# Patient Record
Sex: FEMALE | Race: WHITE | Employment: OTHER | URBAN - METROPOLITAN AREA
[De-identification: names, ages, dates, MRNs, and addresses within clinical notes are randomized per-mention and may not be internally consistent; named-entity substitution may affect disease eponyms.]

---

## 2017-02-14 ENCOUNTER — APPOINTMENT (OUTPATIENT)
Dept: GENERAL RADIOLOGY | Age: 72
End: 2017-02-14
Payer: MEDICARE

## 2017-02-14 ENCOUNTER — HOSPITAL ENCOUNTER (EMERGENCY)
Age: 72
Discharge: HOME | End: 2017-02-14
Attending: EMERGENCY MEDICINE
Payer: MEDICARE

## 2017-02-14 VITALS
OXYGEN SATURATION: 95 % | TEMPERATURE: 98.6 F | RESPIRATION RATE: 20 BRPM | BODY MASS INDEX: 38.27 KG/M2 | WEIGHT: 230 LBS | DIASTOLIC BLOOD PRESSURE: 58 MMHG | SYSTOLIC BLOOD PRESSURE: 122 MMHG | HEART RATE: 97 BPM

## 2017-02-14 DIAGNOSIS — J40 BRONCHITIS: Primary | ICD-10-CM

## 2017-02-14 PROCEDURE — 6370000000 HC RX 637 (ALT 250 FOR IP): Performed by: EMERGENCY MEDICINE

## 2017-02-14 PROCEDURE — 99283 EMERGENCY DEPT VISIT LOW MDM: CPT

## 2017-02-14 PROCEDURE — 71020 XR CHEST STANDARD TWO VW: CPT

## 2017-02-14 RX ORDER — BENZONATATE 100 MG/1
100 CAPSULE ORAL 3 TIMES DAILY PRN
Qty: 20 CAPSULE | Refills: 0 | Status: ON HOLD | OUTPATIENT
Start: 2017-02-14 | End: 2019-10-28

## 2017-02-14 RX ORDER — AZITHROMYCIN 250 MG/1
TABLET, FILM COATED ORAL
Qty: 4 TABLET | Refills: 0 | Status: SHIPPED | OUTPATIENT
Start: 2017-02-14 | End: 2017-02-24

## 2017-02-14 RX ORDER — AZITHROMYCIN 500 MG/1
1000 TABLET, FILM COATED ORAL DAILY
Status: DISCONTINUED | OUTPATIENT
Start: 2017-02-14 | End: 2017-02-14

## 2017-02-14 RX ORDER — AZITHROMYCIN 500 MG/1
500 TABLET, FILM COATED ORAL ONCE
Status: COMPLETED | OUTPATIENT
Start: 2017-02-14 | End: 2017-02-14

## 2017-02-14 RX ORDER — BENZONATATE 100 MG/1
200 CAPSULE ORAL ONCE
Status: COMPLETED | OUTPATIENT
Start: 2017-02-14 | End: 2017-02-14

## 2017-02-14 RX ORDER — PREDNISONE 10 MG/1
50 TABLET ORAL DAILY
Qty: 25 TABLET | Refills: 0 | Status: SHIPPED | OUTPATIENT
Start: 2017-02-14 | End: 2017-02-19

## 2017-02-14 RX ADMIN — AZITHROMYCIN 500 MG: 500 TABLET, FILM COATED ORAL at 21:54

## 2017-02-14 RX ADMIN — BENZONATATE 200 MG: 100 CAPSULE ORAL at 21:50

## 2017-02-14 ASSESSMENT — PAIN SCALES - GENERAL: PAINLEVEL_OUTOF10: 5

## 2017-02-14 ASSESSMENT — ENCOUNTER SYMPTOMS
EYE DISCHARGE: 0
ABDOMINAL PAIN: 0
CHEST TIGHTNESS: 0
WHEEZING: 0
ABDOMINAL DISTENTION: 0
VOMITING: 0
PHOTOPHOBIA: 0
SHORTNESS OF BREATH: 0
SORE THROAT: 1
COUGH: 1

## 2017-02-14 ASSESSMENT — PAIN DESCRIPTION - PAIN TYPE: TYPE: ACUTE PAIN

## 2017-02-14 ASSESSMENT — PAIN DESCRIPTION - LOCATION: LOCATION: THROAT

## 2017-02-14 ASSESSMENT — PAIN DESCRIPTION - DESCRIPTORS: DESCRIPTORS: SORE

## 2017-02-14 ASSESSMENT — PAIN DESCRIPTION - FREQUENCY: FREQUENCY: CONTINUOUS

## 2017-04-23 ENCOUNTER — APPOINTMENT (OUTPATIENT)
Dept: GENERAL RADIOLOGY | Age: 72
End: 2017-04-23
Payer: MEDICARE

## 2017-04-23 ENCOUNTER — APPOINTMENT (OUTPATIENT)
Dept: ULTRASOUND IMAGING | Age: 72
End: 2017-04-23
Payer: MEDICARE

## 2017-04-23 ENCOUNTER — HOSPITAL ENCOUNTER (EMERGENCY)
Age: 72
Discharge: HOME | End: 2017-04-23
Payer: MEDICARE

## 2017-04-23 VITALS
RESPIRATION RATE: 18 BRPM | HEART RATE: 91 BPM | TEMPERATURE: 98.7 F | OXYGEN SATURATION: 99 % | BODY MASS INDEX: 38.32 KG/M2 | SYSTOLIC BLOOD PRESSURE: 152 MMHG | DIASTOLIC BLOOD PRESSURE: 83 MMHG | WEIGHT: 230 LBS | HEIGHT: 65 IN

## 2017-04-23 DIAGNOSIS — R60.9 PERIPHERAL EDEMA: Primary | ICD-10-CM

## 2017-04-23 LAB
ALBUMIN SERPL-MCNC: 4.1 G/DL (ref 3.9–4.9)
ALP BLD-CCNC: 74 U/L (ref 40–130)
ALT SERPL-CCNC: 16 U/L (ref 0–33)
ANION GAP SERPL CALCULATED.3IONS-SCNC: 11 MEQ/L (ref 7–13)
AST SERPL-CCNC: 20 U/L (ref 0–35)
BASOPHILS ABSOLUTE: 0 K/UL (ref 0–0.2)
BASOPHILS RELATIVE PERCENT: 0.3 %
BILIRUB SERPL-MCNC: 0.3 MG/DL (ref 0–1.2)
BILIRUBIN URINE: NEGATIVE
BLOOD, URINE: NEGATIVE
BUN BLDV-MCNC: 14 MG/DL (ref 8–23)
CALCIUM SERPL-MCNC: 10.1 MG/DL (ref 8.6–10.2)
CHLORIDE BLD-SCNC: 98 MEQ/L (ref 98–107)
CLARITY: CLEAR
CO2: 29 MEQ/L (ref 22–29)
COLOR: YELLOW
CREAT SERPL-MCNC: 0.67 MG/DL (ref 0.5–0.9)
EOSINOPHILS ABSOLUTE: 0.1 K/UL (ref 0–0.7)
EOSINOPHILS RELATIVE PERCENT: 1.7 %
GFR AFRICAN AMERICAN: >60
GFR NON-AFRICAN AMERICAN: >60
GLOBULIN: 2.3 G/DL (ref 2.3–3.5)
GLUCOSE BLD-MCNC: 90 MG/DL (ref 74–109)
GLUCOSE URINE: NEGATIVE MG/DL
HCT VFR BLD CALC: 36.9 % (ref 37–47)
HEMOGLOBIN: 12.3 G/DL (ref 12–16)
KETONES, URINE: NEGATIVE MG/DL
LEUKOCYTE ESTERASE, URINE: NEGATIVE
LYMPHOCYTES ABSOLUTE: 2.6 K/UL (ref 1–4.8)
LYMPHOCYTES RELATIVE PERCENT: 30.7 %
MCH RBC QN AUTO: 28.7 PG (ref 27–31.3)
MCHC RBC AUTO-ENTMCNC: 33.4 % (ref 33–37)
MCV RBC AUTO: 86 FL (ref 82–100)
MONOCYTES ABSOLUTE: 0.7 K/UL (ref 0.2–0.8)
MONOCYTES RELATIVE PERCENT: 7.8 %
NEUTROPHILS ABSOLUTE: 5 K/UL (ref 1.4–6.5)
NEUTROPHILS RELATIVE PERCENT: 59.5 %
NITRITE, URINE: NEGATIVE
PDW BLD-RTO: 14.2 % (ref 11.5–14.5)
PH UA: 5.5 (ref 5–9)
PLATELET # BLD: 346 K/UL (ref 130–400)
POTASSIUM SERPL-SCNC: 4.1 MEQ/L (ref 3.5–5.1)
PRO-BNP: 74 PG/ML
PROTEIN UA: NEGATIVE MG/DL
RBC # BLD: 4.29 M/UL (ref 4.2–5.4)
SODIUM BLD-SCNC: 138 MEQ/L (ref 132–144)
SPECIFIC GRAVITY UA: 1.01 (ref 1–1.03)
TOTAL CK: 155 U/L (ref 0–170)
TOTAL PROTEIN: 6.4 G/DL (ref 6.4–8.1)
URINE REFLEX TO CULTURE: NORMAL
UROBILINOGEN, URINE: 0.2 E.U./DL
WBC # BLD: 8.4 K/UL (ref 4.8–10.8)

## 2017-04-23 PROCEDURE — 80053 COMPREHEN METABOLIC PANEL: CPT

## 2017-04-23 PROCEDURE — 82550 ASSAY OF CK (CPK): CPT

## 2017-04-23 PROCEDURE — 81003 URINALYSIS AUTO W/O SCOPE: CPT

## 2017-04-23 PROCEDURE — 71010 XR CHEST PORTABLE: CPT

## 2017-04-23 PROCEDURE — 96374 THER/PROPH/DIAG INJ IV PUSH: CPT

## 2017-04-23 PROCEDURE — 6360000002 HC RX W HCPCS: Performed by: PHYSICIAN ASSISTANT

## 2017-04-23 PROCEDURE — 99284 EMERGENCY DEPT VISIT MOD MDM: CPT

## 2017-04-23 PROCEDURE — 85025 COMPLETE CBC W/AUTO DIFF WBC: CPT

## 2017-04-23 PROCEDURE — 93971 EXTREMITY STUDY: CPT

## 2017-04-23 PROCEDURE — 83880 ASSAY OF NATRIURETIC PEPTIDE: CPT

## 2017-04-23 PROCEDURE — 36415 COLL VENOUS BLD VENIPUNCTURE: CPT

## 2017-04-23 RX ORDER — HYDROCODONE BITARTRATE AND ACETAMINOPHEN 5; 325 MG/1; MG/1
1 TABLET ORAL EVERY 8 HOURS PRN
Qty: 10 TABLET | Refills: 0 | Status: SHIPPED | OUTPATIENT
Start: 2017-04-23 | End: 2017-04-30

## 2017-04-23 RX ORDER — LORAZEPAM 0.5 MG/1
0.25 TABLET ORAL
COMMUNITY

## 2017-04-23 RX ORDER — FUROSEMIDE 10 MG/ML
40 INJECTION INTRAMUSCULAR; INTRAVENOUS ONCE
Status: COMPLETED | OUTPATIENT
Start: 2017-04-23 | End: 2017-04-23

## 2017-04-23 RX ORDER — IBUPROFEN 600 MG/1
600 TABLET ORAL EVERY 6 HOURS PRN
COMMUNITY
End: 2018-08-11

## 2017-04-23 RX ORDER — CYCLOBENZAPRINE HCL 5 MG
5 TABLET ORAL 2 TIMES DAILY PRN
COMMUNITY
End: 2018-08-11 | Stop reason: ALTCHOICE

## 2017-04-23 RX ORDER — VITAMIN B COMPLEX
1 CAPSULE ORAL DAILY
Status: ON HOLD | COMMUNITY
End: 2019-10-29

## 2017-04-23 RX ADMIN — FUROSEMIDE 40 MG: 10 INJECTION, SOLUTION INTRAMUSCULAR; INTRAVENOUS at 16:27

## 2017-04-23 ASSESSMENT — ENCOUNTER SYMPTOMS
BACK PAIN: 0
SORE THROAT: 0
COLOR CHANGE: 0
EYE PAIN: 0
DIARRHEA: 0
ABDOMINAL PAIN: 0
VOMITING: 0
NAUSEA: 0
RHINORRHEA: 0
CHEST TIGHTNESS: 0
CONSTIPATION: 1
COUGH: 0
ABDOMINAL DISTENTION: 0
EYE DISCHARGE: 0
SHORTNESS OF BREATH: 0

## 2017-12-18 ENCOUNTER — HOSPITAL ENCOUNTER (EMERGENCY)
Age: 72
Discharge: HOME | End: 2017-12-18
Attending: EMERGENCY MEDICINE
Payer: MEDICARE

## 2017-12-18 VITALS
DIASTOLIC BLOOD PRESSURE: 72 MMHG | RESPIRATION RATE: 18 BRPM | OXYGEN SATURATION: 99 % | BODY MASS INDEX: 38.32 KG/M2 | SYSTOLIC BLOOD PRESSURE: 118 MMHG | WEIGHT: 230 LBS | TEMPERATURE: 98.4 F | HEIGHT: 65 IN | HEART RATE: 88 BPM

## 2017-12-18 DIAGNOSIS — S16.1XXA STRAIN OF NECK MUSCLE, INITIAL ENCOUNTER: Primary | ICD-10-CM

## 2017-12-18 PROCEDURE — 96372 THER/PROPH/DIAG INJ SC/IM: CPT

## 2017-12-18 PROCEDURE — 99283 EMERGENCY DEPT VISIT LOW MDM: CPT

## 2017-12-18 PROCEDURE — 6360000002 HC RX W HCPCS: Performed by: EMERGENCY MEDICINE

## 2017-12-18 RX ORDER — KETOROLAC TROMETHAMINE 30 MG/ML
60 INJECTION, SOLUTION INTRAMUSCULAR; INTRAVENOUS ONCE
Status: COMPLETED | OUTPATIENT
Start: 2017-12-18 | End: 2017-12-18

## 2017-12-18 RX ADMIN — KETOROLAC TROMETHAMINE 60 MG: 30 INJECTION, SOLUTION INTRAMUSCULAR at 18:10

## 2017-12-18 ASSESSMENT — ENCOUNTER SYMPTOMS
SHORTNESS OF BREATH: 0
ABDOMINAL PAIN: 0
FACIAL SWELLING: 0
ABDOMINAL DISTENTION: 0
VOMITING: 0
COLOR CHANGE: 0
WHEEZING: 0
PHOTOPHOBIA: 0
RHINORRHEA: 0
EYE DISCHARGE: 0

## 2017-12-18 ASSESSMENT — PAIN DESCRIPTION - ORIENTATION
ORIENTATION: LEFT

## 2017-12-18 ASSESSMENT — PAIN SCALES - GENERAL
PAINLEVEL_OUTOF10: 4
PAINLEVEL_OUTOF10: 7
PAINLEVEL_OUTOF10: 2
PAINLEVEL_OUTOF10: 4

## 2017-12-18 ASSESSMENT — PAIN DESCRIPTION - LOCATION
LOCATION: NECK

## 2017-12-18 ASSESSMENT — PAIN DESCRIPTION - DESCRIPTORS
DESCRIPTORS: ACHING
DESCRIPTORS: SHARP;SPASM

## 2017-12-18 ASSESSMENT — PAIN DESCRIPTION - PAIN TYPE
TYPE: ACUTE PAIN

## 2017-12-18 ASSESSMENT — PAIN DESCRIPTION - FREQUENCY
FREQUENCY: INTERMITTENT
FREQUENCY: INTERMITTENT

## 2017-12-18 ASSESSMENT — PAIN DESCRIPTION - PROGRESSION: CLINICAL_PROGRESSION: GRADUALLY IMPROVING

## 2017-12-18 ASSESSMENT — PAIN DESCRIPTION - ONSET: ONSET: ON-GOING

## 2017-12-19 NOTE — ED PROVIDER NOTES
[address] [facility] ED  eMERGENCY dEPARTMENT eNCOUnter      Pt Name: Samson Tomlin  MRN: [id number]  Shruthigfсветлана 1945  Date of evaluation: 12/18/2017  Provider: Tyrel Medina, [address] [place] [address]       Chief Complaint   Patient presents with    Neck Pain     left sided neck pain with certain positions since last night         HISTORY OF PRESENT ILLNESS   (Location/Symptom, Timing/Onset, Context/Setting, Quality, Duration, Modifying Factors, Severity)  Note limiting factors. Samson Tomlin is a 71 y.o. female who presents to the emergency department With a chief complaint of a pain in her neck. It started yesterday evening when she fell asleep on the couch and woke up with a start. She noticed it off and on throughout the day today especially with turning her head in a certain direction or with certain position changes. She denies any pain or numbness down her arm. She denies any lightheadedness or dizziness or chest pain, nausea, headache, vomiting, or other complaints. Patient states \"it's almost like it spasms off and on and the pain gets really intense but it does not last for very long\". HPI    Nursing Notes were reviewed. REVIEW OF SYSTEMS    (2-9 systems for level 4, 10 or more for level 5)     Review of Systems   Constitutional: Negative for activity change and appetite change. HENT: Negative for congestion, facial swelling and rhinorrhea. Eyes: Negative for photophobia and discharge. Respiratory: Negative for shortness of breath and wheezing. Cardiovascular: Negative for chest pain. Gastrointestinal: Negative for abdominal distention, abdominal pain and vomiting. Endocrine: Negative for polydipsia and polyphagia. Genitourinary: Negative for difficulty urinating, frequency, vaginal bleeding and vaginal discharge. Musculoskeletal: Positive for neck pain. Negative for gait problem. Skin: Negative for color change.    Allergic/Immunologic: Negative for immunocompromised state. Neurological: Negative for dizziness, weakness and light-headedness. Hematological: Negative for adenopathy. Psychiatric/Behavioral: Negative for behavioral problems. Except as noted above the remainder of the review of systems was reviewed and negative. PAST MEDICAL HISTORY     Past Medical History:   Diagnosis Date    Hypertension     MRSA infection     UTI (lower urinary tract infection)          SURGICAL HISTORY       Past Surgical History:   Procedure Laterality Date    APPENDECTOMY      SHOULDER SURGERY      TONSILLECTOMY      WRIST SURGERY           CURRENT MEDICATIONS       Previous Medications    AMITRIPTYLINE (ELAVIL) 25 MG TABLET    Take 25 mg by mouth nightly. ASPIRIN 81 MG TABLET    Take 81 mg by mouth daily. ATORVASTATIN (LIPITOR) 20 MG TABLET    Take 20 mg by mouth daily. B COMPLEX VITAMINS CAPSULE    Take 1 capsule by mouth daily    BENZONATATE (TESSALON PERLES) 100 MG CAPSULE    Take 1 capsule by mouth 3 times daily as needed for Cough    CEPHALEXIN (KEFLEX) 500 MG CAPSULE    Take 1 capsule by mouth 3 times daily. CHLORTHALIDONE (HYGROTON) 25 MG TABLET    Take 25 mg by mouth daily. CIPROFLOXACIN (CIPRO) 500 MG TABLET    Take 500 mg by mouth 2 times daily. CYCLOBENZAPRINE (FLEXERIL) 5 MG TABLET    Take 5 mg by mouth 3 times daily as needed for Muscle spasms.     CYCLOBENZAPRINE (FLEXERIL) 5 MG TABLET    Take 5 mg by mouth 2 times daily as needed for Muscle spasms    DICYCLOMINE (BENTYL) 10 MG CAPSULE    Take 1 capsule by mouth 4 times daily (before meals and nightly)    FUROSEMIDE (LASIX) 40 MG TABLET    Take 20 mg by mouth daily     GLUCOS-CHONDROIT-HYALURON-MSM (GLUCOSAMINE CHONDROITIN JOINT PO)    Take by mouth    IBUPROFEN (ADVIL;MOTRIN) 600 MG TABLET    Take 600 mg by mouth every 6 hours as needed for Pain    LISINOPRIL (PRINIVIL;ZESTRIL) 20 MG TABLET    Take 10 mg by mouth daily     LORAZEPAM (ATIVAN) 0.5 MG TABLET    Take 0.5 mg by mouth 2 times daily    METH-HYO-M BL-NA PHOS-PH SAL (URIBEL) 118 MG CAPS    Take 20 capsules by mouth 3 times daily. MULTIPLE VITAMINS-MINERALS (THERAPEUTIC MULTIVITAMIN-MINERALS) TABLET    Take 1 tablet by mouth daily. NABUMETONE (RELAFEN) 750 MG TABLET    Take 750 mg by mouth 2 times daily. PAROXETINE (PAXIL-CR) 25 MG CR TABLET    Take 25 mg by mouth every morning. POTASSIUM CHLORIDE SA (K-DUR;KLOR-CON M) 10 MEQ TABLET    Take 10 mEq by mouth 2 times daily. ALLERGIES     Metronidazole and Tetracycline    FAMILY HISTORY     History reviewed. No pertinent family history. SOCIAL HISTORY       Social History     Social History    Marital status:      Spouse name: N/A    Number of children: N/A    Years of education: N/A     Social History Main Topics    Smoking status: Never Smoker    Smokeless tobacco: Never Used    Alcohol use Yes      Comment: Rarely    Drug use: No    Sexual activity: No     Other Topics Concern    None     Social History Narrative    None       SCREENINGS             PHYSICAL EXAM    (up to 7 for level 4, 8 or more for level 5)     ED Triage Vitals [12/18/17 1723]   BP Temp Temp Source Pulse Resp SpO2 Height Weight   119/74 99.3 °F (37.4 °C) Oral 110 18 99 % 5' 5\" (1.651 m) 230 lb (104.3 kg)       Physical Exam   Constitutional: She is oriented to person, place, and time. She appears well-developed and well-nourished. HENT:   Head: Normocephalic and atraumatic. Eyes: Conjunctivae and EOM are normal. Pupils are equal, round, and reactive to light. Neck: Normal range of motion. Neck supple. Cardiovascular: Normal rate. Pulmonary/Chest: Effort normal.   Abdominal: Soft. Bowel sounds are normal.   Musculoskeletal: Normal range of motion. Patient has pain at the lateral aspect of her left neck at the insertion of her muscle where increased tissue tension abnormality is palpable as a muscle spasm.     Neurological: She is alert and oriented to

## 2018-03-15 ENCOUNTER — HOSPITAL ENCOUNTER (EMERGENCY)
Age: 73
Discharge: HOME | End: 2018-03-15
Attending: STUDENT IN AN ORGANIZED HEALTH CARE EDUCATION/TRAINING PROGRAM
Payer: MEDICARE

## 2018-03-15 VITALS
OXYGEN SATURATION: 99 % | HEART RATE: 83 BPM | HEIGHT: 65 IN | SYSTOLIC BLOOD PRESSURE: 128 MMHG | BODY MASS INDEX: 38.32 KG/M2 | TEMPERATURE: 98.9 F | RESPIRATION RATE: 14 BRPM | WEIGHT: 230 LBS | DIASTOLIC BLOOD PRESSURE: 74 MMHG

## 2018-03-15 DIAGNOSIS — R35.0 URINARY FREQUENCY: Primary | ICD-10-CM

## 2018-03-15 DIAGNOSIS — E87.6 HYPOKALEMIA: ICD-10-CM

## 2018-03-15 LAB
ALBUMIN SERPL-MCNC: 3.9 G/DL (ref 3.9–4.9)
ALP BLD-CCNC: 76 U/L (ref 40–130)
ALT SERPL-CCNC: 18 U/L (ref 0–33)
ANION GAP SERPL CALCULATED.3IONS-SCNC: 12 MEQ/L (ref 7–13)
AST SERPL-CCNC: 20 U/L (ref 0–35)
BASOPHILS ABSOLUTE: 0.1 K/UL (ref 0–0.2)
BASOPHILS RELATIVE PERCENT: 1 %
BILIRUB SERPL-MCNC: 0.4 MG/DL (ref 0–1.2)
BILIRUBIN URINE: NEGATIVE
BLOOD, URINE: NEGATIVE
BUN BLDV-MCNC: 19 MG/DL (ref 8–23)
CALCIUM SERPL-MCNC: 9.1 MG/DL (ref 8.6–10.2)
CHLORIDE BLD-SCNC: 95 MEQ/L (ref 98–107)
CLARITY: CLEAR
CO2: 33 MEQ/L (ref 22–29)
COLOR: YELLOW
CREAT SERPL-MCNC: 0.76 MG/DL (ref 0.5–0.9)
EOSINOPHILS ABSOLUTE: 0.2 K/UL (ref 0–0.7)
EOSINOPHILS RELATIVE PERCENT: 2.9 %
GFR AFRICAN AMERICAN: >60
GFR NON-AFRICAN AMERICAN: >60
GLOBULIN: 2.4 G/DL (ref 2.3–3.5)
GLUCOSE BLD-MCNC: 125 MG/DL (ref 74–109)
GLUCOSE URINE: NEGATIVE MG/DL
HCT VFR BLD CALC: 38.3 % (ref 37–47)
HEMOGLOBIN: 12.9 G/DL (ref 12–16)
KETONES, URINE: NEGATIVE MG/DL
LACTIC ACID: 1.7 MMOL/L (ref 0.5–2.2)
LEUKOCYTE ESTERASE, URINE: NEGATIVE
LYMPHOCYTES ABSOLUTE: 2.6 K/UL (ref 1–4.8)
LYMPHOCYTES RELATIVE PERCENT: 36.2 %
MCH RBC QN AUTO: 29.6 PG (ref 27–31.3)
MCHC RBC AUTO-ENTMCNC: 33.8 % (ref 33–37)
MCV RBC AUTO: 87.6 FL (ref 82–100)
MONOCYTES ABSOLUTE: 0.5 K/UL (ref 0.2–0.8)
MONOCYTES RELATIVE PERCENT: 6.6 %
NEUTROPHILS ABSOLUTE: 3.8 K/UL (ref 1.4–6.5)
NEUTROPHILS RELATIVE PERCENT: 53.3 %
NITRITE, URINE: NEGATIVE
PDW BLD-RTO: 14.1 % (ref 11.5–14.5)
PH UA: 6 (ref 5–9)
PLATELET # BLD: 297 K/UL (ref 130–400)
POTASSIUM SERPL-SCNC: 3.1 MEQ/L (ref 3.5–5.1)
PROTEIN UA: NEGATIVE MG/DL
RBC # BLD: 4.37 M/UL (ref 4.2–5.4)
SODIUM BLD-SCNC: 140 MEQ/L (ref 132–144)
SPECIFIC GRAVITY UA: 1.01 (ref 1–1.03)
TOTAL PROTEIN: 6.3 G/DL (ref 6.4–8.1)
URINE REFLEX TO CULTURE: NORMAL
UROBILINOGEN, URINE: 0.2 E.U./DL
WBC # BLD: 7.1 K/UL (ref 4.8–10.8)

## 2018-03-15 PROCEDURE — 99284 EMERGENCY DEPT VISIT MOD MDM: CPT

## 2018-03-15 PROCEDURE — 83605 ASSAY OF LACTIC ACID: CPT

## 2018-03-15 PROCEDURE — 85025 COMPLETE CBC W/AUTO DIFF WBC: CPT

## 2018-03-15 PROCEDURE — 80053 COMPREHEN METABOLIC PANEL: CPT

## 2018-03-15 PROCEDURE — 81003 URINALYSIS AUTO W/O SCOPE: CPT

## 2018-03-15 PROCEDURE — 36415 COLL VENOUS BLD VENIPUNCTURE: CPT

## 2018-03-15 PROCEDURE — 6370000000 HC RX 637 (ALT 250 FOR IP): Performed by: NURSE PRACTITIONER

## 2018-03-15 RX ORDER — POTASSIUM BICARBONATE 25 MEQ/1
50 TABLET, EFFERVESCENT ORAL ONCE
Status: COMPLETED | OUTPATIENT
Start: 2018-03-15 | End: 2018-03-15

## 2018-03-15 RX ADMIN — POTASSIUM BICARBONATE 50 MEQ: 25 TABLET, EFFERVESCENT ORAL at 16:59

## 2018-03-15 ASSESSMENT — ENCOUNTER SYMPTOMS
DIARRHEA: 0
VOMITING: 0
COUGH: 0
ABDOMINAL PAIN: 0
SHORTNESS OF BREATH: 0
NAUSEA: 0

## 2018-03-15 ASSESSMENT — PAIN DESCRIPTION - DESCRIPTORS: DESCRIPTORS: SHARP

## 2018-03-15 ASSESSMENT — PAIN DESCRIPTION - FREQUENCY: FREQUENCY: INTERMITTENT

## 2018-03-15 ASSESSMENT — PAIN DESCRIPTION - PAIN TYPE: TYPE: ACUTE PAIN

## 2018-03-15 ASSESSMENT — PAIN SCALES - GENERAL: PAINLEVEL_OUTOF10: 4

## 2018-03-15 ASSESSMENT — PAIN DESCRIPTION - ORIENTATION: ORIENTATION: LEFT

## 2018-03-15 ASSESSMENT — PAIN DESCRIPTION - LOCATION: LOCATION: FLANK

## 2018-03-15 NOTE — ED TRIAGE NOTES
A &o x4 female, skin pk/w/d, resp unlabored, bilateral 2-3+ edema bilateral edema, urine pale yellow, speech clear, [place] x4 = & spont.

## 2018-03-15 NOTE — ED PROVIDER NOTES
vomiting. Endocrine: Positive for polyuria. Genitourinary: Negative for dysuria and flank pain. Skin: Negative for rash. Neurological: Negative for dizziness, light-headedness and headaches. Except as noted above the remainder of the review of systems was reviewed and negative. PAST HISTORY     Past Medical History:   Diagnosis Date    Asthma     Degenerative disc disease, lumbar     Hypertension     MRSA infection     UTI (lower urinary tract infection)      Past Surgical History:   Procedure Laterality Date    APPENDECTOMY      SHOULDER SURGERY      TONSILLECTOMY      WRIST SURGERY       Social History     Social History    Marital status:      Spouse name: N/A    Number of children: N/A    Years of education: N/A     Social History Main Topics    Smoking status: Never Smoker    Smokeless tobacco: Never Used    Alcohol use Yes      Comment: Rarely    Drug use: No    Sexual activity: No     Other Topics Concern    None     Social History Narrative    None         PHYSICAL EXAM    (up to 7 for level 4, 8 or more for level 5)     ED Triage Vitals [03/15/18 1449]   BP Temp Temp Source Pulse Resp SpO2 Height Weight   131/71 98.9 °F (37.2 °C) Oral 103 19 98 % 5' 5\" (1.651 m) 230 lb (104.3 kg)       Physical Exam   Constitutional: She is oriented to person, place, and time. She appears well-developed and well-nourished. She is active. No distress. HENT:   Head: Normocephalic and atraumatic. Mouth/Throat: Mucous membranes are normal.   Neck: Normal range of motion. Neck supple. Cardiovascular: Normal rate, regular rhythm, normal heart sounds, intact distal pulses and normal pulses. Pulmonary/Chest: Effort normal and breath sounds normal.   Abdominal: Soft. Normal appearance and bowel sounds are normal. There is no tenderness. Musculoskeletal: She exhibits edema (chronic). Neurological: She is alert and oriented to person, place, and time. She has normal strength. concerning symptoms. Patient demonstrates understanding and all questions were answered. PROCEDURES:    Procedures      FINAL IMPRESSION      1. Urinary frequency    2.  Hypokalemia          DISPOSITION/PLAN   DISPOSITION Decision To Discharge 03/15/2018 04:28:01 PM      PATIENT REFERRED TO:  Becky Emanuel MD  [facility] [phone]    In 1 day  For continued evaluation and management      DISCHARGE MEDICATIONS:  New Prescriptions    No medications on file       (Please note that portions of this note were completed with a voice recognition program.  Efforts were made to edit the dictations but occasionally words are mis-transcribed.)    Ted Matta, [address] [facility], CNP  03/15/18 [facility], [address] [place] [address]  03/15/18 306 [address]

## 2018-04-13 ENCOUNTER — APPOINTMENT (OUTPATIENT)
Dept: GENERAL RADIOLOGY | Age: 73
End: 2018-04-13
Payer: MEDICARE

## 2018-04-13 ENCOUNTER — APPOINTMENT (OUTPATIENT)
Dept: ULTRASOUND IMAGING | Age: 73
End: 2018-04-13
Payer: MEDICARE

## 2018-04-13 ENCOUNTER — HOSPITAL ENCOUNTER (EMERGENCY)
Age: 73
Discharge: HOME | End: 2018-04-13
Payer: MEDICARE

## 2018-04-13 VITALS
SYSTOLIC BLOOD PRESSURE: 138 MMHG | TEMPERATURE: 99.2 F | RESPIRATION RATE: 18 BRPM | HEIGHT: 65 IN | DIASTOLIC BLOOD PRESSURE: 56 MMHG | HEART RATE: 91 BPM | BODY MASS INDEX: 38.32 KG/M2 | OXYGEN SATURATION: 94 % | WEIGHT: 230 LBS

## 2018-04-13 DIAGNOSIS — G89.29 CHRONIC PAIN OF BOTH KNEES: Primary | ICD-10-CM

## 2018-04-13 DIAGNOSIS — E87.6 HYPOKALEMIA: ICD-10-CM

## 2018-04-13 DIAGNOSIS — M25.561 CHRONIC PAIN OF BOTH KNEES: Primary | ICD-10-CM

## 2018-04-13 DIAGNOSIS — M25.562 CHRONIC PAIN OF BOTH KNEES: Primary | ICD-10-CM

## 2018-04-13 DIAGNOSIS — R60.0 BILATERAL LOWER EXTREMITY EDEMA: ICD-10-CM

## 2018-04-13 LAB
ALBUMIN SERPL-MCNC: 4.1 G/DL (ref 3.9–4.9)
ALP BLD-CCNC: 80 U/L (ref 40–130)
ALT SERPL-CCNC: 20 U/L (ref 0–33)
ANION GAP SERPL CALCULATED.3IONS-SCNC: 14 MEQ/L (ref 7–13)
APTT: 25.4 SEC (ref 21.6–35.4)
AST SERPL-CCNC: 24 U/L (ref 0–35)
BASOPHILS ABSOLUTE: 0 K/UL (ref 0–0.2)
BASOPHILS RELATIVE PERCENT: 0.5 %
BILIRUB SERPL-MCNC: 0.4 MG/DL (ref 0–1.2)
BUN BLDV-MCNC: 12 MG/DL (ref 8–23)
CALCIUM SERPL-MCNC: 9.5 MG/DL (ref 8.6–10.2)
CHLORIDE BLD-SCNC: 94 MEQ/L (ref 98–107)
CO2: 30 MEQ/L (ref 22–29)
CREAT SERPL-MCNC: 0.71 MG/DL (ref 0.5–0.9)
EOSINOPHILS ABSOLUTE: 0.1 K/UL (ref 0–0.7)
EOSINOPHILS RELATIVE PERCENT: 1.6 %
GFR AFRICAN AMERICAN: >60
GFR NON-AFRICAN AMERICAN: >60
GLOBULIN: 2 G/DL (ref 2.3–3.5)
GLUCOSE BLD-MCNC: 149 MG/DL (ref 74–109)
HCT VFR BLD CALC: 38.3 % (ref 37–47)
HEMOGLOBIN: 12.9 G/DL (ref 12–16)
INR BLD: 1
LYMPHOCYTES ABSOLUTE: 2.8 K/UL (ref 1–4.8)
LYMPHOCYTES RELATIVE PERCENT: 30.9 %
MCH RBC QN AUTO: 29.7 PG (ref 27–31.3)
MCHC RBC AUTO-ENTMCNC: 33.8 % (ref 33–37)
MCV RBC AUTO: 87.8 FL (ref 82–100)
MONOCYTES ABSOLUTE: 0.5 K/UL (ref 0.2–0.8)
MONOCYTES RELATIVE PERCENT: 6.1 %
NEUTROPHILS ABSOLUTE: 5.5 K/UL (ref 1.4–6.5)
NEUTROPHILS RELATIVE PERCENT: 60.9 %
PDW BLD-RTO: 14.5 % (ref 11.5–14.5)
PLATELET # BLD: 325 K/UL (ref 130–400)
POTASSIUM SERPL-SCNC: 3.1 MEQ/L (ref 3.5–5.1)
PRO-BNP: 126 PG/ML
PROTHROMBIN TIME: 10.8 SEC (ref 9.6–12.3)
RBC # BLD: 4.36 M/UL (ref 4.2–5.4)
SODIUM BLD-SCNC: 138 MEQ/L (ref 132–144)
TOTAL PROTEIN: 6.1 G/DL (ref 6.4–8.1)
WBC # BLD: 9 K/UL (ref 4.8–10.8)

## 2018-04-13 PROCEDURE — 80053 COMPREHEN METABOLIC PANEL: CPT

## 2018-04-13 PROCEDURE — 6370000000 HC RX 637 (ALT 250 FOR IP): Performed by: PHYSICIAN ASSISTANT

## 2018-04-13 PROCEDURE — 99284 EMERGENCY DEPT VISIT MOD MDM: CPT

## 2018-04-13 PROCEDURE — 73562 X-RAY EXAM OF KNEE 3: CPT

## 2018-04-13 PROCEDURE — 85730 THROMBOPLASTIN TIME PARTIAL: CPT

## 2018-04-13 PROCEDURE — 36415 COLL VENOUS BLD VENIPUNCTURE: CPT

## 2018-04-13 PROCEDURE — 93970 EXTREMITY STUDY: CPT

## 2018-04-13 PROCEDURE — 85610 PROTHROMBIN TIME: CPT

## 2018-04-13 PROCEDURE — 83880 ASSAY OF NATRIURETIC PEPTIDE: CPT

## 2018-04-13 PROCEDURE — 85025 COMPLETE CBC W/AUTO DIFF WBC: CPT

## 2018-04-13 PROCEDURE — 71046 X-RAY EXAM CHEST 2 VIEWS: CPT

## 2018-04-13 RX ORDER — POTASSIUM CHLORIDE 20 MEQ/1
40 TABLET, EXTENDED RELEASE ORAL ONCE
Status: COMPLETED | OUTPATIENT
Start: 2018-04-13 | End: 2018-04-13

## 2018-04-13 RX ADMIN — POTASSIUM CHLORIDE 40 MEQ: 20 TABLET, EXTENDED RELEASE ORAL at 19:20

## 2018-04-13 ASSESSMENT — ENCOUNTER SYMPTOMS
GASTROINTESTINAL NEGATIVE: 1
EYES NEGATIVE: 1

## 2018-04-13 ASSESSMENT — PAIN DESCRIPTION - LOCATION: LOCATION: KNEE

## 2018-04-13 ASSESSMENT — PAIN SCALES - GENERAL: PAINLEVEL_OUTOF10: 2

## 2018-04-13 ASSESSMENT — PAIN DESCRIPTION - PAIN TYPE: TYPE: ACUTE PAIN

## 2018-04-13 ASSESSMENT — PAIN DESCRIPTION - ORIENTATION: ORIENTATION: LEFT

## 2018-05-07 ENCOUNTER — HOSPITAL ENCOUNTER (EMERGENCY)
Age: 73
Discharge: HOME | End: 2018-05-07
Attending: EMERGENCY MEDICINE
Payer: MEDICARE

## 2018-05-07 VITALS
SYSTOLIC BLOOD PRESSURE: 146 MMHG | DIASTOLIC BLOOD PRESSURE: 83 MMHG | HEIGHT: 63 IN | RESPIRATION RATE: 16 BRPM | TEMPERATURE: 99.3 F | HEART RATE: 68 BPM | WEIGHT: 230 LBS | BODY MASS INDEX: 40.75 KG/M2 | OXYGEN SATURATION: 100 %

## 2018-05-07 DIAGNOSIS — S09.8XXA BLUNT HEAD TRAUMA, INITIAL ENCOUNTER: Primary | ICD-10-CM

## 2018-05-07 PROCEDURE — 99283 EMERGENCY DEPT VISIT LOW MDM: CPT

## 2018-05-07 PROCEDURE — 6370000000 HC RX 637 (ALT 250 FOR IP): Performed by: EMERGENCY MEDICINE

## 2018-05-07 RX ORDER — ACETAMINOPHEN 500 MG
1000 TABLET ORAL ONCE
Status: COMPLETED | OUTPATIENT
Start: 2018-05-07 | End: 2018-05-07

## 2018-05-07 RX ORDER — NAPROXEN 500 MG/1
500 TABLET ORAL ONCE
Status: COMPLETED | OUTPATIENT
Start: 2018-05-07 | End: 2018-05-07

## 2018-05-07 RX ADMIN — NAPROXEN 500 MG: 500 TABLET ORAL at 17:10

## 2018-05-07 RX ADMIN — ACETAMINOPHEN 1000 MG: 500 TABLET ORAL at 17:10

## 2018-05-07 ASSESSMENT — PAIN DESCRIPTION - FREQUENCY: FREQUENCY: CONTINUOUS

## 2018-05-07 ASSESSMENT — ENCOUNTER SYMPTOMS
SHORTNESS OF BREATH: 0
PHOTOPHOBIA: 0
ABDOMINAL PAIN: 0
WHEEZING: 0
FACIAL SWELLING: 0
RHINORRHEA: 0
COLOR CHANGE: 0
VOMITING: 0
EYE DISCHARGE: 0
ABDOMINAL DISTENTION: 0

## 2018-05-07 ASSESSMENT — PAIN DESCRIPTION - LOCATION
LOCATION: HEAD
LOCATION: SHOULDER

## 2018-05-07 ASSESSMENT — PAIN DESCRIPTION - ORIENTATION: ORIENTATION: LEFT

## 2018-05-07 ASSESSMENT — PAIN SCALES - GENERAL
PAINLEVEL_OUTOF10: 2

## 2018-05-07 ASSESSMENT — PAIN DESCRIPTION - DESCRIPTORS
DESCRIPTORS: ACHING;SORE
DESCRIPTORS: SORE

## 2018-05-10 ENCOUNTER — APPOINTMENT (OUTPATIENT)
Dept: CT IMAGING | Age: 73
End: 2018-05-10
Payer: MEDICARE

## 2018-05-10 ENCOUNTER — HOSPITAL ENCOUNTER (EMERGENCY)
Age: 73
Discharge: HOME | End: 2018-05-10
Payer: MEDICARE

## 2018-05-10 VITALS
HEIGHT: 65 IN | HEART RATE: 78 BPM | TEMPERATURE: 98.4 F | WEIGHT: 230 LBS | SYSTOLIC BLOOD PRESSURE: 124 MMHG | OXYGEN SATURATION: 100 % | BODY MASS INDEX: 38.32 KG/M2 | RESPIRATION RATE: 20 BRPM | DIASTOLIC BLOOD PRESSURE: 68 MMHG

## 2018-05-10 DIAGNOSIS — F07.81 POST CONCUSSION SYNDROME: ICD-10-CM

## 2018-05-10 DIAGNOSIS — S09.90XA CLOSED HEAD INJURY, INITIAL ENCOUNTER: Primary | ICD-10-CM

## 2018-05-10 DIAGNOSIS — N39.0 URINARY TRACT INFECTION WITHOUT HEMATURIA, SITE UNSPECIFIED: ICD-10-CM

## 2018-05-10 LAB
BACTERIA: ABNORMAL /HPF
BILIRUBIN URINE: NEGATIVE
BLOOD, URINE: NEGATIVE
CLARITY: ABNORMAL
COLOR: YELLOW
EPITHELIAL CELLS, UA: ABNORMAL /HPF
GLUCOSE URINE: NEGATIVE MG/DL
KETONES, URINE: NEGATIVE MG/DL
LEUKOCYTE ESTERASE, URINE: ABNORMAL
NITRITE, URINE: NEGATIVE
PH UA: 6.5 (ref 5–9)
PROTEIN UA: NEGATIVE MG/DL
RBC UA: ABNORMAL /HPF (ref 0–2)
SPECIFIC GRAVITY UA: 1.01 (ref 1–1.03)
URINE REFLEX TO CULTURE: YES
UROBILINOGEN, URINE: 0.2 E.U./DL
WBC UA: ABNORMAL /HPF (ref 0–5)

## 2018-05-10 PROCEDURE — 70450 CT HEAD/BRAIN W/O DYE: CPT

## 2018-05-10 PROCEDURE — 99284 EMERGENCY DEPT VISIT MOD MDM: CPT

## 2018-05-10 PROCEDURE — 6360000002 HC RX W HCPCS: Performed by: PHYSICIAN ASSISTANT

## 2018-05-10 PROCEDURE — 87086 URINE CULTURE/COLONY COUNT: CPT

## 2018-05-10 PROCEDURE — 81001 URINALYSIS AUTO W/SCOPE: CPT

## 2018-05-10 PROCEDURE — 72125 CT NECK SPINE W/O DYE: CPT

## 2018-05-10 RX ORDER — ONDANSETRON 4 MG/1
4 TABLET, ORALLY DISINTEGRATING ORAL ONCE
Status: COMPLETED | OUTPATIENT
Start: 2018-05-10 | End: 2018-05-10

## 2018-05-10 RX ORDER — ONDANSETRON 4 MG/1
4 TABLET, FILM COATED ORAL EVERY 8 HOURS PRN
Qty: 12 TABLET | Refills: 0 | Status: SHIPPED | OUTPATIENT
Start: 2018-05-10 | End: 2018-08-11 | Stop reason: ALTCHOICE

## 2018-05-10 RX ORDER — NITROFURANTOIN 25; 75 MG/1; MG/1
100 CAPSULE ORAL 2 TIMES DAILY
Qty: 10 CAPSULE | Refills: 0 | Status: SHIPPED | OUTPATIENT
Start: 2018-05-10 | End: 2018-05-15

## 2018-05-10 RX ADMIN — ONDANSETRON 4 MG: 4 TABLET, ORALLY DISINTEGRATING ORAL at 16:10

## 2018-05-10 ASSESSMENT — PAIN DESCRIPTION - PAIN TYPE: TYPE: ACUTE PAIN

## 2018-05-10 ASSESSMENT — ENCOUNTER SYMPTOMS
NAUSEA: 1
ABDOMINAL PAIN: 0
DIARRHEA: 0
CONSTIPATION: 0
SHORTNESS OF BREATH: 0
COLOR CHANGE: 0
EYE DISCHARGE: 0
ABDOMINAL DISTENTION: 0
VOMITING: 0
SORE THROAT: 0
RHINORRHEA: 0

## 2018-05-10 ASSESSMENT — PAIN SCALES - GENERAL: PAINLEVEL_OUTOF10: 3

## 2018-05-10 ASSESSMENT — PAIN DESCRIPTION - LOCATION: LOCATION: HEAD

## 2018-05-12 LAB — URINE CULTURE, ROUTINE: NORMAL

## 2018-06-17 ENCOUNTER — HOSPITAL ENCOUNTER (EMERGENCY)
Age: 73
Discharge: HOME | End: 2018-06-17
Payer: MEDICARE

## 2018-06-17 ENCOUNTER — APPOINTMENT (OUTPATIENT)
Dept: CT IMAGING | Age: 73
End: 2018-06-17
Payer: MEDICARE

## 2018-06-17 VITALS
HEART RATE: 97 BPM | WEIGHT: 230 LBS | OXYGEN SATURATION: 100 % | SYSTOLIC BLOOD PRESSURE: 130 MMHG | BODY MASS INDEX: 38.32 KG/M2 | DIASTOLIC BLOOD PRESSURE: 80 MMHG | HEIGHT: 65 IN | TEMPERATURE: 98.4 F | RESPIRATION RATE: 20 BRPM

## 2018-06-17 DIAGNOSIS — N30.01 ACUTE CYSTITIS WITH HEMATURIA: Primary | ICD-10-CM

## 2018-06-17 LAB
ALBUMIN SERPL-MCNC: 3.9 G/DL (ref 3.9–4.9)
ALP BLD-CCNC: 85 U/L (ref 40–130)
ALT SERPL-CCNC: 16 U/L (ref 0–33)
ANION GAP SERPL CALCULATED.3IONS-SCNC: 12 MEQ/L (ref 7–13)
APTT: 24.2 SEC (ref 21.6–35.4)
AST SERPL-CCNC: 22 U/L (ref 0–35)
BACTERIA: ABNORMAL /HPF
BASOPHILS ABSOLUTE: 0 K/UL (ref 0–0.2)
BASOPHILS RELATIVE PERCENT: 0.4 %
BILIRUB SERPL-MCNC: 0.6 MG/DL (ref 0–1.2)
BILIRUBIN URINE: NEGATIVE
BLOOD, URINE: ABNORMAL
BUN BLDV-MCNC: 14 MG/DL (ref 8–23)
CALCIUM SERPL-MCNC: 9 MG/DL (ref 8.6–10.2)
CHLORIDE BLD-SCNC: 98 MEQ/L (ref 98–107)
CLARITY: ABNORMAL
CO2: 33 MEQ/L (ref 22–29)
COLOR: YELLOW
CREAT SERPL-MCNC: 0.63 MG/DL (ref 0.5–0.9)
EOSINOPHILS ABSOLUTE: 0.2 K/UL (ref 0–0.7)
EOSINOPHILS RELATIVE PERCENT: 2.4 %
EPITHELIAL CELLS, UA: ABNORMAL /HPF
GFR AFRICAN AMERICAN: >60
GFR NON-AFRICAN AMERICAN: >60
GLOBULIN: 2.8 G/DL (ref 2.3–3.5)
GLUCOSE BLD-MCNC: 111 MG/DL (ref 74–109)
GLUCOSE URINE: NEGATIVE MG/DL
HCT VFR BLD CALC: 38.2 % (ref 37–47)
HEMOGLOBIN: 13 G/DL (ref 12–16)
INR BLD: 1
KETONES, URINE: NEGATIVE MG/DL
LEUKOCYTE ESTERASE, URINE: ABNORMAL
LYMPHOCYTES ABSOLUTE: 2.2 K/UL (ref 1–4.8)
LYMPHOCYTES RELATIVE PERCENT: 25.3 %
MCH RBC QN AUTO: 29.8 PG (ref 27–31.3)
MCHC RBC AUTO-ENTMCNC: 34 % (ref 33–37)
MCV RBC AUTO: 87.7 FL (ref 82–100)
MONOCYTES ABSOLUTE: 0.6 K/UL (ref 0.2–0.8)
MONOCYTES RELATIVE PERCENT: 6.4 %
NEUTROPHILS ABSOLUTE: 5.7 K/UL (ref 1.4–6.5)
NEUTROPHILS RELATIVE PERCENT: 65.5 %
NITRITE, URINE: NEGATIVE
PDW BLD-RTO: 14 % (ref 11.5–14.5)
PH UA: 6 (ref 5–9)
PLATELET # BLD: 334 K/UL (ref 130–400)
POTASSIUM SERPL-SCNC: 3.3 MEQ/L (ref 3.5–5.1)
PROTEIN UA: NEGATIVE MG/DL
PROTHROMBIN TIME: 10.4 SEC (ref 9.6–12.3)
RBC # BLD: 4.35 M/UL (ref 4.2–5.4)
RBC UA: ABNORMAL /HPF (ref 0–2)
SODIUM BLD-SCNC: 143 MEQ/L (ref 132–144)
SPECIFIC GRAVITY UA: 1.01 (ref 1–1.03)
TOTAL PROTEIN: 6.7 G/DL (ref 6.4–8.1)
URINE REFLEX TO CULTURE: YES
UROBILINOGEN, URINE: 0.2 E.U./DL
WBC # BLD: 8.8 K/UL (ref 4.8–10.8)
WBC UA: ABNORMAL /HPF (ref 0–5)

## 2018-06-17 PROCEDURE — 85610 PROTHROMBIN TIME: CPT

## 2018-06-17 PROCEDURE — 6360000002 HC RX W HCPCS: Performed by: PHYSICIAN ASSISTANT

## 2018-06-17 PROCEDURE — 81001 URINALYSIS AUTO W/SCOPE: CPT

## 2018-06-17 PROCEDURE — 74177 CT ABD & PELVIS W/CONTRAST: CPT

## 2018-06-17 PROCEDURE — 87086 URINE CULTURE/COLONY COUNT: CPT

## 2018-06-17 PROCEDURE — 80053 COMPREHEN METABOLIC PANEL: CPT

## 2018-06-17 PROCEDURE — 99284 EMERGENCY DEPT VISIT MOD MDM: CPT

## 2018-06-17 PROCEDURE — 36415 COLL VENOUS BLD VENIPUNCTURE: CPT

## 2018-06-17 PROCEDURE — 6360000004 HC RX CONTRAST MEDICATION: Performed by: PHYSICIAN ASSISTANT

## 2018-06-17 PROCEDURE — 85025 COMPLETE CBC W/AUTO DIFF WBC: CPT

## 2018-06-17 PROCEDURE — 85730 THROMBOPLASTIN TIME PARTIAL: CPT

## 2018-06-17 PROCEDURE — 96374 THER/PROPH/DIAG INJ IV PUSH: CPT

## 2018-06-17 RX ORDER — KETOROLAC TROMETHAMINE 30 MG/ML
30 INJECTION, SOLUTION INTRAMUSCULAR; INTRAVENOUS ONCE
Status: COMPLETED | OUTPATIENT
Start: 2018-06-17 | End: 2018-06-17

## 2018-06-17 RX ORDER — CEPHALEXIN 500 MG/1
500 CAPSULE ORAL 4 TIMES DAILY
Qty: 40 CAPSULE | Refills: 0 | Status: SHIPPED | OUTPATIENT
Start: 2018-06-17 | End: 2018-08-11 | Stop reason: ALTCHOICE

## 2018-06-17 RX ADMIN — KETOROLAC TROMETHAMINE 30 MG: 30 INJECTION, SOLUTION INTRAMUSCULAR at 16:02

## 2018-06-17 RX ADMIN — IOPAMIDOL 100 ML: 755 INJECTION, SOLUTION INTRAVENOUS at 16:21

## 2018-06-17 ASSESSMENT — ENCOUNTER SYMPTOMS
ABDOMINAL PAIN: 1
VOMITING: 0
SHORTNESS OF BREATH: 0
BLOOD IN STOOL: 0
CONSTIPATION: 1
DIARRHEA: 0
COUGH: 0
NAUSEA: 1
WHEEZING: 0
BACK PAIN: 1
SORE THROAT: 0
RHINORRHEA: 0

## 2018-06-17 ASSESSMENT — PAIN SCALES - GENERAL: PAINLEVEL_OUTOF10: 3

## 2018-06-19 LAB — URINE CULTURE, ROUTINE: NORMAL

## 2018-08-11 ENCOUNTER — HOSPITAL ENCOUNTER (EMERGENCY)
Age: 73
Discharge: HOME | End: 2018-08-11
Attending: STUDENT IN AN ORGANIZED HEALTH CARE EDUCATION/TRAINING PROGRAM
Payer: MEDICARE

## 2018-08-11 ENCOUNTER — APPOINTMENT (OUTPATIENT)
Dept: GENERAL RADIOLOGY | Age: 73
End: 2018-08-11
Payer: MEDICARE

## 2018-08-11 VITALS
DIASTOLIC BLOOD PRESSURE: 71 MMHG | WEIGHT: 230 LBS | RESPIRATION RATE: 18 BRPM | BODY MASS INDEX: 38.32 KG/M2 | HEART RATE: 94 BPM | TEMPERATURE: 98.6 F | OXYGEN SATURATION: 97 % | SYSTOLIC BLOOD PRESSURE: 122 MMHG | HEIGHT: 65 IN

## 2018-08-11 DIAGNOSIS — S40.011A CONTUSION OF RIGHT SHOULDER, INITIAL ENCOUNTER: ICD-10-CM

## 2018-08-11 DIAGNOSIS — M25.511 ACUTE PAIN OF RIGHT SHOULDER: ICD-10-CM

## 2018-08-11 DIAGNOSIS — N30.01 ACUTE CYSTITIS WITH HEMATURIA: Primary | ICD-10-CM
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
## 2018-08-11 LAB
BACTERIA: ABNORMAL /HPF
BILIRUBIN URINE: NEGATIVE
BLOOD, URINE: ABNORMAL
CLARITY: ABNORMAL
COLOR: YELLOW
EPITHELIAL CELLS, UA: ABNORMAL /HPF
GLUCOSE URINE: NEGATIVE MG/DL
KETONES, URINE: NEGATIVE MG/DL
LEUKOCYTE ESTERASE, URINE: ABNORMAL
NITRITE, URINE: POSITIVE
PH UA: 5.5 (ref 5–9)
PROTEIN UA: NEGATIVE MG/DL
RBC UA: ABNORMAL /HPF (ref 0–2)
SPECIFIC GRAVITY UA: 1.01 (ref 1–1.03)
URINE REFLEX TO CULTURE: YES
UROBILINOGEN, URINE: 0.2 E.U./DL
WBC UA: >100 /HPF (ref 0–5)

## 2018-08-11 PROCEDURE — 6370000000 HC RX 637 (ALT 250 FOR IP): Performed by: STUDENT IN AN ORGANIZED HEALTH CARE EDUCATION/TRAINING PROGRAM

## 2018-08-11 PROCEDURE — 81001 URINALYSIS AUTO W/SCOPE: CPT

## 2018-08-11 PROCEDURE — 87086 URINE CULTURE/COLONY COUNT: CPT

## 2018-08-11 PROCEDURE — 99284 EMERGENCY DEPT VISIT MOD MDM: CPT

## 2018-08-11 PROCEDURE — 73030 X-RAY EXAM OF SHOULDER: CPT

## 2018-08-11 PROCEDURE — 87077 CULTURE AEROBIC IDENTIFY: CPT

## 2018-08-11 PROCEDURE — 87186 SC STD MICRODIL/AGAR DIL: CPT

## 2018-08-11 RX ORDER — SULFAMETHOXAZOLE AND TRIMETHOPRIM 800; 160 MG/1; MG/1
1 TABLET ORAL 2 TIMES DAILY
Qty: 14 TABLET | Refills: 0 | Status: SHIPPED | OUTPATIENT
Start: 2018-08-11 | End: 2018-08-18

## 2018-08-11 RX ORDER — SULFAMETHOXAZOLE AND TRIMETHOPRIM 800; 160 MG/1; MG/1
1 TABLET ORAL ONCE
Status: COMPLETED | OUTPATIENT
Start: 2018-08-11 | End: 2018-08-11

## 2018-08-11 RX ADMIN — SULFAMETHOXAZOLE AND TRIMETHOPRIM 1 TABLET: 800; 160 TABLET ORAL at 18:51

## 2018-08-11 ASSESSMENT — ENCOUNTER SYMPTOMS
SINUS PRESSURE: 0
DIARRHEA: 0
BACK PAIN: 0
COUGH: 0
NAUSEA: 0
VOMITING: 0
TROUBLE SWALLOWING: 0
CHEST TIGHTNESS: 0
SHORTNESS OF BREATH: 0
ABDOMINAL PAIN: 0

## 2018-08-11 ASSESSMENT — PAIN SCALES - GENERAL: PAINLEVEL_OUTOF10: 2

## 2018-08-11 ASSESSMENT — PAIN DESCRIPTION - DESCRIPTORS: DESCRIPTORS: DISCOMFORT

## 2018-08-11 ASSESSMENT — PAIN DESCRIPTION - FREQUENCY: FREQUENCY: INTERMITTENT

## 2018-08-11 ASSESSMENT — PAIN DESCRIPTION - ORIENTATION: ORIENTATION: RIGHT

## 2018-08-11 ASSESSMENT — PAIN DESCRIPTION - LOCATION: LOCATION: SHOULDER

## 2018-08-11 NOTE — ED PROVIDER NOTES
Neurological: Negative for syncope, weakness and headaches. Hematological: Does not bruise/bleed easily. All other systems reviewed and are negative. Except as noted above the remainder of the review of systems was reviewed and negative. PAST MEDICAL HISTORY     Past Medical History:   Diagnosis Date    Asthma     Degenerative disc disease, lumbar     Hypertension     Lymphedema     MRSA infection     UTI (lower urinary tract infection)          SURGICAL HISTORY       Past Surgical History:   Procedure Laterality Date    APPENDECTOMY      SHOULDER SURGERY      TONSILLECTOMY      WRIST SURGERY           CURRENT MEDICATIONS       Previous Medications    ASPIRIN 81 MG TABLET    Take 81 mg by mouth daily. ATORVASTATIN (LIPITOR) 20 MG TABLET    Take 20 mg by mouth daily. B COMPLEX VITAMINS CAPSULE    Take 1 capsule by mouth daily    BENZONATATE (TESSALON PERLES) 100 MG CAPSULE    Take 1 capsule by mouth 3 times daily as needed for Cough    CHLORTHALIDONE (HYGROTON) 25 MG TABLET    Take 25 mg by mouth daily. DICYCLOMINE (BENTYL) 10 MG CAPSULE    Take 1 capsule by mouth 4 times daily (before meals and nightly)    FUROSEMIDE (LASIX) 40 MG TABLET    Take 40 mg by mouth daily     GLUCOS-CHONDROIT-HYALURON-MSM (GLUCOSAMINE CHONDROITIN JOINT PO)    Take by mouth    LISINOPRIL (PRINIVIL;ZESTRIL) 20 MG TABLET    Take 10 mg by mouth daily     LORAZEPAM (ATIVAN) 0.5 MG TABLET    Take 0.5 mg by mouth 2 times daily    METH-HYO-M BL-NA PHOS-PH SAL (URIBEL) 118 MG CAPS    Take 20 capsules by mouth 3 times daily. MULTIPLE VITAMINS-MINERALS (THERAPEUTIC MULTIVITAMIN-MINERALS) TABLET    Take 1 tablet by mouth daily. NABUMETONE (RELAFEN) 750 MG TABLET    Take 750 mg by mouth 2 times daily. PAROXETINE (PAXIL-CR) 25 MG CR TABLET    Take 25 mg by mouth every morning. POTASSIUM CHLORIDE SA (K-DUR;KLOR-CON M) 10 MEQ TABLET    Take 10 mEq by mouth 2 times daily.        ALLERGIES     Metronidazole There is no tenderness. There is no rebound and no guarding. Obese. No CVA tenderness. Musculoskeletal: Normal range of motion. She exhibits no edema or tenderness. Lymphadenopathy:        Head (right side): No submental adenopathy present. Head (left side): No submental adenopathy present. She has no cervical adenopathy. Neurological: She is alert and oriented to person, place, and time. She has normal reflexes. No cranial nerve deficit. She exhibits normal muscle tone. Coordination normal.   Skin: Skin is warm and dry. No rash noted. She is not diaphoretic. No erythema. No pallor. Psychiatric: She has a normal mood and affect. Her behavior is normal. Judgment and thought content normal.   Nursing note and vitals reviewed. DIAGNOSTIC RESULTS     EKG: All EKG's are interpreted by the Emergency Department Physician who either signs or Co-signs this chart in the absence of a cardiologist.        RADIOLOGY:   Non-plain film images such as CT, Ultrasound and MRI are read by the radiologist. Plain radiographic images are visualized and preliminarily interpreted by the emergency physician with the below findings:        Interpretation per the Radiologist below, if available at the time of this note:    XR SHOULDER RIGHT (MIN 2 VIEWS)   Final Result   NO ACUTE FRACTURE. REMOTE INTERNAL FIXATION RIGHT HUMERAL HEAD AND NECK. ED BEDSIDE ULTRASOUND:   Performed by ED Physician - none    LABS:  Labs Reviewed   URINE RT REFLEX TO CULTURE - Abnormal; Notable for the following:        Result Value    Clarity, UA TURBID (*)     Blood, Urine SMALL (*)     Nitrite, Urine POSITIVE (*)     Leukocyte Esterase, Urine LARGE (*)     All other components within normal limits   URINE CULTURE   MICROSCOPIC URINALYSIS       All other labs were within normal range or not returned as of this dictation.     EMERGENCY DEPARTMENT COURSE and DIFFERENTIAL DIAGNOSIS/MDM:   Vitals:    Vitals:    08/11/18 1635   BP: 122/71   Pulse: 94   Resp: 18   Temp: 98.6 °F (37 °C)   TempSrc: Oral   SpO2: 97%   Weight: 230 lb (104.3 kg)   Height: 5' 5\" (1.651 m)           MDM  The ER physician reexamined the patient. She has a urinary tract infection and started on Bactrim in the emergency room. The ER physician discussed the findings to patient, she verbalizes understanding as no further questions. CONSULTS:  None    PROCEDURES:  Unless otherwise noted below, none     Procedures    FINAL IMPRESSION      1. Acute cystitis with hematuria    2. Contusion of right shoulder, initial encounter    3. Fall at home, initial encounter    4.  Acute pain of right shoulder          DISPOSITION/PLAN   DISPOSITION        PATIENT REFERRED TO:  Alie Krishnan MD  [address] [place], [place] [address] [phone]    Schedule an appointment as soon as possible for a visit in 2 days        DISCHARGE MEDICATIONS:  New Prescriptions    SULFAMETHOXAZOLE-TRIMETHOPRIM (BACTRIM DS) 800-160 MG PER TABLET    Take 1 tablet by mouth 2 times daily for 7 days          (Please note that portions of this note were completed with a voice recognition program.  Efforts were made to edit the dictations but occasionally words are mis-transcribed.)    Glenna Chery DO (electronically signed)  Attending Emergency Physician          Glenna Chery DO  08/11/18 [phone]

## 2018-08-13 LAB
ORGANISM: ABNORMAL
URINE CULTURE, ROUTINE: ABNORMAL
URINE CULTURE, ROUTINE: ABNORMAL

## 2019-01-02 ENCOUNTER — APPOINTMENT (OUTPATIENT)
Dept: GENERAL RADIOLOGY | Age: 74
End: 2019-01-02
Payer: MEDICARE

## 2019-01-02 ENCOUNTER — HOSPITAL ENCOUNTER (EMERGENCY)
Age: 74
Discharge: HOME | End: 2019-01-02
Payer: MEDICARE

## 2019-01-02 VITALS
SYSTOLIC BLOOD PRESSURE: 123 MMHG | HEIGHT: 65 IN | WEIGHT: 225 LBS | OXYGEN SATURATION: 96 % | RESPIRATION RATE: 19 BRPM | HEART RATE: 94 BPM | DIASTOLIC BLOOD PRESSURE: 62 MMHG | TEMPERATURE: 98.8 F | BODY MASS INDEX: 37.49 KG/M2

## 2019-01-02 DIAGNOSIS — N30.00 ACUTE CYSTITIS WITHOUT HEMATURIA: Primary | ICD-10-CM

## 2019-01-02 LAB
ALBUMIN SERPL-MCNC: 3.6 G/DL (ref 3.9–4.9)
ALP BLD-CCNC: 73 U/L (ref 40–130)
ALT SERPL-CCNC: 17 U/L (ref 0–33)
ANION GAP SERPL CALCULATED.3IONS-SCNC: 14 MEQ/L (ref 7–13)
AST SERPL-CCNC: 21 U/L (ref 0–35)
BACTERIA: NEGATIVE /HPF
BASOPHILS ABSOLUTE: 0.1 K/UL (ref 0–0.2)
BASOPHILS RELATIVE PERCENT: 1.5 %
BILIRUB SERPL-MCNC: 0.3 MG/DL (ref 0–1.2)
BILIRUBIN URINE: NEGATIVE
BLOOD, URINE: NEGATIVE
BUN BLDV-MCNC: 13 MG/DL (ref 8–23)
CALCIUM SERPL-MCNC: 9.1 MG/DL (ref 8.6–10.2)
CHLORIDE BLD-SCNC: 96 MEQ/L (ref 98–107)
CK MB: 5.2 NG/ML (ref 0–3.8)
CLARITY: CLEAR
CO2: 28 MEQ/L (ref 22–29)
COLOR: YELLOW
CREAT SERPL-MCNC: 0.58 MG/DL (ref 0.5–0.9)
CREATINE KINASE-MB INDEX: 3 % (ref 0–3.5)
EKG ATRIAL RATE: 76 BPM
EKG P AXIS: 53 DEGREES
EKG P-R INTERVAL: 162 MS
EKG Q-T INTERVAL: 396 MS
EKG QRS DURATION: 108 MS
EKG QTC CALCULATION (BAZETT): 445 MS
EKG R AXIS: 6 DEGREES
EKG T AXIS: 1 DEGREES
EKG VENTRICULAR RATE: 76 BPM
EOSINOPHILS ABSOLUTE: 0.3 K/UL (ref 0–0.7)
EOSINOPHILS RELATIVE PERCENT: 2.8 %
EPITHELIAL CELLS, UA: ABNORMAL /HPF (ref 0–5)
GFR AFRICAN AMERICAN: >60
GFR NON-AFRICAN AMERICAN: >60
GLOBULIN: 2.9 G/DL (ref 2.3–3.5)
GLUCOSE BLD-MCNC: 72 MG/DL (ref 74–109)
GLUCOSE URINE: NEGATIVE MG/DL
HCT VFR BLD CALC: 36.8 % (ref 37–47)
HEMOGLOBIN: 12.6 G/DL (ref 12–16)
HYALINE CASTS: ABNORMAL /HPF (ref 0–5)
KETONES, URINE: NEGATIVE MG/DL
LACTIC ACID: 0.8 MMOL/L (ref 0.5–2.2)
LEUKOCYTE ESTERASE, URINE: ABNORMAL
LYMPHOCYTES ABSOLUTE: 3.5 K/UL (ref 1–4.8)
LYMPHOCYTES RELATIVE PERCENT: 36.4 %
MCH RBC QN AUTO: 30.4 PG (ref 27–31.3)
MCHC RBC AUTO-ENTMCNC: 34.2 % (ref 33–37)
MCV RBC AUTO: 88.9 FL (ref 82–100)
MONOCYTES ABSOLUTE: 0.7 K/UL (ref 0.2–0.8)
MONOCYTES RELATIVE PERCENT: 6.7 %
NEUTROPHILS ABSOLUTE: 5.1 K/UL (ref 1.4–6.5)
NEUTROPHILS RELATIVE PERCENT: 52.6 %
NITRITE, URINE: NEGATIVE
PDW BLD-RTO: 13.8 % (ref 11.5–14.5)
PH UA: 6.5 (ref 5–9)
PLATELET # BLD: 344 K/UL (ref 130–400)
POTASSIUM SERPL-SCNC: 3.8 MEQ/L (ref 3.5–5.1)
PROTEIN UA: NEGATIVE MG/DL
RBC # BLD: 4.14 M/UL (ref 4.2–5.4)
RBC UA: ABNORMAL /HPF (ref 0–5)
SODIUM BLD-SCNC: 138 MEQ/L (ref 132–144)
SPECIFIC GRAVITY UA: 1.01 (ref 1–1.03)
TOTAL CK: 172 U/L (ref 0–170)
TOTAL PROTEIN: 6.5 G/DL (ref 6.4–8.1)
TROPONIN: <0.01 NG/ML (ref 0–0.01)
URINE REFLEX TO CULTURE: YES
UROBILINOGEN, URINE: 0.2 E.U./DL
WBC # BLD: 9.7 K/UL (ref 4.8–10.8)
WBC UA: ABNORMAL /HPF (ref 0–5)

## 2019-01-02 PROCEDURE — 82553 CREATINE MB FRACTION: CPT

## 2019-01-02 PROCEDURE — 2580000003 HC RX 258: Performed by: PHYSICIAN ASSISTANT

## 2019-01-02 PROCEDURE — 71045 X-RAY EXAM CHEST 1 VIEW: CPT

## 2019-01-02 PROCEDURE — 36415 COLL VENOUS BLD VENIPUNCTURE: CPT

## 2019-01-02 PROCEDURE — 87086 URINE CULTURE/COLONY COUNT: CPT

## 2019-01-02 PROCEDURE — 84484 ASSAY OF TROPONIN QUANT: CPT

## 2019-01-02 PROCEDURE — 80053 COMPREHEN METABOLIC PANEL: CPT

## 2019-01-02 PROCEDURE — 96374 THER/PROPH/DIAG INJ IV PUSH: CPT

## 2019-01-02 PROCEDURE — 6370000000 HC RX 637 (ALT 250 FOR IP): Performed by: PHYSICIAN ASSISTANT

## 2019-01-02 PROCEDURE — 85025 COMPLETE CBC W/AUTO DIFF WBC: CPT

## 2019-01-02 PROCEDURE — 82550 ASSAY OF CK (CPK): CPT

## 2019-01-02 PROCEDURE — 81001 URINALYSIS AUTO W/SCOPE: CPT

## 2019-01-02 PROCEDURE — 93005 ELECTROCARDIOGRAM TRACING: CPT

## 2019-01-02 PROCEDURE — 83605 ASSAY OF LACTIC ACID: CPT

## 2019-01-02 PROCEDURE — 6360000002 HC RX W HCPCS: Performed by: PHYSICIAN ASSISTANT

## 2019-01-02 PROCEDURE — 99285 EMERGENCY DEPT VISIT HI MDM: CPT

## 2019-01-02 RX ORDER — SULFAMETHOXAZOLE AND TRIMETHOPRIM 800; 160 MG/1; MG/1
1 TABLET ORAL 2 TIMES DAILY
Qty: 20 TABLET | Refills: 0 | Status: SHIPPED | OUTPATIENT
Start: 2019-01-02 | End: 2019-01-12

## 2019-01-02 RX ORDER — PHENAZOPYRIDINE HYDROCHLORIDE 100 MG/1
100 TABLET, FILM COATED ORAL ONCE
Status: COMPLETED | OUTPATIENT
Start: 2019-01-02 | End: 2019-01-02

## 2019-01-02 RX ORDER — PHENAZOPYRIDINE HYDROCHLORIDE 100 MG/1
100 TABLET, FILM COATED ORAL 3 TIMES DAILY PRN
Qty: 6 TABLET | Refills: 0 | Status: SHIPPED | OUTPATIENT
Start: 2019-01-02 | End: 2019-01-05

## 2019-01-02 RX ORDER — 0.9 % SODIUM CHLORIDE 0.9 %
1000 INTRAVENOUS SOLUTION INTRAVENOUS ONCE
Status: COMPLETED | OUTPATIENT
Start: 2019-01-02 | End: 2019-01-02

## 2019-01-02 RX ORDER — SULFAMETHOXAZOLE AND TRIMETHOPRIM 800; 160 MG/1; MG/1
1 TABLET ORAL ONCE
Status: COMPLETED | OUTPATIENT
Start: 2019-01-02 | End: 2019-01-02

## 2019-01-02 RX ORDER — ONDANSETRON 2 MG/ML
4 INJECTION INTRAMUSCULAR; INTRAVENOUS ONCE
Status: COMPLETED | OUTPATIENT
Start: 2019-01-02 | End: 2019-01-02

## 2019-01-02 RX ADMIN — PHENAZOPYRIDINE HYDROCHLORIDE 100 MG: 100 TABLET ORAL at 21:39

## 2019-01-02 RX ADMIN — SODIUM CHLORIDE 1000 ML: 9 INJECTION, SOLUTION INTRAVENOUS at 19:45

## 2019-01-02 RX ADMIN — SULFAMETHOXAZOLE AND TRIMETHOPRIM 1 TABLET: 800; 160 TABLET ORAL at 21:39

## 2019-01-02 RX ADMIN — ONDANSETRON 4 MG: 2 INJECTION INTRAMUSCULAR; INTRAVENOUS at 19:45

## 2019-01-02 ASSESSMENT — PAIN DESCRIPTION - DESCRIPTORS: DESCRIPTORS: CRAMPING

## 2019-01-02 ASSESSMENT — PAIN DESCRIPTION - ORIENTATION: ORIENTATION: LOWER;MID

## 2019-01-02 ASSESSMENT — ENCOUNTER SYMPTOMS
EYE PAIN: 0
ABDOMINAL PAIN: 0
ALLERGIC/IMMUNOLOGIC NEGATIVE: 1
COLOR CHANGE: 0
VOMITING: 0
SHORTNESS OF BREATH: 0
APNEA: 0
DIARRHEA: 0
TROUBLE SWALLOWING: 0

## 2019-01-02 ASSESSMENT — PAIN DESCRIPTION - PAIN TYPE: TYPE: ACUTE PAIN

## 2019-01-02 ASSESSMENT — PAIN DESCRIPTION - FREQUENCY: FREQUENCY: INTERMITTENT

## 2019-01-02 ASSESSMENT — PAIN DESCRIPTION - LOCATION: LOCATION: ABDOMEN

## 2019-01-02 ASSESSMENT — PAIN SCALES - GENERAL: PAINLEVEL_OUTOF10: 4

## 2019-01-04 LAB — URINE CULTURE, ROUTINE: NORMAL

## 2019-01-04 PROCEDURE — 93010 ELECTROCARDIOGRAM REPORT: CPT | Performed by: INTERNAL MEDICINE

## 2019-06-08 ENCOUNTER — HOSPITAL ENCOUNTER (EMERGENCY)
Age: 74
Discharge: HOME | End: 2019-06-08
Attending: EMERGENCY MEDICINE
Payer: MEDICARE

## 2019-06-08 ENCOUNTER — APPOINTMENT (OUTPATIENT)
Dept: GENERAL RADIOLOGY | Age: 74
End: 2019-06-08
Payer: MEDICARE

## 2019-06-08 VITALS
DIASTOLIC BLOOD PRESSURE: 87 MMHG | BODY MASS INDEX: 38.32 KG/M2 | SYSTOLIC BLOOD PRESSURE: 170 MMHG | WEIGHT: 230 LBS | HEIGHT: 65 IN | OXYGEN SATURATION: 97 % | TEMPERATURE: 99.1 F | HEART RATE: 94 BPM | RESPIRATION RATE: 18 BRPM

## 2019-06-08 DIAGNOSIS — N30.00 ACUTE CYSTITIS WITHOUT HEMATURIA: ICD-10-CM

## 2019-06-08 DIAGNOSIS — M25.512 ACUTE PAIN OF LEFT SHOULDER: ICD-10-CM

## 2019-06-08 DIAGNOSIS — M54.12 CERVICAL RADICULOPATHY: Primary | ICD-10-CM

## 2019-06-08 DIAGNOSIS — M54.2 NECK PAIN: ICD-10-CM

## 2019-06-08 LAB
ALBUMIN SERPL-MCNC: 3.5 G/DL (ref 3.5–4.6)
ALP BLD-CCNC: 83 U/L (ref 40–130)
ALT SERPL-CCNC: 27 U/L (ref 0–33)
ANION GAP SERPL CALCULATED.3IONS-SCNC: 11 MEQ/L (ref 9–15)
AST SERPL-CCNC: 20 U/L (ref 0–35)
BACTERIA: NEGATIVE /HPF
BASOPHILS ABSOLUTE: 0.1 K/UL (ref 0–0.2)
BASOPHILS RELATIVE PERCENT: 1.2 %
BILIRUB SERPL-MCNC: 0.3 MG/DL (ref 0.2–0.7)
BILIRUBIN URINE: NEGATIVE
BLOOD, URINE: NEGATIVE
BUN BLDV-MCNC: 9 MG/DL (ref 8–23)
CALCIUM SERPL-MCNC: 9.2 MG/DL (ref 8.5–9.9)
CHLORIDE BLD-SCNC: 94 MEQ/L (ref 95–107)
CLARITY: CLEAR
CO2: 31 MEQ/L (ref 20–31)
COLOR: YELLOW
CREAT SERPL-MCNC: 0.53 MG/DL (ref 0.5–0.9)
EKG ATRIAL RATE: 86 BPM
EKG P AXIS: 45 DEGREES
EKG P-R INTERVAL: 148 MS
EKG Q-T INTERVAL: 392 MS
EKG QRS DURATION: 132 MS
EKG QTC CALCULATION (BAZETT): 469 MS
EKG R AXIS: 3 DEGREES
EKG T AXIS: -13 DEGREES
EKG VENTRICULAR RATE: 86 BPM
EOSINOPHILS ABSOLUTE: 0.2 K/UL (ref 0–0.7)
EOSINOPHILS RELATIVE PERCENT: 1.5 %
EPITHELIAL CELLS, UA: NORMAL /HPF (ref 0–5)
GFR AFRICAN AMERICAN: >60
GFR NON-AFRICAN AMERICAN: >60
GLOBULIN: 3.3 G/DL (ref 2.3–3.5)
GLUCOSE BLD-MCNC: 83 MG/DL (ref 70–99)
GLUCOSE URINE: NEGATIVE MG/DL
HCT VFR BLD CALC: 34.3 % (ref 37–47)
HEMOGLOBIN: 11.5 G/DL (ref 12–16)
HYALINE CASTS: NORMAL /HPF (ref 0–5)
KETONES, URINE: NEGATIVE MG/DL
LEUKOCYTE ESTERASE, URINE: ABNORMAL
LYMPHOCYTES ABSOLUTE: 2.3 K/UL (ref 1–4.8)
LYMPHOCYTES RELATIVE PERCENT: 22.1 %
MCH RBC QN AUTO: 29.4 PG (ref 27–31.3)
MCHC RBC AUTO-ENTMCNC: 33.5 % (ref 33–37)
MCV RBC AUTO: 87.7 FL (ref 82–100)
MONOCYTES ABSOLUTE: 1 K/UL (ref 0.2–0.8)
MONOCYTES RELATIVE PERCENT: 9.8 %
NEUTROPHILS ABSOLUTE: 6.8 K/UL (ref 1.4–6.5)
NEUTROPHILS RELATIVE PERCENT: 65.4 %
NITRITE, URINE: NEGATIVE
PDW BLD-RTO: 13.8 % (ref 11.5–14.5)
PH UA: 5 (ref 5–9)
PLATELET # BLD: 472 K/UL (ref 130–400)
POTASSIUM SERPL-SCNC: 4.2 MEQ/L (ref 3.4–4.9)
PRO-BNP: 193 PG/ML
PROTEIN UA: NEGATIVE MG/DL
RBC # BLD: 3.91 M/UL (ref 4.2–5.4)
RBC UA: NORMAL /HPF (ref 0–5)
SODIUM BLD-SCNC: 136 MEQ/L (ref 135–144)
SPECIFIC GRAVITY UA: 1.01 (ref 1–1.03)
TOTAL PROTEIN: 6.8 G/DL (ref 6.3–8)
TROPONIN: <0.01 NG/ML (ref 0–0.01)
URINE REFLEX TO CULTURE: YES
UROBILINOGEN, URINE: 0.2 E.U./DL
WBC # BLD: 10.3 K/UL (ref 4.8–10.8)
WBC UA: NORMAL /HPF (ref 0–5)

## 2019-06-08 PROCEDURE — 85025 COMPLETE CBC W/AUTO DIFF WBC: CPT

## 2019-06-08 PROCEDURE — 81001 URINALYSIS AUTO W/SCOPE: CPT

## 2019-06-08 PROCEDURE — 93005 ELECTROCARDIOGRAM TRACING: CPT | Performed by: EMERGENCY MEDICINE

## 2019-06-08 PROCEDURE — 71045 X-RAY EXAM CHEST 1 VIEW: CPT

## 2019-06-08 PROCEDURE — 99284 EMERGENCY DEPT VISIT MOD MDM: CPT

## 2019-06-08 PROCEDURE — 83880 ASSAY OF NATRIURETIC PEPTIDE: CPT

## 2019-06-08 PROCEDURE — 87086 URINE CULTURE/COLONY COUNT: CPT

## 2019-06-08 PROCEDURE — 80053 COMPREHEN METABOLIC PANEL: CPT

## 2019-06-08 PROCEDURE — 36415 COLL VENOUS BLD VENIPUNCTURE: CPT

## 2019-06-08 PROCEDURE — 84484 ASSAY OF TROPONIN QUANT: CPT

## 2019-06-08 RX ORDER — SULFAMETHOXAZOLE AND TRIMETHOPRIM 800; 160 MG/1; MG/1
1 TABLET ORAL 2 TIMES DAILY
Qty: 14 TABLET | Refills: 0 | Status: SHIPPED | OUTPATIENT
Start: 2019-06-08 | End: 2019-06-15

## 2019-06-08 RX ORDER — ETODOLAC 200 MG/1
200 CAPSULE ORAL EVERY 8 HOURS
Qty: 25 CAPSULE | Refills: 0 | Status: ON HOLD | OUTPATIENT
Start: 2019-06-08 | End: 2019-10-29

## 2019-06-08 ASSESSMENT — ENCOUNTER SYMPTOMS
CHEST TIGHTNESS: 0
FACIAL SWELLING: 0
WHEEZING: 0
SHORTNESS OF BREATH: 0
SORE THROAT: 0
SINUS PRESSURE: 0
EYE DISCHARGE: 0
COLOR CHANGE: 0
NAUSEA: 0
RHINORRHEA: 0
ABDOMINAL PAIN: 0
BACK PAIN: 0
CONSTIPATION: 0
COUGH: 0
STRIDOR: 0
DIARRHEA: 0
CHOKING: 0
EYE ITCHING: 0
VOMITING: 0
EYE REDNESS: 0
VOICE CHANGE: 0
PHOTOPHOBIA: 0
ANAL BLEEDING: 0
EYE PAIN: 0
BLOOD IN STOOL: 0
ABDOMINAL DISTENTION: 0
TROUBLE SWALLOWING: 0

## 2019-06-08 ASSESSMENT — PAIN SCALES - GENERAL: PAINLEVEL_OUTOF10: 7

## 2019-06-08 ASSESSMENT — PAIN DESCRIPTION - PAIN TYPE: TYPE: ACUTE PAIN

## 2019-06-08 ASSESSMENT — PAIN DESCRIPTION - LOCATION: LOCATION: NECK;SHOULDER

## 2019-06-08 NOTE — ED NOTES
Pt resting in bed. Given warm blankets. Denies any needs at this time. States neck and shoulder pain worse on movement, especially when trying to lift her arms above her head.       Lady Rosales RN  06/08/19 [phone]

## 2019-06-08 NOTE — ED PROVIDER NOTES
PO) Take by mouthHistorical Med      b complex vitamins capsule Take 1 capsule by mouth dailyHistorical Med      benzonatate (TESSALON PERLES) 100 MG capsule Take 1 capsule by mouth 3 times daily as needed for Cough, Disp-20 capsule, R-0      dicyclomine (BENTYL) 10 MG capsule Take 1 capsule by mouth 4 times daily (before meals and nightly), Disp-20 capsule, R-0      Meth-Hyo-M Bl-Na Phos-Ph Sal (URIBEL) 118 MG CAPS Take 20 capsules by mouth 3 times daily. , Disp-120 capsule, R-0      PARoxetine (PAXIL-CR) 25 MG CR tablet Take 25 mg by mouth every morning. furosemide (LASIX) 40 MG tablet Take 40 mg by mouth daily Historical Med      chlorthalidone (HYGROTON) 25 MG tablet Take 25 mg by mouth daily. potassium chloride SA (K-DUR;KLOR-CON M) 10 MEQ tablet Take 10 mEq by mouth 2 times daily. lisinopril (PRINIVIL;ZESTRIL) 20 MG tablet Take 10 mg by mouth daily Historical Med      atorvastatin (LIPITOR) 20 MG tablet Take 20 mg by mouth daily. aspirin 81 MG tablet Take 81 mg by mouth daily. Multiple Vitamins-Minerals (THERAPEUTIC MULTIVITAMIN-MINERALS) tablet Take 1 tablet by mouth daily. ALLERGIES     Metronidazole and Tetracycline    FAMILY HISTORY     History reviewed. No pertinent family history. SOCIAL HISTORY       Social History     Socioeconomic History    Marital status:       Spouse name: None    Number of children: None    Years of education: None    Highest education level: None   Occupational History    None   Social Needs    Financial resource strain: None    Food insecurity:     Worry: None     Inability: None    Transportation needs:     Medical: None     Non-medical: None   Tobacco Use    Smoking status: Never Smoker    Smokeless tobacco: Never Used   Substance and Sexual Activity    Alcohol use: Yes     Comment: Rarely    Drug use: No    Sexual activity: Never   Lifestyle    Physical activity:     Days per week: None     Minutes per session: None    Stress: None   Relationships    Social connections:     Talks on phone: None     Gets together: None     Attends Mu-ism service: None     Active member of club or organization: None     Attends meetings of clubs or organizations: None     Relationship status: None    Intimate partner violence:     Fear of current or ex partner: None     Emotionally abused: None     Physically abused: None     Forced sexual activity: None   Other Topics Concern    None   Social History Narrative    None       SCREENINGS             PHYSICAL EXAM    (up to 7 for level 4, 8 or more for level 5)     ED Triage Vitals [06/08/19 1545]   BP Temp Temp Source Pulse Resp SpO2 Height Weight   (!) 161/104 99.1 °F (37.3 °C) Oral 90 18 100 % 5' 5\" (1.651 m) 230 lb (104.3 kg)       Physical Exam   Constitutional: She is oriented to person, place, and time. She appears well-developed and well-nourished. No distress. HENT:   Head: Normocephalic and atraumatic. Right Ear: External ear normal.   Left Ear: External ear normal.   Nose: Nose normal.   Mouth/Throat: Oropharynx is clear and moist. No oropharyngeal exudate. Eyes: Pupils are equal, round, and reactive to light. Conjunctivae and EOM are normal. Right eye exhibits no discharge. Left eye exhibits no discharge. No scleral icterus. Neck: Normal range of motion. Neck supple. No JVD present. No tracheal deviation present. No thyromegaly present. Cardiovascular: Normal rate, regular rhythm, normal heart sounds and intact distal pulses. Exam reveals no gallop and no friction rub. No murmur heard. Pulmonary/Chest: Effort normal and breath sounds normal. No stridor. No respiratory distress. She has no wheezes. She has no rales. She exhibits no tenderness. Abdominal: Soft. Bowel sounds are normal. She exhibits no distension and no mass. There is no tenderness. There is no rebound and no guarding. Musculoskeletal: Normal range of motion.  She exhibits no edema or tenderness. Lymphadenopathy:     She has no cervical adenopathy. Neurological: She is alert and oriented to person, place, and time. She has normal reflexes. She displays normal reflexes. No cranial nerve deficit. She exhibits normal muscle tone. Coordination normal.   Skin: Skin is warm and dry. No rash noted. She is not diaphoretic. No erythema. No pallor. Psychiatric: She has a normal mood and affect. Her behavior is normal. Judgment and thought content normal.   Nursing note and vitals reviewed. DIAGNOSTIC RESULTS     EKG: All EKG's are interpreted by the Emergency Department Physician who either signs or Co-signs this chart in the absence of a cardiologist.    12-lead EKG was performed and shows normal sinus rhythm with a rate of 86 bpm.    Rhythm: normal sinus   Rate: normal  Axis: normal  Ectopy: none  Conduction: right bundle branch block (complete)  ST Segments: depression in  v1 and v2  T Waves: inversion in  v1 and v2  Q Waves: nonspecific    Clinical Impression: no acute changes and right bundle branch block. Is no ectopy. There is no ST segment elevation or depression in any limb later precordial lead. Summary abnormal EKG without acute findings. RADIOLOGY:   Non-plain film images such as CT, Ultrasound and MRI are read by the radiologist. Plain radiographic images are visualized and preliminarily interpreted by the emergency physician with the below findings:    One view chest x-ray was performed which shows a right shoulder prosthesis no other abnormalities.   The patient was given a copy is her x-ray summary normal 1 view chest x-ray    Interpretation per the Radiologist below, if available at the time of this note:    XR CHEST PORTABLE    (Results Pending)         ED BEDSIDE ULTRASOUND:   Performed by ED Physician - none    LABS:  Labs Reviewed   URINE RT REFLEX TO CULTURE - Abnormal; Notable for the following components:       Result Value    Leukocyte Esterase, Urine TRACE (*) All other components within normal limits   COMPREHENSIVE METABOLIC PANEL - Abnormal; Notable for the following components:    Chloride 94 (*)     All other components within normal limits   CBC WITH AUTO DIFFERENTIAL - Abnormal; Notable for the following components:    RBC 3.91 (*)     Hemoglobin 11.5 (*)     Hematocrit 34.3 (*)     Platelets 776 (*)     Neutrophils # 6.8 (*)     Monocytes # 1.0 (*)     All other components within normal limits   URINE CULTURE   BRAIN NATRIURETIC PEPTIDE   TROPONIN   MICROSCOPIC URINALYSIS       All other labs were within normal range or not returned as of this dictation. EMERGENCY DEPARTMENT COURSE and DIFFERENTIAL DIAGNOSIS/MDM:   Vitals:    Vitals:    06/08/19 1545 06/08/19 1600   BP: (!) 161/104 (!) 170/87   Pulse: 90 94   Resp: 18    Temp: 99.1 °F (37.3 °C)    TempSrc: Oral    SpO2: 100% 97%   Weight: 230 lb (104.3 kg)    Height: 5' 5\" (1.651 m)        I printed copies of all lab tests and explained everyone to the patient. I showed her the EKG and told her that there were no findings of an acute STEMI MI. I reviewed the patient's chest x-ray with her and showed her that there was no cause for concern and it was normal.  After reassuring the patient that all of this was most likely musculoskeletal pain since movement of her left arm reproduces everything I told her that she should not be sleeping on the couch which she's been doing for a number of years she should be either sleeping on the bed or on the floor because the couch will not give her support and that's samantha causing the neck and shoulder pain area and she nodded her head in understanding this    MDM      CRITICAL CARE TIME     CONSULTS:  None    PROCEDURES:  Unless otherwise noted below, none     Procedures    FINAL IMPRESSION      1. Cervical radiculopathy    2. Neck pain    3. Acute pain of left shoulder    4.  Acute cystitis without hematuria          DISPOSITION/PLAN   DISPOSITION Decision To Discharge 06/08/2019 06:41:22 PM      PATIENT REFERRED TO:  Sylvia Virgen MD  [address] [facility]  Jacob Has [phone]    Go in 3 days  For follow up. Return if worse in any way.       DISCHARGE MEDICATIONS:  Discharge Medication List as of 6/8/2019  6:45 PM      START taking these medications    Details   etodolac (LODINE) 200 MG capsule Take 1 capsule by mouth every 8 hours As needed for pain, Disp-25 capsule, R-0Print      sulfamethoxazole-trimethoprim (BACTRIM DS) 800-160 MG per tablet Take 1 tablet by mouth 2 times daily for 7 days, Disp-14 tablet, R-0Print                (Please note that portions of this note were completed with a voice recognition program.  Efforts were made to edit the dictations but occasionally words are mis-transcribed.)    Diann Morrison MD (electronically signed)  Attending Emergency Physician          Diann Morrison MD  06/08/19 0277

## 2019-06-08 NOTE — ED TRIAGE NOTES
Pt to ER with c/o neck and bilat shoulder pain on and off x 1 week. Also c/o nausea x 1 month. Pt alert and oriented. Denies CP or SOB. Skin warm and dry.

## 2019-06-08 NOTE — ED NOTES
Bed: 19  Expected date: 6/8/19  Expected time: 3:35 PM  Means of arrival: [facility]  Comments:  68year old female neck/shoulder pain x 1 day.       Raleigh Hurtado RN  06/08/19 8741

## 2019-06-10 PROCEDURE — 93010 ELECTROCARDIOGRAM REPORT: CPT | Performed by: INTERNAL MEDICINE

## 2019-06-11 LAB — URINE CULTURE, ROUTINE: NORMAL

## 2019-08-02 ENCOUNTER — HOSPITAL ENCOUNTER (OUTPATIENT)
Dept: GENERAL RADIOLOGY | Age: 74
Discharge: HOME | End: 2019-08-04
Payer: MEDICARE

## 2019-08-02 DIAGNOSIS — R07.89 OTHER CHEST PAIN: ICD-10-CM

## 2019-08-02 PROCEDURE — 71046 X-RAY EXAM CHEST 2 VIEWS: CPT

## 2019-08-08 ENCOUNTER — HOSPITAL ENCOUNTER (EMERGENCY)
Age: 74
Discharge: HOME | End: 2019-08-08
Attending: EMERGENCY MEDICINE
Payer: MEDICARE

## 2019-08-08 VITALS
BODY MASS INDEX: 38.65 KG/M2 | HEIGHT: 65 IN | TEMPERATURE: 98.5 F | WEIGHT: 232 LBS | OXYGEN SATURATION: 94 % | HEART RATE: 99 BPM | SYSTOLIC BLOOD PRESSURE: 141 MMHG | RESPIRATION RATE: 16 BRPM | DIASTOLIC BLOOD PRESSURE: 94 MMHG

## 2019-08-08 DIAGNOSIS — Z87.39 HISTORY OF SPINAL STENOSIS: ICD-10-CM

## 2019-08-08 DIAGNOSIS — R26.9 GAIT DISTURBANCE: Primary | ICD-10-CM

## 2019-08-08 DIAGNOSIS — R20.0 NUMBNESS OF LEFT FOOT: ICD-10-CM

## 2019-08-08 LAB
BACTERIA: NEGATIVE /HPF
BILIRUBIN URINE: NEGATIVE
BLOOD, URINE: NEGATIVE
CLARITY: CLEAR
COLOR: YELLOW
EPITHELIAL CELLS, UA: ABNORMAL /HPF (ref 0–5)
GLUCOSE URINE: NEGATIVE MG/DL
HYALINE CASTS: ABNORMAL /HPF (ref 0–5)
KETONES, URINE: NEGATIVE MG/DL
LEUKOCYTE ESTERASE, URINE: ABNORMAL
NITRITE, URINE: NEGATIVE
PH UA: 5 (ref 5–9)
PROTEIN UA: NEGATIVE MG/DL
RBC UA: ABNORMAL /HPF (ref 0–5)
SPECIFIC GRAVITY UA: 1.01 (ref 1–1.03)
URINE REFLEX TO CULTURE: YES
UROBILINOGEN, URINE: 0.2 E.U./DL
WBC UA: ABNORMAL /HPF (ref 0–5)

## 2019-08-08 PROCEDURE — 99283 EMERGENCY DEPT VISIT LOW MDM: CPT

## 2019-08-08 PROCEDURE — 81001 URINALYSIS AUTO W/SCOPE: CPT

## 2019-08-08 PROCEDURE — 87086 URINE CULTURE/COLONY COUNT: CPT

## 2019-08-08 ASSESSMENT — ENCOUNTER SYMPTOMS
ABDOMINAL PAIN: 0
EYE PAIN: 0
SORE THROAT: 0
NAUSEA: 0
BACK PAIN: 0
VOMITING: 0
CHEST TIGHTNESS: 0
SHORTNESS OF BREATH: 0

## 2019-08-08 NOTE — CARE COORDINATION
Dr. Mere Phillips considering Physical Therapy consult. Spoke to pt regarding Physical Therapy options. Discussed HHC and homebound status and Out Patient Physical Therapy. After discussion pt does not consider herself homebound. Would rather have Out Patient Physical Therapy, she has had it in past about 3 years ago. Dr. Mere Phillips spoke to PMD who agreed to write the referral order for Physical Therapy Out Patient.

## 2019-08-08 NOTE — ED TRIAGE NOTES
Pt arrived to ER with c/o having chronic numbness to her left foot. Pt states it is moving to her right foot and she may loose her ability to drive. Pt suffers from underlying depression due to multiple deaths in her life and her mobility declining. Pt A&Ox4, skin p/w/d. resp even and unlabored.

## 2019-08-10 LAB — URINE CULTURE, ROUTINE: NORMAL

## 2019-10-26 ENCOUNTER — HOSPITAL ENCOUNTER (EMERGENCY)
Age: 74
Discharge: HOME | DRG: 312 | End: 2019-10-26
Attending: FAMILY MEDICINE
Payer: MEDICARE

## 2019-10-26 VITALS
HEIGHT: 65 IN | DIASTOLIC BLOOD PRESSURE: 80 MMHG | SYSTOLIC BLOOD PRESSURE: 127 MMHG | OXYGEN SATURATION: 98 % | TEMPERATURE: 98.4 F | BODY MASS INDEX: 38.32 KG/M2 | RESPIRATION RATE: 18 BRPM | HEART RATE: 72 BPM | WEIGHT: 230 LBS

## 2019-10-26 DIAGNOSIS — N39.0 ACUTE UTI (URINARY TRACT INFECTION): Primary | ICD-10-CM

## 2019-10-26 LAB
ALBUMIN SERPL-MCNC: 3.6 G/DL (ref 3.5–4.6)
ALP BLD-CCNC: 71 U/L (ref 40–130)
ALT SERPL-CCNC: 11 U/L (ref 0–33)
ANION GAP SERPL CALCULATED.3IONS-SCNC: 10 MEQ/L (ref 9–15)
AST SERPL-CCNC: 17 U/L (ref 0–35)
BACTERIA: ABNORMAL /HPF
BASOPHILS ABSOLUTE: 0 K/UL (ref 0–0.2)
BASOPHILS RELATIVE PERCENT: 0.3 %
BILIRUB SERPL-MCNC: 0.3 MG/DL (ref 0.2–0.7)
BILIRUBIN URINE: NEGATIVE
BLOOD, URINE: NEGATIVE
BUN BLDV-MCNC: 10 MG/DL (ref 8–23)
CALCIUM SERPL-MCNC: 8.5 MG/DL (ref 8.5–9.9)
CHLORIDE BLD-SCNC: 93 MEQ/L (ref 95–107)
CLARITY: CLEAR
CO2: 32 MEQ/L (ref 20–31)
COLOR: YELLOW
CREAT SERPL-MCNC: 0.59 MG/DL (ref 0.5–0.9)
EOSINOPHILS ABSOLUTE: 0.1 K/UL (ref 0–0.7)
EOSINOPHILS RELATIVE PERCENT: 0.8 %
EPITHELIAL CELLS, UA: ABNORMAL /HPF (ref 0–5)
GFR AFRICAN AMERICAN: >60
GFR NON-AFRICAN AMERICAN: >60
GLOBULIN: 2.6 G/DL (ref 2.3–3.5)
GLUCOSE BLD-MCNC: 106 MG/DL (ref 70–99)
GLUCOSE URINE: NEGATIVE MG/DL
HCT VFR BLD CALC: 35.4 % (ref 37–47)
HEMOGLOBIN: 11.9 G/DL (ref 12–16)
HYALINE CASTS: ABNORMAL /HPF (ref 0–5)
KETONES, URINE: NEGATIVE MG/DL
LACTIC ACID: 1.6 MMOL/L (ref 0.5–2.2)
LEUKOCYTE ESTERASE, URINE: ABNORMAL
LYMPHOCYTES ABSOLUTE: 2.1 K/UL (ref 1–4.8)
LYMPHOCYTES RELATIVE PERCENT: 27.5 %
MCH RBC QN AUTO: 28.9 PG (ref 27–31.3)
MCHC RBC AUTO-ENTMCNC: 33.6 % (ref 33–37)
MCV RBC AUTO: 85.9 FL (ref 82–100)
MONOCYTES ABSOLUTE: 0.6 K/UL (ref 0.2–0.8)
MONOCYTES RELATIVE PERCENT: 7.4 %
NEUTROPHILS ABSOLUTE: 4.8 K/UL (ref 1.4–6.5)
NEUTROPHILS RELATIVE PERCENT: 64 %
NITRITE, URINE: NEGATIVE
PDW BLD-RTO: 14.5 % (ref 11.5–14.5)
PH UA: 6 (ref 5–9)
PLATELET # BLD: 337 K/UL (ref 130–400)
POTASSIUM SERPL-SCNC: 3.7 MEQ/L (ref 3.4–4.9)
PRO-BNP: 124 PG/ML
PROTEIN UA: NEGATIVE MG/DL
RBC # BLD: 4.12 M/UL (ref 4.2–5.4)
RBC UA: ABNORMAL /HPF (ref 0–5)
SODIUM BLD-SCNC: 135 MEQ/L (ref 135–144)
SPECIFIC GRAVITY UA: 1.01 (ref 1–1.03)
TOTAL PROTEIN: 6.2 G/DL (ref 6.3–8)
URINE REFLEX TO CULTURE: YES
UROBILINOGEN, URINE: 0.2 E.U./DL
WBC # BLD: 7.6 K/UL (ref 4.8–10.8)
WBC UA: ABNORMAL /HPF (ref 0–5)

## 2019-10-26 PROCEDURE — 80053 COMPREHEN METABOLIC PANEL: CPT

## 2019-10-26 PROCEDURE — 87086 URINE CULTURE/COLONY COUNT: CPT

## 2019-10-26 PROCEDURE — 36415 COLL VENOUS BLD VENIPUNCTURE: CPT

## 2019-10-26 PROCEDURE — 87040 BLOOD CULTURE FOR BACTERIA: CPT

## 2019-10-26 PROCEDURE — 6360000002 HC RX W HCPCS: Performed by: FAMILY MEDICINE

## 2019-10-26 PROCEDURE — 99283 EMERGENCY DEPT VISIT LOW MDM: CPT

## 2019-10-26 PROCEDURE — 81001 URINALYSIS AUTO W/SCOPE: CPT

## 2019-10-26 PROCEDURE — 83880 ASSAY OF NATRIURETIC PEPTIDE: CPT

## 2019-10-26 PROCEDURE — 96365 THER/PROPH/DIAG IV INF INIT: CPT

## 2019-10-26 PROCEDURE — 85025 COMPLETE CBC W/AUTO DIFF WBC: CPT

## 2019-10-26 PROCEDURE — 83605 ASSAY OF LACTIC ACID: CPT

## 2019-10-26 RX ORDER — CIPROFLOXACIN 500 MG/1
500 TABLET, FILM COATED ORAL 2 TIMES DAILY
Qty: 14 TABLET | Refills: 0 | Status: SHIPPED | OUTPATIENT
Start: 2019-10-26 | End: 2019-11-02

## 2019-10-26 RX ORDER — CIPROFLOXACIN 2 MG/ML
400 INJECTION, SOLUTION INTRAVENOUS ONCE
Status: COMPLETED | OUTPATIENT
Start: 2019-10-26 | End: 2019-10-26

## 2019-10-26 RX ADMIN — CIPROFLOXACIN 400 MG: 2 INJECTION, SOLUTION INTRAVENOUS at 13:26

## 2019-10-26 ASSESSMENT — PAIN SCALES - GENERAL: PAINLEVEL_OUTOF10: 4

## 2019-10-26 ASSESSMENT — PAIN DESCRIPTION - DESCRIPTORS: DESCRIPTORS: HEADACHE

## 2019-10-26 ASSESSMENT — PAIN DESCRIPTION - LOCATION: LOCATION: HEAD

## 2019-10-26 ASSESSMENT — ENCOUNTER SYMPTOMS: RESPIRATORY NEGATIVE: 1

## 2019-10-26 ASSESSMENT — PAIN DESCRIPTION - PAIN TYPE: TYPE: ACUTE PAIN

## 2019-10-28 ENCOUNTER — HOSPITAL ENCOUNTER (INPATIENT)
Age: 74
LOS: 1 days | Discharge: HOME | DRG: 312 | End: 2019-10-29
Attending: EMERGENCY MEDICINE | Admitting: INTERNAL MEDICINE
Payer: MEDICARE

## 2019-10-28 DIAGNOSIS — I95.1 ORTHOSTATIC HYPOTENSION: Primary | ICD-10-CM

## 2019-10-28 DIAGNOSIS — R42 DIZZINESS: ICD-10-CM

## 2019-10-28 DIAGNOSIS — R42 LIGHTHEADEDNESS: ICD-10-CM

## 2019-10-28 DIAGNOSIS — E87.6 HYPOKALEMIA: ICD-10-CM

## 2019-10-28 LAB
ALBUMIN SERPL-MCNC: 4.1 G/DL (ref 3.5–4.6)
ALP BLD-CCNC: 84 U/L (ref 40–130)
ALT SERPL-CCNC: 13 U/L (ref 0–33)
ANION GAP SERPL CALCULATED.3IONS-SCNC: 16 MEQ/L (ref 9–15)
AST SERPL-CCNC: 24 U/L (ref 0–35)
BACTERIA: ABNORMAL /HPF
BILIRUB SERPL-MCNC: 0.3 MG/DL (ref 0.2–0.7)
BILIRUBIN URINE: NEGATIVE
BLOOD, URINE: NEGATIVE
BUN BLDV-MCNC: 12 MG/DL (ref 8–23)
CALCIUM SERPL-MCNC: 8.9 MG/DL (ref 8.5–9.9)
CASTS UA: ABNORMAL /LPF
CHLORIDE BLD-SCNC: 88 MEQ/L (ref 95–107)
CLARITY: CLEAR
CO2: 29 MEQ/L (ref 20–31)
COLOR: YELLOW
CREAT SERPL-MCNC: 0.8 MG/DL (ref 0.5–0.9)
EKG ATRIAL RATE: 75 BPM
EKG P AXIS: 16 DEGREES
EKG P-R INTERVAL: 168 MS
EKG Q-T INTERVAL: 436 MS
EKG QRS DURATION: 152 MS
EKG QTC CALCULATION (BAZETT): 486 MS
EKG R AXIS: 29 DEGREES
EKG T AXIS: 0 DEGREES
EKG VENTRICULAR RATE: 75 BPM
EPITHELIAL CELLS, UA: ABNORMAL /HPF (ref 0–5)
GFR AFRICAN AMERICAN: >60
GFR NON-AFRICAN AMERICAN: >60
GLOBULIN: 2.9 G/DL (ref 2.3–3.5)
GLUCOSE BLD-MCNC: 88 MG/DL (ref 70–99)
GLUCOSE URINE: NEGATIVE MG/DL
HCT VFR BLD CALC: 36.8 % (ref 37–47)
HEMOGLOBIN: 12.4 G/DL (ref 12–16)
HYALINE CASTS: ABNORMAL /HPF (ref 0–5)
KETONES, URINE: NEGATIVE MG/DL
LEUKOCYTE ESTERASE, URINE: ABNORMAL
MCH RBC QN AUTO: 29.1 PG (ref 27–31.3)
MCHC RBC AUTO-ENTMCNC: 33.7 % (ref 33–37)
MCV RBC AUTO: 86.5 FL (ref 82–100)
NITRITE, URINE: NEGATIVE
PDW BLD-RTO: 14.4 % (ref 11.5–14.5)
PH UA: 6.5 (ref 5–9)
PLATELET # BLD: 359 K/UL (ref 130–400)
POTASSIUM SERPL-SCNC: 3 MEQ/L (ref 3.4–4.9)
PROTEIN UA: NEGATIVE MG/DL
RBC # BLD: 4.25 M/UL (ref 4.2–5.4)
RBC UA: ABNORMAL /HPF (ref 0–2)
SODIUM BLD-SCNC: 133 MEQ/L (ref 135–144)
SPECIFIC GRAVITY UA: 1.01 (ref 1–1.03)
TOTAL PROTEIN: 7 G/DL (ref 6.3–8)
URINE CULTURE, ROUTINE: NORMAL
URINE REFLEX TO CULTURE: YES
UROBILINOGEN, URINE: 0.2 E.U./DL
WBC # BLD: 8.7 K/UL (ref 4.8–10.8)
WBC UA: ABNORMAL /HPF (ref 0–5)

## 2019-10-28 PROCEDURE — 87086 URINE CULTURE/COLONY COUNT: CPT

## 2019-10-28 PROCEDURE — 6360000002 HC RX W HCPCS: Performed by: INTERNAL MEDICINE

## 2019-10-28 PROCEDURE — 6370000000 HC RX 637 (ALT 250 FOR IP): Performed by: EMERGENCY MEDICINE

## 2019-10-28 PROCEDURE — 2580000003 HC RX 258: Performed by: EMERGENCY MEDICINE

## 2019-10-28 PROCEDURE — 99285 EMERGENCY DEPT VISIT HI MDM: CPT

## 2019-10-28 PROCEDURE — 36415 COLL VENOUS BLD VENIPUNCTURE: CPT

## 2019-10-28 PROCEDURE — 96361 HYDRATE IV INFUSION ADD-ON: CPT

## 2019-10-28 PROCEDURE — 6370000000 HC RX 637 (ALT 250 FOR IP): Performed by: INTERNAL MEDICINE

## 2019-10-28 PROCEDURE — 81001 URINALYSIS AUTO W/SCOPE: CPT

## 2019-10-28 PROCEDURE — 93005 ELECTROCARDIOGRAM TRACING: CPT | Performed by: EMERGENCY MEDICINE

## 2019-10-28 PROCEDURE — 96360 HYDRATION IV INFUSION INIT: CPT

## 2019-10-28 PROCEDURE — 96372 THER/PROPH/DIAG INJ SC/IM: CPT

## 2019-10-28 PROCEDURE — 85027 COMPLETE CBC AUTOMATED: CPT

## 2019-10-28 PROCEDURE — 1210000000 HC MED SURG R&B

## 2019-10-28 PROCEDURE — G0378 HOSPITAL OBSERVATION PER HR: HCPCS

## 2019-10-28 PROCEDURE — 2580000003 HC RX 258: Performed by: INTERNAL MEDICINE

## 2019-10-28 PROCEDURE — 80053 COMPREHEN METABOLIC PANEL: CPT

## 2019-10-28 RX ORDER — AMITRIPTYLINE HYDROCHLORIDE 50 MG/1
25 TABLET, FILM COATED ORAL NIGHTLY
Status: DISCONTINUED | OUTPATIENT
Start: 2019-10-28 | End: 2019-10-29 | Stop reason: HOSPADM

## 2019-10-28 RX ORDER — LORAZEPAM 0.5 MG/1
0.5 TABLET ORAL 2 TIMES DAILY
Status: DISCONTINUED | OUTPATIENT
Start: 2019-10-28 | End: 2019-10-29 | Stop reason: HOSPADM

## 2019-10-28 RX ORDER — BENZONATATE 100 MG/1
100 CAPSULE ORAL 3 TIMES DAILY PRN
Status: DISCONTINUED | OUTPATIENT
Start: 2019-10-28 | End: 2019-10-29 | Stop reason: HOSPADM

## 2019-10-28 RX ORDER — PAROXETINE HYDROCHLORIDE 20 MG/1
20 TABLET, FILM COATED ORAL DAILY
Status: DISCONTINUED | OUTPATIENT
Start: 2019-10-29 | End: 2019-10-29 | Stop reason: HOSPADM

## 2019-10-28 RX ORDER — LORAZEPAM 1 MG/1
0.5 TABLET ORAL ONCE
Status: COMPLETED | OUTPATIENT
Start: 2019-10-28 | End: 2019-10-28

## 2019-10-28 RX ORDER — SODIUM CHLORIDE 9 MG/ML
INJECTION, SOLUTION INTRAVENOUS CONTINUOUS
Status: DISPENSED | OUTPATIENT
Start: 2019-10-28 | End: 2019-10-29

## 2019-10-28 RX ORDER — IBUPROFEN 400 MG/1
600 TABLET ORAL 2 TIMES DAILY
Status: ON HOLD | COMMUNITY
End: 2020-02-06 | Stop reason: HOSPADM

## 2019-10-28 RX ORDER — ANALGESIC BALM 1.74; 4.06 G/29G; G/29G
OINTMENT TOPICAL ONCE
COMMUNITY

## 2019-10-28 RX ORDER — IBUPROFEN 400 MG/1
400 TABLET ORAL EVERY 8 HOURS PRN
Status: DISCONTINUED | OUTPATIENT
Start: 2019-10-28 | End: 2019-10-29 | Stop reason: HOSPADM

## 2019-10-28 RX ORDER — POTASSIUM CHLORIDE 20 MEQ/1
40 TABLET, EXTENDED RELEASE ORAL ONCE
Status: COMPLETED | OUTPATIENT
Start: 2019-10-28 | End: 2019-10-28

## 2019-10-28 RX ORDER — ATORVASTATIN CALCIUM 20 MG/1
20 TABLET, FILM COATED ORAL DAILY
Status: DISCONTINUED | OUTPATIENT
Start: 2019-10-28 | End: 2019-10-29 | Stop reason: HOSPADM

## 2019-10-28 RX ORDER — ONDANSETRON 2 MG/ML
4 INJECTION INTRAMUSCULAR; INTRAVENOUS EVERY 6 HOURS PRN
Status: DISCONTINUED | OUTPATIENT
Start: 2019-10-28 | End: 2019-10-29 | Stop reason: HOSPADM

## 2019-10-28 RX ORDER — 0.9 % SODIUM CHLORIDE 0.9 %
1000 INTRAVENOUS SOLUTION INTRAVENOUS ONCE
Status: COMPLETED | OUTPATIENT
Start: 2019-10-28 | End: 2019-10-28

## 2019-10-28 RX ORDER — CIPROFLOXACIN 250 MG/1
500 TABLET, FILM COATED ORAL 2 TIMES DAILY
Status: DISCONTINUED | OUTPATIENT
Start: 2019-10-28 | End: 2019-10-29 | Stop reason: HOSPADM

## 2019-10-28 RX ORDER — AMITRIPTYLINE HYDROCHLORIDE 25 MG/1
25 TABLET, FILM COATED ORAL NIGHTLY
COMMUNITY

## 2019-10-28 RX ORDER — ASPIRIN 81 MG/1
81 TABLET, CHEWABLE ORAL DAILY
Status: DISCONTINUED | OUTPATIENT
Start: 2019-10-28 | End: 2019-10-29 | Stop reason: HOSPADM

## 2019-10-28 RX ORDER — LORAZEPAM 0.5 MG/1
0.5 TABLET ORAL NIGHTLY
Status: ON HOLD | COMMUNITY
End: 2020-02-04

## 2019-10-28 RX ORDER — SODIUM CHLORIDE 0.9 % (FLUSH) 0.9 %
10 SYRINGE (ML) INJECTION PRN
Status: DISCONTINUED | OUTPATIENT
Start: 2019-10-28 | End: 2019-10-29 | Stop reason: HOSPADM

## 2019-10-28 RX ORDER — SODIUM CHLORIDE 0.9 % (FLUSH) 0.9 %
10 SYRINGE (ML) INJECTION EVERY 12 HOURS SCHEDULED
Status: DISCONTINUED | OUTPATIENT
Start: 2019-10-28 | End: 2019-10-29 | Stop reason: HOSPADM

## 2019-10-28 RX ORDER — LIDOCAINE 4 G/G
2 PATCH TOPICAL DAILY
Status: DISCONTINUED | OUTPATIENT
Start: 2019-10-29 | End: 2019-10-29 | Stop reason: HOSPADM

## 2019-10-28 RX ADMIN — Medication 10 ML: at 21:21

## 2019-10-28 RX ADMIN — ATORVASTATIN CALCIUM 20 MG: 20 TABLET, FILM COATED ORAL at 21:35

## 2019-10-28 RX ADMIN — LORAZEPAM 0.5 MG: 1 TABLET ORAL at 18:08

## 2019-10-28 RX ADMIN — POTASSIUM CHLORIDE 40 MEQ: 20 TABLET, EXTENDED RELEASE ORAL at 15:12

## 2019-10-28 RX ADMIN — SODIUM CHLORIDE 1000 ML: 9 INJECTION, SOLUTION INTRAVENOUS at 13:35

## 2019-10-28 RX ADMIN — IBUPROFEN 400 MG: 400 TABLET, FILM COATED ORAL at 23:39

## 2019-10-28 RX ADMIN — AMITRIPTYLINE HYDROCHLORIDE 25 MG: 50 TABLET, FILM COATED ORAL at 21:37

## 2019-10-28 RX ADMIN — LORAZEPAM 0.25 MG: 0.5 TABLET ORAL at 21:35

## 2019-10-28 RX ADMIN — SODIUM CHLORIDE: 9 INJECTION, SOLUTION INTRAVENOUS at 21:23

## 2019-10-28 RX ADMIN — ENOXAPARIN SODIUM 40 MG: 40 INJECTION SUBCUTANEOUS at 21:25

## 2019-10-28 ASSESSMENT — PAIN DESCRIPTION - PAIN TYPE: TYPE: CHRONIC PAIN

## 2019-10-28 ASSESSMENT — ENCOUNTER SYMPTOMS
VOMITING: 0
ABDOMINAL PAIN: 0
FACIAL SWELLING: 0
WHEEZING: 0
RHINORRHEA: 0
SHORTNESS OF BREATH: 0
PHOTOPHOBIA: 0
EYE DISCHARGE: 0
COLOR CHANGE: 0
ABDOMINAL DISTENTION: 0

## 2019-10-28 ASSESSMENT — PAIN SCALES - GENERAL
PAINLEVEL_OUTOF10: 4
PAINLEVEL_OUTOF10: 3
PAINLEVEL_OUTOF10: 3

## 2019-10-28 ASSESSMENT — PAIN DESCRIPTION - LOCATION
LOCATION: KNEE
LOCATION: HEAD

## 2019-10-29 VITALS
WEIGHT: 238 LBS | DIASTOLIC BLOOD PRESSURE: 69 MMHG | HEART RATE: 79 BPM | OXYGEN SATURATION: 99 % | SYSTOLIC BLOOD PRESSURE: 135 MMHG | BODY MASS INDEX: 39.65 KG/M2 | RESPIRATION RATE: 18 BRPM | TEMPERATURE: 97.7 F | HEIGHT: 65 IN

## 2019-10-29 LAB
ANION GAP SERPL CALCULATED.3IONS-SCNC: 10 MEQ/L (ref 9–15)
BUN BLDV-MCNC: 11 MG/DL (ref 8–23)
CALCIUM SERPL-MCNC: 8.3 MG/DL (ref 8.5–9.9)
CHLORIDE BLD-SCNC: 98 MEQ/L (ref 95–107)
CO2: 31 MEQ/L (ref 20–31)
CREAT SERPL-MCNC: 0.69 MG/DL (ref 0.5–0.9)
GFR AFRICAN AMERICAN: >60
GFR NON-AFRICAN AMERICAN: >60
GLUCOSE BLD-MCNC: 103 MG/DL (ref 70–99)
HCT VFR BLD CALC: 34.5 % (ref 37–47)
HEMOGLOBIN: 11.6 G/DL (ref 12–16)
MCH RBC QN AUTO: 29.2 PG (ref 27–31.3)
MCHC RBC AUTO-ENTMCNC: 33.6 % (ref 33–37)
MCV RBC AUTO: 87 FL (ref 82–100)
PDW BLD-RTO: 14.7 % (ref 11.5–14.5)
PLATELET # BLD: 327 K/UL (ref 130–400)
POTASSIUM REFLEX MAGNESIUM: 3.6 MEQ/L (ref 3.4–4.9)
RBC # BLD: 3.97 M/UL (ref 4.2–5.4)
SODIUM BLD-SCNC: 139 MEQ/L (ref 135–144)
WBC # BLD: 8.5 K/UL (ref 4.8–10.8)

## 2019-10-29 PROCEDURE — 80048 BASIC METABOLIC PNL TOTAL CA: CPT

## 2019-10-29 PROCEDURE — 6370000000 HC RX 637 (ALT 250 FOR IP): Performed by: INTERNAL MEDICINE

## 2019-10-29 PROCEDURE — G0378 HOSPITAL OBSERVATION PER HR: HCPCS

## 2019-10-29 PROCEDURE — 85027 COMPLETE CBC AUTOMATED: CPT

## 2019-10-29 PROCEDURE — 93010 ELECTROCARDIOGRAM REPORT: CPT | Performed by: INTERNAL MEDICINE

## 2019-10-29 PROCEDURE — 36415 COLL VENOUS BLD VENIPUNCTURE: CPT

## 2019-10-29 RX ORDER — LORAZEPAM 1 MG/1
1 TABLET ORAL
Status: DISCONTINUED | OUTPATIENT
Start: 2019-10-29 | End: 2019-10-29 | Stop reason: HOSPADM

## 2019-10-29 RX ADMIN — ASPIRIN 81 MG 81 MG: 81 TABLET ORAL at 10:01

## 2019-10-29 RX ADMIN — CIPROFLOXACIN HYDROCHLORIDE 500 MG: 250 TABLET, FILM COATED ORAL at 10:01

## 2019-10-29 RX ADMIN — PAROXETINE HYDROCHLORIDE 20 MG: 20 TABLET, FILM COATED ORAL at 10:01

## 2019-10-29 RX ADMIN — ATORVASTATIN CALCIUM 20 MG: 20 TABLET, FILM COATED ORAL at 10:01

## 2019-10-29 RX ADMIN — LORAZEPAM 0.5 MG: 0.5 TABLET ORAL at 10:01

## 2019-10-29 ASSESSMENT — PAIN SCALES - GENERAL
PAINLEVEL_OUTOF10: 2
PAINLEVEL_OUTOF10: 2

## 2019-10-30 LAB — URINE CULTURE, ROUTINE: NORMAL

## 2019-10-31 LAB
BLOOD CULTURE, ROUTINE: NORMAL
CULTURE, BLOOD 2: NORMAL

## 2019-11-03 ENCOUNTER — HOSPITAL ENCOUNTER (EMERGENCY)
Age: 74
Discharge: HOME | End: 2019-11-03
Payer: MEDICARE

## 2019-11-03 ENCOUNTER — APPOINTMENT (OUTPATIENT)
Dept: GENERAL RADIOLOGY | Age: 74
End: 2019-11-03
Payer: MEDICARE

## 2019-11-03 VITALS
RESPIRATION RATE: 18 BRPM | TEMPERATURE: 98.8 F | HEART RATE: 85 BPM | WEIGHT: 230 LBS | HEIGHT: 65 IN | SYSTOLIC BLOOD PRESSURE: 169 MMHG | DIASTOLIC BLOOD PRESSURE: 95 MMHG | BODY MASS INDEX: 38.32 KG/M2 | OXYGEN SATURATION: 98 %

## 2019-11-03 DIAGNOSIS — R60.9 PERIPHERAL EDEMA: Primary | ICD-10-CM

## 2019-11-03 LAB
ALBUMIN SERPL-MCNC: 3.9 G/DL (ref 3.5–4.6)
ALP BLD-CCNC: 81 U/L (ref 40–130)
ALT SERPL-CCNC: 8 U/L (ref 0–33)
ANION GAP SERPL CALCULATED.3IONS-SCNC: 11 MEQ/L (ref 9–15)
AST SERPL-CCNC: 18 U/L (ref 0–35)
BACTERIA: NEGATIVE /HPF
BASOPHILS ABSOLUTE: 0 K/UL (ref 0–0.2)
BASOPHILS RELATIVE PERCENT: 0.4 %
BILIRUB SERPL-MCNC: 0.4 MG/DL (ref 0.2–0.7)
BILIRUBIN URINE: NEGATIVE
BLOOD, URINE: NEGATIVE
BUN BLDV-MCNC: 8 MG/DL (ref 8–23)
CALCIUM SERPL-MCNC: 9.3 MG/DL (ref 8.5–9.9)
CHLORIDE BLD-SCNC: 92 MEQ/L (ref 95–107)
CLARITY: CLEAR
CO2: 31 MEQ/L (ref 20–31)
COLOR: YELLOW
CREAT SERPL-MCNC: 0.72 MG/DL (ref 0.5–0.9)
EOSINOPHILS ABSOLUTE: 0.1 K/UL (ref 0–0.7)
EOSINOPHILS RELATIVE PERCENT: 1.3 %
EPITHELIAL CELLS, UA: NORMAL /HPF (ref 0–5)
GFR AFRICAN AMERICAN: >60
GFR NON-AFRICAN AMERICAN: >60
GLOBULIN: 3.1 G/DL (ref 2.3–3.5)
GLUCOSE BLD-MCNC: 91 MG/DL (ref 70–99)
GLUCOSE URINE: NEGATIVE MG/DL
HCT VFR BLD CALC: 37.3 % (ref 37–47)
HEMOGLOBIN: 12.7 G/DL (ref 12–16)
HYALINE CASTS: NORMAL /HPF (ref 0–5)
KETONES, URINE: NEGATIVE MG/DL
LEUKOCYTE ESTERASE, URINE: ABNORMAL
LYMPHOCYTES ABSOLUTE: 2.4 K/UL (ref 1–4.8)
LYMPHOCYTES RELATIVE PERCENT: 23.1 %
MCH RBC QN AUTO: 29.2 PG (ref 27–31.3)
MCHC RBC AUTO-ENTMCNC: 34.1 % (ref 33–37)
MCV RBC AUTO: 85.5 FL (ref 82–100)
MONOCYTES ABSOLUTE: 0.8 K/UL (ref 0.2–0.8)
MONOCYTES RELATIVE PERCENT: 7.5 %
NEUTROPHILS ABSOLUTE: 7 K/UL (ref 1.4–6.5)
NEUTROPHILS RELATIVE PERCENT: 67.7 %
NITRITE, URINE: NEGATIVE
PDW BLD-RTO: 14.5 % (ref 11.5–14.5)
PH UA: 6.5 (ref 5–9)
PLATELET # BLD: 355 K/UL (ref 130–400)
POTASSIUM SERPL-SCNC: 4.1 MEQ/L (ref 3.4–4.9)
PRO-BNP: 186 PG/ML
PROTEIN UA: NEGATIVE MG/DL
RBC # BLD: 4.36 M/UL (ref 4.2–5.4)
RBC UA: NORMAL /HPF (ref 0–5)
SODIUM BLD-SCNC: 134 MEQ/L (ref 135–144)
SPECIFIC GRAVITY UA: 1 (ref 1–1.03)
TOTAL PROTEIN: 7 G/DL (ref 6.3–8)
URINE REFLEX TO CULTURE: YES
UROBILINOGEN, URINE: 0.2 E.U./DL
WBC # BLD: 10.3 K/UL (ref 4.8–10.8)
WBC UA: NORMAL /HPF (ref 0–5)

## 2019-11-03 PROCEDURE — 80053 COMPREHEN METABOLIC PANEL: CPT

## 2019-11-03 PROCEDURE — 87086 URINE CULTURE/COLONY COUNT: CPT

## 2019-11-03 PROCEDURE — 83880 ASSAY OF NATRIURETIC PEPTIDE: CPT

## 2019-11-03 PROCEDURE — 71045 X-RAY EXAM CHEST 1 VIEW: CPT

## 2019-11-03 PROCEDURE — 99283 EMERGENCY DEPT VISIT LOW MDM: CPT

## 2019-11-03 PROCEDURE — 85025 COMPLETE CBC W/AUTO DIFF WBC: CPT

## 2019-11-03 PROCEDURE — 36415 COLL VENOUS BLD VENIPUNCTURE: CPT

## 2019-11-03 PROCEDURE — 81001 URINALYSIS AUTO W/SCOPE: CPT

## 2019-11-03 PROCEDURE — 87077 CULTURE AEROBIC IDENTIFY: CPT

## 2019-11-03 PROCEDURE — 87186 SC STD MICRODIL/AGAR DIL: CPT

## 2019-11-03 ASSESSMENT — ENCOUNTER SYMPTOMS
CHOKING: 0
ABDOMINAL DISTENTION: 0
RHINORRHEA: 0
COLOR CHANGE: 0
SORE THROAT: 0
COUGH: 0
SHORTNESS OF BREATH: 0
BACK PAIN: 0
EYE DISCHARGE: 0
DIARRHEA: 0
VOMITING: 0
ABDOMINAL PAIN: 0
CONSTIPATION: 0
NAUSEA: 0

## 2019-11-03 ASSESSMENT — PAIN DESCRIPTION - ORIENTATION: ORIENTATION: LOWER

## 2019-11-03 ASSESSMENT — PAIN DESCRIPTION - LOCATION: LOCATION: ABDOMEN

## 2019-11-03 ASSESSMENT — PAIN SCALES - GENERAL: PAINLEVEL_OUTOF10: 3

## 2019-11-03 ASSESSMENT — PAIN DESCRIPTION - DESCRIPTORS: DESCRIPTORS: CRAMPING

## 2019-11-06 LAB
ORGANISM: ABNORMAL
URINE CULTURE, ROUTINE: ABNORMAL

## 2019-12-05 ENCOUNTER — HOSPITAL ENCOUNTER (EMERGENCY)
Age: 74
Discharge: HOME | End: 2019-12-05
Payer: MEDICARE

## 2019-12-05 VITALS
TEMPERATURE: 98.7 F | HEIGHT: 65 IN | WEIGHT: 230 LBS | SYSTOLIC BLOOD PRESSURE: 142 MMHG | DIASTOLIC BLOOD PRESSURE: 74 MMHG | OXYGEN SATURATION: 98 % | BODY MASS INDEX: 38.32 KG/M2 | HEART RATE: 96 BPM | RESPIRATION RATE: 18 BRPM

## 2019-12-05 DIAGNOSIS — N39.0 URINARY TRACT INFECTION WITHOUT HEMATURIA, SITE UNSPECIFIED: Primary | ICD-10-CM

## 2019-12-05 LAB
ALBUMIN SERPL-MCNC: 3.6 G/DL (ref 3.5–4.6)
ALP BLD-CCNC: 74 U/L (ref 40–130)
ALT SERPL-CCNC: 11 U/L (ref 0–33)
ANION GAP SERPL CALCULATED.3IONS-SCNC: 8 MEQ/L (ref 9–15)
AST SERPL-CCNC: 16 U/L (ref 0–35)
BACTERIA: ABNORMAL /HPF
BASOPHILS ABSOLUTE: 0.1 K/UL (ref 0–0.2)
BASOPHILS RELATIVE PERCENT: 0.8 %
BILIRUB SERPL-MCNC: 0.3 MG/DL (ref 0.2–0.7)
BILIRUBIN URINE: NEGATIVE
BLOOD, URINE: ABNORMAL
BUN BLDV-MCNC: 14 MG/DL (ref 8–23)
CALCIUM SERPL-MCNC: 8.7 MG/DL (ref 8.5–9.9)
CHLORIDE BLD-SCNC: 99 MEQ/L (ref 95–107)
CLARITY: ABNORMAL
CO2: 31 MEQ/L (ref 20–31)
COLOR: YELLOW
CREAT SERPL-MCNC: 0.65 MG/DL (ref 0.5–0.9)
EOSINOPHILS ABSOLUTE: 0.2 K/UL (ref 0–0.7)
EOSINOPHILS RELATIVE PERCENT: 2.9 %
EPITHELIAL CELLS, UA: ABNORMAL /HPF (ref 0–5)
GFR AFRICAN AMERICAN: >60
GFR NON-AFRICAN AMERICAN: >60
GLOBULIN: 2.9 G/DL (ref 2.3–3.5)
GLUCOSE BLD-MCNC: 97 MG/DL (ref 70–99)
GLUCOSE URINE: NEGATIVE MG/DL
HCT VFR BLD CALC: 35.4 % (ref 37–47)
HEMOGLOBIN: 11.7 G/DL (ref 12–16)
HYALINE CASTS: ABNORMAL /HPF (ref 0–5)
KETONES, URINE: NEGATIVE MG/DL
LEUKOCYTE ESTERASE, URINE: ABNORMAL
LYMPHOCYTES ABSOLUTE: 1.8 K/UL (ref 1–4.8)
LYMPHOCYTES RELATIVE PERCENT: 22.8 %
MCH RBC QN AUTO: 28.6 PG (ref 27–31.3)
MCHC RBC AUTO-ENTMCNC: 33.1 % (ref 33–37)
MCV RBC AUTO: 86.5 FL (ref 82–100)
MONOCYTES ABSOLUTE: 0.6 K/UL (ref 0.2–0.8)
MONOCYTES RELATIVE PERCENT: 7.8 %
NEUTROPHILS ABSOLUTE: 5.2 K/UL (ref 1.4–6.5)
NEUTROPHILS RELATIVE PERCENT: 65.7 %
NITRITE, URINE: POSITIVE
PDW BLD-RTO: 14.3 % (ref 11.5–14.5)
PH UA: 7.5 (ref 5–9)
PLATELET # BLD: 411 K/UL (ref 130–400)
POTASSIUM SERPL-SCNC: 3.8 MEQ/L (ref 3.4–4.9)
PROTEIN UA: ABNORMAL MG/DL
RBC # BLD: 4.09 M/UL (ref 4.2–5.4)
RBC UA: ABNORMAL /HPF (ref 0–5)
SODIUM BLD-SCNC: 138 MEQ/L (ref 135–144)
SPECIFIC GRAVITY UA: 1.01 (ref 1–1.03)
TOTAL PROTEIN: 6.5 G/DL (ref 6.3–8)
URINE REFLEX TO CULTURE: YES
UROBILINOGEN, URINE: 0.2 E.U./DL
WBC # BLD: 8 K/UL (ref 4.8–10.8)
WBC UA: >100 /HPF (ref 0–5)

## 2019-12-05 PROCEDURE — 36415 COLL VENOUS BLD VENIPUNCTURE: CPT

## 2019-12-05 PROCEDURE — 87086 URINE CULTURE/COLONY COUNT: CPT

## 2019-12-05 PROCEDURE — 2580000003 HC RX 258: Performed by: PHYSICIAN ASSISTANT

## 2019-12-05 PROCEDURE — 81001 URINALYSIS AUTO W/SCOPE: CPT

## 2019-12-05 PROCEDURE — 99283 EMERGENCY DEPT VISIT LOW MDM: CPT

## 2019-12-05 PROCEDURE — 80053 COMPREHEN METABOLIC PANEL: CPT

## 2019-12-05 PROCEDURE — 96365 THER/PROPH/DIAG IV INF INIT: CPT

## 2019-12-05 PROCEDURE — 6360000002 HC RX W HCPCS: Performed by: PHYSICIAN ASSISTANT

## 2019-12-05 PROCEDURE — 87077 CULTURE AEROBIC IDENTIFY: CPT

## 2019-12-05 PROCEDURE — 87186 SC STD MICRODIL/AGAR DIL: CPT

## 2019-12-05 PROCEDURE — 85025 COMPLETE CBC W/AUTO DIFF WBC: CPT

## 2019-12-05 RX ORDER — PHENAZOPYRIDINE HYDROCHLORIDE 100 MG/1
100 TABLET, FILM COATED ORAL 3 TIMES DAILY PRN
Qty: 6 TABLET | Refills: 0 | Status: SHIPPED | OUTPATIENT
Start: 2019-12-05 | End: 2019-12-08

## 2019-12-05 RX ORDER — SODIUM CHLORIDE 9 MG/ML
INJECTION, SOLUTION INTRAVENOUS CONTINUOUS
Status: DISCONTINUED | OUTPATIENT
Start: 2019-12-05 | End: 2019-12-05 | Stop reason: HOSPADM

## 2019-12-05 RX ORDER — SULFAMETHOXAZOLE AND TRIMETHOPRIM 800; 160 MG/1; MG/1
1 TABLET ORAL 2 TIMES DAILY
Qty: 14 TABLET | Refills: 0 | Status: SHIPPED | OUTPATIENT
Start: 2019-12-05 | End: 2019-12-12

## 2019-12-05 RX ORDER — TIZANIDINE 4 MG/1
4 TABLET ORAL 2 TIMES DAILY
COMMUNITY

## 2019-12-05 RX ADMIN — CEFTRIAXONE SODIUM 1 G: 1 INJECTION, POWDER, FOR SOLUTION INTRAMUSCULAR; INTRAVENOUS at 15:30

## 2019-12-05 RX ADMIN — SODIUM CHLORIDE: 9 INJECTION, SOLUTION INTRAVENOUS at 11:52

## 2019-12-05 ASSESSMENT — ENCOUNTER SYMPTOMS
EYE DISCHARGE: 0
CONSTIPATION: 0
RHINORRHEA: 0
SORE THROAT: 0
ABDOMINAL PAIN: 0
COLOR CHANGE: 0
ABDOMINAL DISTENTION: 0
SHORTNESS OF BREATH: 0
BACK PAIN: 0

## 2019-12-05 ASSESSMENT — PAIN DESCRIPTION - LOCATION: LOCATION: ABDOMEN

## 2019-12-05 ASSESSMENT — PAIN DESCRIPTION - DESCRIPTORS: DESCRIPTORS: CRAMPING

## 2019-12-05 ASSESSMENT — PAIN DESCRIPTION - FREQUENCY: FREQUENCY: CONTINUOUS

## 2019-12-05 ASSESSMENT — PAIN DESCRIPTION - PAIN TYPE: TYPE: ACUTE PAIN

## 2019-12-05 ASSESSMENT — PAIN SCALES - GENERAL: PAINLEVEL_OUTOF10: 2

## 2019-12-07 LAB
ORGANISM: ABNORMAL
URINE CULTURE, ROUTINE: ABNORMAL

## 2019-12-27 ENCOUNTER — APPOINTMENT (OUTPATIENT)
Dept: CT IMAGING | Age: 74
End: 2019-12-27
Payer: MEDICARE

## 2019-12-27 ENCOUNTER — APPOINTMENT (OUTPATIENT)
Dept: GENERAL RADIOLOGY | Age: 74
End: 2019-12-27
Payer: MEDICARE

## 2019-12-27 ENCOUNTER — HOSPITAL ENCOUNTER (OUTPATIENT)
Age: 74
Setting detail: OBSERVATION
Discharge: HOME | End: 2019-12-28
Attending: ORTHOPAEDIC SURGERY | Admitting: ORTHOPAEDIC SURGERY
Payer: MEDICARE

## 2019-12-27 PROBLEM — S43.015A ANTERIOR DISLOCATION OF LEFT SHOULDER: Status: ACTIVE | Noted: 2019-12-27

## 2019-12-27 LAB
ALBUMIN SERPL-MCNC: 3.4 G/DL (ref 3.5–4.6)
ALP BLD-CCNC: 81 U/L (ref 40–130)
ALT SERPL-CCNC: 13 U/L (ref 0–33)
ANION GAP SERPL CALCULATED.3IONS-SCNC: 13 MEQ/L (ref 9–15)
ANION GAP SERPL CALCULATED.3IONS-SCNC: 13 MEQ/L (ref 9–15)
APTT: 28.9 SEC (ref 24.4–36.8)
AST SERPL-CCNC: 18 U/L (ref 0–35)
BACTERIA: NEGATIVE /HPF
BASOPHILS ABSOLUTE: 0.1 K/UL (ref 0–0.2)
BASOPHILS RELATIVE PERCENT: 0.5 %
BILIRUB SERPL-MCNC: 0.5 MG/DL (ref 0.2–0.7)
BILIRUBIN URINE: NEGATIVE
BLOOD, URINE: ABNORMAL
BUN BLDV-MCNC: 12 MG/DL (ref 8–23)
BUN BLDV-MCNC: 12 MG/DL (ref 8–23)
CALCIUM SERPL-MCNC: 8.9 MG/DL (ref 8.5–9.9)
CALCIUM SERPL-MCNC: 8.9 MG/DL (ref 8.5–9.9)
CHLORIDE BLD-SCNC: 93 MEQ/L (ref 95–107)
CHLORIDE BLD-SCNC: 94 MEQ/L (ref 95–107)
CLARITY: CLEAR
CO2: 26 MEQ/L (ref 20–31)
CO2: 29 MEQ/L (ref 20–31)
COLOR: YELLOW
CREAT SERPL-MCNC: 0.5 MG/DL (ref 0.5–0.9)
CREAT SERPL-MCNC: 0.57 MG/DL (ref 0.5–0.9)
EKG ATRIAL RATE: 95 BPM
EKG P AXIS: 53 DEGREES
EKG P-R INTERVAL: 160 MS
EKG Q-T INTERVAL: 390 MS
EKG QRS DURATION: 148 MS
EKG QTC CALCULATION (BAZETT): 490 MS
EKG R AXIS: 10 DEGREES
EKG T AXIS: -20 DEGREES
EKG VENTRICULAR RATE: 95 BPM
EOSINOPHILS ABSOLUTE: 0 K/UL (ref 0–0.7)
EOSINOPHILS RELATIVE PERCENT: 0.4 %
EPITHELIAL CELLS, UA: ABNORMAL /HPF (ref 0–5)
GFR AFRICAN AMERICAN: >60
GFR AFRICAN AMERICAN: >60
GFR NON-AFRICAN AMERICAN: >60
GFR NON-AFRICAN AMERICAN: >60
GLOBULIN: 3 G/DL (ref 2.3–3.5)
GLUCOSE BLD-MCNC: 102 MG/DL (ref 70–99)
GLUCOSE BLD-MCNC: 95 MG/DL (ref 70–99)
GLUCOSE URINE: NEGATIVE MG/DL
HCT VFR BLD CALC: 35.8 % (ref 37–47)
HEMOGLOBIN: 11.8 G/DL (ref 12–16)
HYALINE CASTS: ABNORMAL /HPF (ref 0–5)
INR BLD: 1
KETONES, URINE: NEGATIVE MG/DL
LEUKOCYTE ESTERASE, URINE: ABNORMAL
LYMPHOCYTES ABSOLUTE: 2.3 K/UL (ref 1–4.8)
LYMPHOCYTES RELATIVE PERCENT: 18 %
MAGNESIUM: 1.9 MG/DL (ref 1.7–2.4)
MCH RBC QN AUTO: 28.7 PG (ref 27–31.3)
MCHC RBC AUTO-ENTMCNC: 33 % (ref 33–37)
MCV RBC AUTO: 87 FL (ref 82–100)
MONOCYTES ABSOLUTE: 1.5 K/UL (ref 0.2–0.8)
MONOCYTES RELATIVE PERCENT: 11.6 %
NEUTROPHILS ABSOLUTE: 8.9 K/UL (ref 1.4–6.5)
NEUTROPHILS RELATIVE PERCENT: 69.5 %
NITRITE, URINE: NEGATIVE
PDW BLD-RTO: 14.5 % (ref 11.5–14.5)
PH UA: 7 (ref 5–9)
PLATELET # BLD: 332 K/UL (ref 130–400)
POTASSIUM REFLEX MAGNESIUM: 3.1 MEQ/L (ref 3.4–4.9)
POTASSIUM SERPL-SCNC: 3.1 MEQ/L (ref 3.4–4.9)
PROTEIN UA: NEGATIVE MG/DL
PROTHROMBIN TIME: 14 SEC (ref 12.3–14.9)
RBC # BLD: 4.11 M/UL (ref 4.2–5.4)
RBC UA: ABNORMAL /HPF (ref 0–5)
SODIUM BLD-SCNC: 132 MEQ/L (ref 135–144)
SODIUM BLD-SCNC: 136 MEQ/L (ref 135–144)
SPECIFIC GRAVITY UA: 1.01 (ref 1–1.03)
TOTAL PROTEIN: 6.4 G/DL (ref 6.3–8)
URINE REFLEX TO CULTURE: YES
UROBILINOGEN, URINE: 0.2 E.U./DL
WBC # BLD: 12.8 K/UL (ref 4.8–10.8)
WBC UA: ABNORMAL /HPF (ref 0–5)

## 2019-12-27 PROCEDURE — 6370000000 HC RX 637 (ALT 250 FOR IP): Performed by: ORTHOPAEDIC SURGERY

## 2019-12-27 PROCEDURE — 86901 BLOOD TYPING SEROLOGIC RH(D): CPT

## 2019-12-27 PROCEDURE — G0378 HOSPITAL OBSERVATION PER HR: HCPCS

## 2019-12-27 PROCEDURE — 73030 X-RAY EXAM OF SHOULDER: CPT

## 2019-12-27 PROCEDURE — 83735 ASSAY OF MAGNESIUM: CPT

## 2019-12-27 PROCEDURE — 93005 ELECTROCARDIOGRAM TRACING: CPT

## 2019-12-27 PROCEDURE — 6360000002 HC RX W HCPCS: Performed by: PHYSICIAN ASSISTANT

## 2019-12-27 PROCEDURE — 6360000002 HC RX W HCPCS

## 2019-12-27 PROCEDURE — 96374 THER/PROPH/DIAG INJ IV PUSH: CPT

## 2019-12-27 PROCEDURE — 81001 URINALYSIS AUTO W/SCOPE: CPT

## 2019-12-27 PROCEDURE — 86850 RBC ANTIBODY SCREEN: CPT

## 2019-12-27 PROCEDURE — 2580000003 HC RX 258: Performed by: PHYSICIAN ASSISTANT

## 2019-12-27 PROCEDURE — 6370000000 HC RX 637 (ALT 250 FOR IP): Performed by: INTERNAL MEDICINE

## 2019-12-27 PROCEDURE — 85730 THROMBOPLASTIN TIME PARTIAL: CPT

## 2019-12-27 PROCEDURE — 87086 URINE CULTURE/COLONY COUNT: CPT

## 2019-12-27 PROCEDURE — 80053 COMPREHEN METABOLIC PANEL: CPT

## 2019-12-27 PROCEDURE — 23650 CLTX SHO DSLC W/MNPJ WO ANES: CPT

## 2019-12-27 PROCEDURE — 85610 PROTHROMBIN TIME: CPT

## 2019-12-27 PROCEDURE — 85025 COMPLETE CBC W/AUTO DIFF WBC: CPT

## 2019-12-27 PROCEDURE — 99285 EMERGENCY DEPT VISIT HI MDM: CPT

## 2019-12-27 PROCEDURE — 2500000003 HC RX 250 WO HCPCS: Performed by: PHYSICIAN ASSISTANT

## 2019-12-27 PROCEDURE — 96375 TX/PRO/DX INJ NEW DRUG ADDON: CPT

## 2019-12-27 PROCEDURE — 73200 CT UPPER EXTREMITY W/O DYE: CPT

## 2019-12-27 PROCEDURE — 36415 COLL VENOUS BLD VENIPUNCTURE: CPT

## 2019-12-27 PROCEDURE — 86900 BLOOD TYPING SEROLOGIC ABO: CPT

## 2019-12-27 PROCEDURE — 99152 MOD SED SAME PHYS/QHP 5/>YRS: CPT

## 2019-12-27 RX ORDER — SODIUM CHLORIDE 9 MG/ML
INJECTION, SOLUTION INTRAVENOUS CONTINUOUS
Status: DISCONTINUED | OUTPATIENT
Start: 2019-12-27 | End: 2019-12-28 | Stop reason: HOSPADM

## 2019-12-27 RX ORDER — MIDAZOLAM HYDROCHLORIDE 1 MG/ML
2 INJECTION INTRAMUSCULAR; INTRAVENOUS ONCE
Status: COMPLETED | OUTPATIENT
Start: 2019-12-27 | End: 2019-12-27

## 2019-12-27 RX ORDER — ASPIRIN 81 MG/1
81 TABLET, CHEWABLE ORAL DAILY
Status: DISCONTINUED | OUTPATIENT
Start: 2019-12-28 | End: 2019-12-28 | Stop reason: HOSPADM

## 2019-12-27 RX ORDER — ETOMIDATE 2 MG/ML
0.3 INJECTION INTRAVENOUS ONCE
Status: COMPLETED | OUTPATIENT
Start: 2019-12-27 | End: 2019-12-27

## 2019-12-27 RX ORDER — FENTANYL CITRATE 50 UG/ML
50 INJECTION, SOLUTION INTRAMUSCULAR; INTRAVENOUS ONCE
Status: DISCONTINUED | OUTPATIENT
Start: 2019-12-27 | End: 2019-12-27 | Stop reason: HOSPADM

## 2019-12-27 RX ORDER — ONDANSETRON 2 MG/ML
4 INJECTION INTRAMUSCULAR; INTRAVENOUS EVERY 6 HOURS PRN
Status: DISCONTINUED | OUTPATIENT
Start: 2019-12-27 | End: 2019-12-28 | Stop reason: HOSPADM

## 2019-12-27 RX ORDER — MORPHINE SULFATE 2 MG/ML
2 INJECTION, SOLUTION INTRAMUSCULAR; INTRAVENOUS
Status: DISCONTINUED | OUTPATIENT
Start: 2019-12-27 | End: 2019-12-28 | Stop reason: HOSPADM

## 2019-12-27 RX ORDER — LORAZEPAM 0.5 MG/1
0.5 TABLET ORAL NIGHTLY
Status: DISCONTINUED | OUTPATIENT
Start: 2019-12-27 | End: 2019-12-28 | Stop reason: HOSPADM

## 2019-12-27 RX ORDER — IBUPROFEN 600 MG/1
600 TABLET ORAL 2 TIMES DAILY
Status: DISCONTINUED | OUTPATIENT
Start: 2019-12-27 | End: 2019-12-28 | Stop reason: HOSPADM

## 2019-12-27 RX ORDER — LORAZEPAM 0.5 MG/1
0.25 TABLET ORAL
Status: DISCONTINUED | OUTPATIENT
Start: 2019-12-28 | End: 2019-12-28 | Stop reason: HOSPADM

## 2019-12-27 RX ORDER — ACETAMINOPHEN 80 MG
TABLET,CHEWABLE ORAL ONCE
Status: DISCONTINUED | OUTPATIENT
Start: 2019-12-27 | End: 2019-12-28 | Stop reason: HOSPADM

## 2019-12-27 RX ORDER — ONDANSETRON 2 MG/ML
4 INJECTION INTRAMUSCULAR; INTRAVENOUS ONCE
Status: COMPLETED | OUTPATIENT
Start: 2019-12-27 | End: 2019-12-27

## 2019-12-27 RX ORDER — PAROXETINE HYDROCHLORIDE 20 MG/1
20 TABLET, FILM COATED ORAL DAILY
Status: DISCONTINUED | OUTPATIENT
Start: 2019-12-28 | End: 2019-12-28 | Stop reason: HOSPADM

## 2019-12-27 RX ORDER — OXYCODONE HYDROCHLORIDE AND ACETAMINOPHEN 5; 325 MG/1; MG/1
2 TABLET ORAL EVERY 6 HOURS PRN
Status: DISCONTINUED | OUTPATIENT
Start: 2019-12-27 | End: 2019-12-28 | Stop reason: HOSPADM

## 2019-12-27 RX ORDER — OXYCODONE HYDROCHLORIDE AND ACETAMINOPHEN 5; 325 MG/1; MG/1
1 TABLET ORAL EVERY 6 HOURS PRN
Status: DISCONTINUED | OUTPATIENT
Start: 2019-12-27 | End: 2019-12-28 | Stop reason: HOSPADM

## 2019-12-27 RX ORDER — ONDANSETRON 2 MG/ML
4 INJECTION INTRAMUSCULAR; INTRAVENOUS ONCE
Status: DISCONTINUED | OUTPATIENT
Start: 2019-12-27 | End: 2019-12-27 | Stop reason: HOSPADM

## 2019-12-27 RX ORDER — ATORVASTATIN CALCIUM 20 MG/1
20 TABLET, FILM COATED ORAL DAILY
Status: DISCONTINUED | OUTPATIENT
Start: 2019-12-28 | End: 2019-12-28 | Stop reason: HOSPADM

## 2019-12-27 RX ORDER — ACETAMINOPHEN 500 MG
1000 TABLET ORAL ONCE
Status: DISCONTINUED | OUTPATIENT
Start: 2019-12-27 | End: 2019-12-28 | Stop reason: HOSPADM

## 2019-12-27 RX ORDER — CHLORTHALIDONE 25 MG/1
12.5 TABLET ORAL DAILY
Status: DISCONTINUED | OUTPATIENT
Start: 2019-12-28 | End: 2019-12-28 | Stop reason: HOSPADM

## 2019-12-27 RX ORDER — PROPOFOL 10 MG/ML
INJECTION, EMULSION INTRAVENOUS
Status: COMPLETED
Start: 2019-12-27 | End: 2019-12-27

## 2019-12-27 RX ORDER — CEFAZOLIN SODIUM 2 G/50ML
2 SOLUTION INTRAVENOUS
Status: ACTIVE | OUTPATIENT
Start: 2019-12-27 | End: 2019-12-28

## 2019-12-27 RX ORDER — FUROSEMIDE 40 MG/1
40 TABLET ORAL DAILY
COMMUNITY

## 2019-12-27 RX ORDER — TIZANIDINE 4 MG/1
4 TABLET ORAL 2 TIMES DAILY
Status: DISCONTINUED | OUTPATIENT
Start: 2019-12-27 | End: 2019-12-28 | Stop reason: HOSPADM

## 2019-12-27 RX ORDER — AMITRIPTYLINE HYDROCHLORIDE 25 MG/1
25 TABLET, FILM COATED ORAL NIGHTLY
Status: DISCONTINUED | OUTPATIENT
Start: 2019-12-27 | End: 2019-12-28 | Stop reason: HOSPADM

## 2019-12-27 RX ORDER — MORPHINE SULFATE 2 MG/ML
2 INJECTION, SOLUTION INTRAMUSCULAR; INTRAVENOUS ONCE
Status: COMPLETED | OUTPATIENT
Start: 2019-12-27 | End: 2019-12-27

## 2019-12-27 RX ORDER — SODIUM CHLORIDE 0.9 % (FLUSH) 0.9 %
10 SYRINGE (ML) INJECTION EVERY 12 HOURS SCHEDULED
Status: DISCONTINUED | OUTPATIENT
Start: 2019-12-27 | End: 2019-12-28 | Stop reason: HOSPADM

## 2019-12-27 RX ORDER — SODIUM CHLORIDE 0.9 % (FLUSH) 0.9 %
10 SYRINGE (ML) INJECTION PRN
Status: DISCONTINUED | OUTPATIENT
Start: 2019-12-27 | End: 2019-12-28 | Stop reason: HOSPADM

## 2019-12-27 RX ORDER — MORPHINE SULFATE 4 MG/ML
4 INJECTION, SOLUTION INTRAMUSCULAR; INTRAVENOUS
Status: DISCONTINUED | OUTPATIENT
Start: 2019-12-27 | End: 2019-12-28 | Stop reason: HOSPADM

## 2019-12-27 RX ORDER — FUROSEMIDE 40 MG/1
40 TABLET ORAL DAILY
Status: DISCONTINUED | OUTPATIENT
Start: 2019-12-28 | End: 2019-12-28 | Stop reason: HOSPADM

## 2019-12-27 RX ADMIN — LORAZEPAM 0.5 MG: 0.5 TABLET ORAL at 22:59

## 2019-12-27 RX ADMIN — PROPOFOL 40 MG: 10 INJECTION, EMULSION INTRAVENOUS at 17:27

## 2019-12-27 RX ADMIN — MIDAZOLAM HYDROCHLORIDE 2 MG: 1 INJECTION, SOLUTION INTRAMUSCULAR; INTRAVENOUS at 17:24

## 2019-12-27 RX ADMIN — MORPHINE SULFATE 2 MG: 2 INJECTION, SOLUTION INTRAMUSCULAR; INTRAVENOUS at 16:41

## 2019-12-27 RX ADMIN — AMITRIPTYLINE HYDROCHLORIDE 25 MG: 25 TABLET, FILM COATED ORAL at 22:59

## 2019-12-27 RX ADMIN — OXYCODONE HYDROCHLORIDE AND ACETAMINOPHEN 1 TABLET: 5; 325 TABLET ORAL at 22:07

## 2019-12-27 RX ADMIN — ETOMIDATE 31.2 MG: 2 INJECTION, SOLUTION INTRAVENOUS at 17:21

## 2019-12-27 RX ADMIN — TIZANIDINE 4 MG: 4 TABLET ORAL at 22:59

## 2019-12-27 RX ADMIN — SODIUM CHLORIDE: 9 INJECTION, SOLUTION INTRAVENOUS at 16:41

## 2019-12-27 RX ADMIN — ONDANSETRON 4 MG: 2 INJECTION INTRAMUSCULAR; INTRAVENOUS at 16:41

## 2019-12-27 RX ADMIN — IBUPROFEN 600 MG: 600 TABLET, FILM COATED ORAL at 22:59

## 2019-12-27 ASSESSMENT — PAIN DESCRIPTION - ORIENTATION
ORIENTATION: LEFT
ORIENTATION: LEFT

## 2019-12-27 ASSESSMENT — PAIN DESCRIPTION - PAIN TYPE
TYPE: ACUTE PAIN
TYPE: ACUTE PAIN

## 2019-12-27 ASSESSMENT — PAIN DESCRIPTION - LOCATION
LOCATION: SHOULDER
LOCATION: SHOULDER

## 2019-12-27 ASSESSMENT — ENCOUNTER SYMPTOMS
ABDOMINAL PAIN: 0
COLOR CHANGE: 0
APNEA: 0
ALLERGIC/IMMUNOLOGIC NEGATIVE: 1
EYE PAIN: 0
TROUBLE SWALLOWING: 0
SHORTNESS OF BREATH: 0

## 2019-12-27 ASSESSMENT — PAIN SCALES - GENERAL
PAINLEVEL_OUTOF10: 5
PAINLEVEL_OUTOF10: 6
PAINLEVEL_OUTOF10: 5
PAINLEVEL_OUTOF10: 5
PAINLEVEL_OUTOF10: 3
PAINLEVEL_OUTOF10: 5

## 2019-12-27 NOTE — ED PROVIDER NOTES
[facility] ED  eMERGENCYdEPARTMENT eNCOUnter      Pt Name: Hayley Lundberg  MRN: [id number]  [place] 1945  Date of evaluation: 12/27/2019  Provider:Jered Stephenson PA-C    CHIEF COMPLAINT       Chief Complaint   Patient presents with    Shoulder Pain         HISTORY OF PRESENT ILLNESS  (Location/Symptom, Timing/Onset, Context/Setting, Quality, Duration, Modifying Factors, Severity.)   Hayley Lundberg is a 69 y.o. female who presents to the emergency department with a 2-day history of left shoulder pain. Patient denies any falls or blunt trauma, but does state that she was carrying heavy trash bags prior to symptom onset. Patient describes the pain is \"in the joint\" that intermittently radiates to the left elbow, across her left collarbone and into her chest.  Pain increases with any movement or range of motion of the shoulder or when leaning forward. Patient denies any chest pain or shortness of breath. Patient denies any headaches or dizziness. Patient has been taking ibuprofen for the pain without improvement. Patient rates the pain at a 5 out of 10    HPI    Nursing Notes were reviewed and I agree. REVIEW OF SYSTEMS    (2-9 systems for level 4, 10 or more for level 5)     Review of Systems   Constitutional: Negative for diaphoresis and fever. HENT: Negative for hearing loss and trouble swallowing. Eyes: Negative for pain. Respiratory: Negative for apnea and shortness of breath. Cardiovascular: Negative for chest pain. Gastrointestinal: Negative for abdominal pain. Endocrine: Negative. Genitourinary: Negative for hematuria. Musculoskeletal: Negative for neck pain and neck stiffness. Skin: Negative for color change. Allergic/Immunologic: Negative. Neurological: Negative for dizziness and numbness. Hematological: Negative. Psychiatric/Behavioral: Negative. All other systems reviewed and are negative.        Except as noted above the remainder of the review of systems was reviewed and negative. PAST MEDICAL HISTORY     Past Medical History:   Diagnosis Date    Asthma     Degenerative disc disease, lumbar     Hypertension     Lymphedema     MRSA infection     UTI (lower urinary tract infection)          SURGICAL HISTORY       Past Surgical History:   Procedure Laterality Date    APPENDECTOMY      SALPINGO-OOPHORECTOMY      left side     SHOULDER SURGERY      TONSILLECTOMY      WRIST SURGERY           CURRENT MEDICATIONS       Previous Medications    AMITRIPTYLINE (ELAVIL) 25 MG TABLET    Take 25 mg by mouth nightly    ASPIRIN 81 MG TABLET    Take 81 mg by mouth daily. ATORVASTATIN (LIPITOR) 20 MG TABLET    Take 20 mg by mouth daily. CHLORTHALIDONE (HYGROTON) 25 MG TABLET    Take 12.5 mg by mouth daily Patient takes half a tablet    GLUCOS-CHONDROIT-HYALURON-MSM (GLUCOSAMINE CHONDROITIN JOINT PO)    Take by mouth    IBUPROFEN (ADVIL;MOTRIN) 400 MG TABLET    Take 400 mg by mouth 2 times daily    LORAZEPAM (ATIVAN) 0.5 MG TABLET    Take 0.5 mg by mouth Daily with lunch. LORAZEPAM (ATIVAN) 0.5 MG TABLET    Take 0.25 mg by mouth nightly. Reduced dose at night    MENTHOL-METHYL SALICYLATE (ANALGESIC OINTMENT) OINT OINTMENT    Apply topically once    MULTIPLE VITAMINS-MINERALS (THERAPEUTIC MULTIVITAMIN-MINERALS) TABLET    Take 1 tablet by mouth daily. PAROXETINE (PAXIL-CR) 25 MG CR TABLET    Take 25 mg by mouth every morning. POTASSIUM CHLORIDE SA (K-DUR;KLOR-CON M) 10 MEQ TABLET    Take 10 mEq by mouth 2 times daily. TIZANIDINE (ZANAFLEX) 4 MG TABLET    Take 4 mg by mouth every 8 hours       ALLERGIES     Ciprofloxacin; Metronidazole; and Tetracycline    FAMILY HISTORY     History reviewed. No pertinent family history. SOCIAL HISTORY       Social History     Socioeconomic History    Marital status:       Spouse name: None    Number of children: None    Years of education: None    Highest education level: None Occupational History    None   Social Needs    Financial resource strain: None    Food insecurity:     Worry: None     Inability: None    Transportation needs:     Medical: None     Non-medical: None   Tobacco Use    Smoking status: Never Smoker    Smokeless tobacco: Never Used   Substance and Sexual Activity    Alcohol use: Yes     Comment: Rarely    Drug use: No    Sexual activity: Never   Lifestyle    Physical activity:     Days per week: None     Minutes per session: None    Stress: None   Relationships    Social connections:     Talks on phone: None     Gets together: None     Attends Hoahaoism service: None     Active member of club or organization: None     Attends meetings of clubs or organizations: None     Relationship status: None    Intimate partner violence:     Fear of current or ex partner: None     Emotionally abused: None     Physically abused: None     Forced sexual activity: None   Other Topics Concern    None   Social History Narrative    None       SCREENINGS           PHYSICAL EXAM    (up to 7 forlevel 4, 8 or more for level 5)     ED Triage Vitals   BP Temp Temp src Pulse Resp SpO2 Height Weight   -- -- -- -- -- -- -- --       Physical Exam  Vitals signs and nursing note reviewed. Constitutional:       General: She is not in acute distress. Appearance: She is well-developed. She is not diaphoretic. HENT:      Head: Normocephalic and atraumatic. Mouth/Throat:      Pharynx: No oropharyngeal exudate. Eyes:      General: No scleral icterus. Conjunctiva/sclera: Conjunctivae normal.      Pupils: Pupils are equal, round, and reactive to light. Neck:      Musculoskeletal: Normal range of motion and neck supple. Trachea: No tracheal deviation. Cardiovascular:      Rate and Rhythm: Normal rate. Heart sounds: Normal heart sounds. Pulmonary:      Effort: Pulmonary effort is normal. No respiratory distress. Breath sounds: Normal breath sounds.

## 2019-12-27 NOTE — ED TRIAGE NOTES
Pt arrived to ER with c/o left shoulder pain x2 days. Pt thinks she may have hurt it lifting a heavy garbage bag. Pt A&OX4, skin p/w/d, resp even and unlabored.

## 2019-12-27 NOTE — ED NOTES
Report called to [facility] RN 2wt, transport requested     Dedra Bruner RN  12/27/19 2725

## 2019-12-28 VITALS
RESPIRATION RATE: 19 BRPM | TEMPERATURE: 97.5 F | OXYGEN SATURATION: 88 % | SYSTOLIC BLOOD PRESSURE: 113 MMHG | DIASTOLIC BLOOD PRESSURE: 64 MMHG | BODY MASS INDEX: 38.32 KG/M2 | WEIGHT: 230 LBS | HEIGHT: 65 IN | HEART RATE: 78 BPM

## 2019-12-28 PROBLEM — S42.152A GLENOID FRACTURE OF SHOULDER, LEFT, CLOSED, INITIAL ENCOUNTER: Status: ACTIVE | Noted: 2019-12-28

## 2019-12-28 PROBLEM — S42.142A GLENOID FRACTURE OF SHOULDER, LEFT, CLOSED, INITIAL ENCOUNTER: Status: ACTIVE | Noted: 2019-12-28

## 2019-12-28 LAB
ABO/RH: NORMAL
ANTIBODY SCREEN: NORMAL

## 2019-12-28 PROCEDURE — 6370000000 HC RX 637 (ALT 250 FOR IP): Performed by: INTERNAL MEDICINE

## 2019-12-28 PROCEDURE — G0378 HOSPITAL OBSERVATION PER HR: HCPCS

## 2019-12-28 RX ORDER — POTASSIUM CHLORIDE 20 MEQ/1
40 TABLET, EXTENDED RELEASE ORAL ONCE
Status: COMPLETED | OUTPATIENT
Start: 2019-12-28 | End: 2019-12-28

## 2019-12-28 RX ADMIN — LORAZEPAM 0.25 MG: 0.5 TABLET ORAL at 14:19

## 2019-12-28 RX ADMIN — CHLORTHALIDONE 12.5 MG: 25 TABLET ORAL at 09:59

## 2019-12-28 RX ADMIN — PAROXETINE HYDROCHLORIDE 20 MG: 20 TABLET, FILM COATED ORAL at 09:59

## 2019-12-28 RX ADMIN — POTASSIUM CHLORIDE 40 MEQ: 20 TABLET, EXTENDED RELEASE ORAL at 14:22

## 2019-12-28 RX ADMIN — IBUPROFEN 600 MG: 600 TABLET, FILM COATED ORAL at 09:59

## 2019-12-28 RX ADMIN — TIZANIDINE 4 MG: 4 TABLET ORAL at 10:00

## 2019-12-28 RX ADMIN — ASPIRIN 81 MG 81 MG: 81 TABLET ORAL at 09:59

## 2019-12-28 RX ADMIN — POTASSIUM CHLORIDE 40 MEQ: 20 TABLET, EXTENDED RELEASE ORAL at 11:03

## 2019-12-28 RX ADMIN — ATORVASTATIN CALCIUM 20 MG: 20 TABLET, FILM COATED ORAL at 10:00

## 2019-12-28 RX ADMIN — FUROSEMIDE 40 MG: 40 TABLET ORAL at 10:00

## 2019-12-28 ASSESSMENT — PAIN SCALES - GENERAL
PAINLEVEL_OUTOF10: 0
PAINLEVEL_OUTOF10: 0
PAINLEVEL_OUTOF10: 4

## 2019-12-28 ASSESSMENT — ENCOUNTER SYMPTOMS
NAUSEA: 0
COUGH: 0
DIARRHEA: 0
SHORTNESS OF BREATH: 0
VOMITING: 0

## 2019-12-28 NOTE — CARE COORDINATION
Spoke to patient. Patient from home alone, uses cane, no working transportation and has been relying on friends for transportation. No anticipated needs @ discharge. Patient is right handed and injury to L arm. Patient will follow-up with physician in office in 2 weeks and determine if follow-up therapy needed.

## 2019-12-28 NOTE — CONSULTS
Hospital Medicine  History and Physical    Patient:  Nancy Lomeli  MRN: [id number]    CHIEF COMPLAINT:    Chief Complaint   Patient presents with    Shoulder Pain       History Obtained From:  Patient, EMR  Primary Care Physician: Brennan Noyola MD    HISTORY OF PRESENT ILLNESS:   The patient is a 69 y.o. female with PMHx of HTN and asthma who presents with left shoulder pain initially concerning for possible dislocation. Admitted for surgical correction but after CT reviewed by surgeon no plans for OR. We were consulted for medical comanagement. Other than shoulder pain the patient denies any other complaints. No fevers, chills, sweats, CP, SOB; does admit to frequent urination. Past Medical History:      Diagnosis Date    Asthma     Degenerative disc disease, lumbar     Hypertension     Lymphedema     MRSA infection     UTI (lower urinary tract infection)      Past Surgical History:      Procedure Laterality Date    APPENDECTOMY      SALPINGO-OOPHORECTOMY      left side     SHOULDER SURGERY      TONSILLECTOMY      WRIST SURGERY       Medications Prior to Admission:    Prior to Admission medications    Medication Sig Start Date End Date Taking? Authorizing Provider   furosemide (LASIX) 40 MG tablet Take 40 mg by mouth daily   Yes Historical Provider, MD   tiZANidine (ZANAFLEX) 4 MG tablet Take 4 mg by mouth 2 times daily    Yes Historical Provider, MD   LORazepam (ATIVAN) 0.5 MG tablet Take 0.5 mg by mouth nightly. Reduced dose at night    Yes Historical Provider, MD   amitriptyline (ELAVIL) 25 MG tablet Take 25 mg by mouth nightly   Yes Historical Provider, MD   Menthol-Methyl Salicylate (ANALGESIC OINTMENT) OINT ointment Apply topically once   Yes Historical Provider, MD   ibuprofen (ADVIL;MOTRIN) 400 MG tablet Take 600 mg by mouth 2 times daily    Yes Historical Provider, MD   LORazepam (ATIVAN) 0.5 MG tablet Take 0.25 mg by mouth Daily with lunch.     Yes Historical Provider, MD Glucos-Chondroit-Hyaluron-MSM (GLUCOSAMINE CHONDROITIN JOINT PO) Take by mouth   Yes Historical Provider, MD   PARoxetine (PAXIL-CR) 25 MG CR tablet Take 25 mg by mouth every morning. Yes Historical Provider, MD   chlorthalidone (HYGROTON) 25 MG tablet Take 12.5 mg by mouth daily Patient takes half a tablet   Yes Historical Provider, MD   potassium chloride SA (K-DUR;KLOR-CON M) 10 MEQ tablet Take 10 mEq by mouth 2 times daily. Yes Historical Provider, MD   atorvastatin (LIPITOR) 20 MG tablet Take 20 mg by mouth daily. Yes Historical Provider, MD   aspirin 81 MG tablet Take 81 mg by mouth daily. Yes Historical Provider, MD   Multiple Vitamins-Minerals (THERAPEUTIC MULTIVITAMIN-MINERALS) tablet Take 1 tablet by mouth daily. Yes Historical Provider, MD     Allergies:  Ciprofloxacin; Metronidazole; and Tetracycline    Social History:   TOBACCO:   reports that she has never smoked. She has never used smokeless tobacco.  ETOH:   reports current alcohol use. Family History:   History reviewed. No pertinent family history. REVIEW OF SYSTEMS:  Ten systems reviewed and negative except for stated in HPI    Physical Exam:    Vitals: BP (!) 121/99   Pulse 104   Temp 98.8 °F (37.1 °C) (Oral)   Resp 19   Ht 5' 5\" (1.651 m)   Wt 230 lb (104.3 kg)   SpO2 96%   BMI 38.27 kg/m²   General appearance: alert, appears stated age and cooperative  Skin:  No rashes or lesions  HEENT: Head: Normal, normocephalic, atraumatic. Aneita Lorenzo    Neck: supple, symmetrical, trachea midline  Lungs: clear to auscultation bilaterally  Heart: regular rate and rhythm  Abdomen: soft, non-tender; bowel sounds normal; no masses,  no organomegaly  Extremities: extremities normal, atraumatic, no cyanosis or edema  Neurologic: Non focal    Recent Labs     12/27/19 2007   WBC 12.8*   HGB 11.8*        Recent Labs     12/27/19  1817 12/27/19 2008   * 136   K 3.1* 3.1*   CL 93* 94*   CO2 26 29   BUN 12 12   CREATININE 0.50 0.57 GLUCOSE 95 102*   AST 18  --    ALT 13  --    BILITOT 0.5  --    ALKPHOS 81  --      Troponin T: No results for input(s): TROPONINI in the last 72 hours. ABGs: No results found for: PHART, PO2ART, TCZ6XGQ  INR:   Recent Labs     12/27/19 2007   INR 1.0     URINALYSIS:  Recent Labs     12/27/19  1800   COLORU Yellow   PHUR 7.0   WBCUA 6-10*   RBCUA 6-10*   BACTERIA Negative   CLARITYU Clear   SPECGRAV 1.014   LEUKOCYTESUR SMALL*   UROBILINOGEN 0.2   BILIRUBINUR Negative   BLOODU TRACE*   GLUCOSEU Negative     -----------------------------------------------------------------   Ct Shoulder Left Wo Contrast    Result Date: 12/27/2019  CT SHOULDER LEFT WO CONTRAST : 12/27/2019 CLINICAL HISTORY: dislocation . COMPARISON: Chest CT 11/1/2015. TECHNIQUE: Spiral unenhanced imaging was obtained of the left shoulder, with routine multiplanar reconstructions performed. All CT scans at this facility use dose modulation, iterative reconstruction, and/or weight based dosing when appropriate to reduce radiation dose to as low as reasonably achievable. FINDINGS: A mildly displaced avulsion fracture of the anterior inferior glenoid rim measuring just over 1 cm appears old, but is a new finding from 11/1/2015. There is mild anterior subluxation of the humeral head with respect to the glenoid, without dislocation. Marked osteoarthritic changes of the glenohumeral joint have also significantly worsened compared to the prior study. A large joint effusion extends into the subdeltoid bursa, likely related to chronic rotator cuff disease. There is no acute fracture, worrisome bone destruction or acromioclavicular joint separation identified. Moderate degenerative changes of the acromioclavicular joint is present with prominent inferior marginal osteophytosis of a type II shaped lateral acromion. NO ACUTE FRACTURE OR GLENOHUMERAL DISLOCATION.  JUST OVER 1 CM MILDLY DISPLACED PROBABLY OLD AVULSION FRACTURE OF THE ANTERIOR INFERIOR GLENOID RIM. ADVANCED GLENOHUMERAL OSTEOARTHROSIS. LARGE JOINT EFFUSION EXTENDING INTO THE SUBDELTOID BURSA, LIKELY SECONDARY TO ROTATOR CUFF DISEASE. Xr Shoulder Left (min 2 Views)    Result Date: 12/27/2019  XR SHOULDER LEFT (MIN 2 VIEWS) : 12/27/2019 CLINICAL HISTORY:  post reduction . COMPARISON: Left shoulder from earlier 12/27/2019. TECHNIQUE: A portable transscapular radiographs of the left shoulder was obtained. FINDINGS: The glenohumeral joint appears in anatomic position on the single view obtained. There is no fracture or other significant changes identified. APPARENTLY REDUCED LEFT SHOULDER DISLOCATION. Xr Shoulder Left (min 2 Views)    Result Date: 12/27/2019  EXAM: Left shoulder, 3 view HISTORY: Shoulder pain COMPARISON: Left shoulder radiographs from May 11, 2010 TECHNIQUE: Frontal, Alina Champ and a Y view of the shoulder obtained. FINDINGS: Anterior inferior shoulder dislocation. No acute fracture identified. Mild glenohumeral and moderate acromioclavicular osteoarthritis. Degenerative changes of the thoracic spine noted. Soft tissues are within normal limits. Anterior inferior shoulder dislocation. Assessment and Plan   1. Left shoulder pain:  Per primary team  2. HTN:  Continue home meds  3. ? Anxiety/Depression:  Per med list likely has anxiety although no diagnosis found; continue home meds    Patient Active Problem List   Diagnosis Code    Abscess or cellulitis of wrist [id number]    MRSA (methicillin resistant Staphylococcus aureus) infection A49.02    Orthostasis I95.1    Anterior dislocation of left shoulder S43.015A       Amarilis So MD  Consultant Hospitalist    Emergency Contact:

## 2019-12-29 LAB — URINE CULTURE, ROUTINE: NORMAL

## 2019-12-30 NOTE — H&P
[address]                      [facility], [address] [facility]                              HISTORY AND PHYSICAL    PATIENT NAME: Amber Hernandez                 :        1945  MED REC NO:   [id number]                            ROOM:       R168  ACCOUNT NO:   [de-identified]                           ADMIT DATE: 2019  PROVIDER:     Rashida Whitehead MD    HISTORY OF PRESENT ILLNESS:  The patient was admitted with a dislocated  shoulder. It was reduced. Had abnormal x-rays. Concern was for still  chronic dislocation due to considerable pain. She was stabilized and a  CT scan was obtained. Review of the CT scan shows a complex shoulder situation. 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 This creates an unstable, but yet painful  situation, but manageable. She is comfortable in the sling this morning  after she kind of awoken from all her sedation and the reduction. PAST MEDICAL HISTORY:  Noted to include asthma, arthritis, hypertension,  history of MRSA infections, previous open reduction and fixation  surgical wise on the right shoulder. ALLERGIES:  To CIPRO, METRONIDAZOLE, and TETRACYCLINE. SOCIAL HISTORY:  Negative for tobacco.  Alcohol is minimal.    FAMILY HISTORY:  Negative. REVIEW OF SYSTEMS:  Noncontributory for orthopedics. PHYSICAL EXAMINATION:  HEENT:  Normocephalic, atraumatic head. HEART:  Regular rate and rhythm. LUNGS:  Clear. ABDOMEN:  Nondistended. EXTREMITIES:  Left elbow, hand, and wrist are moving well. Shoulder is  less painful, less discomfort, less swelling. No gross deformity. Tender obviously to touch. No bruising. Neurovascularly intact.     DIAGNOSTIC DATA:  Review of study shows a stable reduced shoulder with  severe arthritic change and old glenoid rim fracture at this time. TREATMENT PLAN:  Sling for comfort. Motrin anti-inflammatory for pain. We talked about using some light narcotics, which she declined. She  will see us in the office as an outpatient in two to three weeks and we  will discuss then possible shoulder replacement. Sling should be used  at this time for comfort, but may be removed for general range of motion  and ADLs. She is not to weightbear or put cane or crutches on that  side. We will look forward to seeing her back in the office in two to three  weeks as an outpatient.         Kamala John MD    D: 12/28/2019 10:34:53       T: 12/28/2019 11:43:50     [email or web address]  Job#: [id number]     Doc#: [id number]    CC:

## 2020-02-02 PROCEDURE — 96374 THER/PROPH/DIAG INJ IV PUSH: CPT

## 2020-02-02 PROCEDURE — 96376 TX/PRO/DX INJ SAME DRUG ADON: CPT

## 2020-02-03 ENCOUNTER — HOSPITAL ENCOUNTER (INPATIENT)
Age: 75
LOS: 1 days | Discharge: HOME | DRG: 244 | End: 2020-02-06
Attending: INTERNAL MEDICINE | Admitting: INTERNAL MEDICINE
Payer: MEDICARE

## 2020-02-03 PROCEDURE — 99285 EMERGENCY DEPT VISIT HI MDM: CPT

## 2020-02-03 PROCEDURE — 93005 ELECTROCARDIOGRAM TRACING: CPT | Performed by: PHYSICIAN ASSISTANT

## 2020-02-03 PROCEDURE — 96376 TX/PRO/DX INJ SAME DRUG ADON: CPT

## 2020-02-03 ASSESSMENT — PAIN DESCRIPTION - DESCRIPTORS: DESCRIPTORS: ACHING

## 2020-02-03 ASSESSMENT — PAIN DESCRIPTION - LOCATION: LOCATION: HEAD

## 2020-02-03 ASSESSMENT — PAIN SCALES - GENERAL: PAINLEVEL_OUTOF10: 4

## 2020-02-04 ENCOUNTER — APPOINTMENT (OUTPATIENT)
Dept: GENERAL RADIOLOGY | Age: 75
DRG: 244 | End: 2020-02-04
Payer: MEDICARE

## 2020-02-04 PROBLEM — R00.1 SYMPTOMATIC BRADYCARDIA: Status: ACTIVE | Noted: 2020-02-04

## 2020-02-04 LAB
ALBUMIN SERPL-MCNC: 4.2 G/DL (ref 3.5–4.6)
ALP BLD-CCNC: 105 U/L (ref 40–130)
ALT SERPL-CCNC: 16 U/L (ref 0–33)
ANION GAP SERPL CALCULATED.3IONS-SCNC: 12 MEQ/L (ref 9–15)
AST SERPL-CCNC: 27 U/L (ref 0–35)
BACTERIA: ABNORMAL /HPF
BASOPHILS ABSOLUTE: 0.2 K/UL (ref 0–0.2)
BASOPHILS RELATIVE PERCENT: 1.4 %
BILIRUB SERPL-MCNC: <0.2 MG/DL (ref 0.2–0.7)
BILIRUBIN URINE: NEGATIVE
BLOOD, URINE: NEGATIVE
BUN BLDV-MCNC: 18 MG/DL (ref 8–23)
CALCIUM SERPL-MCNC: 9.5 MG/DL (ref 8.5–9.9)
CHLORIDE BLD-SCNC: 97 MEQ/L (ref 95–107)
CLARITY: CLEAR
CO2: 30 MEQ/L (ref 20–31)
COLOR: YELLOW
CREAT SERPL-MCNC: 0.66 MG/DL (ref 0.5–0.9)
EOSINOPHILS ABSOLUTE: 0.4 K/UL (ref 0–0.7)
EOSINOPHILS RELATIVE PERCENT: 2.8 %
EPITHELIAL CELLS, UA: ABNORMAL /HPF (ref 0–5)
GFR AFRICAN AMERICAN: >60
GFR NON-AFRICAN AMERICAN: >60
GLOBULIN: 3.4 G/DL (ref 2.3–3.5)
GLUCOSE BLD-MCNC: 88 MG/DL (ref 70–99)
GLUCOSE URINE: NEGATIVE MG/DL
HCT VFR BLD CALC: 38.9 % (ref 37–47)
HEMOGLOBIN: 12.8 G/DL (ref 12–16)
HYALINE CASTS: ABNORMAL /HPF (ref 0–5)
KETONES, URINE: NEGATIVE MG/DL
LEUKOCYTE ESTERASE, URINE: ABNORMAL
LV EF: 60 %
LVEF MODALITY: NORMAL
LYMPHOCYTES ABSOLUTE: 4.4 K/UL (ref 1–4.8)
LYMPHOCYTES RELATIVE PERCENT: 32.8 %
MAGNESIUM: 2.1 MG/DL (ref 1.7–2.4)
MCH RBC QN AUTO: 28.8 PG (ref 27–31.3)
MCHC RBC AUTO-ENTMCNC: 33 % (ref 33–37)
MCV RBC AUTO: 87.2 FL (ref 82–100)
MONOCYTES ABSOLUTE: 1.1 K/UL (ref 0.2–0.8)
MONOCYTES RELATIVE PERCENT: 8.4 %
NEUTROPHILS ABSOLUTE: 7.4 K/UL (ref 1.4–6.5)
NEUTROPHILS RELATIVE PERCENT: 54.6 %
NITRITE, URINE: NEGATIVE
PDW BLD-RTO: 14.8 % (ref 11.5–14.5)
PH UA: 6.5 (ref 5–9)
PLATELET # BLD: 447 K/UL (ref 130–400)
POTASSIUM SERPL-SCNC: 4.3 MEQ/L (ref 3.4–4.9)
PRO-BNP: 227 PG/ML
PROTEIN UA: NEGATIVE MG/DL
RBC # BLD: 4.46 M/UL (ref 4.2–5.4)
RBC UA: ABNORMAL /HPF (ref 0–5)
SODIUM BLD-SCNC: 139 MEQ/L (ref 135–144)
SPECIFIC GRAVITY UA: 1.01 (ref 1–1.03)
TOTAL CK: 114 U/L (ref 0–170)
TOTAL PROTEIN: 7.6 G/DL (ref 6.3–8)
TROPONIN: <0.01 NG/ML (ref 0–0.01)
TROPONIN: <0.01 NG/ML (ref 0–0.01)
URINE REFLEX TO CULTURE: YES
UROBILINOGEN, URINE: 0.2 E.U./DL
WBC # BLD: 13.5 K/UL (ref 4.8–10.8)
WBC UA: ABNORMAL /HPF (ref 0–5)

## 2020-02-04 PROCEDURE — G0378 HOSPITAL OBSERVATION PER HR: HCPCS

## 2020-02-04 PROCEDURE — 96372 THER/PROPH/DIAG INJ SC/IM: CPT

## 2020-02-04 PROCEDURE — 99219 PR INITIAL OBSERVATION CARE/DAY 50 MINUTES: CPT | Performed by: INTERNAL MEDICINE

## 2020-02-04 PROCEDURE — 6360000002 HC RX W HCPCS: Performed by: PHYSICIAN ASSISTANT

## 2020-02-04 PROCEDURE — 6370000000 HC RX 637 (ALT 250 FOR IP): Performed by: PHYSICIAN ASSISTANT

## 2020-02-04 PROCEDURE — 85025 COMPLETE CBC W/AUTO DIFF WBC: CPT

## 2020-02-04 PROCEDURE — 83735 ASSAY OF MAGNESIUM: CPT

## 2020-02-04 PROCEDURE — 71045 X-RAY EXAM CHEST 1 VIEW: CPT

## 2020-02-04 PROCEDURE — 82550 ASSAY OF CK (CPK): CPT

## 2020-02-04 PROCEDURE — 80053 COMPREHEN METABOLIC PANEL: CPT

## 2020-02-04 PROCEDURE — 6360000002 HC RX W HCPCS: Performed by: INTERNAL MEDICINE

## 2020-02-04 PROCEDURE — 36415 COLL VENOUS BLD VENIPUNCTURE: CPT

## 2020-02-04 PROCEDURE — 87086 URINE CULTURE/COLONY COUNT: CPT

## 2020-02-04 PROCEDURE — 81001 URINALYSIS AUTO W/SCOPE: CPT

## 2020-02-04 PROCEDURE — 2580000003 HC RX 258: Performed by: INTERNAL MEDICINE

## 2020-02-04 PROCEDURE — 6370000000 HC RX 637 (ALT 250 FOR IP): Performed by: INTERNAL MEDICINE

## 2020-02-04 PROCEDURE — 96376 TX/PRO/DX INJ SAME DRUG ADON: CPT

## 2020-02-04 PROCEDURE — 93306 TTE W/DOPPLER COMPLETE: CPT

## 2020-02-04 PROCEDURE — 96374 THER/PROPH/DIAG INJ IV PUSH: CPT

## 2020-02-04 PROCEDURE — 84484 ASSAY OF TROPONIN QUANT: CPT

## 2020-02-04 PROCEDURE — 83880 ASSAY OF NATRIURETIC PEPTIDE: CPT

## 2020-02-04 RX ORDER — IBUPROFEN 600 MG/1
600 TABLET ORAL 2 TIMES DAILY
Status: DISCONTINUED | OUTPATIENT
Start: 2020-02-04 | End: 2020-02-06 | Stop reason: HOSPADM

## 2020-02-04 RX ORDER — CHLORTHALIDONE 25 MG/1
12.5 TABLET ORAL DAILY
Status: CANCELLED | OUTPATIENT
Start: 2020-02-04

## 2020-02-04 RX ORDER — TIZANIDINE 4 MG/1
4 TABLET ORAL 2 TIMES DAILY
Status: DISCONTINUED | OUTPATIENT
Start: 2020-02-04 | End: 2020-02-06 | Stop reason: HOSPADM

## 2020-02-04 RX ORDER — ATORVASTATIN CALCIUM 40 MG/1
20 TABLET, FILM COATED ORAL NIGHTLY
Status: DISCONTINUED | OUTPATIENT
Start: 2020-02-04 | End: 2020-02-06 | Stop reason: HOSPADM

## 2020-02-04 RX ORDER — LORAZEPAM 0.5 MG/1
0.25 TABLET ORAL
Status: DISCONTINUED | OUTPATIENT
Start: 2020-02-04 | End: 2020-02-06 | Stop reason: HOSPADM

## 2020-02-04 RX ORDER — IBUPROFEN 400 MG/1
400 TABLET ORAL ONCE
Status: COMPLETED | OUTPATIENT
Start: 2020-02-04 | End: 2020-02-04

## 2020-02-04 RX ORDER — POTASSIUM CHLORIDE 20 MEQ/1
10 TABLET, EXTENDED RELEASE ORAL 2 TIMES DAILY
Status: DISCONTINUED | OUTPATIENT
Start: 2020-02-04 | End: 2020-02-06

## 2020-02-04 RX ORDER — SULFAMETHOXAZOLE AND TRIMETHOPRIM 800; 160 MG/1; MG/1
1 TABLET ORAL ONCE
Status: COMPLETED | OUTPATIENT
Start: 2020-02-04 | End: 2020-02-04

## 2020-02-04 RX ORDER — SODIUM CHLORIDE 0.9 % (FLUSH) 0.9 %
10 SYRINGE (ML) INJECTION EVERY 12 HOURS SCHEDULED
Status: DISCONTINUED | OUTPATIENT
Start: 2020-02-04 | End: 2020-02-06 | Stop reason: HOSPADM

## 2020-02-04 RX ORDER — ONDANSETRON 2 MG/ML
4 INJECTION INTRAMUSCULAR; INTRAVENOUS EVERY 6 HOURS PRN
Status: DISCONTINUED | OUTPATIENT
Start: 2020-02-04 | End: 2020-02-06 | Stop reason: HOSPADM

## 2020-02-04 RX ORDER — ASPIRIN 81 MG/1
324 TABLET, CHEWABLE ORAL ONCE
Status: COMPLETED | OUTPATIENT
Start: 2020-02-04 | End: 2020-02-04

## 2020-02-04 RX ORDER — PAROXETINE HYDROCHLORIDE 25 MG/1
25 TABLET, FILM COATED, EXTENDED RELEASE ORAL EVERY MORNING
Status: DISCONTINUED | OUTPATIENT
Start: 2020-02-04 | End: 2020-02-05 | Stop reason: CLARIF

## 2020-02-04 RX ORDER — NITROGLYCERIN 0.4 MG/1
0.4 TABLET SUBLINGUAL EVERY 5 MIN PRN
Status: DISCONTINUED | OUTPATIENT
Start: 2020-02-04 | End: 2020-02-06 | Stop reason: HOSPADM

## 2020-02-04 RX ORDER — AMITRIPTYLINE HYDROCHLORIDE 25 MG/1
25 TABLET, FILM COATED ORAL NIGHTLY
Status: DISCONTINUED | OUTPATIENT
Start: 2020-02-04 | End: 2020-02-06 | Stop reason: HOSPADM

## 2020-02-04 RX ORDER — ANALGESIC BALM 1.74; 4.06 G/29G; G/29G
OINTMENT TOPICAL ONCE
Status: DISCONTINUED | OUTPATIENT
Start: 2020-02-04 | End: 2020-02-06 | Stop reason: HOSPADM

## 2020-02-04 RX ORDER — ASPIRIN 81 MG/1
81 TABLET, CHEWABLE ORAL DAILY
Status: DISCONTINUED | OUTPATIENT
Start: 2020-02-05 | End: 2020-02-06 | Stop reason: HOSPADM

## 2020-02-04 RX ORDER — M-VIT,TX,IRON,MINS/CALC/FOLIC 27MG-0.4MG
1 TABLET ORAL DAILY
Status: DISCONTINUED | OUTPATIENT
Start: 2020-02-04 | End: 2020-02-06 | Stop reason: HOSPADM

## 2020-02-04 RX ORDER — LORAZEPAM 0.5 MG/1
0.5 TABLET ORAL ONCE
Status: COMPLETED | OUTPATIENT
Start: 2020-02-05 | End: 2020-02-04

## 2020-02-04 RX ORDER — FUROSEMIDE 40 MG/1
40 TABLET ORAL DAILY
Status: CANCELLED | OUTPATIENT
Start: 2020-02-04

## 2020-02-04 RX ORDER — FUROSEMIDE 10 MG/ML
40 INJECTION INTRAMUSCULAR; INTRAVENOUS 2 TIMES DAILY
Status: DISCONTINUED | OUTPATIENT
Start: 2020-02-04 | End: 2020-02-06

## 2020-02-04 RX ORDER — ATORVASTATIN CALCIUM 20 MG/1
20 TABLET, FILM COATED ORAL DAILY
Status: CANCELLED | OUTPATIENT
Start: 2020-02-04

## 2020-02-04 RX ORDER — SODIUM CHLORIDE 0.9 % (FLUSH) 0.9 %
10 SYRINGE (ML) INJECTION PRN
Status: DISCONTINUED | OUTPATIENT
Start: 2020-02-04 | End: 2020-02-06 | Stop reason: HOSPADM

## 2020-02-04 RX ADMIN — Medication 10 ML: at 10:50

## 2020-02-04 RX ADMIN — IBUPROFEN 600 MG: 600 TABLET ORAL at 10:51

## 2020-02-04 RX ADMIN — LORAZEPAM 0.25 MG: 0.5 TABLET ORAL at 10:51

## 2020-02-04 RX ADMIN — MULTIPLE VITAMINS W/ MINERALS TAB 1 TABLET: TAB at 10:51

## 2020-02-04 RX ADMIN — POTASSIUM CHLORIDE 10 MEQ: 20 TABLET, EXTENDED RELEASE ORAL at 10:51

## 2020-02-04 RX ADMIN — ASPIRIN 81 MG 324 MG: 81 TABLET ORAL at 03:09

## 2020-02-04 RX ADMIN — FUROSEMIDE 40 MG: 10 INJECTION, SOLUTION INTRAMUSCULAR; INTRAVENOUS at 10:50

## 2020-02-04 RX ADMIN — Medication 10 ML: at 21:33

## 2020-02-04 RX ADMIN — SULFAMETHOXAZOLE AND TRIMETHOPRIM 1 TABLET: 800; 160 TABLET ORAL at 03:09

## 2020-02-04 RX ADMIN — IBUPROFEN 400 MG: 400 TABLET, FILM COATED ORAL at 01:58

## 2020-02-04 RX ADMIN — LORAZEPAM 0.5 MG: 0.5 TABLET ORAL at 23:52

## 2020-02-04 RX ADMIN — AMITRIPTYLINE HYDROCHLORIDE 25 MG: 25 TABLET, FILM COATED ORAL at 21:34

## 2020-02-04 RX ADMIN — ATORVASTATIN CALCIUM 20 MG: 40 TABLET, FILM COATED ORAL at 21:33

## 2020-02-04 RX ADMIN — FUROSEMIDE 40 MG: 10 INJECTION, SOLUTION INTRAMUSCULAR; INTRAVENOUS at 16:10

## 2020-02-04 RX ADMIN — ENOXAPARIN SODIUM 100 MG: 100 INJECTION SUBCUTANEOUS at 03:10

## 2020-02-04 RX ADMIN — POTASSIUM CHLORIDE 10 MEQ: 20 TABLET, EXTENDED RELEASE ORAL at 21:34

## 2020-02-04 RX ADMIN — TIZANIDINE 4 MG: 4 TABLET ORAL at 10:51

## 2020-02-04 RX ADMIN — IBUPROFEN 600 MG: 600 TABLET ORAL at 21:33

## 2020-02-04 RX ADMIN — TIZANIDINE 4 MG: 4 TABLET ORAL at 21:33

## 2020-02-04 ASSESSMENT — ENCOUNTER SYMPTOMS
ABDOMINAL PAIN: 1
NAUSEA: 0
WHEEZING: 0
PHOTOPHOBIA: 0
BACK PAIN: 0
APNEA: 0
EYE DISCHARGE: 0
EYES NEGATIVE: 1
BLOOD IN STOOL: 0
COUGH: 0
ABDOMINAL DISTENTION: 0
SHORTNESS OF BREATH: 0
RESPIRATORY NEGATIVE: 1
CHEST TIGHTNESS: 0
VOICE CHANGE: 0
ANAL BLEEDING: 0
GASTROINTESTINAL NEGATIVE: 1
STRIDOR: 0

## 2020-02-04 ASSESSMENT — PAIN SCALES - GENERAL
PAINLEVEL_OUTOF10: 0
PAINLEVEL_OUTOF10: 4
PAINLEVEL_OUTOF10: 5
PAINLEVEL_OUTOF10: 0

## 2020-02-04 ASSESSMENT — PAIN DESCRIPTION - LOCATION: LOCATION: BACK;SHOULDER

## 2020-02-04 ASSESSMENT — PAIN DESCRIPTION - PAIN TYPE: TYPE: CHRONIC PAIN

## 2020-02-04 NOTE — ED NOTES
Pt asking for and receiving water. Pt is asymptomatic but continually asks about her heart rate. Pt labs sent via tube system at this time. She is aware that urine is needed.       Hollie Philippe RN  02/04/20 5464

## 2020-02-04 NOTE — ED NOTES
Pt up to [facility] with 1x assist, veronique. Well, obt. Urine to lab, pt a&ox4, skin w/d/pink, 0 c/o, 0 sob, 0 problems, will monitor.      Kaaren Kawasaki, RN  02/04/20 3985

## 2020-02-04 NOTE — H&P
History and Physical  [facility]  [facility] S[facility]/I047-40  YOB: 1945  MRN: [id number]  Acct: [de-identified]   Admitting Diagnosis: Symptomatic bradycardia [R00.1]  Admit Date:  2/3/2020  Hospital Day: 0      Chief Complaint: HTN and Bradycardia      History of Present Illness: past 2 days prior to ER she felt LH and little 'off\" she noted her BP to be elevated and HR to be low. NO prior CAD PAD CVA    SHe has had Lymphedema since age 13. ECG showed Type 2 2nd degree AVB     She is minimally active but gets around with a rosario. No falls. She lives independently      EKG: Type ll AVB 2nd degree  PMHx:  Past Medical History:   Diagnosis Date    Asthma     Degenerative disc disease, lumbar     Hypertension     Lymphedema     MRSA infection     UTI (lower urinary tract infection)        PSHx:  Past Surgical History:   Procedure Laterality Date    APPENDECTOMY      SALPINGO-OOPHORECTOMY      left side     SHOULDER SURGERY      TONSILLECTOMY      WRIST SURGERY         Social Hx:  Social History     Socioeconomic History    Marital status:       Spouse name: None    Number of children: None    Years of education: None    Highest education level: None   Occupational History    None   Social Needs    Financial resource strain: None    Food insecurity:     Worry: None     Inability: None    Transportation needs:     Medical: None     Non-medical: None   Tobacco Use    Smoking status: Never Smoker    Smokeless tobacco: Never Used   Substance and Sexual Activity    Alcohol use: Yes     Comment: Rarely    Drug use: No    Sexual activity: Never   Lifestyle    Physical activity:     Days per week: None     Minutes per session: None    Stress: None   Relationships    Social connections:     Talks on phone: None     Gets together: None     Attends Restoration service: None     Active member of club or organization: None     Attends meetings of clubs or organizations: None Relationship status: None    Intimate partner violence:     Fear of current or ex partner: None     Emotionally abused: None     Physically abused: None     Forced sexual activity: None   Other Topics Concern    None   Social History Narrative    None       Family Hx:  No family history on file. Allergies   Allergen Reactions    Ciprofloxacin      Per patient request due to bad side effects     Metronidazole     Tetracycline        Current Facility-Administered Medications   Medication Dose Route Frequency Provider Last Rate Last Dose    sodium chloride flush 0.9 % injection 10 mL  10 mL Intravenous 2 times per day Savannah Can MD        sodium chloride flush 0.9 % injection 10 mL  10 mL Intravenous PRN Savannah Can MD        magnesium hydroxide (MILK OF MAGNESIA) 400 MG/5ML suspension 30 mL  30 mL Oral Daily PRN Savannah aCn MD        ondansetron [facility]) injection 4 mg  4 mg Intravenous Q6H PRN Savannah Can MD        atorvastatin (LIPITOR) tablet 20 mg  20 mg Oral Nightly MD August Schmid ON 2/5/2020] aspirin chewable tablet 81 mg  81 mg Oral Daily Savannah Can MD        nitroGLYCERIN (NITROSTAT) SL tablet 0.4 mg  0.4 mg Sublingual Q5 Min PRN Savannah Can MD        furosemide (LASIX) injection 40 mg  40 mg Intravenous BID Savannah Can MD           Review of Systems:   Review of Systems   Constitutional: Negative. Negative for diaphoresis and fatigue. HENT: Negative. Eyes: Negative. Respiratory: Negative. Negative for cough, chest tightness, shortness of breath, wheezing and stridor. Cardiovascular: Negative. Negative for chest pain, palpitations and leg swelling. Gastrointestinal: Negative. Negative for blood in stool and nausea. Genitourinary: Negative. Musculoskeletal: Negative. Skin: Negative. Neurological: Positive for weakness and light-headedness. Negative for dizziness and syncope. Hematological: Negative.     Psychiatric/Behavioral: Negative. Physical Examination:    BP (!) 122/59   Pulse 59   Temp 98.3 °F (36.8 °C) (Oral)   Resp 18   Ht 5' 5\" (1.651 m)   Wt 237 lb 4.8 oz (107.6 kg)   SpO2 97%   BMI 39.49 kg/m²    Physical Exam   Constitutional: She appears healthy. No distress. HENT:   Normal cephalic and Atraumatic   Eyes: Pupils are equal, round, and reactive to light. Neck: Normal range of motion and thyroid normal. Neck supple. No JVD present. No neck adenopathy. No thyromegaly present. Cardiovascular: Normal rate, regular rhythm, intact distal pulses and normal pulses. Murmur heard. Pulmonary/Chest: Effort normal and breath sounds normal. She has no wheezes. She has no rales. She exhibits no tenderness. Abdominal: Soft. Bowel sounds are normal. There is no abdominal tenderness. Musculoskeletal: Normal range of motion. General: Edema (moderate) present. No tenderness. Neurological: She is alert and oriented to person, place, and time. Skin: Skin is warm. No cyanosis. Nails show no clubbing.          LABS:  CBC:   Lab Results   Component Value Date    WBC 13.5 02/04/2020    RBC 4.46 02/04/2020    HGB 12.8 02/04/2020    HCT 38.9 02/04/2020    MCV 87.2 02/04/2020    MCH 28.8 02/04/2020    MCHC 33.0 02/04/2020    RDW 14.8 02/04/2020     02/04/2020    MPV 8.5 05/26/2015     CBC with Differential:    Lab Results   Component Value Date    WBC 13.5 02/04/2020    RBC 4.46 02/04/2020    HGB 12.8 02/04/2020    HCT 38.9 02/04/2020     02/04/2020    MCV 87.2 02/04/2020    MCH 28.8 02/04/2020    MCHC 33.0 02/04/2020    RDW 14.8 02/04/2020    LYMPHOPCT 32.8 02/04/2020    MONOPCT 8.4 02/04/2020    BASOPCT 1.4 02/04/2020    MONOSABS 1.1 02/04/2020    LYMPHSABS 4.4 02/04/2020    EOSABS 0.4 02/04/2020    BASOSABS 0.2 02/04/2020     CMP:    Lab Results   Component Value Date     02/04/2020    K 4.3 02/04/2020    K 3.1 12/27/2019    CL 97 02/04/2020    CO2 30 02/04/2020    BUN 18 02/04/2020

## 2020-02-04 NOTE — ACP (ADVANCE CARE PLANNING)
Advanced Care Planning:  Reviewed with the patient the [facility] of /Living Will Declaration forms. Reviewed the process of designating a competent adult as an Agent (or -in-fact) that could take make health care decisions for the patient if incompetent.  Patient was asked to complete the declaration forms, either acknowledge the forms by a public notary or an eligible witness and provide a signed copy to the hospital.    Time spent (minutes): [facility]

## 2020-02-04 NOTE — PROGRESS NOTES
Advanced Care Planning:  Reviewed with the patient the [facility] of /Living Will Declaration forms. Reviewed the process of designating a competent adult as an Agent (or -in-fact) that could take make health care decisions for the patient if incompetent.  Patient was asked to complete the declaration forms, either acknowledge the forms by a public notary or an eligible witness and provide a signed copy to the hospital.    Time spent (minutes): [address] [facility]

## 2020-02-04 NOTE — CARE COORDINATION
[facility] [place] Case Management Initial Discharge Assessment    Met with Patient to discuss discharge plan. PCP: Hussain Jackson MD                                  Date of Last Visit: COUPLE MONTHS, HAS UPCOMING APPT    Discharge Planning    Living Arrangements: independently at home    Who do you live with? SELF    Who helps you with your care:  self    If lives at home:     Do you have any barriers navigating in your home? no    Patient can perform ADL? Yes    Current Services (outpatient and in home) :  [facility] ([address] 2X/MONTH)    Dialysis: No    Is transportation available to get to your appointments? Yes, PT CURRENTLY DOES NOT HAVE A CAR, FAMILY AND FRIENDS TAKE HER PLACES OR SHE WILL CALL A TAXI. TRANSPORTATION LIST GIVEN UP ON REQUEST    DME Equipment:  yes - CANE    Respiratory equipment: None    Respiratory provider:  no     Pharmacy:  [facility] - [facility] [place]    Consult with Medication Assistance Program?  No      Patient agreeable to [facility]? Yes, [employer]--ONLY IF NEEDED AFTER PACEMAKER    Patient agreeable to SNF/Rehab? Declined    Other discharge needs identified? N/A    Freedom of choice list provided with basic dialogue that supports the patient's individualized plan of care/goals and shares the quality data associated with the providers. Yes    Does Patient Have a High-Risk for Readmission Diagnosis (CHF, PN, MI, COPD)? No    The plan for Transition of Care is related to the following treatment goals: [facility] [facility]    Initial Discharge Plan? (Note: please see concurrent daily documentation for any updates after initial note). MET WITH PATIENT, FROM HOME ALONE, USES A CANE. PT HAS BEEN SLEEPING ON HER COUCH FOR THE PAST FEW YEARS D/T FEAR OF GOING UP HER STAIRS BY HERSELF. PT HAS HANDICAPPED BATHROOM DOWNSTAIRS. PT DOES HAVE LIFE ALERT TYPE SYSTEM.   PT STATES SHE WOULD LIKE TO GO HOME SINCE SHE HAS AN APPT

## 2020-02-04 NOTE — ED PROVIDER NOTES
[address] [facility] ED  eMERGENCY dEPARTMENT eNCOUnter      Pt Name: Cristian Burns  MRN: [id number]  [place] 1945  Date of evaluation: 2/3/2020  Provider: Linda Corea PA-C    CHIEF COMPLAINT       Chief Complaint   Patient presents with    Hypertension         HISTORY OF PRESENT ILLNESS   (Location/Symptom, Timing/Onset,Context/Setting, Quality, Duration, Modifying Factors, Severity)  Note limiting factors. Cristian Burns is a 69 y.o. female who presents to the emergency department presents with hypertension chest discomfort and low heart rate she states normally her heart rate is elevated 80-90 today is been 40-50. She is also been tired does have some lower abdominal pain with history of UTIs. Symptoms are moderate severity nothing improves or worsen symptoms she denies shortness of breath nausea vomiting fever chills. HPI    NursingNotes were reviewed. REVIEW OF SYSTEMS    (2-9 systems for level 4, 10 or more for level 5)     Review of Systems   Constitutional: Positive for fatigue. Negative for activity change, appetite change, fever and unexpected weight change. HENT: Negative for ear discharge, nosebleeds and voice change. Eyes: Negative for photophobia and discharge. Respiratory: Negative for apnea and cough. Cardiovascular: Positive for chest pain and leg swelling. Gastrointestinal: Positive for abdominal pain. Negative for abdominal distention and anal bleeding. Endocrine: Negative for cold intolerance, heat intolerance and polyphagia. Genitourinary: Negative for dysuria and hematuria. Musculoskeletal: Negative for back pain and joint swelling. Skin: Negative for pallor. Allergic/Immunologic: Negative for immunocompromised state. Neurological: Negative for seizures and facial asymmetry. Hematological: Does not bruise/bleed easily. Psychiatric/Behavioral: Negative for behavioral problems, self-injury and sleep disturbance.    All other systems Spouse name: Not on file    Number of children: Not on file    Years of education: Not on file    Highest education level: Not on file   Occupational History    Not on file   Social Needs    Financial resource strain: Not on file    Food insecurity:     Worry: Not on file     Inability: Not on file    Transportation needs:     Medical: Not on file     Non-medical: Not on file   Tobacco Use    Smoking status: Never Smoker    Smokeless tobacco: Never Used   Substance and Sexual Activity    Alcohol use: Yes     Comment: Rarely    Drug use: No    Sexual activity: Never   Lifestyle    Physical activity:     Days per week: Not on file     Minutes per session: Not on file    Stress: Not on file   Relationships    Social connections:     Talks on phone: Not on file     Gets together: Not on file     Attends Anabaptist service: Not on file     Active member of club or organization: Not on file     Attends meetings of clubs or organizations: Not on file     Relationship status: Not on file    Intimate partner violence:     Fear of current or ex partner: Not on file     Emotionally abused: Not on file     Physically abused: Not on file     Forced sexual activity: Not on file   Other Topics Concern    Not on file   Social History Narrative    Not on file       SCREENINGS    [place] Coma Scale  Eye Opening: Spontaneous  Best Verbal Response: Oriented  Best Motor Response: Obeys commands  [place]anda Coma Scale Score: 15 @FLOW([id number])@      PHYSICAL EXAM    (up to 7 for level 4, 8 or more for level 5)     ED Triage Vitals [02/03/20 2315]   BP Temp Temp Source Pulse Resp SpO2 Height Weight   (!) 135/56 98.3 °F (36.8 °C) Oral 104 20 95 % 5' 5\" (1.651 m) 230 lb (104.3 kg)       Physical Exam  Vitals signs and nursing note reviewed. Constitutional:       General: She is not in acute distress. Appearance: She is well-developed. She is obese. HENT:      Head: Normocephalic and atraumatic.       Nose: Nose normal.      Mouth/Throat:      Mouth: Mucous membranes are moist.   Eyes:      General:         Right eye: No discharge. Left eye: No discharge. Pupils: Pupils are equal, round, and reactive to light. Neck:      Musculoskeletal: Normal range of motion and neck supple. Cardiovascular:      Rate and Rhythm: Normal rate and regular rhythm. Heart sounds: Normal heart sounds. Pulmonary:      Effort: Pulmonary effort is normal. No respiratory distress. Breath sounds: Normal breath sounds. No stridor. No wheezing or rhonchi. Abdominal:      General: Bowel sounds are normal. There is no distension. Palpations: Abdomen is soft. Tenderness: There is no abdominal tenderness. Musculoskeletal: Normal range of motion. Right lower leg: Edema present. Left lower leg: Edema present. Comments: +2/+3 pitting edema bilaterally with history of lymphedema   Skin:     General: Skin is warm. Findings: No erythema. Neurological:      General: No focal deficit present. Mental Status: She is alert and oriented to person, place, and time. Psychiatric:         Mood and Affect: Mood normal.         DIAGNOSTIC RESULTS     EKG: All EKG's are interpreted by the Emergency Department Physician who either signs or Co-signsthis chart in the absence of a cardiologist.    EKG sinus rhythm ventricular rate 49 bradycardia FL interval 154 ms  ms.     RADIOLOGY:   Non-plain filmimages such as CT, Ultrasound and MRI are read by the radiologist. Plain radiographic images are visualized and preliminarily interpreted by the emergency physician with the below findings:    See rad report    Interpretation per the Radiologist below, if available at the time ofthis note:    XR CHEST PORTABLE    (Results Pending)         ED BEDSIDE ULTRASOUND:   Performed by ED Physician - none    LABS:  Labs Reviewed   CBC WITH AUTO DIFFERENTIAL - Abnormal; Notable for the following components:

## 2020-02-05 ENCOUNTER — APPOINTMENT (OUTPATIENT)
Dept: CARDIAC CATH/INVASIVE PROCEDURES | Age: 75
DRG: 244 | End: 2020-02-05
Payer: MEDICARE

## 2020-02-05 ENCOUNTER — APPOINTMENT (OUTPATIENT)
Dept: GENERAL RADIOLOGY | Age: 75
DRG: 244 | End: 2020-02-05
Payer: MEDICARE

## 2020-02-05 PROBLEM — I45.9 HEART BLOCK: Status: ACTIVE | Noted: 2020-02-05

## 2020-02-05 LAB
ANION GAP SERPL CALCULATED.3IONS-SCNC: 13 MEQ/L (ref 9–15)
BACTERIA: NEGATIVE /HPF
BILIRUBIN URINE: NEGATIVE
BLOOD, URINE: NEGATIVE
BUN BLDV-MCNC: 17 MG/DL (ref 8–23)
CALCIUM SERPL-MCNC: 9 MG/DL (ref 8.5–9.9)
CHLORIDE BLD-SCNC: 101 MEQ/L (ref 95–107)
CHOLESTEROL, TOTAL: 101 MG/DL (ref 0–199)
CLARITY: CLEAR
CO2: 31 MEQ/L (ref 20–31)
COLOR: YELLOW
CREAT SERPL-MCNC: 0.8 MG/DL (ref 0.5–0.9)
EKG ATRIAL RATE: 100 BPM
EKG ATRIAL RATE: 48 BPM
EKG ATRIAL RATE: 91 BPM
EKG P AXIS: 56 DEGREES
EKG P AXIS: 63 DEGREES
EKG P AXIS: 69 DEGREES
EKG P-R INTERVAL: 150 MS
EKG P-R INTERVAL: 154 MS
EKG P-R INTERVAL: 180 MS
EKG Q-T INTERVAL: 440 MS
EKG Q-T INTERVAL: 454 MS
EKG Q-T INTERVAL: 464 MS
EKG QRS DURATION: 138 MS
EKG QRS DURATION: 140 MS
EKG QRS DURATION: 166 MS
EKG QTC CALCULATION (BAZETT): 410 MS
EKG QTC CALCULATION (BAZETT): 414 MS
EKG QTC CALCULATION (BAZETT): 541 MS
EKG R AXIS: -76 DEGREES
EKG R AXIS: 1 DEGREES
EKG R AXIS: 30 DEGREES
EKG T AXIS: -3 DEGREES
EKG T AXIS: 0 DEGREES
EKG T AXIS: 90 DEGREES
EKG VENTRICULAR RATE: 48 BPM
EKG VENTRICULAR RATE: 49 BPM
EKG VENTRICULAR RATE: 91 BPM
EPITHELIAL CELLS, UA: ABNORMAL /HPF (ref 0–5)
GFR AFRICAN AMERICAN: >60
GFR NON-AFRICAN AMERICAN: >60
GLUCOSE BLD-MCNC: 101 MG/DL (ref 70–99)
GLUCOSE URINE: NEGATIVE MG/DL
HCT VFR BLD CALC: 33.2 % (ref 37–47)
HDLC SERPL-MCNC: 54 MG/DL (ref 40–59)
HEMOGLOBIN: 10.8 G/DL (ref 12–16)
HYALINE CASTS: ABNORMAL /HPF (ref 0–5)
KETONES, URINE: NEGATIVE MG/DL
LDL CHOLESTEROL CALCULATED: 30 MG/DL (ref 0–129)
LEUKOCYTE ESTERASE, URINE: ABNORMAL
MAGNESIUM: 2.1 MG/DL (ref 1.7–2.4)
MCH RBC QN AUTO: 28 PG (ref 27–31.3)
MCHC RBC AUTO-ENTMCNC: 32.6 % (ref 33–37)
MCV RBC AUTO: 85.9 FL (ref 82–100)
NITRITE, URINE: NEGATIVE
PDW BLD-RTO: 14.9 % (ref 11.5–14.5)
PH UA: 5.5 (ref 5–9)
PLATELET # BLD: 384 K/UL (ref 130–400)
POTASSIUM SERPL-SCNC: 3.8 MEQ/L (ref 3.4–4.9)
PROTEIN UA: NEGATIVE MG/DL
RBC # BLD: 3.87 M/UL (ref 4.2–5.4)
RBC UA: ABNORMAL /HPF (ref 0–5)
SODIUM BLD-SCNC: 145 MEQ/L (ref 135–144)
SPECIFIC GRAVITY UA: 1.01 (ref 1–1.03)
TRIGL SERPL-MCNC: 83 MG/DL (ref 0–150)
TROPONIN: <0.01 NG/ML (ref 0–0.01)
URINE CULTURE, ROUTINE: NORMAL
URINE REFLEX TO CULTURE: YES
UROBILINOGEN, URINE: 0.2 E.U./DL
WBC # BLD: 8.1 K/UL (ref 4.8–10.8)
WBC UA: ABNORMAL /HPF (ref 0–5)

## 2020-02-05 PROCEDURE — 6360000002 HC RX W HCPCS: Performed by: INTERNAL MEDICINE

## 2020-02-05 PROCEDURE — 02H63JZ INSERTION OF PACEMAKER LEAD INTO RIGHT ATRIUM, PERCUTANEOUS APPROACH: ICD-10-PCS | Performed by: INTERNAL MEDICINE

## 2020-02-05 PROCEDURE — 81001 URINALYSIS AUTO W/SCOPE: CPT

## 2020-02-05 PROCEDURE — 93005 ELECTROCARDIOGRAM TRACING: CPT | Performed by: INTERNAL MEDICINE

## 2020-02-05 PROCEDURE — 6360000004 HC RX CONTRAST MEDICATION: Performed by: INTERNAL MEDICINE

## 2020-02-05 PROCEDURE — 2060000000 HC ICU INTERMEDIATE R&B

## 2020-02-05 PROCEDURE — 93010 ELECTROCARDIOGRAM REPORT: CPT | Performed by: INTERNAL MEDICINE

## 2020-02-05 PROCEDURE — 2500000003 HC RX 250 WO HCPCS

## 2020-02-05 PROCEDURE — C1785 PMKR, DUAL, RATE-RESP: HCPCS

## 2020-02-05 PROCEDURE — 87086 URINE CULTURE/COLONY COUNT: CPT

## 2020-02-05 PROCEDURE — 33208 INSRT HEART PM ATRIAL & VENT: CPT | Performed by: INTERNAL MEDICINE

## 2020-02-05 PROCEDURE — 84484 ASSAY OF TROPONIN QUANT: CPT

## 2020-02-05 PROCEDURE — 0JH606Z INSERTION OF PACEMAKER, DUAL CHAMBER INTO CHEST SUBCUTANEOUS TISSUE AND FASCIA, OPEN APPROACH: ICD-10-PCS | Performed by: INTERNAL MEDICINE

## 2020-02-05 PROCEDURE — 2580000003 HC RX 258: Performed by: INTERNAL MEDICINE

## 2020-02-05 PROCEDURE — 6370000000 HC RX 637 (ALT 250 FOR IP): Performed by: INTERNAL MEDICINE

## 2020-02-05 PROCEDURE — 80048 BASIC METABOLIC PNL TOTAL CA: CPT

## 2020-02-05 PROCEDURE — 80061 LIPID PANEL: CPT

## 2020-02-05 PROCEDURE — 36005 INJECTION EXT VENOGRAPHY: CPT | Performed by: INTERNAL MEDICINE

## 2020-02-05 PROCEDURE — C1898 LEAD, PMKR, OTHER THAN TRANS: HCPCS

## 2020-02-05 PROCEDURE — 75820 VEIN X-RAY ARM/LEG: CPT | Performed by: INTERNAL MEDICINE

## 2020-02-05 PROCEDURE — 2709999900 HC NON-CHARGEABLE SUPPLY

## 2020-02-05 PROCEDURE — C1894 INTRO/SHEATH, NON-LASER: HCPCS

## 2020-02-05 PROCEDURE — 6370000000 HC RX 637 (ALT 250 FOR IP)

## 2020-02-05 PROCEDURE — 36415 COLL VENOUS BLD VENIPUNCTURE: CPT

## 2020-02-05 PROCEDURE — 2580000003 HC RX 258

## 2020-02-05 PROCEDURE — 6360000002 HC RX W HCPCS

## 2020-02-05 PROCEDURE — 71045 X-RAY EXAM CHEST 1 VIEW: CPT

## 2020-02-05 PROCEDURE — 02HK3JZ INSERTION OF PACEMAKER LEAD INTO RIGHT VENTRICLE, PERCUTANEOUS APPROACH: ICD-10-PCS | Performed by: INTERNAL MEDICINE

## 2020-02-05 PROCEDURE — 99226 PR SBSQ OBSERVATION CARE/DAY 35 MINUTES: CPT | Performed by: INTERNAL MEDICINE

## 2020-02-05 PROCEDURE — 85027 COMPLETE CBC AUTOMATED: CPT

## 2020-02-05 PROCEDURE — 83735 ASSAY OF MAGNESIUM: CPT

## 2020-02-05 RX ORDER — SODIUM CHLORIDE 0.9 % (FLUSH) 0.9 %
10 SYRINGE (ML) INJECTION EVERY 12 HOURS SCHEDULED
Status: DISCONTINUED | OUTPATIENT
Start: 2020-02-05 | End: 2020-02-06 | Stop reason: HOSPADM

## 2020-02-05 RX ORDER — PAROXETINE HYDROCHLORIDE 20 MG/1
20 TABLET, FILM COATED ORAL DAILY
Status: DISCONTINUED | OUTPATIENT
Start: 2020-02-05 | End: 2020-02-06 | Stop reason: HOSPADM

## 2020-02-05 RX ORDER — MORPHINE SULFATE 2 MG/ML
2 INJECTION, SOLUTION INTRAMUSCULAR; INTRAVENOUS
Status: DISCONTINUED | OUTPATIENT
Start: 2020-02-05 | End: 2020-02-06 | Stop reason: HOSPADM

## 2020-02-05 RX ORDER — TRAMADOL HYDROCHLORIDE 50 MG/1
50 TABLET ORAL EVERY 6 HOURS PRN
Status: DISCONTINUED | OUTPATIENT
Start: 2020-02-05 | End: 2020-02-06 | Stop reason: HOSPADM

## 2020-02-05 RX ORDER — TRAMADOL HYDROCHLORIDE 50 MG/1
100 TABLET ORAL EVERY 6 HOURS PRN
Status: DISCONTINUED | OUTPATIENT
Start: 2020-02-05 | End: 2020-02-06 | Stop reason: HOSPADM

## 2020-02-05 RX ORDER — MORPHINE SULFATE 4 MG/ML
4 INJECTION, SOLUTION INTRAMUSCULAR; INTRAVENOUS
Status: DISCONTINUED | OUTPATIENT
Start: 2020-02-05 | End: 2020-02-06 | Stop reason: HOSPADM

## 2020-02-05 RX ORDER — CEFAZOLIN SODIUM 2 G/50ML
2 SOLUTION INTRAVENOUS EVERY 8 HOURS
Status: COMPLETED | OUTPATIENT
Start: 2020-02-05 | End: 2020-02-06

## 2020-02-05 RX ORDER — SODIUM CHLORIDE 0.9 % (FLUSH) 0.9 %
10 SYRINGE (ML) INJECTION PRN
Status: DISCONTINUED | OUTPATIENT
Start: 2020-02-05 | End: 2020-02-06 | Stop reason: HOSPADM

## 2020-02-05 RX ORDER — SODIUM CHLORIDE 9 MG/ML
INJECTION, SOLUTION INTRAVENOUS
Status: DISPENSED
Start: 2020-02-05 | End: 2020-02-06

## 2020-02-05 RX ORDER — ACETAMINOPHEN 325 MG/1
650 TABLET ORAL EVERY 4 HOURS PRN
Status: DISCONTINUED | OUTPATIENT
Start: 2020-02-05 | End: 2020-02-06 | Stop reason: HOSPADM

## 2020-02-05 RX ORDER — CEFAZOLIN SODIUM 1 G/3ML
INJECTION, POWDER, FOR SOLUTION INTRAMUSCULAR; INTRAVENOUS
Status: COMPLETED
Start: 2020-02-05 | End: 2020-02-05

## 2020-02-05 RX ADMIN — MULTIPLE VITAMINS W/ MINERALS TAB 1 TABLET: TAB at 09:15

## 2020-02-05 RX ADMIN — CEFTRIAXONE SODIUM 1 G: 1 INJECTION, POWDER, FOR SOLUTION INTRAMUSCULAR; INTRAVENOUS at 18:22

## 2020-02-05 RX ADMIN — IOVERSOL 12 ML: 678 INJECTION INTRA-ARTERIAL; INTRAVENOUS at 16:06

## 2020-02-05 RX ADMIN — CEFAZOLIN SODIUM 2 G: 2 SOLUTION INTRAVENOUS at 23:42

## 2020-02-05 RX ADMIN — IBUPROFEN 600 MG: 600 TABLET ORAL at 20:46

## 2020-02-05 RX ADMIN — TIZANIDINE 4 MG: 4 TABLET ORAL at 09:16

## 2020-02-05 RX ADMIN — LORAZEPAM 0.25 MG: 0.5 TABLET ORAL at 14:00

## 2020-02-05 RX ADMIN — PAROXETINE HYDROCHLORIDE 20 MG: 20 TABLET, FILM COATED ORAL at 09:16

## 2020-02-05 RX ADMIN — POTASSIUM CHLORIDE 10 MEQ: 20 TABLET, EXTENDED RELEASE ORAL at 20:47

## 2020-02-05 RX ADMIN — Medication 10 ML: at 20:48

## 2020-02-05 RX ADMIN — IBUPROFEN 600 MG: 600 TABLET ORAL at 09:16

## 2020-02-05 RX ADMIN — CEFAZOLIN SODIUM 1 G: 1 INJECTION, POWDER, FOR SOLUTION INTRAMUSCULAR; INTRAVENOUS at 15:30

## 2020-02-05 RX ADMIN — FUROSEMIDE 40 MG: 10 INJECTION, SOLUTION INTRAMUSCULAR; INTRAVENOUS at 18:23

## 2020-02-05 RX ADMIN — TIZANIDINE 4 MG: 4 TABLET ORAL at 20:47

## 2020-02-05 RX ADMIN — ATORVASTATIN CALCIUM 20 MG: 40 TABLET, FILM COATED ORAL at 20:47

## 2020-02-05 RX ADMIN — FUROSEMIDE 40 MG: 10 INJECTION, SOLUTION INTRAMUSCULAR; INTRAVENOUS at 09:15

## 2020-02-05 RX ADMIN — POTASSIUM CHLORIDE 10 MEQ: 20 TABLET, EXTENDED RELEASE ORAL at 09:15

## 2020-02-05 RX ADMIN — ASPIRIN 81 MG 81 MG: 81 TABLET ORAL at 09:15

## 2020-02-05 RX ADMIN — AMITRIPTYLINE HYDROCHLORIDE 25 MG: 25 TABLET, FILM COATED ORAL at 20:48

## 2020-02-05 ASSESSMENT — PAIN DESCRIPTION - PAIN TYPE: TYPE: CHRONIC PAIN

## 2020-02-05 ASSESSMENT — PAIN DESCRIPTION - ONSET: ONSET: GRADUAL

## 2020-02-05 ASSESSMENT — PAIN DESCRIPTION - DESCRIPTORS: DESCRIPTORS: ACHING

## 2020-02-05 ASSESSMENT — ENCOUNTER SYMPTOMS
COUGH: 0
WHEEZING: 0
BLOOD IN STOOL: 0
STRIDOR: 0
GASTROINTESTINAL NEGATIVE: 1
EYES NEGATIVE: 1
NAUSEA: 0
SHORTNESS OF BREATH: 0
RESPIRATORY NEGATIVE: 1
CHEST TIGHTNESS: 0

## 2020-02-05 ASSESSMENT — PAIN DESCRIPTION - LOCATION
LOCATION: BACK
LOCATION: BACK;SHOULDER

## 2020-02-05 ASSESSMENT — PAIN DESCRIPTION - PROGRESSION: CLINICAL_PROGRESSION: GRADUALLY WORSENING

## 2020-02-05 ASSESSMENT — PAIN SCALES - GENERAL
PAINLEVEL_OUTOF10: 4
PAINLEVEL_OUTOF10: 4
PAINLEVEL_OUTOF10: 0

## 2020-02-05 NOTE — PROGRESS NOTES
Progress Note  Patient: Petra Bhagat  Unit/Bed: E736/L206-35  YOB: 1945  MRN: [id number]  Acct: [de-identified]   Admitting Diagnosis: Symptomatic bradycardia [R00.1]  Admit Date:  2/3/2020  Hospital Day: 0    Chief Complaint: HTN Bbadycardia 2nd degree HB    Histories:  Past Medical History:   Diagnosis Date    Asthma     Degenerative disc disease, lumbar     Hypertension     Lymphedema     MRSA infection     UTI (lower urinary tract infection)      Past Surgical History:   Procedure Laterality Date    APPENDECTOMY      SALPINGO-OOPHORECTOMY      left side     SHOULDER SURGERY      TONSILLECTOMY      WRIST SURGERY       No family history on file. Social History     Socioeconomic History    Marital status:       Spouse name: None    Number of children: None    Years of education: None    Highest education level: None   Occupational History    None   Social Needs    Financial resource strain: None    Food insecurity:     Worry: None     Inability: None    Transportation needs:     Medical: None     Non-medical: None   Tobacco Use    Smoking status: Never Smoker    Smokeless tobacco: Never Used   Substance and Sexual Activity    Alcohol use: Yes     Comment: Rarely    Drug use: No    Sexual activity: Never   Lifestyle    Physical activity:     Days per week: None     Minutes per session: None    Stress: None   Relationships    Social connections:     Talks on phone: None     Gets together: None     Attends Mandaen service: None     Active member of club or organization: None     Attends meetings of clubs or organizations: None     Relationship status: None    Intimate partner violence:     Fear of current or ex partner: None     Emotionally abused: None     Physically abused: None     Forced sexual activity: None   Other Topics Concern    None   Social History Narrative    None       Subjective/HPI Pt demonstrating persistent Type ll symptomatic 2 nd degree AVB  No CP NO sob    EKG: AVB        Review of Systems:   Review of Systems   Constitutional: Negative. Negative for diaphoresis and fatigue. HENT: Negative. Eyes: Negative. Respiratory: Negative. Negative for cough, chest tightness, shortness of breath, wheezing and stridor. Cardiovascular: Positive for leg swelling. Negative for chest pain and palpitations. Gastrointestinal: Negative. Negative for blood in stool and nausea. Genitourinary: Negative. Musculoskeletal: Negative. Skin: Negative. Neurological: Positive for weakness. Negative for dizziness, syncope and light-headedness. Hematological: Negative. Psychiatric/Behavioral: Negative. Physical Examination:    BP (!) 143/74   Pulse 92   Temp 97.7 °F (36.5 °C)   Resp 18   Ht 5' 5\" (1.651 m)   Wt 230 lb 11.2 oz (104.6 kg)   SpO2 94%   BMI 38.39 kg/m²    Physical Exam   Constitutional: She appears healthy. No distress. HENT:   Normal cephalic and Atraumatic   Eyes: Pupils are equal, round, and reactive to light. Neck: Normal range of motion and thyroid normal. Neck supple. No JVD present. No neck adenopathy. No thyromegaly present. Cardiovascular: Normal rate, regular rhythm, normal heart sounds, intact distal pulses and normal pulses. Pulmonary/Chest: Effort normal and breath sounds normal. She has no wheezes. She has no rales. She exhibits no tenderness. Abdominal: Soft. Bowel sounds are normal. There is no abdominal tenderness. Musculoskeletal: Normal range of motion. General: Edema present. No tenderness. Neurological: She is alert and oriented to person, place, and time. Skin: Skin is warm. No cyanosis. Nails show no clubbing.        LABS:  CBC:   Lab Results   Component Value Date    WBC 8.1 02/05/2020    RBC 3.87 02/05/2020    HGB 10.8 02/05/2020    HCT 33.2 02/05/2020    MCV 85.9 02/05/2020    MCH 28.0 02/05/2020    MCHC 32.6 02/05/2020    RDW 14.9 02/05/2020     02/05/2020    MPV 8.5 05/26/2015     CBC with Differential:    Lab Results   Component Value Date    WBC 8.1 02/05/2020    RBC 3.87 02/05/2020    HGB 10.8 02/05/2020    HCT 33.2 02/05/2020     02/05/2020    MCV 85.9 02/05/2020    MCH 28.0 02/05/2020    MCHC 32.6 02/05/2020    RDW 14.9 02/05/2020    LYMPHOPCT 32.8 02/04/2020    MONOPCT 8.4 02/04/2020    BASOPCT 1.4 02/04/2020    MONOSABS 1.1 02/04/2020    LYMPHSABS 4.4 02/04/2020    EOSABS 0.4 02/04/2020    BASOSABS 0.2 02/04/2020     CMP:    Lab Results   Component Value Date     02/05/2020    K 3.8 02/05/2020    K 3.1 12/27/2019     02/05/2020    CO2 31 02/05/2020    BUN 17 02/05/2020    CREATININE 0.80 02/05/2020    GFRAA >60.0 02/05/2020    LABGLOM >60.0 02/05/2020    GLUCOSE 101 02/05/2020    PROT 7.6 02/04/2020    LABALBU 4.2 02/04/2020    CALCIUM 9.0 02/05/2020    BILITOT <0.2 02/04/2020    ALKPHOS 105 02/04/2020    AST 27 02/04/2020    ALT 16 02/04/2020     BMP:    Lab Results   Component Value Date     02/05/2020    K 3.8 02/05/2020    K 3.1 12/27/2019     02/05/2020    CO2 31 02/05/2020    BUN 17 02/05/2020    LABALBU 4.2 02/04/2020    CREATININE 0.80 02/05/2020    CALCIUM 9.0 02/05/2020    GFRAA >60.0 02/05/2020    LABGLOM >60.0 02/05/2020    GLUCOSE 101 02/05/2020     Magnesium:    Lab Results   Component Value Date    MG 2.1 02/05/2020     Troponin:    Lab Results   Component Value Date    TROPONINI <0.010 02/05/2020        Active Hospital Problems    Diagnosis Date Noted    Symptomatic bradycardia [R00.1] 02/04/2020     Priority: Low        Assessment/Plan:  1. 2nd degree AVB Type ll - at risk of progression to complete HB. Not on AVN suppressing agents. Will need to check LV to exclude need for ICD. EF 60% - will proceed with PPM RBA discussed with pt.   2. Lymphedema -  Heart Block likely  decreased Cardiac Output- she was more edematous than her usual. She responded well to iv Lasix. Will continue today.   3. HTN stable now

## 2020-02-05 NOTE — PROGRESS NOTES
Arrived to pre/post from the cath lab and report from [facility]. Left upper chest dressing dry and intact with no bleeding or hematoma. Sling to left arm. Attached to monitor and vitals are stable.   Post ekg done

## 2020-02-05 NOTE — PROGRESS NOTES
Patient alert and orient, report called to Aleksandra on [address]. Telemetry box connected to patient.

## 2020-02-05 NOTE — CONSULTS
Hospital Medicine  History and Physical    Patient:  Vitor Bauer  MRN: [id number]    CHIEF COMPLAINT:    Chief Complaint   Patient presents with    Hypertension       History Obtained From:  Patient, EMR  Primary Care Physician: Hussain Jackson MD    HISTORY OF PRESENT ILLNESS:   The patient is a 69 y.o. female with PMHx of HTN who presents with symptomatic bradycardia and planned for a PPM.  We were consulted for coomanagement and evaluation of possible UTI. Denies fevers; has chronic sweats and chills. She says she has a suprapubic cramping feeling which is bothering her. She denies increased urinary frequency or difficulty voiding or burning micturition. Denies CP, SOB, diarrhea. Occasional drinker non smoker. Past Medical History:      Diagnosis Date    Asthma     Degenerative disc disease, lumbar     Hypertension     Lymphedema     MRSA infection     UTI (lower urinary tract infection)        Past Surgical History:      Procedure Laterality Date    APPENDECTOMY      SALPINGO-OOPHORECTOMY      left side     SHOULDER SURGERY      TONSILLECTOMY      WRIST SURGERY         Medications Prior to Admission:    Prior to Admission medications    Medication Sig Start Date End Date Taking? Authorizing Provider   furosemide (LASIX) 40 MG tablet Take 40 mg by mouth daily   Yes Historical Provider, MD   tiZANidine (ZANAFLEX) 4 MG tablet Take 4 mg by mouth 2 times daily    Yes Historical Provider, MD   amitriptyline (ELAVIL) 25 MG tablet Take 25 mg by mouth nightly   Yes Historical Provider, MD   Menthol-Methyl Salicylate (ANALGESIC OINTMENT) OINT ointment Apply topically once   Yes Historical Provider, MD   LORazepam (ATIVAN) 0.5 MG tablet Take 0.25 mg by mouth Daily with lunch. Yes Historical Provider, MD   PARoxetine (PAXIL-CR) 25 MG CR tablet Take 25 mg by mouth every morning.    Yes Historical Provider, MD   chlorthalidone (HYGROTON) 25 MG tablet Take 12.5 mg by mouth daily Patient takes half a tablet   Yes Historical Provider, MD   potassium chloride SA (K-DUR;KLOR-CON M) 10 MEQ tablet Take 10 mEq by mouth 2 times daily. Yes Historical Provider, MD   atorvastatin (LIPITOR) 20 MG tablet Take 20 mg by mouth daily. Yes Historical Provider, MD   aspirin 81 MG tablet Take 81 mg by mouth daily. Yes Historical Provider, MD   Multiple Vitamins-Minerals (THERAPEUTIC MULTIVITAMIN-MINERALS) tablet Take 1 tablet by mouth daily. Yes Historical Provider, MD   ibuprofen (ADVIL;MOTRIN) 400 MG tablet Take 600 mg by mouth 2 times daily     Historical Provider, MD       Allergies:  Ciprofloxacin; Metronidazole; and Tetracycline    Social History:   TOBACCO:   reports that she has never smoked. She has never used smokeless tobacco.  ETOH:   reports current alcohol use. Family History:   No family history on file. REVIEW OF SYSTEMS:  Ten systems reviewed and negative except for stated in HPI    Physical Exam:    Vitals: BP (!) 143/74   Pulse 92   Temp 97.7 °F (36.5 °C)   Resp 18   Ht 5' 5\" (1.651 m)   Wt 230 lb 11.2 oz (104.6 kg)   SpO2 94%   BMI 38.39 kg/m²   General appearance: alert, appears stated age and cooperative  Skin: . No rashes or lesions  HEENT: Head: Normal, normocephalic, atraumatic.    Neck: supple, symmetrical, trachea midline  Lungs: clear to auscultation bilaterally  Heart: regular rate and rhythm  Abdomen: Suprapubic tenderness  Extremities: extremities normal, atraumatic, no cyanosis or edema  Neurologic: Non focal    Recent Labs     02/04/20  0015 02/05/20  0600   WBC 13.5* 8.1   HGB 12.8 10.8*   * 384     Recent Labs     02/04/20  0015 02/05/20  0600    145*   K 4.3 3.8   CL 97 101   CO2 30 31   BUN 18 17   CREATININE 0.66 0.80   GLUCOSE 88 101*   AST 27  --    ALT 16  --    BILITOT <0.2  --    ALKPHOS 105  --      Troponin T:   Recent Labs     02/04/20  0015 02/04/20  0510 02/05/20  0600   TROPONINI <0.010 <0.010 <0.010       ABGs: No results found for: PHART, PO2ART,

## 2020-02-06 VITALS
RESPIRATION RATE: 18 BRPM | OXYGEN SATURATION: 100 % | TEMPERATURE: 98.3 F | HEART RATE: 85 BPM | HEIGHT: 65 IN | DIASTOLIC BLOOD PRESSURE: 73 MMHG | SYSTOLIC BLOOD PRESSURE: 139 MMHG | WEIGHT: 230.2 LBS | BODY MASS INDEX: 38.35 KG/M2

## 2020-02-06 LAB
ANION GAP SERPL CALCULATED.3IONS-SCNC: 12 MEQ/L (ref 9–15)
BUN BLDV-MCNC: 20 MG/DL (ref 8–23)
CALCIUM SERPL-MCNC: 8.6 MG/DL (ref 8.5–9.9)
CHLORIDE BLD-SCNC: 101 MEQ/L (ref 95–107)
CO2: 30 MEQ/L (ref 20–31)
CREAT SERPL-MCNC: 0.66 MG/DL (ref 0.5–0.9)
GFR AFRICAN AMERICAN: >60
GFR NON-AFRICAN AMERICAN: >60
GLUCOSE BLD-MCNC: 97 MG/DL (ref 70–99)
POTASSIUM SERPL-SCNC: 4 MEQ/L (ref 3.4–4.9)
SODIUM BLD-SCNC: 143 MEQ/L (ref 135–144)

## 2020-02-06 PROCEDURE — 99217 PR OBSERVATION CARE DISCHARGE MANAGEMENT: CPT | Performed by: INTERNAL MEDICINE

## 2020-02-06 PROCEDURE — 6360000002 HC RX W HCPCS: Performed by: INTERNAL MEDICINE

## 2020-02-06 PROCEDURE — 36415 COLL VENOUS BLD VENIPUNCTURE: CPT

## 2020-02-06 PROCEDURE — 6370000000 HC RX 637 (ALT 250 FOR IP): Performed by: INTERNAL MEDICINE

## 2020-02-06 PROCEDURE — 93280 PM DEVICE PROGR EVAL DUAL: CPT

## 2020-02-06 PROCEDURE — 80048 BASIC METABOLIC PNL TOTAL CA: CPT

## 2020-02-06 RX ORDER — POTASSIUM CHLORIDE 750 MG/1
10 TABLET, FILM COATED, EXTENDED RELEASE ORAL DAILY
Status: DISCONTINUED | OUTPATIENT
Start: 2020-02-07 | End: 2020-02-06 | Stop reason: HOSPADM

## 2020-02-06 RX ORDER — AMOXICILLIN 250 MG
2 CAPSULE ORAL DAILY
Qty: 60 TABLET | Refills: 1 | Status: SHIPPED | OUTPATIENT
Start: 2020-02-06 | End: 2021-12-01

## 2020-02-06 RX ORDER — DOCUSATE SODIUM 100 MG/1
100 CAPSULE, LIQUID FILLED ORAL 2 TIMES DAILY
Qty: 60 CAPSULE | Refills: 0 | Status: SHIPPED | OUTPATIENT
Start: 2020-02-06 | End: 2020-03-07

## 2020-02-06 RX ADMIN — TIZANIDINE 4 MG: 4 TABLET ORAL at 08:52

## 2020-02-06 RX ADMIN — IBUPROFEN 600 MG: 600 TABLET ORAL at 08:51

## 2020-02-06 RX ADMIN — METOPROLOL TARTRATE 25 MG: 25 TABLET, FILM COATED ORAL at 15:30

## 2020-02-06 RX ADMIN — POTASSIUM CHLORIDE 10 MEQ: 20 TABLET, EXTENDED RELEASE ORAL at 10:00

## 2020-02-06 RX ADMIN — LORAZEPAM 0.25 MG: 0.5 TABLET ORAL at 12:10

## 2020-02-06 RX ADMIN — MAGNESIUM HYDROXIDE 30 ML: 2400 SUSPENSION ORAL at 12:18

## 2020-02-06 RX ADMIN — PAROXETINE HYDROCHLORIDE 20 MG: 20 TABLET, FILM COATED ORAL at 08:52

## 2020-02-06 RX ADMIN — ASPIRIN 81 MG 81 MG: 81 TABLET ORAL at 08:51

## 2020-02-06 RX ADMIN — CEFAZOLIN SODIUM 2 G: 2 SOLUTION INTRAVENOUS at 09:03

## 2020-02-06 RX ADMIN — FUROSEMIDE 40 MG: 10 INJECTION, SOLUTION INTRAMUSCULAR; INTRAVENOUS at 08:50

## 2020-02-06 RX ADMIN — MULTIPLE VITAMINS W/ MINERALS TAB 1 TABLET: TAB at 08:52

## 2020-02-06 ASSESSMENT — PAIN DESCRIPTION - PAIN TYPE: TYPE: CHRONIC PAIN

## 2020-02-06 ASSESSMENT — PAIN SCALES - GENERAL
PAINLEVEL_OUTOF10: 0
PAINLEVEL_OUTOF10: 4
PAINLEVEL_OUTOF10: 0
PAINLEVEL_OUTOF10: 0

## 2020-02-06 ASSESSMENT — PAIN DESCRIPTION - LOCATION: LOCATION: BACK

## 2020-02-06 NOTE — FLOWSHEET NOTE
Sl and tele dc'd. Discharge instructions reviewed with patient. Discussed new meds, post pacemaker care and follow ups.

## 2020-02-06 NOTE — DISCHARGE SUMMARY
9.0    Protein, UA Negative Negative mg/dL    Urobilinogen, Urine 0.2 <2.0 E.U./dL    Nitrite, Urine Negative Negative    Leukocyte Esterase, Urine LARGE (A) Negative    Urine Reflex to Culture YES    CK    Collection Time: 02/04/20 12:15 AM   Result Value Ref Range    Total  0 - 170 U/L   Urine Culture    Collection Time: 02/04/20 12:15 AM   Result Value Ref Range    Urine Culture, Routine       <50,000 CFU/ml of mixed roxanne  Multiple organisms isolated, no predominance. Culture  indicates probable contamination. Please review colony count  and clinical indications to determine if a repeat culture is  necessary. No further workup to be done.      Microscopic Urinalysis    Collection Time: 02/04/20 12:15 AM   Result Value Ref Range    Bacteria, UA RARE (A) /HPF    Hyaline Casts, UA 3-5 0 - 5 /HPF    WBC, UA  (A) 0 - 5 /HPF    RBC, UA 0-2 0 - 5 /HPF    Epi Cells 0-2 0 - 5 /HPF   Troponin    Collection Time: 02/04/20  5:10 AM   Result Value Ref Range    Troponin <0.010 0.000 - 0.010 ng/mL   Brain natriuretic peptide    Collection Time: 02/04/20  5:10 AM   Result Value Ref Range    Pro- pg/mL   EKG 12 lead    Collection Time: 02/05/20  5:19 AM   Result Value Ref Range    Ventricular Rate 48 BPM    Atrial Rate 48 BPM    P-R Interval 150 ms    QRS Duration 140 ms    Q-T Interval 464 ms    QTc Calculation (Bazett) 414 ms    P Axis 63 degrees    R Axis 30 degrees    T Axis -3 degrees   Troponin    Collection Time: 02/05/20  6:00 AM   Result Value Ref Range    Troponin <0.010 0.000 - 0.010 ng/mL   Magnesium    Collection Time: 02/05/20  6:00 AM   Result Value Ref Range    Magnesium 2.1 1.7 - 2.4 mg/dL   Lipid panel - fasting    Collection Time: 02/05/20  6:00 AM   Result Value Ref Range    Cholesterol, Total 101 0 - 199 mg/dL    Triglycerides 83 0 - 150 mg/dL    HDL 54 40 - 59 mg/dL    LDL Calculated 30 0 - 129 mg/dL   CBC    Collection Time: 02/05/20  6:00 AM   Result Value Ref Range    WBC 8.1 4.8 - 10.8 K/uL    RBC 3.87 (L) 4.20 - 5.40 M/uL    Hemoglobin 10.8 (L) 12.0 - 16.0 g/dL    Hematocrit 33.2 (L) 37.0 - 47.0 %    MCV 85.9 82.0 - 100.0 fL    MCH 28.0 27.0 - 31.3 pg    MCHC 32.6 (L) 33.0 - 37.0 %    RDW 14.9 (H) 11.5 - 14.5 %    Platelets 701 733 - 907 K/uL   Basic Metabolic Panel    Collection Time: 02/05/20  6:00 AM   Result Value Ref Range    Sodium 145 (H) 135 - 144 mEq/L    Potassium 3.8 3.4 - 4.9 mEq/L    Chloride 101 95 - 107 mEq/L    CO2 31 20 - 31 mEq/L    Anion Gap 13 9 - 15 mEq/L    Glucose 101 (H) 70 - 99 mg/dL    BUN 17 8 - 23 mg/dL    CREATININE 0.80 0.50 - 0.90 mg/dL    GFR Non-African American >60.0 >60    GFR  >60.0 >60    Calcium 9.0 8.5 - 9.9 mg/dL   URINE RT REFLEX TO CULTURE    Collection Time: 02/05/20  4:34 PM   Result Value Ref Range    Color, UA Yellow Straw/Yellow    Clarity, UA Clear Clear    Glucose, Ur Negative Negative mg/dL    Bilirubin Urine Negative Negative    Ketones, Urine Negative Negative mg/dL    Specific Gravity, UA 1.014 1.005 - 1.030    Blood, Urine Negative Negative    pH, UA 5.5 5.0 - 9.0    Protein, UA Negative Negative mg/dL    Urobilinogen, Urine 0.2 <2.0 E.U./dL    Nitrite, Urine Negative Negative    Leukocyte Esterase, Urine SMALL (A) Negative    Urine Reflex to Culture YES    Microscopic Urinalysis    Collection Time: 02/05/20  4:34 PM   Result Value Ref Range    Bacteria, UA Negative /HPF    Hyaline Casts, UA 0-1 0 - 5 /HPF    WBC, UA 6-10 (A) 0 - 5 /HPF    RBC, UA 3-5 (A) 0 - 5 /HPF    Epi Cells 6-10 0 - 5 /HPF   Urine Culture    Collection Time: 02/05/20  4:46 PM   Result Value Ref Range    Urine Culture, Routine No growth in <24 hours, culture reincubated    EKG 12 lead    Collection Time: 02/05/20  4:52 PM   Result Value Ref Range    Ventricular Rate 91 BPM    Atrial Rate 91 BPM    P-R Interval 180 ms    QRS Duration 166 ms    Q-T Interval 440 ms    QTc Calculation (Bazett) 541 ms    P Axis 69 degrees    R Axis -76 degrees    T Axis 90 degrees   Basic Metabolic Panel    Collection Time: 02/06/20  6:10 AM   Result Value Ref Range    Sodium 143 135 - 144 mEq/L    Potassium 4.0 3.4 - 4.9 mEq/L    Chloride 101 95 - 107 mEq/L    CO2 30 20 - 31 mEq/L    Anion Gap 12 9 - 15 mEq/L    Glucose 97 70 - 99 mg/dL    BUN 20 8 - 23 mg/dL    CREATININE 0.66 0.50 - 0.90 mg/dL    GFR Non-African American >60.0 >60    GFR  >60.0 >60    Calcium 8.6 8.5 - 9.9 mg/dL           Follow-up visits:   Shar Atwood MD  [address] [facility] [address], [place]  [address] [place] [address] A  [address] [place] [address]  [phone]    Schedule an appointment as soon as possible for a visit in 1 week         [facility]    Diagnosis Date Noted    Heart block [I45.9] 02/05/2020     Priority: High    Symptomatic bradycardia [R00.1] 02/04/2020     Priority: Low     1.  2nd degree AVB Type ll - at risk of progression to complete HB. Not on AVN suppressing agents. S/p PPM  2. LVEF 60  3. Lymphedema -  Heart Block likely  decreased Cardiac Output- she was more edematous than her usual. She responded well to iv Lasix. Will continue PO lasix + K  1.  HTN stable now               Electronically signed by Amrit Kaur MD on 2/6/2020 at 12:12 PM

## 2020-02-06 NOTE — PROGRESS NOTES
Hospitalist Progress Note      PCP: Xochitl Oconnell MD    Date of Admission: 2/3/2020    Chief Complaint:    Chief Complaint   Patient presents with    Hypertension     Subjective:  Patient now states her suprapubic tenderness is actually cramping sensation that she has had chronically. Admitting to having a change in her stools where they are now small and hard. 12 point ROS negative other than mentioned above     Medications:  Reviewed    Infusion Medications   Scheduled Medications    [START ON 2/7/2020] potassium chloride  10 mEq Oral Daily    metoprolol tartrate  25 mg Oral BID    sodium chloride flush  10 mL Intravenous 2 times per day    PARoxetine  20 mg Oral Daily    ceFAZolin  1 g Intravenous Once    sodium chloride flush  10 mL Intravenous 2 times per day    atorvastatin  20 mg Oral Nightly    aspirin  81 mg Oral Daily    therapeutic multivitamin-minerals  1 tablet Oral Daily    LORazepam  0.25 mg Oral Lunch    amitriptyline  25 mg Oral Nightly    analgesic ointment   Topical Once    ibuprofen  600 mg Oral BID    tiZANidine  4 mg Oral BID     PRN Meds: sodium chloride flush, traMADol **OR** traMADol, morphine **OR** morphine, acetaminophen, sodium chloride flush, magnesium hydroxide, ondansetron, nitroGLYCERIN      Intake/Output Summary (Last 24 hours) at 2/6/2020 1453  Last data filed at 2/6/2020 1225  Gross per 24 hour   Intake 940 ml   Output 2300 ml   Net -1360 ml     Exam:    /75   Pulse 80   Temp 97.5 °F (36.4 °C) (Oral)   Resp 18   Ht 5' 5\" (1.651 m)   Wt 230 lb 3.2 oz (104.4 kg)   SpO2 94%   BMI 38.31 kg/m²     General appearance: No apparent distress, appears stated age and cooperative. HEENT:  Conjunctivae/corneas clear. Neck:  Trachea midline. Respiratory:  Normal respiratory effort. Clear to auscultation. Cardiovascular: Regular rate and rhythm   Abdomen: Soft, non-tender, non-distended with normal bowel sounds.   Musculoskeletal: No clubbing, cyanosis or edema

## 2020-02-07 ENCOUNTER — TELEPHONE (OUTPATIENT)
Dept: CARDIOLOGY CLINIC | Age: 75
End: 2020-02-07

## 2020-02-07 LAB — URINE CULTURE, ROUTINE: NORMAL

## 2020-02-07 PROCEDURE — 93010 ELECTROCARDIOGRAM REPORT: CPT | Performed by: INTERNAL MEDICINE

## 2020-02-07 NOTE — TELEPHONE ENCOUNTER
Patient calling as she had pacemaker inserted 2/5/20 and her discharge instructions list her Ativan 0.5 mg incorrectly to take 1/2 tablet once daily. She states the directions are actually to take 1/2 tablet daily and 1 whole tablet nightly and would like to know if she can still take the whole tablet nightly. She was also told not to take ibuprofen for her arthritis and would like to know what she can take. Please advise.

## 2020-02-10 NOTE — PROCEDURES
[address]                      [address], [address] [facility]                                 PROCEDURE NOTE    PATIENT NAME: Christine Guillory                 :        1945  MED REC NO:   [id number]                            ROOM:       D517  ACCOUNT NO:   [de-identified]                           ADMIT DATE: 2020  PROVIDER:     Alden Vides DO    DATE OF PROCEDURE:  2020    PROCEDURE PERFORMED:  Dual chamber permanent pacemaker placement. INDICATIONS:  Sick sinus syndrome. SITE:  Left subclavian site. PROCEDURE PERFORMED BY:  Flaco Vides DO    COMPLICATIONS:  None. DESCRIPTION OF PROCEDURE:  After the risks, benefits and alternatives of  the above mentioned procedure were explained in detail with the patient,  informed consent was obtained verbally and in writing, and placed in the  chart. The patient was taken to the cardiac cath lab suite where they  were sterilely prepped and draped in the usual fashion. Lidocaine 1%  was used to anesthetize the left subclavian area. A thin-walled 18-gauge Argon needle was used to cannulate the left  subclavian vein. A guidewire was inserted through the needle into the  vascular lumen under fluoroscopic guidance. The needle was removed. Another thin-walled 18-gauge Argon needle was used to cannulate the left  subclavian vein. A guidewire was through the needle into the vascular  lumen under fluoroscopic guidance. The needle was removed and both  guidewires were attached to the field. A 1.5 inch incision was made  utilizing a #15 blade in the left subclavian side. Hemostasis was made  complete. Electrocautery along with digital blunt dissection was  utilized to dissect to the level of the pectoralis muscle fascia. Dissection was carried cephalad along the pectoralis muscle plane. Digital blunt dissection was used to create a pocket large enough to  accommodate the generator.   A venous the post-cath  holding area in a stable satisfactory condition. The patient tolerated the procedure well. GENERATOR DATA:  [employer]tr[employer]. LEAD DATA:  RIGHT ATRIAL LEAD DATA:  Refer to chart. RIGHT VENTRICLE LEAD DATA:  Refer to chart. STIMULATION THRESHOLDS:  RIGHT ATRIUM:  Within normal limits. RIGHT VENTRICLE:  Within normal limits. PARAMETER SETTINGS:  Pacing mode is DDDR. ASSESSMENT:  Status post successful dual chamber permanent pacemaker  placement, MRI compatible.     PLAN:  Postprocedure care as usual.        Sophie Obrien DO    D: 02/10/2020 17:00:01       T: 02/10/2020 17:54:15     [email or web address]  Job#: [id number]     Doc#: [id number]    CC:

## 2020-02-12 ENCOUNTER — OFFICE VISIT (OUTPATIENT)
Dept: CARDIOLOGY CLINIC | Age: 75
End: 2020-02-12
Payer: MEDICARE

## 2020-02-12 VITALS
RESPIRATION RATE: 18 BRPM | OXYGEN SATURATION: 98 % | WEIGHT: 244.6 LBS | BODY MASS INDEX: 40.7 KG/M2 | HEART RATE: 66 BPM | DIASTOLIC BLOOD PRESSURE: 84 MMHG | SYSTOLIC BLOOD PRESSURE: 134 MMHG

## 2020-02-12 PROBLEM — I89.0 LYMPHEDEMA: Status: ACTIVE | Noted: 2020-02-12

## 2020-02-12 PROCEDURE — G8400 PT W/DXA NO RESULTS DOC: HCPCS | Performed by: INTERNAL MEDICINE

## 2020-02-12 PROCEDURE — 1111F DSCHRG MED/CURRENT MED MERGE: CPT | Performed by: INTERNAL MEDICINE

## 2020-02-12 PROCEDURE — G8427 DOCREV CUR MEDS BY ELIG CLIN: HCPCS | Performed by: INTERNAL MEDICINE

## 2020-02-12 PROCEDURE — 99214 OFFICE O/P EST MOD 30 MIN: CPT | Performed by: INTERNAL MEDICINE

## 2020-02-12 PROCEDURE — 1090F PRES/ABSN URINE INCON ASSESS: CPT | Performed by: INTERNAL MEDICINE

## 2020-02-12 PROCEDURE — 1123F ACP DISCUSS/DSCN MKR DOCD: CPT | Performed by: INTERNAL MEDICINE

## 2020-02-12 PROCEDURE — G8484 FLU IMMUNIZE NO ADMIN: HCPCS | Performed by: INTERNAL MEDICINE

## 2020-02-12 PROCEDURE — 4040F PNEUMOC VAC/ADMIN/RCVD: CPT | Performed by: INTERNAL MEDICINE

## 2020-02-12 PROCEDURE — 1036F TOBACCO NON-USER: CPT | Performed by: INTERNAL MEDICINE

## 2020-02-12 PROCEDURE — 3017F COLORECTAL CA SCREEN DOC REV: CPT | Performed by: INTERNAL MEDICINE

## 2020-02-12 PROCEDURE — 93000 ELECTROCARDIOGRAM COMPLETE: CPT | Performed by: INTERNAL MEDICINE

## 2020-02-12 PROCEDURE — G8417 CALC BMI ABV UP PARAM F/U: HCPCS | Performed by: INTERNAL MEDICINE

## 2020-02-12 ASSESSMENT — ENCOUNTER SYMPTOMS
EYES NEGATIVE: 1
COUGH: 0
WHEEZING: 0
CHEST TIGHTNESS: 0
RESPIRATORY NEGATIVE: 1
BLOOD IN STOOL: 0
GASTROINTESTINAL NEGATIVE: 1
STRIDOR: 0
SHORTNESS OF BREATH: 0
NAUSEA: 0

## 2020-02-12 NOTE — PROGRESS NOTES
Subsequent Progress Note  Patient: Sylvia Velazquez  YOB: 1945  MRN: [id number]    Chief Complaint:  Chief Complaint   Patient presents with   [place] [facility] 2/3-2/6/20    Bradycardia    Heart Problem     heart block    Follow Up After Procedure     S/P PPM    Edema     BLE-h/o lymphedema       CV Data:  2//6/2020 PPM 2nd dgree Type II AVB   2/2020 Echo EF 60     Subjective/HPI: taking slaty food. Edema worse. No cp + castano. Waking some. No falls no bleed. EKG:    Past Medical History:   Diagnosis Date    Asthma     Degenerative disc disease, lumbar     Hypertension     Lymphedema     MRSA infection     UTI (lower urinary tract infection)        Past Surgical History:   Procedure Laterality Date    APPENDECTOMY      SALPINGO-OOPHORECTOMY      left side     SHOULDER SURGERY      TONSILLECTOMY      WRIST SURGERY         History reviewed. No pertinent family history. Social History     Socioeconomic History    Marital status:       Spouse name: None    Number of children: None    Years of education: None    Highest education level: None   Occupational History    None   Social Needs    Financial resource strain: None    Food insecurity:     Worry: None     Inability: None    Transportation needs:     Medical: None     Non-medical: None   Tobacco Use    Smoking status: Never Smoker    Smokeless tobacco: Never Used   Substance and Sexual Activity    Alcohol use: Yes     Comment: Rarely    Drug use: No    Sexual activity: Never   Lifestyle    Physical activity:     Days per week: None     Minutes per session: None    Stress: None   Relationships    Social connections:     Talks on phone: None     Gets together: None     Attends Jain service: None     Active member of club or organization: None     Attends meetings of clubs or organizations: None     Relationship status: None    Intimate partner violence:     Fear of current or ex partner: None     Emotionally abused: None     Physically abused: None     Forced sexual activity: None   Other Topics Concern    None   Social History Narrative    None       Allergies   Allergen Reactions    Ciprofloxacin      Per patient request due to bad side effects     Metronidazole     Tetracycline        Current Outpatient Medications   Medication Sig Dispense Refill    metoprolol tartrate (LOPRESSOR) 25 MG tablet Take 1 tablet by mouth 2 times daily 60 tablet 3    docusate sodium (COLACE) 100 MG capsule Take 1 capsule by mouth 2 times daily 60 capsule 0    senna-docusate (PERICOLACE) 8.6-50 MG per tablet Take 2 tablets by mouth daily 60 tablet 1    furosemide (LASIX) 40 MG tablet Take 40 mg by mouth daily      tiZANidine (ZANAFLEX) 4 MG tablet Take 4 mg by mouth 2 times daily       amitriptyline (ELAVIL) 25 MG tablet Take 25 mg by mouth nightly      Menthol-Methyl Salicylate (ANALGESIC OINTMENT) OINT ointment Apply topically once      LORazepam (ATIVAN) 0.5 MG tablet Take 0.25 mg by mouth Daily with lunch.  PARoxetine (PAXIL-CR) 25 MG CR tablet Take 25 mg by mouth every morning.  potassium chloride SA (K-DUR;KLOR-CON M) 10 MEQ tablet Take 10 mEq by mouth 2 times daily.  atorvastatin (LIPITOR) 20 MG tablet Take 20 mg by mouth daily.  aspirin 81 MG tablet Take 81 mg by mouth daily.  Multiple Vitamins-Minerals (THERAPEUTIC MULTIVITAMIN-MINERALS) tablet Take 1 tablet by mouth daily. No current facility-administered medications for this visit. Review of Systems:   Review of Systems   Constitutional: Negative. Negative for diaphoresis and fatigue. HENT: Negative. Eyes: Negative. Respiratory: Negative. Negative for cough, chest tightness, shortness of breath, wheezing and stridor. Cardiovascular: Positive for leg swelling. Negative for chest pain and palpitations. Gastrointestinal: Negative. Negative for blood in stool and nausea.    Genitourinary: Precautions to Prevent Falls and Walk Daily    Return for AFTER TESTS.       Electronically signed by Lu Faye MD on 2/12/2020 at 3:42 PM

## 2020-02-26 ENCOUNTER — HOSPITAL ENCOUNTER (OUTPATIENT)
Dept: NUCLEAR MEDICINE | Age: 75
Discharge: HOME | End: 2020-02-28
Payer: MEDICARE

## 2020-02-26 PROCEDURE — 3430000000 HC RX DIAGNOSTIC RADIOPHARMACEUTICAL: Performed by: INTERNAL MEDICINE

## 2020-02-26 PROCEDURE — A9502 TC99M TETROFOSMIN: HCPCS | Performed by: INTERNAL MEDICINE

## 2020-02-26 PROCEDURE — 78452 HT MUSCLE IMAGE SPECT MULT: CPT

## 2020-02-26 PROCEDURE — 93017 CV STRESS TEST TRACING ONLY: CPT

## 2020-02-26 PROCEDURE — 6360000002 HC RX W HCPCS: Performed by: INTERNAL MEDICINE

## 2020-02-26 PROCEDURE — 2580000003 HC RX 258: Performed by: INTERNAL MEDICINE

## 2020-02-26 RX ORDER — SODIUM CHLORIDE 0.9 % (FLUSH) 0.9 %
10 SYRINGE (ML) INJECTION PRN
Status: COMPLETED | OUTPATIENT
Start: 2020-02-26 | End: 2020-02-26

## 2020-02-26 RX ADMIN — REGADENOSON 0.4 MG: 0.08 INJECTION, SOLUTION INTRAVENOUS at 10:28

## 2020-02-26 RX ADMIN — TETROFOSMIN 30.9 MILLICURIE: 1.38 INJECTION, POWDER, LYOPHILIZED, FOR SOLUTION INTRAVENOUS at 10:28

## 2020-02-26 RX ADMIN — Medication 10 ML: at 10:28

## 2020-02-26 RX ADMIN — Medication 10 ML: at 10:29

## 2020-02-26 RX ADMIN — Medication 10 ML: at 08:22

## 2020-02-26 RX ADMIN — TETROFOSMIN 11.9 MILLICURIE: 1.38 INJECTION, POWDER, LYOPHILIZED, FOR SOLUTION INTRAVENOUS at 08:22

## 2020-03-03 ENCOUNTER — TELEPHONE (OUTPATIENT)
Dept: CARDIOLOGY CLINIC | Age: 75
End: 2020-03-03

## 2020-03-03 NOTE — TELEPHONE ENCOUNTER
The patient called and she is noticing some heaviness sensation in her lower legs. No real pain but she is wondering if it is medication related. She is also having fatigue still and a burning sensation on and off at the pacemaker site. I did reassure her and that Dr. Yamini Kwon can discuss when she is in the office for her follow up in 2 days.

## 2020-03-05 ENCOUNTER — OFFICE VISIT (OUTPATIENT)
Dept: CARDIOLOGY CLINIC | Age: 75
End: 2020-03-05
Payer: MEDICARE

## 2020-03-05 VITALS
RESPIRATION RATE: 18 BRPM | SYSTOLIC BLOOD PRESSURE: 134 MMHG | DIASTOLIC BLOOD PRESSURE: 86 MMHG | HEART RATE: 110 BPM | OXYGEN SATURATION: 95 %

## 2020-03-05 PROBLEM — R06.09 DOE (DYSPNEA ON EXERTION): Status: ACTIVE | Noted: 2020-03-05

## 2020-03-05 PROCEDURE — 1111F DSCHRG MED/CURRENT MED MERGE: CPT | Performed by: INTERNAL MEDICINE

## 2020-03-05 PROCEDURE — G8417 CALC BMI ABV UP PARAM F/U: HCPCS | Performed by: INTERNAL MEDICINE

## 2020-03-05 PROCEDURE — 4040F PNEUMOC VAC/ADMIN/RCVD: CPT | Performed by: INTERNAL MEDICINE

## 2020-03-05 PROCEDURE — G8400 PT W/DXA NO RESULTS DOC: HCPCS | Performed by: INTERNAL MEDICINE

## 2020-03-05 PROCEDURE — 1090F PRES/ABSN URINE INCON ASSESS: CPT | Performed by: INTERNAL MEDICINE

## 2020-03-05 PROCEDURE — 3017F COLORECTAL CA SCREEN DOC REV: CPT | Performed by: INTERNAL MEDICINE

## 2020-03-05 PROCEDURE — G8484 FLU IMMUNIZE NO ADMIN: HCPCS | Performed by: INTERNAL MEDICINE

## 2020-03-05 PROCEDURE — G8427 DOCREV CUR MEDS BY ELIG CLIN: HCPCS | Performed by: INTERNAL MEDICINE

## 2020-03-05 PROCEDURE — 99214 OFFICE O/P EST MOD 30 MIN: CPT | Performed by: INTERNAL MEDICINE

## 2020-03-05 PROCEDURE — 1123F ACP DISCUSS/DSCN MKR DOCD: CPT | Performed by: INTERNAL MEDICINE

## 2020-03-05 PROCEDURE — 1036F TOBACCO NON-USER: CPT | Performed by: INTERNAL MEDICINE

## 2020-03-05 ASSESSMENT — ENCOUNTER SYMPTOMS
GASTROINTESTINAL NEGATIVE: 1
COUGH: 0
NAUSEA: 0
STRIDOR: 0
RESPIRATORY NEGATIVE: 1
EYES NEGATIVE: 1
SHORTNESS OF BREATH: 0
BLOOD IN STOOL: 0
WHEEZING: 0
CHEST TIGHTNESS: 0

## 2020-03-05 NOTE — PROGRESS NOTES
clubs or organizations: None     Relationship status: None    Intimate partner violence:     Fear of current or ex partner: None     Emotionally abused: None     Physically abused: None     Forced sexual activity: None   Other Topics Concern    None   Social History Narrative    None       Allergies   Allergen Reactions    Ciprofloxacin      Per patient request due to bad side effects     Metronidazole     Tetracycline        Current Outpatient Medications   Medication Sig Dispense Refill    metoprolol tartrate (LOPRESSOR) 25 MG tablet Take 1 tablet by mouth 2 times daily 60 tablet 3    docusate sodium (COLACE) 100 MG capsule Take 1 capsule by mouth 2 times daily 60 capsule 0    senna-docusate (PERICOLACE) 8.6-50 MG per tablet Take 2 tablets by mouth daily 60 tablet 1    furosemide (LASIX) 40 MG tablet Take 40 mg by mouth daily      tiZANidine (ZANAFLEX) 4 MG tablet Take 4 mg by mouth 2 times daily       amitriptyline (ELAVIL) 25 MG tablet Take 25 mg by mouth nightly      Menthol-Methyl Salicylate (ANALGESIC OINTMENT) OINT ointment Apply topically once      LORazepam (ATIVAN) 0.5 MG tablet Take 0.25 mg by mouth Daily with lunch.  PARoxetine (PAXIL-CR) 25 MG CR tablet Take 25 mg by mouth every morning.  potassium chloride SA (K-DUR;KLOR-CON M) 10 MEQ tablet Take 10 mEq by mouth 2 times daily.  atorvastatin (LIPITOR) 20 MG tablet Take 20 mg by mouth daily.  aspirin 81 MG tablet Take 81 mg by mouth daily.  Multiple Vitamins-Minerals (THERAPEUTIC MULTIVITAMIN-MINERALS) tablet Take 1 tablet by mouth daily. No current facility-administered medications for this visit. Review of Systems:   Review of Systems   Constitutional: Negative. Negative for diaphoresis and fatigue. HENT: Negative. Eyes: Negative. Respiratory: Negative. Negative for cough, chest tightness, shortness of breath, wheezing and stridor. Cardiovascular: Positive for leg swelling.  Negative for

## 2020-03-13 ENCOUNTER — TELEPHONE (OUTPATIENT)
Dept: CARDIOLOGY CLINIC | Age: 75
End: 2020-03-13

## 2020-03-13 RX ORDER — CARVEDILOL 6.25 MG/1
6.25 TABLET ORAL DAILY
Qty: 60 TABLET | Refills: 5 | Status: SHIPPED | OUTPATIENT
Start: 2020-03-13 | End: 2020-03-30

## 2020-03-13 NOTE — TELEPHONE ENCOUNTER
Patient wants you to know that she can not afford to go to physical therapy due to a weekly copay of $56. She feels badly because she would like to participate. She also states that her legs and feet are swollen since starting lopressor so she would like to know if you could change her to something else?

## 2020-03-30 RX ORDER — CARVEDILOL 6.25 MG/1
6.25 TABLET ORAL 2 TIMES DAILY
Qty: 60 TABLET | Refills: 5
Start: 2020-03-30 | End: 2020-10-28 | Stop reason: SDUPTHER

## 2020-04-03 ENCOUNTER — HOSPITAL ENCOUNTER (OUTPATIENT)
Dept: CARDIOLOGY | Age: 75
Discharge: HOME | End: 2020-04-03
Payer: MEDICARE

## 2020-04-03 PROCEDURE — 93296 REM INTERROG EVL PM/IDS: CPT

## 2020-07-07 ENCOUNTER — HOSPITAL ENCOUNTER (OUTPATIENT)
Dept: CARDIOLOGY | Age: 75
Discharge: HOME | End: 2020-07-07
Payer: MEDICARE

## 2020-07-07 PROCEDURE — 93280 PM DEVICE PROGR EVAL DUAL: CPT

## 2020-09-11 LAB
BILIRUBIN URINE: NEGATIVE
BLOOD, URINE: NEGATIVE
CLARITY: CLEAR
COLOR: YELLOW
GLUCOSE URINE: NEGATIVE MG/DL
KETONES, URINE: NEGATIVE MG/DL
LEUKOCYTE ESTERASE, URINE: ABNORMAL
NITRITE, URINE: NEGATIVE
PH UA: 5.5 (ref 5–9)
PROTEIN UA: NEGATIVE MG/DL
SPECIFIC GRAVITY UA: 1.01 (ref 1–1.03)
UROBILINOGEN, URINE: 0.2 E.U./DL

## 2020-10-06 ENCOUNTER — HOSPITAL ENCOUNTER (OUTPATIENT)
Dept: CARDIOLOGY | Age: 75
Discharge: HOME | End: 2020-10-06
Payer: MEDICARE

## 2020-10-06 PROCEDURE — 93296 REM INTERROG EVL PM/IDS: CPT

## 2020-10-28 RX ORDER — CARVEDILOL 6.25 MG/1
6.25 TABLET ORAL 2 TIMES DAILY
Qty: 60 TABLET | Refills: 11 | Status: SHIPPED | OUTPATIENT
Start: 2020-10-28 | End: 2021-10-21

## 2020-12-05 ENCOUNTER — APPOINTMENT (OUTPATIENT)
Dept: CT IMAGING | Age: 75
End: 2020-12-05
Payer: MEDICARE

## 2020-12-05 ENCOUNTER — HOSPITAL ENCOUNTER (EMERGENCY)
Age: 75
Discharge: HOME | End: 2020-12-05
Payer: MEDICARE

## 2020-12-05 VITALS
DIASTOLIC BLOOD PRESSURE: 95 MMHG | RESPIRATION RATE: 18 BRPM | OXYGEN SATURATION: 97 % | TEMPERATURE: 98.8 F | BODY MASS INDEX: 38.32 KG/M2 | HEART RATE: 84 BPM | HEIGHT: 65 IN | SYSTOLIC BLOOD PRESSURE: 160 MMHG | WEIGHT: 230 LBS

## 2020-12-05 PROCEDURE — 73700 CT LOWER EXTREMITY W/O DYE: CPT

## 2020-12-05 PROCEDURE — 99284 EMERGENCY DEPT VISIT MOD MDM: CPT

## 2020-12-05 PROCEDURE — 6370000000 HC RX 637 (ALT 250 FOR IP): Performed by: PHYSICIAN ASSISTANT

## 2020-12-05 RX ORDER — OXYCODONE HYDROCHLORIDE AND ACETAMINOPHEN 5; 325 MG/1; MG/1
1 TABLET ORAL ONCE
Status: COMPLETED | OUTPATIENT
Start: 2020-12-05 | End: 2020-12-05

## 2020-12-05 RX ORDER — OXYCODONE HYDROCHLORIDE AND ACETAMINOPHEN 5; 325 MG/1; MG/1
1 TABLET ORAL EVERY 8 HOURS PRN
Qty: 9 TABLET | Refills: 0 | Status: SHIPPED | OUTPATIENT
Start: 2020-12-05 | End: 2020-12-08

## 2020-12-05 RX ADMIN — OXYCODONE AND ACETAMINOPHEN 1 TABLET: 5; 325 TABLET ORAL at 13:28

## 2020-12-05 ASSESSMENT — PAIN DESCRIPTION - FREQUENCY: FREQUENCY: INTERMITTENT

## 2020-12-05 ASSESSMENT — ENCOUNTER SYMPTOMS
EYES NEGATIVE: 1
RESPIRATORY NEGATIVE: 1
GASTROINTESTINAL NEGATIVE: 1

## 2020-12-05 ASSESSMENT — PAIN DESCRIPTION - ORIENTATION: ORIENTATION: LEFT

## 2020-12-05 ASSESSMENT — PAIN DESCRIPTION - PAIN TYPE: TYPE: ACUTE PAIN

## 2020-12-05 ASSESSMENT — PAIN SCALES - GENERAL: PAINLEVEL_OUTOF10: 5

## 2020-12-05 ASSESSMENT — PAIN DESCRIPTION - DESCRIPTORS: DESCRIPTORS: SORE

## 2020-12-05 ASSESSMENT — PAIN DESCRIPTION - LOCATION: LOCATION: KNEE

## 2020-12-05 NOTE — ED TRIAGE NOTES
Patient states she fell last Monday. States she has pain under her left knee but only when she touches the area.

## 2020-12-05 NOTE — ED PROVIDER NOTES
[address] [facility] ED  eMERGENCY dEPARTMENT eNCOUnter      Pt Name: Carlee Brand  MRN: [id number]  [place] 1945  Date of evaluation: 12/5/2020  Provider: Shannen Johns PA-C      HISTORY OF PRESENT ILLNESS    Carlee Brand is a 77 y.o. female who presents to the Emergency Department with chief complaint of left knee pain. Patient states she fell 5 days ago at home. Patient states it happened so fast, but she denies any syncopal episode and states she possibly just lost her balance. Patient states her pain has continued and she has had some significant swelling. 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 Patient has seen Dr. Jonathan Carney in the past with orthopedics. She has taken occasional Tylenol for pain without relief. Patient denies any head or neck injury and has no other concerns at this time. REVIEW OF SYSTEMS       Review of Systems   Constitutional: Negative. HENT: Negative. Eyes: Negative. Respiratory: Negative. Cardiovascular: Negative. Gastrointestinal: Negative. Endocrine: Negative. Genitourinary: Negative. Musculoskeletal: Positive for arthralgias. Left knee   Skin: Negative. Neurological: Negative. Psychiatric/Behavioral: Negative.           PAST MEDICAL HISTORY     Past Medical History:   Diagnosis Date    Asthma     Degenerative disc disease, lumbar     Hypertension     Lymphedema     MRSA infection     UTI (lower urinary tract infection)          SURGICAL HISTORY       Past Surgical History:   Procedure Laterality Date    APPENDECTOMY      PACEMAKER PLACEMENT  02/05/2020    SALPINGO-OOPHORECTOMY      left side     SHOULDER SURGERY      TONSILLECTOMY      WRIST SURGERY           CURRENT MEDICATIONS       Previous Medications    AMITRIPTYLINE (ELAVIL) 25 MG TABLET    Take 25 mg by mouth nightly    ASPIRIN 81 MG TABLET    Take 81 mg by mouth daily.    ATORVASTATIN (LIPITOR) 20 MG TABLET    Take 20 mg by mouth daily. CARVEDILOL (COREG) 6.25 MG TABLET    Take 1 tablet by mouth 2 times daily    FUROSEMIDE (LASIX) 40 MG TABLET    Take 40 mg by mouth daily    LORAZEPAM (ATIVAN) 0.5 MG TABLET    Take 0.25 mg by mouth Daily with lunch. MENTHOL-METHYL SALICYLATE (ANALGESIC OINTMENT) OINT OINTMENT    Apply topically once    MULTIPLE VITAMINS-MINERALS (THERAPEUTIC MULTIVITAMIN-MINERALS) TABLET    Take 1 tablet by mouth daily. PAROXETINE (PAXIL-CR) 25 MG CR TABLET    Take 25 mg by mouth every morning. POTASSIUM CHLORIDE SA (K-DUR;KLOR-CON M) 10 MEQ TABLET    Take 10 mEq by mouth 2 times daily. SENNA-DOCUSATE (PERICOLACE) 8.6-50 MG PER TABLET    Take 2 tablets by mouth daily    TIZANIDINE (ZANAFLEX) 4 MG TABLET    Take 4 mg by mouth 2 times daily        ALLERGIES     Ciprofloxacin; Metronidazole; and Tetracycline    FAMILY HISTORY     History reviewed. No pertinent family history. SOCIAL HISTORY       Social History     Socioeconomic History    Marital status:       Spouse name: None    Number of children: None    Years of education: None    Highest education level: None   Occupational History    None   Social Needs    Financial resource strain: None    Food insecurity     Worry: None     Inability: None    Transportation needs     Medical: None     Non-medical: None   Tobacco Use    Smoking status: Never Smoker    Smokeless tobacco: Never Used   Substance and Sexual Activity    Alcohol use: Yes     Comment: Rarely    Drug use: No    Sexual activity: Never   Lifestyle    Physical activity     Days per week: None     Minutes per session: None    Stress: None   Relationships    Social connections     Talks on phone: None     Gets together: None     Attends Mandaeism service: None     Active member of club or organization: None     Attends meetings of clubs or organizations: None     Relationship status: None    Intimate partner violence     Fear of current or ex partner: None     Emotionally abused: None     Physically abused: None     Forced sexual activity: None   Other Topics Concern    None   Social History Narrative    None       SCREENINGS    Lawanda Coma Scale  Eye Opening: Spontaneous  Best Verbal Response: Oriented  Best Motor Response: Obeys commands  Lawanda Coma Scale Score: 15 @FLOW([id number])@      PHYSICAL EXAM    (up to 7 for level 4, 8 or more for level 5)     ED Triage Vitals [12/05/20 1145]   BP Temp Temp Source Pulse Resp SpO2 Height Weight   (!) 160/95 98.8 °F (37.1 °C) Oral 84 18 97 % 5' 5\" (1.651 m) 230 lb (104.3 kg)       Physical Exam  Constitutional:       General: She is not in acute distress. Appearance: She is well-developed. HENT:      Head: Normocephalic and atraumatic. Eyes:      Conjunctiva/sclera: Conjunctivae normal.      Pupils: Pupils are equal, round, and reactive to light. Neck:      Musculoskeletal: Normal range of motion and neck supple. Cardiovascular:      Rate and Rhythm: Normal rate and regular rhythm. Heart sounds: No murmur. Pulmonary:      Effort: No respiratory distress. Breath sounds: Normal breath sounds. No wheezing or rales. Abdominal:      General: There is no distension. Palpations: Abdomen is soft. Tenderness: There is no abdominal tenderness. Musculoskeletal: Normal range of motion. Left knee: She exhibits swelling. Tenderness found. Medial joint line and lateral joint line tenderness noted. Legs:       Comments: Significant tenderness over the tibial tuberosity. Skin:     General: Skin is warm and dry. Findings: No erythema or rash. Neurological:      Mental Status: She is alert and oriented to person, place, and time. Cranial Nerves: No cranial nerve deficit. Psychiatric:         Judgment: Judgment normal.           All other labs were within normal range or not returned as of this dictation.     EMERGENCY DEPARTMENT COURSE and DIFFERENTIALDIAGNOSIS/MDM:   Vitals:    Vitals:    12/05/20 1145   BP: (!) 160/95   Pulse: 84   Resp: 18   Temp: 98.8 °F (37.1 °C)   TempSrc: Oral   SpO2: 97%   Weight: 230 lb (104.3 kg)   Height: 5' 5\" (1.651 m)         NO ACUTE FRACTURE. JOINT EFFUSION on left knee ct. patient given Percocet for pain while in the emergency room. Patient will be kept on this medication for treatment at home. She is to follow-up with orthopedics for reevaluation and treatment. She has seen Dr. Matilda Aguillon in the past.  Return here if symptoms worsen or if new concerning symptoms arise. Patient states she has a Rollator and additional walking equipement to help her get around at home. Ace wrap applied for stabilization and comfort. Capillary refill less than 3 seconds after wrap application. PROCEDURES:  Unless otherwise noted below, none     Procedures      FINAL IMPRESSION      1.  Effusion of left knee          DISPOSITION/PLAN   DISPOSITION Decision To Discharge 12/05/2020 01:43:27 PM          Juliana Mendes PA-C (electronically signed)  Attending Emergency Physician  [facility]  12/05/20 0072

## 2020-12-05 NOTE — ED NOTES
Attempting to find patient a ride home. Unsuccessful at this time.       Alexx Armenta RN  12/05/20 0338

## 2020-12-05 NOTE — ED NOTES
Discharge instructions explained with verbalization of understanding.       Dannielle Beebe RN  12/05/20 [id number]

## 2020-12-05 NOTE — ED NOTES
Bed: 26  Expected date:   Expected time:   Means of arrival:   Comments:  75f FALL 6 DAYS AGO, LLE PAIN, AMBULATORY, A&OX3, 80PACED, 171/80,      Boone Jordan RN  12/05/20 1143

## 2020-12-05 NOTE — ED NOTES
Ladarius FINE notified of patients request for pain medication.       Julian Gasca RN  12/05/20 5685

## 2020-12-07 ENCOUNTER — VIRTUAL VISIT (OUTPATIENT)
Dept: CARDIOLOGY CLINIC | Age: 75
End: 2020-12-07
Payer: MEDICARE

## 2020-12-07 PROCEDURE — 1090F PRES/ABSN URINE INCON ASSESS: CPT | Performed by: INTERNAL MEDICINE

## 2020-12-07 PROCEDURE — 1123F ACP DISCUSS/DSCN MKR DOCD: CPT | Performed by: INTERNAL MEDICINE

## 2020-12-07 PROCEDURE — 4040F PNEUMOC VAC/ADMIN/RCVD: CPT | Performed by: INTERNAL MEDICINE

## 2020-12-07 PROCEDURE — G8400 PT W/DXA NO RESULTS DOC: HCPCS | Performed by: INTERNAL MEDICINE

## 2020-12-07 PROCEDURE — 99214 OFFICE O/P EST MOD 30 MIN: CPT | Performed by: INTERNAL MEDICINE

## 2020-12-07 PROCEDURE — 3017F COLORECTAL CA SCREEN DOC REV: CPT | Performed by: INTERNAL MEDICINE

## 2020-12-07 PROCEDURE — G8428 CUR MEDS NOT DOCUMENT: HCPCS | Performed by: INTERNAL MEDICINE

## 2020-12-07 ASSESSMENT — ENCOUNTER SYMPTOMS
NAUSEA: 0
WHEEZING: 0
EYES NEGATIVE: 1
RESPIRATORY NEGATIVE: 1
CHEST TIGHTNESS: 0
BLOOD IN STOOL: 0
SHORTNESS OF BREATH: 0
GASTROINTESTINAL NEGATIVE: 1
COUGH: 0
STRIDOR: 0

## 2020-12-07 NOTE — PROGRESS NOTES
Subsequent Progress Note  Patient: Mima Bhagat  YOB: 1945  MRN: [id number]    Chief Complaint:HB Tired. Edema  Chief Complaint   Patient presents with    Irregular Heart Beat       CV Data:  2//6/2020 PPM 2nd dgree Type II AVB   2/2020 Echo EF 60   2/2020 spect negative. Subjective/HPI: taking slaty food. Edema worse. No cp + salas. Waking some. No falls no bleed. 3/5/2020 no cp no sob no falls no bleed. Tired all the time. Leg swelling little worse. 12/7/2020 Patient and/or health care decision maker is aware that that he/she may receive a bill for this telephone service, depending on his insurance coverage, and has provided verbal consent to proceed. This visit was completed via telephone. Total time 14 minutes. Julian Todd onher knees wwent to ER. CT shows Left knee effuison   Still with SALAS and leg edema no worse. No cp walking little   No bleed  EKG:    Past Medical History:   Diagnosis Date    Asthma     Degenerative disc disease, lumbar     Hypertension     Lymphedema     MRSA infection     UTI (lower urinary tract infection)        Past Surgical History:   Procedure Laterality Date    APPENDECTOMY      PACEMAKER PLACEMENT  02/05/2020    SALPINGO-OOPHORECTOMY      left side     SHOULDER SURGERY      TONSILLECTOMY      WRIST SURGERY         No family history on file. Social History     Socioeconomic History    Marital status:       Spouse name: Not on file    Number of children: Not on file    Years of education: Not on file    Highest education level: Not on file   Occupational History    Not on file   Social Needs    Financial resource strain: Not on file    Food insecurity     Worry: Not on file     Inability: Not on file    Transportation needs     Medical: Not on file     Non-medical: Not on file   Tobacco Use    Smoking status: Never Smoker    Smokeless tobacco: Never Used   Substance and Sexual Activity    Alcohol use: Yes     Comment: Rarely mouth daily.  aspirin 81 MG tablet Take 81 mg by mouth daily.  Multiple Vitamins-Minerals (THERAPEUTIC MULTIVITAMIN-MINERALS) tablet Take 1 tablet by mouth daily. No current facility-administered medications for this visit. Review of Systems:   Review of Systems   Constitutional: Negative. Negative for diaphoresis and fatigue. HENT: Negative. Eyes: Negative. Respiratory: Negative. Negative for cough, chest tightness, shortness of breath, wheezing and stridor. Cardiovascular: Positive for leg swelling. Negative for chest pain and palpitations. Gastrointestinal: Negative. Negative for blood in stool and nausea. Genitourinary: Negative. Musculoskeletal: Positive for gait problem. Skin: Negative. Neurological: Positive for weakness. Negative for dizziness, syncope and light-headedness. Hematological: Negative. Psychiatric/Behavioral: Negative. Physical Examination:    There were no vitals taken for this visit.    Physical Exam       LABS:  CBC:   Lab Results   Component Value Date    WBC 6.0 09/11/2020    RBC 4.40 09/11/2020    HGB 13.3 09/11/2020    HCT 40.2 09/11/2020    MCV 91.5 09/11/2020    MCH 30.3 09/11/2020    MCHC 33.1 09/11/2020    RDW 13.8 09/11/2020     09/11/2020    MPV 8.5 05/26/2015     Lipids:  Lab Results   Component Value Date    CHOL 150 09/11/2020    CHOL 101 02/05/2020    CHOL 136 03/07/2019     Lab Results   Component Value Date    TRIG 53 09/11/2020    TRIG 83 02/05/2020    TRIG 59 03/07/2019     Lab Results   Component Value Date    HDL 72 (H) 09/11/2020    HDL 54 02/05/2020    HDL 69 (H) 03/07/2019     Lab Results   Component Value Date    LDLCALC 67 09/11/2020    LDLCALC 30 02/05/2020    LDLCALC 55 03/07/2019     No results found for: LABVLDL, VLDL  No results found for: CHOLHDLRATIO  CMP:    Lab Results   Component Value Date     09/11/2020    K 4.5 09/11/2020    K 3.1 12/27/2019     09/11/2020    CO2 31 09/11/2020    BUN 20 09/11/2020    CREATININE 0.67 09/11/2020    GFRAA >60.0 09/11/2020    LABGLOM >60.0 09/11/2020    GLUCOSE 107 09/11/2020    PROT 6.9 09/11/2020    LABALBU 4.0 09/11/2020    CALCIUM 8.9 09/11/2020    BILITOT 0.4 09/11/2020    ALKPHOS 85 09/11/2020    AST 18 09/11/2020    ALT 15 09/11/2020     BMP:    Lab Results   Component Value Date     09/11/2020    K 4.5 09/11/2020    K 3.1 12/27/2019     09/11/2020    CO2 31 09/11/2020    BUN 20 09/11/2020    LABALBU 4.0 09/11/2020    CREATININE 0.67 09/11/2020    CALCIUM 8.9 09/11/2020    GFRAA >60.0 09/11/2020    LABGLOM >60.0 09/11/2020    GLUCOSE 107 09/11/2020     Magnesium:    Lab Results   Component Value Date    MG 2.1 02/05/2020     TSH:  Lab Results   Component Value Date    TSH 1.960 09/11/2020       Patient Active Problem List   Diagnosis    Abscess or cellulitis of wrist    MRSA (methicillin resistant Staphylococcus aureus) infection    Orthostasis    Anterior dislocation of left shoulder    Glenoid fracture of shoulder, left, closed, initial encounter    Symptomatic bradycardia    Heart block    Lymphedema    SALAS (dyspnea on exertion)       There are no discontinued medications. Modified Medications    No medications on file       No orders of the defined types were placed in this encounter. Assessment/Plan:    1. Symptomatic bradycardia   PPM placed     2. Heart block    3. Lymphedema   Enroll Lymphedema clinic - need to go to clinic. Low salt diet. Walk     4. SALAS (dyspnea on exertion) stress negative. 5. Left Knee effusion- refer to Ortho    We will see pt in few weeks. Counseling:  Heart Healthy Lifestyle, Improve BMI, Low Salt Diet, Volume Restriction 1500cc per day, Take Precautions to Prevent Falls and Walk Daily    Return in about 1 month (around 1/7/2021).       Electronically signed by Jeramy Moran MD on 12/7/2020 at 2:26 PM

## 2021-01-05 ENCOUNTER — HOSPITAL ENCOUNTER (OUTPATIENT)
Dept: CARDIOLOGY | Age: 76
Discharge: HOME | End: 2021-01-05
Payer: MEDICARE

## 2021-01-05 PROCEDURE — 93296 REM INTERROG EVL PM/IDS: CPT

## 2021-04-05 ENCOUNTER — HOSPITAL ENCOUNTER (OUTPATIENT)
Dept: CARDIOLOGY | Age: 76
Discharge: HOME | End: 2021-04-05
Payer: MEDICARE

## 2021-04-05 PROCEDURE — 93296 REM INTERROG EVL PM/IDS: CPT

## 2021-04-22 ENCOUNTER — HOSPITAL ENCOUNTER (EMERGENCY)
Age: 76
Discharge: HOME | End: 2021-04-22
Payer: MEDICARE

## 2021-04-22 VITALS
DIASTOLIC BLOOD PRESSURE: 80 MMHG | HEART RATE: 81 BPM | SYSTOLIC BLOOD PRESSURE: 153 MMHG | WEIGHT: 230 LBS | OXYGEN SATURATION: 100 % | BODY MASS INDEX: 38.32 KG/M2 | TEMPERATURE: 98.7 F | RESPIRATION RATE: 20 BRPM | HEIGHT: 65 IN

## 2021-04-22 DIAGNOSIS — N30.01 ACUTE CYSTITIS WITH HEMATURIA: Primary | ICD-10-CM

## 2021-04-22 LAB
ALBUMIN SERPL-MCNC: 4.3 G/DL (ref 3.5–4.6)
ALP BLD-CCNC: 80 U/L (ref 40–130)
ALT SERPL-CCNC: 11 U/L (ref 0–33)
ANION GAP SERPL CALCULATED.3IONS-SCNC: 6 MEQ/L (ref 9–15)
AST SERPL-CCNC: 16 U/L (ref 0–35)
BACTERIA: ABNORMAL /HPF
BASOPHILS ABSOLUTE: 0 K/UL (ref 0–0.2)
BASOPHILS RELATIVE PERCENT: 0.3 %
BILIRUB SERPL-MCNC: 0.4 MG/DL (ref 0.2–0.7)
BILIRUBIN URINE: NEGATIVE
BLOOD, URINE: ABNORMAL
BUN BLDV-MCNC: 20 MG/DL (ref 8–23)
CALCIUM SERPL-MCNC: 9.4 MG/DL (ref 8.5–9.9)
CHLORIDE BLD-SCNC: 96 MEQ/L (ref 95–107)
CLARITY: CLEAR
CO2: 34 MEQ/L (ref 20–31)
COLOR: YELLOW
CREAT SERPL-MCNC: 0.67 MG/DL (ref 0.5–0.9)
EOSINOPHILS ABSOLUTE: 0.1 K/UL (ref 0–0.7)
EOSINOPHILS RELATIVE PERCENT: 1.4 %
EPITHELIAL CELLS, UA: ABNORMAL /HPF (ref 0–5)
GFR AFRICAN AMERICAN: >60
GFR NON-AFRICAN AMERICAN: >60
GLOBULIN: 2.9 G/DL (ref 2.3–3.5)
GLUCOSE BLD-MCNC: 77 MG/DL (ref 70–99)
GLUCOSE URINE: NEGATIVE MG/DL
HCT VFR BLD CALC: 41.1 % (ref 37–47)
HEMOGLOBIN: 13.9 G/DL (ref 12–16)
HYALINE CASTS: ABNORMAL /HPF (ref 0–5)
KETONES, URINE: NEGATIVE MG/DL
LEUKOCYTE ESTERASE, URINE: ABNORMAL
LYMPHOCYTES ABSOLUTE: 2.6 K/UL (ref 1–4.8)
LYMPHOCYTES RELATIVE PERCENT: 28.7 %
MCH RBC QN AUTO: 31 PG (ref 27–31.3)
MCHC RBC AUTO-ENTMCNC: 33.8 % (ref 33–37)
MCV RBC AUTO: 91.7 FL (ref 82–100)
MONOCYTES ABSOLUTE: 0.7 K/UL (ref 0.2–0.8)
MONOCYTES RELATIVE PERCENT: 8 %
NEUTROPHILS ABSOLUTE: 5.7 K/UL (ref 1.4–6.5)
NEUTROPHILS RELATIVE PERCENT: 61.6 %
NITRITE, URINE: NEGATIVE
PDW BLD-RTO: 13.9 % (ref 11.5–14.5)
PH UA: 6 (ref 5–9)
PLATELET # BLD: 370 K/UL (ref 130–400)
POTASSIUM SERPL-SCNC: 4.4 MEQ/L (ref 3.4–4.9)
PROTEIN UA: NEGATIVE MG/DL
RBC # BLD: 4.48 M/UL (ref 4.2–5.4)
RBC UA: ABNORMAL /HPF (ref 0–5)
SODIUM BLD-SCNC: 136 MEQ/L (ref 135–144)
SPECIFIC GRAVITY UA: 1.01 (ref 1–1.03)
TOTAL PROTEIN: 7.2 G/DL (ref 6.3–8)
URINE REFLEX TO CULTURE: YES
UROBILINOGEN, URINE: 0.2 E.U./DL
WBC # BLD: 9.2 K/UL (ref 4.8–10.8)
WBC UA: ABNORMAL /HPF (ref 0–5)

## 2021-04-22 PROCEDURE — 87086 URINE CULTURE/COLONY COUNT: CPT

## 2021-04-22 PROCEDURE — 87186 SC STD MICRODIL/AGAR DIL: CPT

## 2021-04-22 PROCEDURE — 2580000003 HC RX 258: Performed by: PHYSICIAN ASSISTANT

## 2021-04-22 PROCEDURE — 80053 COMPREHEN METABOLIC PANEL: CPT

## 2021-04-22 PROCEDURE — 99285 EMERGENCY DEPT VISIT HI MDM: CPT

## 2021-04-22 PROCEDURE — 36415 COLL VENOUS BLD VENIPUNCTURE: CPT

## 2021-04-22 PROCEDURE — 85025 COMPLETE CBC W/AUTO DIFF WBC: CPT

## 2021-04-22 PROCEDURE — 6370000000 HC RX 637 (ALT 250 FOR IP): Performed by: PHYSICIAN ASSISTANT

## 2021-04-22 PROCEDURE — 87077 CULTURE AEROBIC IDENTIFY: CPT

## 2021-04-22 PROCEDURE — 81001 URINALYSIS AUTO W/SCOPE: CPT

## 2021-04-22 RX ORDER — PHENAZOPYRIDINE HYDROCHLORIDE 100 MG/1
100 TABLET, FILM COATED ORAL 3 TIMES DAILY PRN
Qty: 9 TABLET | Refills: 0 | Status: SHIPPED | OUTPATIENT
Start: 2021-04-22 | End: 2021-04-25

## 2021-04-22 RX ORDER — SULFAMETHOXAZOLE AND TRIMETHOPRIM 800; 160 MG/1; MG/1
1 TABLET ORAL ONCE
Status: COMPLETED | OUTPATIENT
Start: 2021-04-22 | End: 2021-04-22

## 2021-04-22 RX ORDER — 0.9 % SODIUM CHLORIDE 0.9 %
1000 INTRAVENOUS SOLUTION INTRAVENOUS ONCE
Status: COMPLETED | OUTPATIENT
Start: 2021-04-22 | End: 2021-04-22

## 2021-04-22 RX ORDER — SULFAMETHOXAZOLE AND TRIMETHOPRIM 800; 160 MG/1; MG/1
1 TABLET ORAL 2 TIMES DAILY
Qty: 14 TABLET | Refills: 0 | Status: SHIPPED | OUTPATIENT
Start: 2021-04-22 | End: 2021-04-29

## 2021-04-22 RX ADMIN — SODIUM CHLORIDE 1000 ML: 9 INJECTION, SOLUTION INTRAVENOUS at 15:41

## 2021-04-22 RX ADMIN — SULFAMETHOXAZOLE AND TRIMETHOPRIM 1 TABLET: 800; 160 TABLET ORAL at 17:32

## 2021-04-22 ASSESSMENT — ENCOUNTER SYMPTOMS
RHINORRHEA: 0
ABDOMINAL PAIN: 0
EYE DISCHARGE: 0
COLOR CHANGE: 0
SORE THROAT: 0
CONSTIPATION: 0
ABDOMINAL DISTENTION: 0
SHORTNESS OF BREATH: 0

## 2021-04-22 ASSESSMENT — PAIN DESCRIPTION - DESCRIPTORS: DESCRIPTORS: ACHING

## 2021-04-22 ASSESSMENT — PAIN DESCRIPTION - FREQUENCY: FREQUENCY: CONTINUOUS

## 2021-04-22 NOTE — ED PROVIDER NOTES
[facility] ED  eMERGENCY dEPARTMENT eNCOUnter      Pt Name: Edgar King  MRN: [id number]  [place] 1945  Date of evaluation: 4/22/2021  Provider: Shadia Warner PA-C    CHIEF COMPLAINT       Chief Complaint   Patient presents with    Fatigue    Hematuria         HISTORY OF PRESENT ILLNESS   (Location/Symptom, Timing/Onset,Context/Setting, Quality, Duration, Modifying Factors, Severity)  Note limiting factors. Edgar King is a 77 y.o. female who presents to the emergency department complaint of increasing urinary frequency, fatigue, and hematuria, which she states she noticed this morning when she urinated. No fevers, no nausea vomiting, no diarrhea. Patient denies any abdominal pain. Patient states she does get frequent urinary tract infections. She has been eating and drinking as normal.  Past medical history significant for asthma, hypertension, lymphedema, urinary tract infections. HPI    NursingNotes were reviewed. REVIEW OF SYSTEMS    (2-9 systems for level 4, 10 or more for level 5)     Review of Systems   Constitutional: Positive for fatigue. Negative for activity change and appetite change. HENT: Negative for congestion, ear discharge, ear pain, nosebleeds, rhinorrhea and sore throat. Eyes: Negative for discharge. Respiratory: Negative for shortness of breath. Cardiovascular: Negative for chest pain, palpitations and leg swelling. Gastrointestinal: Negative for abdominal distention, abdominal pain and constipation. Genitourinary: Positive for frequency, hematuria and urgency. Negative for decreased urine volume, difficulty urinating, dysuria, vaginal bleeding, vaginal discharge and vaginal pain. Musculoskeletal: Negative for arthralgias. Skin: Negative for color change. Neurological: Negative for dizziness, syncope, numbness and headaches. Psychiatric/Behavioral: Negative for agitation and confusion.        Except as noted above the remainder of the review of systems was reviewed and negative. PAST MEDICAL HISTORY     Past Medical History:   Diagnosis Date    Asthma     Degenerative disc disease, lumbar     Hypertension     Lymphedema     MRSA infection     UTI (lower urinary tract infection)          SURGICALHISTORY       Past Surgical History:   Procedure Laterality Date    APPENDECTOMY      PACEMAKER PLACEMENT  02/05/2020    SALPINGO-OOPHORECTOMY      left side     SHOULDER SURGERY      TONSILLECTOMY      WRIST SURGERY           CURRENT MEDICATIONS       Previous Medications    AMITRIPTYLINE (ELAVIL) 25 MG TABLET    Take 25 mg by mouth nightly    ASPIRIN 81 MG TABLET    Take 81 mg by mouth daily. ATORVASTATIN (LIPITOR) 20 MG TABLET    Take 20 mg by mouth daily. CARVEDILOL (COREG) 6.25 MG TABLET    Take 1 tablet by mouth 2 times daily    FUROSEMIDE (LASIX) 40 MG TABLET    Take 40 mg by mouth daily    LORAZEPAM (ATIVAN) 0.5 MG TABLET    Take 0.25 mg by mouth Daily with lunch. MENTHOL-METHYL SALICYLATE (ANALGESIC OINTMENT) OINT OINTMENT    Apply topically once    MULTIPLE VITAMINS-MINERALS (THERAPEUTIC MULTIVITAMIN-MINERALS) TABLET    Take 1 tablet by mouth daily. PAROXETINE (PAXIL-CR) 25 MG CR TABLET    Take 25 mg by mouth every morning. POTASSIUM CHLORIDE SA (K-DUR;KLOR-CON M) 10 MEQ TABLET    Take 10 mEq by mouth 2 times daily. SENNA-DOCUSATE (PERICOLACE) 8.6-50 MG PER TABLET    Take 2 tablets by mouth daily    TIZANIDINE (ZANAFLEX) 4 MG TABLET    Take 4 mg by mouth 2 times daily        ALLERGIES     Ciprofloxacin, Metronidazole, and Tetracycline    FAMILY HISTORY     No family history on file. SOCIAL HISTORY       Social History     Socioeconomic History    Marital status:       Spouse name: Not on file    Number of children: Not on file    Years of education: Not on file    Highest education level: Not on file   Occupational History    Not on file   Social Needs    Financial resource strain: Not on file    Food insecurity     Worry: Not on file     Inability: Not on file    Transportation needs     Medical: Not on file     Non-medical: Not on file   Tobacco Use    Smoking status: Never Smoker    Smokeless tobacco: Never Used   Substance and Sexual Activity    Alcohol use: Yes     Comment: Rarely    Drug use: No    Sexual activity: Never   Lifestyle    Physical activity     Days per week: Not on file     Minutes per session: Not on file    Stress: Not on file   Relationships    Social connections     Talks on phone: Not on file     Gets together: Not on file     Attends Confucianism service: Not on file     Active member of club or organization: Not on file     Attends meetings of clubs or organizations: Not on file     Relationship status: Not on file    Intimate partner violence     Fear of current or ex partner: Not on file     Emotionally abused: Not on file     Physically abused: Not on file     Forced sexual activity: Not on file   Other Topics Concern    Not on file   Social History Narrative    Not on file       SCREENINGS    [place] Coma Scale  Eye Opening: Spontaneous  Best Verbal Response: Oriented  Best Motor Response: Obeys commands  [place] Coma Scale Score: 15 @FLOW([id number])@      PHYSICAL EXAM    (up to 7 for level 4, 8 or more for level 5)     ED Triage Vitals   BP Temp Temp src Pulse Resp SpO2 Height Weight   -- -- -- -- -- -- -- --       Physical Exam  Vitals signs and nursing note reviewed. Constitutional:       General: She is not in acute distress. Appearance: Normal appearance. She is well-developed. She is not ill-appearing, toxic-appearing or diaphoretic. HENT:      Head: Normocephalic. Nose: Nose normal. No congestion. Mouth/Throat:      Mouth: Mucous membranes are moist.      Pharynx: No oropharyngeal exudate or posterior oropharyngeal erythema. Eyes:      Extraocular Movements: Extraocular movements intact.       Conjunctiva/sclera: Conjunctivae normal.      Pupils: Pupils are equal, round, and reactive to light. Neck:      Musculoskeletal: Normal range of motion and neck supple. No neck rigidity. Vascular: No JVD. Trachea: No tracheal deviation. Cardiovascular:      Rate and Rhythm: Normal rate. Pulses: Normal pulses. Heart sounds: Normal heart sounds. No murmur. No friction rub. No gallop. Pulmonary:      Effort: Pulmonary effort is normal. No tachypnea, accessory muscle usage, respiratory distress or retractions. Breath sounds: No stridor. No wheezing, rhonchi or rales. Chest:      Chest wall: No tenderness. Abdominal:      General: Abdomen is flat. Bowel sounds are normal. There is no distension or abdominal bruit. Palpations: Abdomen is soft. There is no shifting dullness, fluid wave, hepatomegaly, splenomegaly, mass or pulsatile mass. Tenderness: There is no abdominal tenderness. There is no right CVA tenderness, left CVA tenderness, guarding or rebound. Negative signs include Snyder's sign, Rovsing's sign and McBurney's sign. Comments: Abdomen soft nondistended nontender no guarding mass or rebound, no CVA tenderness. Musculoskeletal: Normal range of motion. General: No deformity. Comments: Chronic swelling to lower extremity secondary to lymphedema. Skin:     General: Skin is warm and dry. Capillary Refill: Capillary refill takes less than 2 seconds. Coloration: Skin is not jaundiced. Neurological:      General: No focal deficit present. Mental Status: She is alert and oriented to person, place, and time. Mental status is at baseline. Cranial Nerves: No cranial nerve deficit. Sensory: No sensory deficit. Motor: No weakness.       Coordination: Coordination normal.   Psychiatric:         Mood and Affect: Mood normal.         DIAGNOSTIC RESULTS     EKG: All EKG's are interpreted by the Emergency Department Physician who either signs or

## 2021-04-25 LAB
ORGANISM: ABNORMAL
URINE CULTURE, ROUTINE: ABNORMAL

## 2021-06-04 ENCOUNTER — OFFICE VISIT (OUTPATIENT)
Dept: UROLOGY | Age: 76
End: 2021-06-04

## 2021-06-04 VITALS
HEART RATE: 80 BPM | WEIGHT: 245 LBS | BODY MASS INDEX: 40.82 KG/M2 | SYSTOLIC BLOOD PRESSURE: 138 MMHG | DIASTOLIC BLOOD PRESSURE: 82 MMHG | HEIGHT: 65 IN

## 2021-06-04 DIAGNOSIS — R33.9 RETENTION OF URINE: ICD-10-CM

## 2021-06-04 DIAGNOSIS — N39.0 RECURRENT UTI: Primary | ICD-10-CM

## 2021-06-04 DIAGNOSIS — N39.0 RECURRENT UTI: ICD-10-CM

## 2021-06-04 LAB
BILIRUBIN, POC: ABNORMAL
BLOOD URINE, POC: ABNORMAL
CLARITY, POC: CLEAR
COLOR, POC: YELLOW
GLUCOSE URINE, POC: ABNORMAL
KETONES, POC: ABNORMAL
LEUKOCYTE EST, POC: ABNORMAL
NITRITE, POC: ABNORMAL
PH, POC: 6.5
POST VOID RESIDUAL (PVR): 35 ML
PROTEIN, POC: ABNORMAL
SPECIFIC GRAVITY, POC: 1.01
UROBILINOGEN, POC: 0.2

## 2021-06-04 PROCEDURE — G8427 DOCREV CUR MEDS BY ELIG CLIN: HCPCS | Performed by: UROLOGY

## 2021-06-04 PROCEDURE — 81003 URINALYSIS AUTO W/O SCOPE: CPT | Performed by: UROLOGY

## 2021-06-04 PROCEDURE — 99203 OFFICE O/P NEW LOW 30 MIN: CPT | Performed by: UROLOGY

## 2021-06-04 PROCEDURE — 1123F ACP DISCUSS/DSCN MKR DOCD: CPT | Performed by: UROLOGY

## 2021-06-04 PROCEDURE — 1036F TOBACCO NON-USER: CPT | Performed by: UROLOGY

## 2021-06-04 PROCEDURE — 4040F PNEUMOC VAC/ADMIN/RCVD: CPT | Performed by: UROLOGY

## 2021-06-04 PROCEDURE — G8400 PT W/DXA NO RESULTS DOC: HCPCS | Performed by: UROLOGY

## 2021-06-04 PROCEDURE — G8417 CALC BMI ABV UP PARAM F/U: HCPCS | Performed by: UROLOGY

## 2021-06-04 PROCEDURE — 1090F PRES/ABSN URINE INCON ASSESS: CPT | Performed by: UROLOGY

## 2021-06-04 PROCEDURE — 3017F COLORECTAL CA SCREEN DOC REV: CPT | Performed by: UROLOGY

## 2021-06-04 PROCEDURE — 51798 US URINE CAPACITY MEASURE: CPT | Performed by: UROLOGY

## 2021-06-04 RX ORDER — POTASSIUM CHLORIDE 750 MG/1
TABLET, FILM COATED, EXTENDED RELEASE ORAL
COMMUNITY
Start: 2021-05-28 | End: 2021-12-01 | Stop reason: ALTCHOICE

## 2021-06-04 NOTE — PROGRESS NOTES
[facility] EVALUATION NOTE                                                 H&P                                                                                                                                                 Reason for Visit  Hemorrhagic cystitis secondary to UTI    History of Present Illness  Patient was recently admitted to the emergency room for dysuria frequency and hematuria  Urine culture was positive for E. Coli  Was treated with appropriate p.o. 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 No change in voiding symptoms recently  History of lymphedema since childhood  Past Medical History:   Diagnosis Date    Asthma     Degenerative disc disease, lumbar     Hypertension     Lymphedema     MRSA infection     UTI (lower urinary tract infection)      Past Surgical History:   Procedure Laterality Date    APPENDECTOMY      PACEMAKER PLACEMENT  02/05/2020    SALPINGO-OOPHORECTOMY      left side     SHOULDER SURGERY      TONSILLECTOMY      WRIST SURGERY       Social History     Socioeconomic History    Marital status:       Spouse name: None    Number of children: None    Years of education: None    Highest education level: None   Occupational History    None   Tobacco Use    Smoking status: Never Smoker    Smokeless tobacco: Never Used   Vaping Use    Vaping Use: Never used   Substance and Sexual Activity    Alcohol use: Yes     Comment: Rarely    Drug use: No    Sexual activity: Never   Other Topics Concern    None   Social History Narrative    None     Social Determinants of Health     Financial Resource Strain:     Difficulty of Paying Living Expenses:    Food Insecurity:     Worried About Running Out of Food in the Last Year:     [address] in the Last Year:    Transportation Needs:     Lack of Transportation (Medical):  Lack of Transportation (Non-Medical):    Physical Activity:     Days of Exercise per Week:     Minutes of Exercise per Session:    Stress:     Feeling of Stress :    Social Connections:     Frequency of Communication with Friends and Family:     Frequency of Social Gatherings with Friends and Family:     Attends [employer] Services:     Active Member of Clubs or Organizations:     Attends Club or Organization Meetings:     Marital Status:    Intimate Partner Violence:     Fear of Current or Ex-Partner:     Emotionally Abused:     Physically Abused:     Sexually Abused:      History reviewed. No pertinent family history. Current Outpatient Medications   Medication Sig Dispense Refill    potassium chloride (KLOR-CON) 10 MEQ extended release tablet TAKE 1 TABLET BY MOUTH TWICE DAILY      Glucosamine-Chondroitin (OSTEO BI-FLEX REGULAR STRENGTH PO) Take by mouth      carvedilol (COREG) 6.25 MG tablet Take 1 tablet by mouth 2 times daily 60 tablet 11    furosemide (LASIX) 40 MG tablet Take 40 mg by mouth daily      tiZANidine (ZANAFLEX) 4 MG tablet Take 4 mg by mouth 2 times daily       amitriptyline (ELAVIL) 25 MG tablet Take 25 mg by mouth nightly      Menthol-Methyl Salicylate (ANALGESIC OINTMENT) OINT ointment Apply topically once      LORazepam (ATIVAN) 0.5 MG tablet Take 0.25 mg by mouth Daily with lunch.  PARoxetine (PAXIL-CR) 25 MG CR tablet Take 25 mg by mouth every morning.  potassium chloride SA (K-DUR;KLOR-CON M) 10 MEQ tablet Take 10 mEq by mouth 2 times daily.  atorvastatin (LIPITOR) 20 MG tablet Take 20 mg by mouth daily.  aspirin 81 MG tablet Take 81 mg by mouth daily.       Multiple Vitamins-Minerals (THERAPEUTIC MULTIVITAMIN-MINERALS) tablet Take 1 tablet by mouth daily.  senna-docusate (PERICOLACE) 8.6-50 MG per tablet Take 2 tablets by mouth daily (Patient not taking: Reported on 6/4/2021) 60 tablet 1     No current facility-administered medications for this visit. Ciprofloxacin, Metronidazole, and Tetracycline  All reviewed and verified by Dr Mario Wallis on today's visit    No results found for: PSA, PSADIA  Results for POC orders placed in visit on 06/04/21   POCT Urinalysis No Micro (Auto)   Result Value Ref Range    Color, UA yellow     Clarity, UA clear     Glucose, UA POC neg     Bilirubin, UA neg     Ketones, UA neg     Spec Grav, UA 1.015     Blood, UA POC trace-intact     pH, UA 6.5     Protein, UA POC neg     Urobilinogen, UA 0.2     Leukocytes, UA neg     Nitrite, UA neg    poct post void residual   Result Value Ref Range    post void residual 35 ml    Narrative    A point of care test   Post Void Residual was completed by performing  ultrasound scan of the bladder and  reviewed by Dr Mario Wallis       Physical Exam  Vitals:    06/04/21 1329   BP: 138/82   Pulse: 80   Weight: 245 lb (111.1 kg)   Height: 5' 5\" (1.651 m)     Constitutional: Not in distress. Cardiovascular: Normal rate, BP reviewed. Normal  Pulmonary/Chest: Normal respiratory effort not short of breath  Abdominal: Not distended. Severe lymphedema lower extremity  Urologic Exam  Urinalysis trace of blood no further inflammatory cells no evidence of infection  Postvoid residual 35 cc  CT scan reviewed patient has a grade 1 cystocele by CT. Musculoskeletal: Patient is nonambulatory due to knee arthritis. Extremities: Lower extremity edema 4+ history of lymphedema  Neurological: Intact  Lymphatics: Lymphedema  Psychiatric: Normal affect.   Assessment/Medical Necessity-Decision Making  History of UTI which is cleared up urinalysis clear  History of recurrent UTIs  Plan  Patient will drop off urine culture samples in case she feels like she is developing another infection  No further work-up planned at this time  Greater than 50% of 35 minutes spent consulting patient face-to-face  Orders Placed This Encounter   Procedures    POCT Urinalysis No Micro (Auto)    poct post void residual     No orders of the defined types were placed in this encounter. Patel Hoffman MD       Please note this report has been partially produced using speech recognition software  And may cause contain errors related to that system including grammar, punctuation and spelling as well as words and phrases that may seem inappropriate. If there are questions or concerns please feel free to contact me to clarify.

## 2021-06-06 LAB — URINE CULTURE, ROUTINE: NORMAL

## 2021-06-14 ENCOUNTER — HOSPITAL ENCOUNTER (EMERGENCY)
Age: 76
Discharge: HOME | End: 2021-06-14
Payer: MEDICARE

## 2021-06-14 VITALS
BODY MASS INDEX: 40.44 KG/M2 | HEART RATE: 69 BPM | RESPIRATION RATE: 16 BRPM | WEIGHT: 243 LBS | OXYGEN SATURATION: 95 % | TEMPERATURE: 98.8 F | SYSTOLIC BLOOD PRESSURE: 155 MMHG | DIASTOLIC BLOOD PRESSURE: 85 MMHG

## 2021-06-14 DIAGNOSIS — E87.6 HYPOKALEMIA: ICD-10-CM

## 2021-06-14 DIAGNOSIS — M54.32 SCIATICA OF LEFT SIDE: Primary | ICD-10-CM

## 2021-06-14 LAB — MAGNESIUM: 1.8 MG/DL (ref 1.7–2.4)

## 2021-06-14 PROCEDURE — 36415 COLL VENOUS BLD VENIPUNCTURE: CPT

## 2021-06-14 PROCEDURE — 6370000000 HC RX 637 (ALT 250 FOR IP): Performed by: NURSE PRACTITIONER

## 2021-06-14 PROCEDURE — 83735 ASSAY OF MAGNESIUM: CPT

## 2021-06-14 PROCEDURE — 99284 EMERGENCY DEPT VISIT MOD MDM: CPT

## 2021-06-14 RX ORDER — IBUPROFEN 800 MG/1
800 TABLET ORAL EVERY 8 HOURS PRN
Qty: 20 TABLET | Refills: 0 | Status: SHIPPED | OUTPATIENT
Start: 2021-06-14 | End: 2021-12-01

## 2021-06-14 RX ORDER — CHLORZOXAZONE 500 MG/1
500 TABLET ORAL 4 TIMES DAILY PRN
Qty: 20 TABLET | Refills: 0 | Status: SHIPPED | OUTPATIENT
Start: 2021-06-14 | End: 2021-06-24

## 2021-06-14 RX ADMIN — POTASSIUM BICARBONATE 40 MEQ: 782 TABLET, EFFERVESCENT ORAL at 15:21

## 2021-06-14 ASSESSMENT — ENCOUNTER SYMPTOMS
DIARRHEA: 0
NAUSEA: 0
WHEEZING: 0
ABDOMINAL PAIN: 0
BACK PAIN: 1
COUGH: 0
VOMITING: 0
SHORTNESS OF BREATH: 0

## 2021-06-14 ASSESSMENT — PAIN DESCRIPTION - PAIN TYPE: TYPE: ACUTE PAIN

## 2021-06-14 ASSESSMENT — PAIN DESCRIPTION - LOCATION: LOCATION: BUTTOCKS

## 2021-06-14 ASSESSMENT — PAIN DESCRIPTION - ORIENTATION: ORIENTATION: LEFT

## 2021-06-14 ASSESSMENT — PAIN SCALES - GENERAL: PAINLEVEL_OUTOF10: 2

## 2021-06-14 ASSESSMENT — PAIN DESCRIPTION - FREQUENCY: FREQUENCY: CONTINUOUS

## 2021-06-14 ASSESSMENT — PAIN DESCRIPTION - DESCRIPTORS: DESCRIPTORS: ACHING

## 2021-06-14 NOTE — ED TRIAGE NOTES
Pt c/o intermittent spasms in her left buttocks that started lastnight, Pt is A&OX3, calm, ambulatory with a cane, afebrile, breathes are equal and unlabored, sensation and movement intact in LLE

## 2021-06-14 NOTE — ED PROVIDER NOTES
[address] [facility] ED  eMERGENCY dEPARTMENT eNCOUnter      Pt Name: Arline Davis  MRN: [id number]  [place] 1945  Date of evaluation: 6/14/2021  Provider: LEIGH ANN Torres CNP      HISTORY OF PRESENT ILLNESS    Arline Davis is a 77 y.o. female who presents to the Emergency Department with pain in the L buttock that started last night. Patient states the pain would come and go. Pain stopped around 5 AM , now she states there is just a dull pain and mostly with movement. Denies any recent injury or trauma. Denies saddle anesthesia, foot drop or incontinence of bowel or bladder. Denies any SOB, CP, increasing leg swelling. REVIEW OF SYSTEMS       Review of Systems   Constitutional: Negative for activity change, appetite change and fever. HENT: Negative for congestion. Respiratory: Negative for cough, shortness of breath and wheezing. Cardiovascular: Negative for chest pain. Gastrointestinal: Negative for abdominal pain, diarrhea, nausea and vomiting. Genitourinary: Negative for dysuria. Musculoskeletal: Positive for back pain. Negative for arthralgias and neck pain. Skin: Negative for rash. Neurological: Negative for dizziness and headaches. All other systems reviewed and are negative. PAST MEDICAL HISTORY     Past Medical History:   Diagnosis Date    Asthma     Degenerative disc disease, lumbar     Hypertension     Lymphedema     MRSA infection     UTI (lower urinary tract infection)          SURGICAL HISTORY       Past Surgical History:   Procedure Laterality Date    APPENDECTOMY      PACEMAKER PLACEMENT  02/05/2020    SALPINGO-OOPHORECTOMY      left side     SHOULDER SURGERY      TONSILLECTOMY      WRIST SURGERY           CURRENT MEDICATIONS       Previous Medications    AMITRIPTYLINE (ELAVIL) 25 MG TABLET    Take 25 mg by mouth nightly    ASPIRIN 81 MG TABLET    Take 81 mg by mouth daily.     ATORVASTATIN (LIPITOR) 20 MG TABLET    Take 20 mg by mouth daily. CARVEDILOL (COREG) 6.25 MG TABLET    Take 1 tablet by mouth 2 times daily    FUROSEMIDE (LASIX) 40 MG TABLET    Take 40 mg by mouth daily    GLUCOSAMINE-CHONDROITIN (OSTEO BI-FLEX REGULAR STRENGTH PO)    Take by mouth    LORAZEPAM (ATIVAN) 0.5 MG TABLET    Take 0.25 mg by mouth Daily with lunch. MENTHOL-METHYL SALICYLATE (ANALGESIC OINTMENT) OINT OINTMENT    Apply topically once    MULTIPLE VITAMINS-MINERALS (THERAPEUTIC MULTIVITAMIN-MINERALS) TABLET    Take 1 tablet by mouth daily. PAROXETINE (PAXIL-CR) 25 MG CR TABLET    Take 25 mg by mouth every morning. POTASSIUM CHLORIDE (KLOR-CON) 10 MEQ EXTENDED RELEASE TABLET    TAKE 1 TABLET BY MOUTH TWICE DAILY    POTASSIUM CHLORIDE SA (K-DUR;KLOR-CON M) 10 MEQ TABLET    Take 10 mEq by mouth 2 times daily. SENNA-DOCUSATE (PERICOLACE) 8.6-50 MG PER TABLET    Take 2 tablets by mouth daily    TIZANIDINE (ZANAFLEX) 4 MG TABLET    Take 4 mg by mouth 2 times daily        ALLERGIES     Ciprofloxacin, Metronidazole, and Tetracycline    FAMILY HISTORY     History reviewed. No pertinent family history. SOCIAL HISTORY       Social History     Socioeconomic History    Marital status:      Spouse name: None    Number of children: None    Years of education: None    Highest education level: None   Occupational History    None   Tobacco Use    Smoking status: Never Smoker    Smokeless tobacco: Never Used   Vaping Use    Vaping Use: Never used   Substance and Sexual Activity    Alcohol use: Yes     Comment: Rarely    Drug use: No    Sexual activity: Never   Other Topics Concern    None   Social History Narrative    None     Social Determinants of Health     Financial Resource Strain:     Difficulty of Paying Living Expenses:    Food Insecurity:     Worried About Running Out of Food in the Last Year:     Ran Out of Food in the Last Year:    Transportation Needs:     Lack of Transportation (Medical):      Lack of Transportation (Non-Medical):    Physical Activity:     Days of Exercise per Week:     Minutes of Exercise per Session:    Stress:     Feeling of Stress :    Social Connections:     Frequency of Communication with Friends and Family:     Frequency of Social Gatherings with Friends and Family:     Attends Judaism Services:     Active Member of Clubs or Organizations:     Attends Club or Organization Meetings:     Marital Status:    Intimate Partner Violence:     Fear of Current or Ex-Partner:     Emotionally Abused:     Physically Abused:     Sexually Abused:        SCREENINGS    Lawanda Coma Scale  Eye Opening: Spontaneous  Best Verbal Response: Oriented  Best Motor Response: Obeys commands  Mon[place] Coma Scale Score: 15 @FLOW([id number])@      PHYSICAL EXAM    (up to 7 for level 4, 8 or more for level 5)     ED Triage Vitals [06/14/21 1337]   BP Temp Temp Source Pulse Resp SpO2 Height Weight   (!) 155/85 98.8 °F (37.1 °C) Oral 69 16 95 % -- 243 lb (110.2 kg)       Physical Exam  Vitals and nursing note reviewed. Constitutional:       Appearance: She is well-developed. HENT:      Head: Normocephalic and atraumatic. Right Ear: Hearing and external ear normal.      Left Ear: Hearing and external ear normal.      Nose: Nose normal.      Mouth/Throat:      Lips: Pink. Mouth: Mucous membranes are moist.      Pharynx: Oropharynx is clear. Uvula midline. Eyes:      Conjunctiva/sclera: Conjunctivae normal.      Pupils: Pupils are equal, round, and reactive to light. Cardiovascular:      Rate and Rhythm: Normal rate and regular rhythm. Comments: Bilateral lymphedema Noted to LE. Pulmonary:      Effort: Pulmonary effort is normal. No accessory muscle usage or respiratory distress. Breath sounds: Normal breath sounds. No decreased air movement. No decreased breath sounds, wheezing or rhonchi. Abdominal:      General: Bowel sounds are normal. There is no distension.       Palpations: Abdomen is soft. Tenderness: There is no abdominal tenderness. Musculoskeletal:         General: Normal range of motion. Cervical back: Normal range of motion and neck supple. Left hip: Tenderness present. No deformity, bony tenderness or crepitus. Normal strength. Legs:    Skin:     General: Skin is warm and dry. Neurological:      General: No focal deficit present. Mental Status: She is alert and oriented to person, place, and time. GCS: GCS eye subscore is 4. GCS verbal subscore is 5. GCS motor subscore is 6. Deep Tendon Reflexes: Reflexes are normal and symmetric. Psychiatric:         Judgment: Judgment normal.           All other labs were within normal range or not returned as of this dictation. EMERGENCY DEPARTMENT COURSE and DIFFERENTIALDIAGNOSIS/MDM:   Vitals:    Vitals:    06/14/21 1337   BP: (!) 155/85   Pulse: 69   Resp: 16   Temp: 98.8 °F (37.1 °C)   TempSrc: Oral   SpO2: 95%   Weight: 243 lb (110.2 kg)            77 yr old female with hypokalemia and sciatica of the L side. Patient was treated for her low K in the ER. She was given prescriptions for Parafon Forte and Motrin. She is to stop taking Zanaflex if taking the Parafon. She was scheduled to see Dr. Yesica Reynolds at 2p on Friday through the ER. Labs were ordered by Dr. Yesica Reynolds and K was treated in the ER. She is to F/U with Dr. Yesica Reynolds for her elevated D-dimer as discussed in the ER. Patient is stable at discharge and verbalizes understanding. PROCEDURES:  Unless otherwise noted below, none     Procedures      FINAL IMPRESSION      1. Sciatica of left side    2.  Hypokalemia          DISPOSITION/PLAN   DISPOSITION Decision To Discharge 06/14/2021 05:47:24 PM          LEIGH ANN Torres CNP (electronically signed)  Attending Emergency Physician     LEIGH ANN Torres CNP  06/14/21 1197

## 2021-07-18 ENCOUNTER — HOSPITAL ENCOUNTER (EMERGENCY)
Age: 76
Discharge: HOME | End: 2021-07-18
Attending: FAMILY MEDICINE
Payer: MEDICARE

## 2021-07-18 ENCOUNTER — APPOINTMENT (OUTPATIENT)
Dept: ULTRASOUND IMAGING | Age: 76
End: 2021-07-18
Payer: MEDICARE

## 2021-07-18 ENCOUNTER — APPOINTMENT (OUTPATIENT)
Dept: GENERAL RADIOLOGY | Age: 76
End: 2021-07-18
Payer: MEDICARE

## 2021-07-18 VITALS
DIASTOLIC BLOOD PRESSURE: 76 MMHG | OXYGEN SATURATION: 100 % | WEIGHT: 230 LBS | HEART RATE: 83 BPM | SYSTOLIC BLOOD PRESSURE: 153 MMHG | RESPIRATION RATE: 18 BRPM | HEIGHT: 65 IN | BODY MASS INDEX: 38.32 KG/M2 | TEMPERATURE: 98.3 F

## 2021-07-18 DIAGNOSIS — N39.0 ACUTE UTI: Primary | ICD-10-CM

## 2021-07-18 DIAGNOSIS — E87.6 HYPOKALEMIA: ICD-10-CM

## 2021-07-18 LAB
ALBUMIN SERPL-MCNC: 4.3 G/DL (ref 3.5–4.6)
ALP BLD-CCNC: 98 U/L (ref 40–130)
ALT SERPL-CCNC: 16 U/L (ref 0–33)
AMYLASE: 56 U/L (ref 22–93)
ANION GAP SERPL CALCULATED.3IONS-SCNC: 10 MEQ/L (ref 9–15)
AST SERPL-CCNC: 22 U/L (ref 0–35)
BACTERIA: ABNORMAL /HPF
BASOPHILS ABSOLUTE: 0 K/UL (ref 0–0.2)
BASOPHILS RELATIVE PERCENT: 0.4 %
BILIRUB SERPL-MCNC: 0.4 MG/DL (ref 0.2–0.7)
BILIRUBIN URINE: NEGATIVE
BLOOD, URINE: NEGATIVE
BUN BLDV-MCNC: 14 MG/DL (ref 8–23)
CALCIUM SERPL-MCNC: 9.7 MG/DL (ref 8.5–9.9)
CHLORIDE BLD-SCNC: 92 MEQ/L (ref 95–107)
CLARITY: CLEAR
CO2: 34 MEQ/L (ref 20–31)
COLOR: YELLOW
CREAT SERPL-MCNC: 0.69 MG/DL (ref 0.5–0.9)
EOSINOPHILS ABSOLUTE: 0.1 K/UL (ref 0–0.7)
EOSINOPHILS RELATIVE PERCENT: 1.3 %
EPITHELIAL CELLS, UA: ABNORMAL /HPF (ref 0–5)
GFR AFRICAN AMERICAN: >60
GFR NON-AFRICAN AMERICAN: >60
GLOBULIN: 3.3 G/DL (ref 2.3–3.5)
GLUCOSE BLD-MCNC: 101 MG/DL (ref 70–99)
GLUCOSE URINE: NEGATIVE MG/DL
HCT VFR BLD CALC: 41.5 % (ref 37–47)
HEMOGLOBIN: 13.7 G/DL (ref 12–16)
HYALINE CASTS: ABNORMAL /HPF (ref 0–5)
KETONES, URINE: NEGATIVE MG/DL
LACTIC ACID: 2.3 MMOL/L (ref 0.5–2.2)
LEUKOCYTE ESTERASE, URINE: ABNORMAL
LIPASE: 23 U/L (ref 12–95)
LYMPHOCYTES ABSOLUTE: 2.6 K/UL (ref 1–4.8)
LYMPHOCYTES RELATIVE PERCENT: 25.6 %
MAGNESIUM: 1.9 MG/DL (ref 1.7–2.4)
MCH RBC QN AUTO: 29.8 PG (ref 27–31.3)
MCHC RBC AUTO-ENTMCNC: 33.1 % (ref 33–37)
MCV RBC AUTO: 90 FL (ref 82–100)
MONOCYTES ABSOLUTE: 0.6 K/UL (ref 0.2–0.8)
MONOCYTES RELATIVE PERCENT: 6.2 %
NEUTROPHILS ABSOLUTE: 6.8 K/UL (ref 1.4–6.5)
NEUTROPHILS RELATIVE PERCENT: 66.5 %
NITRITE, URINE: NEGATIVE
PDW BLD-RTO: 13.2 % (ref 11.5–14.5)
PH UA: 7 (ref 5–9)
PLATELET # BLD: 435 K/UL (ref 130–400)
POTASSIUM REFLEX MAGNESIUM: 3.1 MEQ/L (ref 3.4–4.9)
PRO-BNP: 155 PG/ML
PROCALCITONIN: 0.06 NG/ML (ref 0–0.15)
PROTEIN UA: NEGATIVE MG/DL
RBC # BLD: 4.62 M/UL (ref 4.2–5.4)
RBC UA: ABNORMAL /HPF (ref 0–5)
SODIUM BLD-SCNC: 136 MEQ/L (ref 135–144)
SPECIFIC GRAVITY UA: 1.01 (ref 1–1.03)
TOTAL PROTEIN: 7.6 G/DL (ref 6.3–8)
TROPONIN: <0.01 NG/ML (ref 0–0.01)
UROBILINOGEN, URINE: 0.2 E.U./DL
WBC # BLD: 10.2 K/UL (ref 4.8–10.8)
WBC UA: ABNORMAL /HPF (ref 0–5)

## 2021-07-18 PROCEDURE — 93970 EXTREMITY STUDY: CPT

## 2021-07-18 PROCEDURE — 85025 COMPLETE CBC W/AUTO DIFF WBC: CPT

## 2021-07-18 PROCEDURE — 83690 ASSAY OF LIPASE: CPT

## 2021-07-18 PROCEDURE — 80053 COMPREHEN METABOLIC PANEL: CPT

## 2021-07-18 PROCEDURE — 36415 COLL VENOUS BLD VENIPUNCTURE: CPT

## 2021-07-18 PROCEDURE — 83880 ASSAY OF NATRIURETIC PEPTIDE: CPT

## 2021-07-18 PROCEDURE — 84484 ASSAY OF TROPONIN QUANT: CPT

## 2021-07-18 PROCEDURE — 84145 PROCALCITONIN (PCT): CPT

## 2021-07-18 PROCEDURE — 83605 ASSAY OF LACTIC ACID: CPT

## 2021-07-18 PROCEDURE — 83735 ASSAY OF MAGNESIUM: CPT

## 2021-07-18 PROCEDURE — 99283 EMERGENCY DEPT VISIT LOW MDM: CPT

## 2021-07-18 PROCEDURE — 71045 X-RAY EXAM CHEST 1 VIEW: CPT

## 2021-07-18 PROCEDURE — 93005 ELECTROCARDIOGRAM TRACING: CPT | Performed by: FAMILY MEDICINE

## 2021-07-18 PROCEDURE — 6370000000 HC RX 637 (ALT 250 FOR IP): Performed by: FAMILY MEDICINE

## 2021-07-18 PROCEDURE — 81001 URINALYSIS AUTO W/SCOPE: CPT

## 2021-07-18 PROCEDURE — 82150 ASSAY OF AMYLASE: CPT

## 2021-07-18 RX ORDER — SULFAMETHOXAZOLE AND TRIMETHOPRIM 800; 160 MG/1; MG/1
1 TABLET ORAL ONCE
Status: DISCONTINUED | OUTPATIENT
Start: 2021-07-18 | End: 2021-07-18

## 2021-07-18 RX ORDER — SULFAMETHOXAZOLE AND TRIMETHOPRIM 800; 160 MG/1; MG/1
1 TABLET ORAL ONCE
Status: COMPLETED | OUTPATIENT
Start: 2021-07-18 | End: 2021-07-18

## 2021-07-18 RX ORDER — SULFAMETHOXAZOLE AND TRIMETHOPRIM 400; 80 MG/1; MG/1
1 TABLET ORAL 2 TIMES DAILY
Qty: 10 TABLET | Refills: 0 | Status: SHIPPED | OUTPATIENT
Start: 2021-07-18 | End: 2021-07-23

## 2021-07-18 RX ADMIN — SULFAMETHOXAZOLE AND TRIMETHOPRIM 1 TABLET: 800; 160 TABLET ORAL at 15:19

## 2021-07-18 ASSESSMENT — PAIN DESCRIPTION - LOCATION: LOCATION: LEG

## 2021-07-18 ASSESSMENT — PAIN SCALES - GENERAL: PAINLEVEL_OUTOF10: 4

## 2021-07-18 ASSESSMENT — ENCOUNTER SYMPTOMS: RESPIRATORY NEGATIVE: 1

## 2021-07-18 ASSESSMENT — PAIN DESCRIPTION - ORIENTATION: ORIENTATION: RIGHT;LEFT;UPPER

## 2021-07-18 ASSESSMENT — PAIN DESCRIPTION - DESCRIPTORS: DESCRIPTORS: ACHING

## 2021-07-18 NOTE — ED PROVIDER NOTES
[address] [facility] ED  eMERGENCY dEPARTMENT eNCOUnter      Pt Name: Corrine Ahuja  MRN: [id number]  [place] 1945  Date of evaluation: 7/18/2021  Provider: Mallory Uriostegui MD    [address] [place] [address]       Chief Complaint   Patient presents with    Fatigue     bilateral upper leg pain, burning with urination         HISTORY OF PRESENT ILLNESS   (Location/Symptom, Timing/Onset,Context/Setting, Quality, Duration, Modifying Factors, Severity)  Note limiting factors. Corrine Ahuja is a 77 y.o. female who presents to the emergency department      77years old presented to the ER with concern about bilateral leg pain and swelling for the last few days had an elevated D-dimer few weeks ago and concerned she might have blood clots in her legs also have burning with urination and fatigue for the last 2 to 3 days some chills but no fever    The history is provided by the patient. NursingNotes were reviewed. REVIEW OF SYSTEMS    (2-9 systems for level 4, 10 or more for level 5)     Review of Systems   Constitutional: Positive for chills. HENT: Negative. Respiratory: Negative. Cardiovascular: Positive for leg swelling. Genitourinary: Positive for dysuria and frequency. Skin: Negative. Psychiatric/Behavioral: Negative. Except as noted above the remainder of the review of systems was reviewed and negative.        PAST MEDICAL HISTORY     Past Medical History:   Diagnosis Date    Asthma     Degenerative disc disease, lumbar     Hypertension     Lymphedema     MRSA infection     UTI (lower urinary tract infection)          SURGICALHISTORY       Past Surgical History:   Procedure Laterality Date    APPENDECTOMY      PACEMAKER PLACEMENT  02/05/2020    SALPINGO-OOPHORECTOMY      left side     SHOULDER SURGERY      TONSILLECTOMY      WRIST SURGERY           CURRENT MEDICATIONS       Discharge Medication List as of 7/18/2021  3:44 PM      CONTINUE these medications which have NOT CHANGED    Details   ibuprofen (IBU) 800 MG tablet Take 1 tablet by mouth every 8 hours as needed for Pain, Disp-20 tablet, R-0Print      potassium chloride (KLOR-CON) 10 MEQ extended release tablet TAKE 1 TABLET BY MOUTH TWICE DAILYHistorical Med      Glucosamine-Chondroitin (OSTEO BI-FLEX REGULAR STRENGTH PO) Take by mouthHistorical Med      carvedilol (COREG) 6.25 MG tablet Take 1 tablet by mouth 2 times daily, Disp-60 tablet,R-11Normal      senna-docusate (PERICOLACE) 8.6-50 MG per tablet Take 2 tablets by mouth daily, Disp-60 tablet, R-1Normal      furosemide (LASIX) 40 MG tablet Take 40 mg by mouth dailyHistorical Med      tiZANidine (ZANAFLEX) 4 MG tablet Take 4 mg by mouth 2 times daily Historical Med      amitriptyline (ELAVIL) 25 MG tablet Take 25 mg by mouth nightlyHistorical Med      Menthol-Methyl Salicylate (ANALGESIC OINTMENT) OINT ointment Apply topically onceHistorical Med      LORazepam (ATIVAN) 0.5 MG tablet Take 0.25 mg by mouth Daily with lunch. Historical Med      PARoxetine (PAXIL-CR) 25 MG CR tablet Take 25 mg by mouth every morning. potassium chloride SA (K-DUR;KLOR-CON M) 10 MEQ tablet Take 10 mEq by mouth 2 times daily. atorvastatin (LIPITOR) 20 MG tablet Take 20 mg by mouth daily. aspirin 81 MG tablet Take 81 mg by mouth daily. Multiple Vitamins-Minerals (THERAPEUTIC MULTIVITAMIN-MINERALS) tablet Take 1 tablet by mouth daily. ALLERGIES     Ciprofloxacin, Metronidazole, and Tetracycline    FAMILY HISTORY     History reviewed. No pertinent family history. SOCIAL HISTORY       Social History     Socioeconomic History    Marital status:       Spouse name: None    Number of children: None    Years of education: None    Highest education level: None   Occupational History    None   Tobacco Use    Smoking status: Never Smoker    Smokeless tobacco: Never Used   Vaping Use    Vaping Use: Never used   Substance and Sexual Activity    Alcohol use: Yes     Comment: Rarely    Drug use: No    Sexual activity: Never   Other Topics Concern    None   Social History Narrative    None     Social Determinants of Health     Financial Resource Strain:     Difficulty of Paying Living Expenses:    Food Insecurity:     Worried About Running Out of Food in the Last Year:     [address] in the Last Year:    Transportation Needs:     Lack of Transportation (Medical):  Lack of Transportation (Non-Medical):    Physical Activity:     Days of Exercise per Week:     Minutes of Exercise per Session:    Stress:     Feeling of Stress :    Social Connections:     Frequency of Communication with Friends and Family:     Frequency of Social Gatherings with Friends and Family:     Attends Baptism Services:     Active Member of Clubs or Organizations:     Attends Club or Organization Meetings:     Marital Status:    Intimate Partner Violence:     Fear of Current or Ex-Partner:     Emotionally Abused:     Physically Abused:     Sexually Abused:        SCREENINGS      @FLOW([id number])@      PHYSICAL EXAM    (up to 7 for level 4, 8 or more for level 5)     ED Triage Vitals [07/18/21 1251]   BP Temp Temp src Pulse Resp SpO2 Height Weight   (!) 153/93 98.3 °F (36.8 °C) -- 83 18 98 % 5' 5\" (1.651 m) 230 lb (104.3 kg)       Physical Exam  Vitals and nursing note reviewed. Constitutional:       Appearance: Normal appearance. She is well-developed. HENT:      Head: Normocephalic and atraumatic. Right Ear: Tympanic membrane and external ear normal.      Left Ear: Tympanic membrane and external ear normal.      Nose: Nose normal.      Mouth/Throat:      Mouth: Mucous membranes are moist.      Pharynx: Oropharynx is clear. Eyes:      Pupils: Pupils are equal, round, and reactive to light. Cardiovascular:      Rate and Rhythm: Normal rate and regular rhythm. Heart sounds: Normal heart sounds.    Pulmonary:      Effort: Pulmonary effort is normal. No respiratory distress. Breath sounds: Normal breath sounds. No stridor. No wheezing, rhonchi or rales. Chest:      Chest wall: No tenderness. Abdominal:      General: Bowel sounds are normal.      Palpations: Abdomen is soft. Musculoskeletal:         General: Normal range of motion. Cervical back: Normal range of motion and neck supple. Right lower leg: Edema present. Left lower leg: Edema present. Skin:     General: Skin is warm and dry. Neurological:      Mental Status: She is alert and oriented to person, place, and time. Cranial Nerves: No cranial nerve deficit. Sensory: No sensory deficit. Motor: No abnormal muscle tone. Coordination: Coordination normal.      Deep Tendon Reflexes: Reflexes normal.   Psychiatric:         Behavior: Behavior normal.         Thought Content: Thought content normal.         Judgment: Judgment normal.         DIAGNOSTIC RESULTS     EKG: All EKG's are interpreted by the Emergency Department Physician who either signs or Co-signsthis chart in the absence of a cardiologist.  EKG: Truly a sensed ventricular paced rhythm at 79 no acute ST or T wave changes no change from previous EKG. RADIOLOGY:   Non-plain filmimages such as CT, Ultrasound and MRI are read by the radiologist. Plain radiographic images are visualized and preliminarily interpreted by the emergency physician with the below findings:         Interpretation per the Radiologist below, if available at the time ofthis note:    US DUP LOWER EXTREMITIES BILATERAL VENOUS   Final Result   NO FINDINGS OF  DEEP VENOUS THROMBUS IN THE [facility] [address]       XR CHEST PORTABLE    (Results Pending)     NO Acute cardiopulmonary process    ED BEDSIDE ULTRASOUND:   Performed by ED Physician - none    LABS:  Labs Reviewed   COMPREHENSIVE METABOLIC PANEL W/ REFLEX TO MG FOR LOW K - Abnormal; Notable for the following components:       Result Value Potassium reflex Magnesium 3.1 (*)     Chloride 92 (*)     CO2 34 (*)     Glucose 101 (*)     All other components within normal limits   CBC WITH AUTO DIFFERENTIAL - Abnormal; Notable for the following components:    Platelets 272 (*)     Neutrophils Absolute 6.8 (*)     All other components within normal limits   URINALYSIS - Abnormal; Notable for the following components:    Leukocyte Esterase, Urine MODERATE (*)     All other components within normal limits   LACTIC ACID, PLASMA - Abnormal; Notable for the following components:    Lactic Acid 2.3 (*)     All other components within normal limits   MICROSCOPIC URINALYSIS - Abnormal; Notable for the following components:    Bacteria, UA MANY (*)     WBC, UA 20-50 (*)     RBC, UA 3-5 (*)     All other components within normal limits   AMYLASE   LIPASE   PROCALCITONIN   TROPONIN   BRAIN NATRIURETIC PEPTIDE   MAGNESIUM       All other labs were within normal range or not returned as of this dictation.     EMERGENCY DEPARTMENT COURSE and DIFFERENTIAL DIAGNOSIS/MDM:   Vitals:    Vitals:    07/18/21 1251 07/18/21 1450 07/18/21 1500   BP: (!) 153/93 (!) 156/80 (!) 153/76   Pulse: 83  83   Resp: 18     Temp: 98.3 °F (36.8 °C)     SpO2: 98% 99% 100%   Weight: 230 lb (104.3 kg)     Height: 5' 5\" (1.651 m)                MDM  Number of Diagnoses or Management Options  Acute UTI  Hypokalemia  Diagnosis management comments: 77years old female patient presented to the ER with concern of bilateral lower extremity pain and swelling she have known lymphedema venous Doppler was negative for DVT patient also had some fatigue and urinary tract symptoms was found to have positive moderate leukocytes and WBCs was treated empirically with Bactrim and sent home to follow-up with primary care patient remained hemodynamically stable during her stay in the ER was discharged home in stable can       Amount and/or Complexity of Data Reviewed  Clinical lab tests: ordered and reviewed  Tests in the radiology section of CPT®: ordered and reviewed         CONSULTS:  None    PROCEDURES:  Unless otherwise noted below, none     Procedures    FINAL IMPRESSION      1. Acute UTI    2. Hypokalemia          DISPOSITION/PLAN   DISPOSITION Decision To Discharge 07/18/2021 03:09:44 PM      PATIENT REFERRED TO:  No follow-up provider specified.     DISCHARGE MEDICATIONS:  Discharge Medication List as of 7/18/2021  3:44 PM      START taking these medications    Details   sulfamethoxazole-trimethoprim (BACTRIM;SEPTRA) 400-80 MG per tablet Take 1 tablet by mouth 2 times daily for 5 days, Disp-10 tablet, R-0Normal                (Please note thatportions of this note were completed with a voice recognition program.  Efforts were made to edit the dictations but occasionally words are mis-transcribed.)    Heriberto Coe MD (electronically signed)  Attending Emergency Physician          Ilana Aguilar MD  07/18/21 7553

## 2021-07-18 NOTE — ED TRIAGE NOTES
Patient c/o feeling fatigue x1 month, worsened in the last 4 days. Also c/o bilateral leg pain and burning with urination. Respirations equal and unlabored.

## 2021-07-19 LAB
EKG ATRIAL RATE: 79 BPM
EKG P AXIS: 53 DEGREES
EKG P-R INTERVAL: 176 MS
EKG Q-T INTERVAL: 398 MS
EKG QRS DURATION: 120 MS
EKG QTC CALCULATION (BAZETT): 456 MS
EKG R AXIS: -5 DEGREES
EKG T AXIS: -12 DEGREES
EKG VENTRICULAR RATE: 79 BPM

## 2021-07-19 PROCEDURE — 93010 ELECTROCARDIOGRAM REPORT: CPT | Performed by: INTERNAL MEDICINE

## 2021-07-21 ENCOUNTER — HOSPITAL ENCOUNTER (OUTPATIENT)
Dept: CARDIOLOGY | Age: 76
Discharge: HOME | End: 2021-07-21
Payer: MEDICARE

## 2021-07-21 PROCEDURE — 93296 REM INTERROG EVL PM/IDS: CPT

## 2021-07-27 ENCOUNTER — HOSPITAL ENCOUNTER (OUTPATIENT)
Dept: ULTRASOUND IMAGING | Age: 76
Discharge: HOME | End: 2021-07-29
Payer: MEDICARE

## 2021-07-27 DIAGNOSIS — E06.9 THYROIDITIS: ICD-10-CM

## 2021-07-27 PROCEDURE — 76536 US EXAM OF HEAD AND NECK: CPT

## 2021-09-22 ENCOUNTER — VIRTUAL VISIT (OUTPATIENT)
Dept: CARDIOLOGY CLINIC | Age: 76
End: 2021-09-22
Payer: MEDICARE

## 2021-09-22 DIAGNOSIS — I89.0 LYMPHEDEMA: ICD-10-CM

## 2021-09-22 DIAGNOSIS — I89.0 LYMPHEDEMA: Primary | ICD-10-CM

## 2021-09-22 DIAGNOSIS — R06.09 DOE (DYSPNEA ON EXERTION): Primary | ICD-10-CM

## 2021-09-22 DIAGNOSIS — R00.1 SYMPTOMATIC BRADYCARDIA: ICD-10-CM

## 2021-09-22 DIAGNOSIS — I45.9 HEART BLOCK: ICD-10-CM

## 2021-09-22 PROCEDURE — 1123F ACP DISCUSS/DSCN MKR DOCD: CPT | Performed by: INTERNAL MEDICINE

## 2021-09-22 PROCEDURE — 1090F PRES/ABSN URINE INCON ASSESS: CPT | Performed by: INTERNAL MEDICINE

## 2021-09-22 PROCEDURE — G8400 PT W/DXA NO RESULTS DOC: HCPCS | Performed by: INTERNAL MEDICINE

## 2021-09-22 PROCEDURE — 4040F PNEUMOC VAC/ADMIN/RCVD: CPT | Performed by: INTERNAL MEDICINE

## 2021-09-22 PROCEDURE — G8428 CUR MEDS NOT DOCUMENT: HCPCS | Performed by: INTERNAL MEDICINE

## 2021-09-22 PROCEDURE — 3017F COLORECTAL CA SCREEN DOC REV: CPT | Performed by: INTERNAL MEDICINE

## 2021-09-22 PROCEDURE — 99214 OFFICE O/P EST MOD 30 MIN: CPT | Performed by: INTERNAL MEDICINE

## 2021-09-22 RX ORDER — METOLAZONE 2.5 MG/1
2.5 TABLET ORAL DAILY
COMMUNITY
Start: 2021-09-07

## 2021-09-22 RX ORDER — POTASSIUM CHLORIDE 750 MG/1
20 TABLET, EXTENDED RELEASE ORAL 3 TIMES DAILY
Qty: 60 TABLET | Refills: 3
Start: 2021-09-22 | End: 2021-10-21 | Stop reason: SDUPTHER

## 2021-09-22 ASSESSMENT — ENCOUNTER SYMPTOMS
STRIDOR: 0
NAUSEA: 0
RESPIRATORY NEGATIVE: 1
COUGH: 0
BLOOD IN STOOL: 0
SHORTNESS OF BREATH: 0
WHEEZING: 0
CHEST TIGHTNESS: 0
EYES NEGATIVE: 1
GASTROINTESTINAL NEGATIVE: 1

## 2021-09-22 NOTE — PROGRESS NOTES
Subsequent Progress Note  Patient: Zohra Bhagat  YOB: 1945  MRN: [id number]    Chief Complaint:HB Tired. Edema  Chief Complaint   Patient presents with    Shortness of Breath       CV Data:  2//6/2020 PPM 2nd dgree Type II AVB   2/2020 Echo EF 60   2/2020 spect negative. Subjective/HPI: taking slaty food. Edema worse. No cp + salas. Waking some. No falls no bleed. 3/5/2020 no cp no sob no falls no bleed. Tired all the time. Leg swelling little worse. 12/7/2020 Patient and/or health care decision maker is aware that that he/she may receive a bill for this telephone service, depending on his insurance coverage, and has provided verbal consent to proceed. This visit was completed via telephone. Total time 14 minutes. Dylan Kaitlin onher knees wwent to ER. CT shows Left knee effuison   Still with SALAS and leg edema no worse. No cp walking little   No bleed    9/22/21 Patient and/or health care decision maker is aware that that he/she may receive a bill for this telephone service, depending on his insurance coverage, and has provided verbal consent to proceed. This visit was completed via telephone. Total time 14 minutes. Pt does not feel well. Sob weak. Having little hematuria. Recent K remains low despite advancing to 20 bid. EKG:    Past Medical History:   Diagnosis Date    Asthma     Degenerative disc disease, lumbar     Hypertension     Lymphedema     MRSA infection     UTI (lower urinary tract infection)        Past Surgical History:   Procedure Laterality Date    APPENDECTOMY      PACEMAKER PLACEMENT  02/05/2020    SALPINGO-OOPHORECTOMY      left side     SHOULDER SURGERY      TONSILLECTOMY      WRIST SURGERY         No family history on file. Social History     Socioeconomic History    Marital status:       Spouse name: Not on file    Number of children: Not on file    Years of education: Not on file    Highest education level: Not on file   Occupational History    Not on file   Tobacco Use    Smoking status: Never Smoker    Smokeless tobacco: Never Used   Vaping Use    Vaping Use: Never used   Substance and Sexual Activity    Alcohol use: Yes     Comment: Rarely    Drug use: No    Sexual activity: Never   Other Topics Concern    Not on file   Social History Narrative    Not on file     Social Determinants of Health     Financial Resource Strain:     Difficulty of Paying Living Expenses:    Food Insecurity:     Worried About Running Out of Food in the Last Year:     [address] in the Last Year:    Transportation Needs:     Lack of Transportation (Medical):  Lack of Transportation (Non-Medical):    Physical Activity:     Days of Exercise per Week:     Minutes of Exercise per Session:    Stress:     Feeling of Stress :    Social Connections:     Frequency of Communication with Friends and Family:     Frequency of Social Gatherings with Friends and Family:     Attends Sikhism Services:     Active Member of Clubs or Organizations:     Attends Club or Organization Meetings:     Marital Status:    Intimate Partner Violence:     Fear of Current or Ex-Partner:     Emotionally Abused:     Physically Abused:     Sexually Abused:         Allergies   Allergen Reactions    Ciprofloxacin      Per patient request due to bad side effects     Metronidazole     Tetracycline        Current Outpatient Medications   Medication Sig Dispense Refill    ibuprofen (IBU) 800 MG tablet Take 1 tablet by mouth every 8 hours as needed for Pain 20 tablet 0    potassium chloride (KLOR-CON) 10 MEQ extended release tablet TAKE 1 TABLET BY MOUTH TWICE DAILY      Glucosamine-Chondroitin (OSTEO BI-FLEX REGULAR STRENGTH PO) Take by mouth      carvedilol (COREG) 6.25 MG tablet Take 1 tablet by mouth 2 times daily 60 tablet 11    senna-docusate (PERICOLACE) 8.6-50 MG per tablet Take 2 tablets by mouth daily (Patient not taking: Reported on 6/4/2021) 60 tablet 1  furosemide (LASIX) 40 MG tablet Take 40 mg by mouth daily      tiZANidine (ZANAFLEX) 4 MG tablet Take 4 mg by mouth 2 times daily       amitriptyline (ELAVIL) 25 MG tablet Take 25 mg by mouth nightly      Menthol-Methyl Salicylate (ANALGESIC OINTMENT) OINT ointment Apply topically once      LORazepam (ATIVAN) 0.5 MG tablet Take 0.25 mg by mouth Daily with lunch.  PARoxetine (PAXIL-CR) 25 MG CR tablet Take 25 mg by mouth every morning.  potassium chloride SA (K-DUR;KLOR-CON M) 10 MEQ tablet Take 10 mEq by mouth 2 times daily.  atorvastatin (LIPITOR) 20 MG tablet Take 20 mg by mouth daily.  aspirin 81 MG tablet Take 81 mg by mouth daily.  Multiple Vitamins-Minerals (THERAPEUTIC MULTIVITAMIN-MINERALS) tablet Take 1 tablet by mouth daily. No current facility-administered medications for this visit. Review of Systems:   Review of Systems   Constitutional: Negative. Negative for diaphoresis and fatigue. HENT: Negative. Eyes: Negative. Respiratory: Negative. Negative for cough, chest tightness, shortness of breath, wheezing and stridor. Cardiovascular: Positive for leg swelling. Negative for chest pain and palpitations. Gastrointestinal: Negative. Negative for blood in stool and nausea. Genitourinary: Negative. Musculoskeletal: Positive for gait problem. Skin: Negative. Neurological: Positive for weakness. Negative for dizziness, syncope and light-headedness. Hematological: Negative. Psychiatric/Behavioral: Negative. Physical Examination:    There were no vitals taken for this visit.    Physical Exam    LABS:  CBC:   Lab Results   Component Value Date    WBC 10.2 07/18/2021    RBC 4.62 07/18/2021    HGB 13.7 07/18/2021    HCT 41.5 07/18/2021    MCV 90.0 07/18/2021    MCH 29.8 07/18/2021    MCHC 33.1 07/18/2021    RDW 13.2 07/18/2021     07/18/2021    MPV 8.5 05/26/2015     Lipids:  Lab Results   Component Value Date    CHOL 169 interrogation reviewed. Stable with 11 yrs and 7 months on Battery life. 2. Heart block - PPM    3. Lymphedema   Enrolled Lymphedema clinic - need to go to clinic. Low salt diet. Walk     4. SALAS (dyspnea on exertion) stress negative. - continued dyspnea. - may need to consider Cath    5. Left Knee effusion- referred to Ortho    We will see pt in office soon. Counseling:  Heart Healthy Lifestyle, Improve BMI, Low Salt Diet, Volume Restriction 1500cc per day, Take Precautions to Prevent Falls and Walk Daily    Return in about 1 week (around 9/29/2021).       Electronically signed by Kevin Morris MD on 9/22/2021 at 11:53 AM

## 2021-09-22 NOTE — TELEPHONE ENCOUNTER
Patient calling states Dr Lara Rubio started Metolazone 2.5mg in una everyday and d/c'd one of her htn medications but does not remember which one and shortly after is when her K+ was critical. Please advise

## 2021-09-24 NOTE — TELEPHONE ENCOUNTER
Left message on machine to call back. Lab ordered.  Patient already has one week follow up scheduled for 10/1/21

## 2021-09-24 NOTE — TELEPHONE ENCOUNTER
PATIENT AWARE  TO GET BMP BEFORE APPT    PATIENT STATES THAT SHE FEELS A LOT OF MOVEMENT AT THE PACEMAKER SITE.  SHE WANTED TO KNOW WHAT THIS COULD BE     WENT TO DR Gail Gutierrez FOR B/P WAS UP. WAS PUT ON METALAZONE AND THAT WHEN HER POTASSIUM WAS A CRITICAL LAB     PLEASE ADVISE

## 2021-10-06 ENCOUNTER — TELEPHONE (OUTPATIENT)
Dept: UROLOGY | Age: 76
End: 2021-10-06

## 2021-10-06 NOTE — TELEPHONE ENCOUNTER
One of the pt's friends came into the office today to drop off a urine sample for the pt. I contacted Dr. Heavenly Garcia MA to see if we were expecting this, and she advised me that we were not. I advised the woman of this and she then told me a few things about this pt. She stated that she strongly believes that the pt needs serious help at home and with getting around. She said that her house looks like a hoarder house and that she can barely get up and down steps. She is afraid for the pt's health and well-being, with the way that she's living. She then went on to say that she thinks that the pt makes things up in her mind, such as having her bring in a urine sample, without being advised to do so. I advised her to contact the pt's PCP to also report this and to see if a home health order could be created. She took my advice and said she would consider it.

## 2021-10-21 ENCOUNTER — TELEPHONE (OUTPATIENT)
Dept: UROLOGY | Age: 76
End: 2021-10-21

## 2021-10-21 ENCOUNTER — NURSE ONLY (OUTPATIENT)
Dept: UROLOGY | Age: 76
End: 2021-10-21
Payer: MEDICARE

## 2021-10-21 ENCOUNTER — OFFICE VISIT (OUTPATIENT)
Dept: CARDIOLOGY CLINIC | Age: 76
End: 2021-10-21
Payer: MEDICARE

## 2021-10-21 ENCOUNTER — HOSPITAL ENCOUNTER (OUTPATIENT)
Dept: CARDIOLOGY | Age: 76
Discharge: HOME | End: 2021-10-21
Payer: MEDICARE

## 2021-10-21 VITALS
OXYGEN SATURATION: 96 % | SYSTOLIC BLOOD PRESSURE: 136 MMHG | DIASTOLIC BLOOD PRESSURE: 82 MMHG | WEIGHT: 239 LBS | RESPIRATION RATE: 16 BRPM | BODY MASS INDEX: 39.82 KG/M2 | HEIGHT: 65 IN | HEART RATE: 100 BPM

## 2021-10-21 DIAGNOSIS — I45.9 HEART BLOCK: ICD-10-CM

## 2021-10-21 DIAGNOSIS — R00.1 SYMPTOMATIC BRADYCARDIA: ICD-10-CM

## 2021-10-21 DIAGNOSIS — Z95.0 PACEMAKER: ICD-10-CM

## 2021-10-21 DIAGNOSIS — R35.0 FREQUENCY OF URINATION: Primary | ICD-10-CM

## 2021-10-21 DIAGNOSIS — M25.462 EFFUSION OF LEFT KNEE: ICD-10-CM

## 2021-10-21 DIAGNOSIS — E04.1 THYROID NODULE: Primary | ICD-10-CM

## 2021-10-21 DIAGNOSIS — R35.0 FREQUENCY OF URINATION: ICD-10-CM

## 2021-10-21 DIAGNOSIS — R06.09 DOE (DYSPNEA ON EXERTION): Primary | ICD-10-CM

## 2021-10-21 DIAGNOSIS — I89.0 LYMPHEDEMA: ICD-10-CM

## 2021-10-21 LAB
BILIRUBIN, POC: ABNORMAL
BLOOD URINE, POC: ABNORMAL
CLARITY, POC: CLEAR
COLOR, POC: CLEAR
GLUCOSE URINE, POC: ABNORMAL
KETONES, POC: ABNORMAL
LEUKOCYTE EST, POC: ABNORMAL
NITRITE, POC: ABNORMAL
PH, POC: 7
PROTEIN, POC: ABNORMAL
SPECIFIC GRAVITY, POC: 1.01
UROBILINOGEN, POC: 0.2

## 2021-10-21 PROCEDURE — 3017F COLORECTAL CA SCREEN DOC REV: CPT | Performed by: INTERNAL MEDICINE

## 2021-10-21 PROCEDURE — 99214 OFFICE O/P EST MOD 30 MIN: CPT | Performed by: INTERNAL MEDICINE

## 2021-10-21 PROCEDURE — 1036F TOBACCO NON-USER: CPT | Performed by: INTERNAL MEDICINE

## 2021-10-21 PROCEDURE — 81003 URINALYSIS AUTO W/O SCOPE: CPT | Performed by: UROLOGY

## 2021-10-21 PROCEDURE — G8417 CALC BMI ABV UP PARAM F/U: HCPCS | Performed by: INTERNAL MEDICINE

## 2021-10-21 PROCEDURE — G8484 FLU IMMUNIZE NO ADMIN: HCPCS | Performed by: INTERNAL MEDICINE

## 2021-10-21 PROCEDURE — 93280 PM DEVICE PROGR EVAL DUAL: CPT

## 2021-10-21 PROCEDURE — G8427 DOCREV CUR MEDS BY ELIG CLIN: HCPCS | Performed by: INTERNAL MEDICINE

## 2021-10-21 PROCEDURE — 1090F PRES/ABSN URINE INCON ASSESS: CPT | Performed by: INTERNAL MEDICINE

## 2021-10-21 PROCEDURE — 4040F PNEUMOC VAC/ADMIN/RCVD: CPT | Performed by: INTERNAL MEDICINE

## 2021-10-21 PROCEDURE — 1123F ACP DISCUSS/DSCN MKR DOCD: CPT | Performed by: INTERNAL MEDICINE

## 2021-10-21 PROCEDURE — G8400 PT W/DXA NO RESULTS DOC: HCPCS | Performed by: INTERNAL MEDICINE

## 2021-10-21 RX ORDER — CARVEDILOL 12.5 MG/1
12.5 TABLET ORAL 2 TIMES DAILY
Qty: 180 TABLET | Refills: 3 | Status: SHIPPED | OUTPATIENT
Start: 2021-10-21 | End: 2022-10-25 | Stop reason: SDUPTHER

## 2021-10-21 RX ORDER — CARVEDILOL 6.25 MG/1
6.25 TABLET ORAL 2 TIMES DAILY
Qty: 60 TABLET | Refills: 11 | Status: CANCELLED | OUTPATIENT
Start: 2021-10-21

## 2021-10-21 RX ORDER — POTASSIUM CHLORIDE 750 MG/1
100 TABLET, EXTENDED RELEASE ORAL DAILY
Qty: 450 TABLET | Refills: 3 | Status: SHIPPED | OUTPATIENT
Start: 2021-10-21 | End: 2021-10-22 | Stop reason: DRUGHIGH

## 2021-10-21 ASSESSMENT — ENCOUNTER SYMPTOMS
STRIDOR: 0
RESPIRATORY NEGATIVE: 1
SHORTNESS OF BREATH: 0
CHEST TIGHTNESS: 0
COUGH: 0
EYES NEGATIVE: 1
GASTROINTESTINAL NEGATIVE: 1
BLOOD IN STOOL: 0
NAUSEA: 0
WHEEZING: 0

## 2021-10-21 NOTE — PROGRESS NOTES
I called [facility] to follow up on our Peer to Peer request    The office hours are 21  to 5pm    I left detailed message with my direct line to contact        Patient ID [id number] Subsequent Progress Note  Patient: Dillan Bhagat  YOB: 1945  MRN: [id number]    Chief Complaint:HB Tired. Edema  Chief Complaint   Patient presents with    1 Month Follow-Up    Results     lab    Shortness of Breath    Bradycardia     pacemaker    Fatigue    Edema     has lymphedema       CV Data:  2//6/2020 PPM 2nd dgree Type II AVB   2/2020 Echo EF 60   2/2020 spect negative. Subjective/HPI: taking slaty food. Edema worse. No cp + salas. Waking some. No falls no bleed. 3/5/2020 no cp no sob no falls no bleed. Tired all the time. Leg swelling little worse. 12/7/2020 Patient and/or health care decision maker is aware that that he/she may receive a bill for this telephone service, depending on his insurance coverage, and has provided verbal consent to proceed. This visit was completed via telephone. Total time 14 minutes. Li martnio knees wwent to ER. CT shows Left knee effuison   Still with SALAS and leg edema no worse. No cp walking little   No bleed    9/22/21 Patient and/or health care decision maker is aware that that he/she may receive a bill for this telephone service, depending on his insurance coverage, and has provided verbal consent to proceed. This visit was completed via telephone. Total time 14 minutes. Pt does not feel well. Sob weak. Having little hematuria. Recent K remains low despite advancing to 20 bid. 10/21/21 no cp no sob no falls no bleed tired all the time. Labs reviewed. EKG:    Past Medical History:   Diagnosis Date    Asthma     Degenerative disc disease, lumbar     Hypertension     Lymphedema     MRSA infection     UTI (lower urinary tract infection)        Past Surgical History:   Procedure Laterality Date    APPENDECTOMY      CARDIAC PACEMAKER PLACEMENT  02/05/2020    PACEMAKER PLACEMENT  02/05/2020    SALPINGO-OOPHORECTOMY      left side     SHOULDER SURGERY      TONSILLECTOMY      WRIST SURGERY         History reviewed. No pertinent family history. Social History     Socioeconomic History    Marital status:      Spouse name: None    Number of children: None    Years of education: None    Highest education level: None   Occupational History    None   Tobacco Use    Smoking status: Never Smoker    Smokeless tobacco: Never Used   Vaping Use    Vaping Use: Never used   Substance and Sexual Activity    Alcohol use: Yes     Comment: Rarely    Drug use: No    Sexual activity: Never   Other Topics Concern    None   Social History Narrative    None     Social Determinants of Health     Financial Resource Strain:     Difficulty of Paying Living Expenses:    Food Insecurity:     Worried About Running Out of Food in the Last Year:     Ran Out of Food in the Last Year:    Transportation Needs:     Lack of Transportation (Medical):  Lack of Transportation (Non-Medical):    Physical Activity:     Days of Exercise per Week:     Minutes of Exercise per Session:    Stress:     Feeling of Stress :    Social Connections:     Frequency of Communication with Friends and Family:     Frequency of Social Gatherings with Friends and Family:     Attends Oriental orthodox Services:     Active Member of Clubs or Organizations:     Attends Club or Organization Meetings:     Marital Status:    Intimate Partner Violence:     Fear of Current or Ex-Partner:     Emotionally Abused:     Physically Abused:     Sexually Abused:         Allergies   Allergen Reactions    Ciprofloxacin      Per patient request due to bad side effects     Metronidazole     Tetracycline        Current Outpatient Medications   Medication Sig Dispense Refill    potassium chloride (KLOR-CON M) 10 MEQ extended release tablet Take 10 tablets by mouth daily 450 tablet 3    carvedilol (COREG) 12.5 MG tablet Take 1 tablet by mouth 2 times daily 180 tablet 3    metOLazone (ZAROXOLYN) 2.5 MG tablet 2.5 mg daily       ibuprofen (IBU) 800 MG tablet Take 1 tablet by mouth every 8 hours as needed for Pain 20 tablet 0    potassium chloride (KLOR-CON) 10 MEQ extended release tablet TAKE 1 TABLET BY MOUTH TWICE DAILY      Glucosamine-Chondroitin (OSTEO BI-FLEX REGULAR STRENGTH PO) Take by mouth      senna-docusate (PERICOLACE) 8.6-50 MG per tablet Take 2 tablets by mouth daily 60 tablet 1    furosemide (LASIX) 40 MG tablet Take 40 mg by mouth daily      tiZANidine (ZANAFLEX) 4 MG tablet Take 4 mg by mouth 2 times daily       amitriptyline (ELAVIL) 25 MG tablet Take 25 mg by mouth nightly      Menthol-Methyl Salicylate (ANALGESIC OINTMENT) OINT ointment Apply topically once      LORazepam (ATIVAN) 0.5 MG tablet Take 0.25 mg by mouth Daily with lunch.  PARoxetine (PAXIL-CR) 25 MG CR tablet Take 25 mg by mouth every morning.  atorvastatin (LIPITOR) 20 MG tablet Take 20 mg by mouth daily.  aspirin 81 MG tablet Take 81 mg by mouth daily.  Multiple Vitamins-Minerals (THERAPEUTIC MULTIVITAMIN-MINERALS) tablet Take 1 tablet by mouth daily. No current facility-administered medications for this visit. Review of Systems:   Review of Systems   Constitutional: Negative. Negative for diaphoresis and fatigue. HENT: Negative. Eyes: Negative. Respiratory: Negative. Negative for cough, chest tightness, shortness of breath, wheezing and stridor. Cardiovascular: Positive for leg swelling. Negative for chest pain and palpitations. Gastrointestinal: Negative. Negative for blood in stool and nausea. Genitourinary: Negative. Musculoskeletal: Positive for gait problem. Skin: Negative. Neurological: Positive for weakness. Negative for dizziness, syncope and light-headedness. Hematological: Negative. Psychiatric/Behavioral: Negative.           Physical Examination:    /82 (Site: Right Upper Arm, Position: Sitting, Cuff Size: Large Adult)   Pulse 100   Resp 16   Ht 5' 5\" (1.651 m)   Wt 239 lb (108.4 kg)   SpO2 96% BMI 39.77 kg/m²    Physical Exam   Constitutional: She appears healthy. No distress. HENT:   Normal cephalic and Atraumatic   Eyes: Pupils are equal, round, and reactive to light. Neck: Thyroid normal. No JVD present. No neck adenopathy. No thyromegaly present. Cardiovascular: Normal rate, regular rhythm, normal heart sounds, intact distal pulses and normal pulses. Pulmonary/Chest: Effort normal and breath sounds normal. She has no wheezes. She has no rales. She exhibits no tenderness. Abdominal: Soft. Bowel sounds are normal. There is no abdominal tenderness. Musculoskeletal:         General: Edema present. No tenderness. Normal range of motion. Cervical back: Normal range of motion and neck supple. Neurological: She is alert and oriented to person, place, and time. Skin: Skin is warm. No cyanosis. Nails show no clubbing.        LABS:  CBC:   Lab Results   Component Value Date    WBC 10.2 07/18/2021    RBC 4.62 07/18/2021    HGB 13.7 07/18/2021    HCT 41.5 07/18/2021    MCV 90.0 07/18/2021    MCH 29.8 07/18/2021    MCHC 33.1 07/18/2021    RDW 13.2 07/18/2021     07/18/2021    MPV 8.5 05/26/2015     Lipids:  Lab Results   Component Value Date    CHOL 169 06/14/2021    CHOL 150 09/11/2020    CHOL 101 02/05/2020     Lab Results   Component Value Date    TRIG 86 06/14/2021    TRIG 53 09/11/2020    TRIG 83 02/05/2020     Lab Results   Component Value Date    HDL 81 (H) 06/14/2021    HDL 72 (H) 09/11/2020    HDL 54 02/05/2020     Lab Results   Component Value Date    LDLCALC 71 06/14/2021    LDLCALC 67 09/11/2020    LDLCALC 30 02/05/2020     No results found for: LABVLDL, VLDL  No results found for: CHOLHDLRATIO  CMP:    Lab Results   Component Value Date     09/30/2021    K 3.2 09/30/2021    K 3.1 07/18/2021    CL 92 09/30/2021    CO2 35 09/30/2021    BUN 12 09/30/2021    CREATININE 0.68 09/30/2021    GFRAA >60.0 09/30/2021    LABGLOM >60.0 09/30/2021    GLUCOSE 77 09/30/2021    PROT 7.6 07/18/2021    LABALBU 4.3 07/18/2021    CALCIUM 9.3 09/30/2021    BILITOT 0.4 07/18/2021    ALKPHOS 98 07/18/2021    AST 22 07/18/2021    ALT 16 07/18/2021     BMP:    Lab Results   Component Value Date     09/30/2021    K 3.2 09/30/2021    K 3.1 07/18/2021    CL 92 09/30/2021    CO2 35 09/30/2021    BUN 12 09/30/2021    LABALBU 4.3 07/18/2021    CREATININE 0.68 09/30/2021    CALCIUM 9.3 09/30/2021    GFRAA >60.0 09/30/2021    LABGLOM >60.0 09/30/2021    GLUCOSE 77 09/30/2021     Magnesium:    Lab Results   Component Value Date    MG 1.9 07/18/2021     TSH:  Lab Results   Component Value Date    TSH 1.420 06/14/2021       Patient Active Problem List   Diagnosis    Abscess or cellulitis of wrist    MRSA (methicillin resistant Staphylococcus aureus) infection    Orthostasis    Anterior dislocation of left shoulder    Glenoid fracture of shoulder, left, closed, initial encounter    Symptomatic bradycardia    Heart block    Lymphedema    SALAS (dyspnea on exertion)       Medications Discontinued During This Encounter   Medication Reason    carvedilol (COREG) 6.25 MG tablet     potassium chloride (KLOR-CON M) 10 MEQ extended release tablet REORDER       Modified Medications    Modified Medication Previous Medication    POTASSIUM CHLORIDE (KLOR-CON M) 10 MEQ EXTENDED RELEASE TABLET potassium chloride (KLOR-CON M) 10 MEQ extended release tablet       Take 10 tablets by mouth daily    Take 2 tablets by mouth 3 times daily       Orders Placed This Encounter   Medications    potassium chloride (KLOR-CON M) 10 MEQ extended release tablet     Sig: Take 10 tablets by mouth daily     Dispense:  450 tablet     Refill:  3    carvedilol (COREG) 12.5 MG tablet     Sig: Take 1 tablet by mouth 2 times daily     Dispense:  180 tablet     Refill:  3       Assessment/Plan:    1. Symptomatic bradycardia   PPM placed - recent interrogation reviewed. Stable with 11 yrs and 7 months on Battery life.      2. Heart block - PPM    3. Lymphedema   Enrolled Lymphedema clinic - need to go to clinic. Low salt diet. Walk     4. SALAS (dyspnea on exertion) stress negative. - continued dyspnea. - may need to consider Cath    5. Left Knee effusion-  Seeing Ortho    6. Thyroid Nodule - refer to Dr. Perez Plascencia. Counseling:  Heart Healthy Lifestyle, Improve BMI, Low Salt Diet, Volume Restriction 1500cc per day, Take Precautions to Prevent Falls and Walk Daily    Return in about 4 months (around 2/21/2022).       Electronically signed by Liam Aldridge MD on 10/21/2021 at 12:38 PM

## 2021-10-21 NOTE — TELEPHONE ENCOUNTER
Urine drop off for frequency   UA show:     10/21/2021 12:25 PM - May Anderson, CMA    Component   Color, UA   clear    Clarity, UA   clear    Glucose, UA POC   neg    Bilirubin, UA   neg    Ketones, UA   neg    Spec Grav, UA   1.015    Blood, UA POC   trace-intact    pH, UA   7.0    Protein, UA POC   neg    Urobilinogen, UA   0.2    Leukocytes, UA   small    Nitrite, UA   neg      Would you like a culture?

## 2021-10-22 ENCOUNTER — TELEPHONE (OUTPATIENT)
Dept: CARDIOLOGY CLINIC | Age: 76
End: 2021-10-22

## 2021-10-22 DIAGNOSIS — E87.6 HYPOKALEMIA: Primary | ICD-10-CM

## 2021-10-22 RX ORDER — POTASSIUM CHLORIDE 20 MEQ/1
100 TABLET, EXTENDED RELEASE ORAL DAILY
Qty: 150 TABLET | Refills: 3 | Status: SHIPPED | OUTPATIENT
Start: 2021-10-22 | End: 2022-03-02 | Stop reason: SDUPTHER

## 2021-10-22 NOTE — TELEPHONE ENCOUNTER
She had KCL 10 Meq tabs and was taking 2 tabs tid for a total of 60 Meq QD. I advanced her to 100 Meq QD due to her K being 3.2 still. So give her KCL 20 Meq and take 5 tabs daily or KCL 10 Meq 10 tabs qd. She can take the 100 meq all at same time rather then splitting it up. Up to her.

## 2021-10-22 NOTE — TELEPHONE ENCOUNTER
[facility] calling as patient's potassium RX was sent in at 0 MEQ take 10 tablets once daily. Please advise/clarify. Thank you.

## 2021-10-22 NOTE — TELEPHONE ENCOUNTER
Patient  Was seen yesterday and forgot to ask why her potassium is low. She states she is taking lasix 40mg dailya and metolazone 2.5 daily. Advised her most likely due to the diuretic. She also states she is up most of the night urinating.  Please advise of any changes

## 2021-10-24 LAB
ORGANISM: ABNORMAL
URINE CULTURE, ROUTINE: ABNORMAL

## 2021-10-25 DIAGNOSIS — E87.6 HYPOKALEMIA: Primary | ICD-10-CM

## 2021-10-25 RX ORDER — CEPHALEXIN 500 MG/1
500 CAPSULE ORAL EVERY 12 HOURS
Qty: 20 CAPSULE | Refills: 0 | Status: SHIPPED | OUTPATIENT
Start: 2021-10-25 | End: 2021-11-04

## 2021-10-25 NOTE — RESULT ENCOUNTER NOTE
Urine culture came back positive  Called in antibiotic to discount drug [facility] in [place]

## 2021-11-09 NOTE — CONSULTS
Patient transported off floor in cart in stable condition to 7-08. Spoke to Andre Healy on [address] prior to transport.      Laya Ward  11/08/21 2020 shoulder with  severe arthritic change and old glenoid rim fracture at this time. TREATMENT PLAN:  Sling for comfort. Motrin anti-inflammatory for pain. We talked about using some light narcotics, which she declined. She  will see us in the office as an outpatient in two to three weeks and we  will discuss then possible shoulder replacement. Sling should be used  at this time for comfort, but may be removed for general range of motion  and ADLs. She is not to weightbear or put cane or crutches on that  side. We will look forward to seeing her back in the office in two to three  weeks as an outpatient.         Melvin Lindsey MD    D: 12/28/2019 10:34:53       T: 12/28/2019 11:43:50     [email or web address]  Job#: [id number]     Doc#: [id number]    CC:

## 2021-12-01 ENCOUNTER — OFFICE VISIT (OUTPATIENT)
Dept: ENDOCRINOLOGY | Age: 76
End: 2021-12-01
Payer: MEDICARE

## 2021-12-01 VITALS
OXYGEN SATURATION: 96 % | HEART RATE: 91 BPM | HEIGHT: 65 IN | SYSTOLIC BLOOD PRESSURE: 153 MMHG | DIASTOLIC BLOOD PRESSURE: 84 MMHG | BODY MASS INDEX: 39.77 KG/M2

## 2021-12-01 DIAGNOSIS — I89.0 LYMPHEDEMA: Primary | ICD-10-CM

## 2021-12-01 DIAGNOSIS — R73.03 PRE-DIABETES: ICD-10-CM

## 2021-12-01 DIAGNOSIS — E04.1 THYROID NODULE: ICD-10-CM

## 2021-12-01 DIAGNOSIS — E87.6 HYPOKALEMIA: ICD-10-CM

## 2021-12-01 DIAGNOSIS — K21.9 GASTROESOPHAGEAL REFLUX DISEASE WITHOUT ESOPHAGITIS: ICD-10-CM

## 2021-12-01 LAB
ALBUMIN SERPL-MCNC: 3.7 G/DL (ref 3.5–4.6)
ALP BLD-CCNC: 86 U/L (ref 40–130)
ALT SERPL-CCNC: 9 U/L (ref 0–33)
ANION GAP SERPL CALCULATED.3IONS-SCNC: 13 MEQ/L (ref 9–15)
AST SERPL-CCNC: 15 U/L (ref 0–35)
BILIRUB SERPL-MCNC: <0.2 MG/DL (ref 0.2–0.7)
BUN BLDV-MCNC: 12 MG/DL (ref 8–23)
CALCIUM SERPL-MCNC: 9.4 MG/DL (ref 8.5–9.9)
CHLORIDE BLD-SCNC: 95 MEQ/L (ref 95–107)
CO2: 31 MEQ/L (ref 20–31)
CREAT SERPL-MCNC: 0.75 MG/DL (ref 0.5–0.9)
GFR AFRICAN AMERICAN: >60
GFR NON-AFRICAN AMERICAN: >60
GLOBULIN: 3.5 G/DL (ref 2.3–3.5)
GLUCOSE BLD-MCNC: 70 MG/DL (ref 70–99)
HBA1C MFR BLD: 6 % (ref 4.8–5.9)
PARATHYROID HORMONE INTACT: 48 PG/ML (ref 15–65)
POTASSIUM SERPL-SCNC: 3.8 MEQ/L (ref 3.4–4.9)
SODIUM BLD-SCNC: 139 MEQ/L (ref 135–144)
TOTAL PROTEIN: 7.2 G/DL (ref 6.3–8)

## 2021-12-01 PROCEDURE — 1123F ACP DISCUSS/DSCN MKR DOCD: CPT | Performed by: PHYSICIAN ASSISTANT

## 2021-12-01 PROCEDURE — G8417 CALC BMI ABV UP PARAM F/U: HCPCS | Performed by: PHYSICIAN ASSISTANT

## 2021-12-01 PROCEDURE — 99203 OFFICE O/P NEW LOW 30 MIN: CPT | Performed by: PHYSICIAN ASSISTANT

## 2021-12-01 PROCEDURE — 1090F PRES/ABSN URINE INCON ASSESS: CPT | Performed by: PHYSICIAN ASSISTANT

## 2021-12-01 PROCEDURE — 1036F TOBACCO NON-USER: CPT | Performed by: PHYSICIAN ASSISTANT

## 2021-12-01 PROCEDURE — 3017F COLORECTAL CA SCREEN DOC REV: CPT | Performed by: PHYSICIAN ASSISTANT

## 2021-12-01 PROCEDURE — G8427 DOCREV CUR MEDS BY ELIG CLIN: HCPCS | Performed by: PHYSICIAN ASSISTANT

## 2021-12-01 PROCEDURE — G8400 PT W/DXA NO RESULTS DOC: HCPCS | Performed by: PHYSICIAN ASSISTANT

## 2021-12-01 PROCEDURE — 4040F PNEUMOC VAC/ADMIN/RCVD: CPT | Performed by: PHYSICIAN ASSISTANT

## 2021-12-01 PROCEDURE — G8484 FLU IMMUNIZE NO ADMIN: HCPCS | Performed by: PHYSICIAN ASSISTANT

## 2021-12-01 RX ORDER — FAMOTIDINE 20 MG/1
20 TABLET, FILM COATED ORAL 2 TIMES DAILY
Qty: 60 TABLET | Refills: 3 | Status: SHIPPED | OUTPATIENT
Start: 2021-12-01 | End: 2022-08-25 | Stop reason: SDUPTHER

## 2021-12-01 ASSESSMENT — ENCOUNTER SYMPTOMS
SINUS PRESSURE: 0
COUGH: 0
EYE PAIN: 0
EYE REDNESS: 0
DIARRHEA: 0
SORE THROAT: 0
NAUSEA: 0
SHORTNESS OF BREATH: 0
RHINORRHEA: 0
ABDOMINAL PAIN: 0
VOMITING: 0
WHEEZING: 0

## 2021-12-01 NOTE — PROGRESS NOTES
12/1/2021    Assessment:       Diagnosis Orders   1. Lymphedema  Tamar You NP, Interventional Radiology, [place]   2. Thyroid nodule  PTH, Intact    Comprehensive Metabolic Panel   3. Pre-diabetes  Comprehensive Metabolic Panel    Hemoglobin A1C   4. Gastroesophageal reflux disease without esophagitis  Comprehensive Metabolic Panel     PLAN:     Labs today- Parathyroid Hormone, CMP, A1C   Will make further recommendations based on those results  Consider Parathyroid scan if labs are positive   Ask sister what type of Thyroid cancer. (Medulary Thyroid Carcinoma and Multiple Endocrine neoplasia?)    Famotidine 20 mg twice a day  Follow up in 3 months       Orders Placed This Encounter   Procedures    PTH, Intact     Standing Status:   Future     Standing Expiration Date:   12/1/2022    Comprehensive Metabolic Panel     Standing Status:   Future     Standing Expiration Date:   12/1/2022    Hemoglobin A1C     Standing Status:   Future     Standing Expiration Date:   12/1/2022   Lilo You NP, Interventional Radiology, [place]     Referral Priority:   Routine     Referral Type:   Eval and Treat     Referral Reason:   Specialty Services Required     Referred to Provider:   LEIGH ANN Parham CNP     Requested Specialty:   Nurse Practitioner     Number of Visits Requested:   1     Orders Placed This Encounter   Medications    famotidine (PEPCID) 20 MG tablet     Sig: Take 1 tablet by mouth 2 times daily     Dispense:  60 tablet     Refill:  3     Return in about 3 months (around 3/1/2022) for Thyroid.   Subjective:     Chief Complaint   Patient presents with    New Patient    Thyroid Problem     Vitals:    12/01/21 1508 12/01/21 1511   BP: (!) 145/76 (!) 153/84   Site: Right Lower Arm    Pulse: 91    SpO2: 96%    Height: 5' 5\" (1.651 m)      Wt Readings from Last 3 Encounters:   10/21/21 239 lb (108.4 kg)   07/18/21 230 lb (104.3 kg)   06/14/21 243 lb (110.2 kg)     BP Readings from Last 3 Encounters:   12/01/21 (!) 153/84   10/21/21 136/82   07/18/21 (!) 153/76     Patient is a 71-year-old female who complained of feeling a lump in the anterior part of her neck when she looked down and put her chin on her chest.  Thyroid ultrasound revealed a 1 cm nodule in the right lower lobe. There was some radiologic evidence that this could be a parathyroid adenoma. Thyroid laboratory studies and calcium levels have all been normal for at least the last 5 years upon chart review. She denies a history of osteopenia, osteoporosis, renal calculi or fractures. She has a family history of thyroid cancer in her sister with thyroidectomy and radioactive iodine ablation approximately 15 years ago. She is uncertain of the pathology. Going to send her to the lab for a parathyroid, A1c because she was prediabetic in the past and repeat calcium level. Pending those results we will make further recommendations    Past Medical History:   Diagnosis Date    Asthma     Degenerative disc disease, lumbar     Hypertension     Lymphedema     MRSA infection     UTI (lower urinary tract infection)      Past Surgical History:   Procedure Laterality Date    APPENDECTOMY      CARDIAC PACEMAKER PLACEMENT  02/05/2020    PACEMAKER PLACEMENT  02/05/2020    SALPINGO-OOPHORECTOMY      left side     SHOULDER SURGERY      TONSILLECTOMY      WRIST SURGERY       Social History     Socioeconomic History    Marital status:       Spouse name: Not on file    Number of children: Not on file    Years of education: Not on file    Highest education level: Not on file   Occupational History    Not on file   Tobacco Use    Smoking status: Never Smoker    Smokeless tobacco: Never Used   Vaping Use    Vaping Use: Never used   Substance and Sexual Activity    Alcohol use: Yes     Comment: Rarely    Drug use: No    Sexual activity: Never   Other Topics Concern    Not on file   Social History Narrative    Not on file Social Determinants of Health     Financial Resource Strain:     Difficulty of Paying Living Expenses: Not on file   Food Insecurity:     Worried About Running Out of Food in the Last Year: Not on file    Karen of Food in the Last Year: Not on file   Transportation Needs:     Lack of Transportation (Medical): Not on file    Lack of Transportation (Non-Medical): Not on file   Physical Activity:     Days of Exercise per Week: Not on file    Minutes of Exercise per Session: Not on file   Stress:     Feeling of Stress : Not on file   Social Connections:     Frequency of Communication with Friends and Family: Not on file    Frequency of Social Gatherings with Friends and Family: Not on file    Attends [employer] Services: Not on file    Active Member of [address] [place] [address] [employer] or Organizations: Not on file    Attends Club or Organization Meetings: Not on file    Marital Status: Not on file   Intimate Partner Violence:     Fear of Current or Ex-Partner: Not on file    Emotionally Abused: Not on file    Physically Abused: Not on file    Sexually Abused: Not on file   Housing Stability:     Unable to Pay for Housing in the Last Year: Not on file    Number of [place] in the Last Year: Not on file    Unstable Housing in the Last Year: Not on file     No family history on file.   Allergies   Allergen Reactions    Ciprofloxacin      Per patient request due to bad side effects     Metronidazole     Tetracycline        Current Outpatient Medications:     famotidine (PEPCID) 20 MG tablet, Take 1 tablet by mouth 2 times daily, Disp: 60 tablet, Rfl: 3    potassium chloride (KLOR-CON M) 20 MEQ extended release tablet, Take 5 tablets by mouth daily, Disp: 150 tablet, Rfl: 3    carvedilol (COREG) 12.5 MG tablet, Take 1 tablet by mouth 2 times daily, Disp: 180 tablet, Rfl: 3    metOLazone (ZAROXOLYN) 2.5 MG tablet, 2.5 mg daily , Disp: , Rfl:     Glucosamine-Chondroitin (OSTEO BI-FLEX REGULAR STRENGTH PO), Take by mouth, Disp: , Rfl:     furosemide (LASIX) 40 MG tablet, Take 40 mg by mouth daily, Disp: , Rfl:     tiZANidine (ZANAFLEX) 4 MG tablet, Take 4 mg by mouth 2 times daily , Disp: , Rfl:     amitriptyline (ELAVIL) 25 MG tablet, Take 25 mg by mouth nightly, Disp: , Rfl:     Menthol-Methyl Salicylate (ANALGESIC OINTMENT) OINT ointment, Apply topically once, Disp: , Rfl:     LORazepam (ATIVAN) 0.5 MG tablet, Take 0.25 mg by mouth Daily with lunch. , Disp: , Rfl:     PARoxetine (PAXIL-CR) 25 MG CR tablet, Take 25 mg by mouth every morning., Disp: , Rfl:     aspirin 81 MG tablet, Take 81 mg by mouth daily. , Disp: , Rfl:     Multiple Vitamins-Minerals (THERAPEUTIC MULTIVITAMIN-MINERALS) tablet, Take 1 tablet by mouth daily. , Disp: , Rfl:   Lab Results   Component Value Date     09/30/2021    K 3.2 (L) 09/30/2021    CL 92 (L) 09/30/2021    CO2 35 (H) 09/30/2021    BUN 12 09/30/2021    CREATININE 0.68 09/30/2021    GLUCOSE 77 09/30/2021    CALCIUM 9.3 09/30/2021    PROT 7.6 07/18/2021    LABALBU 4.3 07/18/2021    BILITOT 0.4 07/18/2021    ALKPHOS 98 07/18/2021    AST 22 07/18/2021    ALT 16 07/18/2021    LABGLOM >60.0 09/30/2021    GFRAA >60.0 09/30/2021    GLOB 3.3 07/18/2021     Lab Results   Component Value Date    WBC 10.2 07/18/2021    HGB 13.7 07/18/2021    HCT 41.5 07/18/2021    MCV 90.0 07/18/2021     (H) 07/18/2021     Lab Results   Component Value Date    LABA1C 5.9 03/31/2016    LABA1C 5.9 05/26/2015    LABA1C 6.1 (H) 02/14/2014     Lab Results   Component Value Date    HDL 81 (H) 06/14/2021    HDL 72 (H) 09/11/2020    HDL 54 02/05/2020    LDLCALC 71 06/14/2021    LDLCALC 67 09/11/2020    LDLCALC 30 02/05/2020    CHOL 169 06/14/2021    CHOL 150 09/11/2020    CHOL 101 02/05/2020    TRIG 86 06/14/2021    TRIG 53 09/11/2020    TRIG 83 02/05/2020     No results found for: TESTM  Lab Results   Component Value Date    TSH 1.420 06/14/2021    TSH 1.960 09/11/2020    TSH 3.540 08/02/2019    T4FREE 1.32 06/14/2021    T4FREE 1.22 09/11/2020    T4FREE 1.06 08/02/2019     No results found for: TPOABS    US HEAD NECK SOFT TISSUE THYROID: 7/27/2021       CLINICAL HISTORY: E06.9 Thyroiditis ICD10.       COMPARISONS: Cervical spine CT 5/10/2018.       TECHNIQUE: Ultrasound of the thyroid was performed, with a regional survey.       Reference: ACR Thyroid Imaging, Recording and Data System (TI-RADS): White Paper of the "[employer]. Journal of the [employer] of Radiology. Volume 14, Issue 5, May 2017, pages 135-800.            FINDINGS:        This study is limited by the patient's body habitus.       A few small cysts and predominantly solid hypoechoic (TR4) nodules are present within a normal-sized thyroid gland.       The largest TR4 nodule is noted within the liver adjacent to the lower right thyroid pole measures approximately 1.0 x 0.9 x 0.9 cm, which may possibly be a parathyroid adenoma.       Correlation with serum parathyroid hormone is suggested. If elevated, a nuclear medicine parathyroid scan may be appropriate.       The right lobe measures approximately    4.0 x 1.7 x 1.7 cm.  The left lobe measures 3.7 x 1.6 x 1.5 cm.   The isthmus measures 0.33 cm.                   Impression       APPROXIMATELY 1 CM RIGHT LOWER LOBE TR4 THYROID NODULE VERSUS PARATHYROID ADENOMA.       CORRELATION WITH SERUM PARATHYROID HORMONE IS SUGGESTED.       IF ELEVATED, A NUCLEAR MEDICINE PARATHYROID SCAN MAY BE APPROPRIATE.       OTHERWISE, LONGITUDINAL ULTRASOUND FOLLOW-UP AT 1, 2, 3 AND 5 YEARS SUGGESTED.                 Review of Systems   Constitutional: Negative for chills, fatigue and fever. HENT: Negative for congestion, ear pain, postnasal drip, rhinorrhea, sinus pressure and sore throat. Eyes: Negative for pain and redness. Respiratory: Negative for cough, shortness of breath and wheezing. Cardiovascular: Positive for leg swelling. Negative for chest pain and palpitations.    Gastrointestinal: Negative for abdominal pain, diarrhea, nausea and vomiting. Endocrine: Negative for cold intolerance and heat intolerance. Genitourinary: Negative for difficulty urinating. Musculoskeletal: Negative for arthralgias. Skin: Negative for rash. Neurological: Negative for dizziness and headaches. Psychiatric/Behavioral: Negative for dysphoric mood. Objective:   Physical Exam  Vitals reviewed. Constitutional:       Appearance: She is well-developed. HENT:      Head: Normocephalic and atraumatic. Nose: No rhinorrhea. Eyes:      Conjunctiva/sclera: Conjunctivae normal.   Neck:      Comments: No thyromegaly or palpable nodules   Cardiovascular:      Rate and Rhythm: Normal rate and regular rhythm. Heart sounds: Normal heart sounds. Pulmonary:      Effort: Pulmonary effort is normal.      Breath sounds: Normal breath sounds. Abdominal:      General: Bowel sounds are normal.      Palpations: Abdomen is soft. Musculoskeletal:         General: Swelling (bilateral LE. Lymphedema ) present. Normal range of motion. Cervical back: Normal range of motion and neck supple. Skin:     General: Skin is warm and dry. Neurological:      Mental Status: She is alert and oriented to person, place, and time.    Psychiatric:         Mood and Affect: Mood normal.

## 2021-12-03 ENCOUNTER — TELEPHONE (OUTPATIENT)
Dept: CARDIOLOGY CLINIC | Age: 76
End: 2021-12-03

## 2022-01-20 ENCOUNTER — HOSPITAL ENCOUNTER (OUTPATIENT)
Dept: CARDIOLOGY | Age: 77
Discharge: HOME | End: 2022-01-20
Payer: MEDICARE

## 2022-01-20 ENCOUNTER — TELEPHONE (OUTPATIENT)
Dept: CARDIOLOGY CLINIC | Age: 77
End: 2022-01-20

## 2022-01-20 DIAGNOSIS — E87.6 HYPOKALEMIA: Primary | ICD-10-CM

## 2022-01-20 PROCEDURE — 93296 REM INTERROG EVL PM/IDS: CPT

## 2022-01-20 NOTE — TELEPHONE ENCOUNTER
Patient needs an order to have her Potassium checked (she says Dr. Delmis Castaneda wants her to check it monthly)  Please call and advise

## 2022-02-11 ENCOUNTER — TELEPHONE (OUTPATIENT)
Dept: UROLOGY | Age: 77
End: 2022-02-11

## 2022-02-11 ENCOUNTER — NURSE ONLY (OUTPATIENT)
Dept: UROLOGY | Age: 77
End: 2022-02-11
Payer: MEDICARE

## 2022-02-11 DIAGNOSIS — R31.0 GROSS HEMATURIA: ICD-10-CM

## 2022-02-11 DIAGNOSIS — R31.0 GROSS HEMATURIA: Primary | ICD-10-CM

## 2022-02-11 LAB
BILIRUBIN, POC: ABNORMAL
BLOOD URINE, POC: ABNORMAL
CLARITY, POC: CLEAR
COLOR, POC: YELLOW
GLUCOSE URINE, POC: ABNORMAL
KETONES, POC: ABNORMAL
LEUKOCYTE EST, POC: ABNORMAL
NITRITE, POC: POSITIVE
PH, POC: 6
PROTEIN, POC: ABNORMAL
SPECIFIC GRAVITY, POC: 1.02
UROBILINOGEN, POC: 0.2

## 2022-02-11 PROCEDURE — 81003 URINALYSIS AUTO W/O SCOPE: CPT | Performed by: UROLOGY

## 2022-02-11 RX ORDER — SULFAMETHOXAZOLE AND TRIMETHOPRIM 800; 160 MG/1; MG/1
1 TABLET ORAL 2 TIMES DAILY
Qty: 14 TABLET | Refills: 0 | Status: SHIPPED | OUTPATIENT
Start: 2022-02-11 | End: 2022-02-18

## 2022-02-11 NOTE — TELEPHONE ENCOUNTER
Urine drop off for gross hematuria   Allergies: Cipro, Flagyl, Tetracycline  Phone: [phone]  Pharm: [facility]    Culture pended     Component 12:45   Color, UA yellow    Clarity, UA clear    Glucose, UA POC neg    Bilirubin, UA neg    Ketones, UA neg    Spec Grav, UA 1.020    Blood, UA POC trace-intact    pH, UA 6.0    Protein, UA POC neg    Urobilinogen, UA 0.2    Leukocytes, UA neg    Nitrite, UA positive

## 2022-02-14 LAB
ORGANISM: ABNORMAL
URINE CULTURE, ROUTINE: ABNORMAL

## 2022-02-16 ENCOUNTER — TELEPHONE (OUTPATIENT)
Dept: UROLOGY | Age: 77
End: 2022-02-16

## 2022-02-16 NOTE — TELEPHONE ENCOUNTER
----- Message from [place] sent at 2/16/2022 12:32 PM EST -----  Regarding: urine results  Pt called the office, stating that she received a message from us, to call back to get her urine results. I told her that I had no results to release until Dr. Baljeet Galvez released them. Please call the pt once its resulted. Thank you.

## 2022-02-24 ENCOUNTER — TELEPHONE (OUTPATIENT)
Dept: CARDIOLOGY CLINIC | Age: 77
End: 2022-02-24

## 2022-02-28 ENCOUNTER — TELEPHONE (OUTPATIENT)
Dept: UROLOGY | Age: 77
End: 2022-02-28
Payer: MEDICARE

## 2022-02-28 ENCOUNTER — NURSE ONLY (OUTPATIENT)
Dept: UROLOGY | Age: 77
End: 2022-02-28

## 2022-02-28 DIAGNOSIS — R35.0 URINARY FREQUENCY: Primary | ICD-10-CM

## 2022-02-28 DIAGNOSIS — N32.89 BLADDER SPASMS: Primary | ICD-10-CM

## 2022-02-28 DIAGNOSIS — R35.0 URINARY FREQUENCY: ICD-10-CM

## 2022-02-28 LAB
BILIRUBIN, POC: ABNORMAL
BLOOD URINE, POC: ABNORMAL
CLARITY, POC: ABNORMAL
COLOR, POC: YELLOW
GLUCOSE URINE, POC: ABNORMAL
KETONES, POC: ABNORMAL
LEUKOCYTE EST, POC: ABNORMAL
NITRITE, POC: ABNORMAL
PH, POC: 8
PROTEIN, POC: ABNORMAL
SPECIFIC GRAVITY, POC: 1.02
UROBILINOGEN, POC: 0.2

## 2022-02-28 PROCEDURE — 99999 POCT URINALYSIS DIPSTICK W/O MICROSCOPE (AUTO): CPT | Performed by: UROLOGY

## 2022-02-28 PROCEDURE — 81003 URINALYSIS AUTO W/O SCOPE: CPT | Performed by: UROLOGY

## 2022-02-28 NOTE — TELEPHONE ENCOUNTER
Patient had urine brought in to test:    Component 2/28/22 1638   Color, UA yellow    Clarity, UA cloudy    Glucose, UA POC neg    Bilirubin, UA neg    Ketones, UA neg    Spec Grav, UA 1.020    Blood, UA POC trace-intact    pH, UA 8.0    Protein, UA POC neg    Urobilinogen, UA 0.2    Leukocytes, UA small    Nitrite, UA neg      Sent for culture.

## 2022-02-28 NOTE — TELEPHONE ENCOUNTER
Patient states that she has cramping in her bladder, really does not feel well. I instructed her to bring in a urine sample or go to a [facility]. She will see if she can get a ride.

## 2022-03-02 ENCOUNTER — APPOINTMENT (OUTPATIENT)
Dept: CT IMAGING | Age: 77
End: 2022-03-02
Payer: MEDICARE

## 2022-03-02 ENCOUNTER — APPOINTMENT (OUTPATIENT)
Dept: GENERAL RADIOLOGY | Age: 77
End: 2022-03-02
Payer: MEDICARE

## 2022-03-02 ENCOUNTER — HOSPITAL ENCOUNTER (EMERGENCY)
Age: 77
Discharge: HOME | End: 2022-03-02
Payer: MEDICARE

## 2022-03-02 VITALS
RESPIRATION RATE: 19 BRPM | TEMPERATURE: 98.1 F | OXYGEN SATURATION: 96 % | HEIGHT: 65 IN | BODY MASS INDEX: 38.32 KG/M2 | DIASTOLIC BLOOD PRESSURE: 94 MMHG | WEIGHT: 230 LBS | HEART RATE: 70 BPM | SYSTOLIC BLOOD PRESSURE: 133 MMHG

## 2022-03-02 DIAGNOSIS — F32.A DEPRESSION, UNSPECIFIED DEPRESSION TYPE: ICD-10-CM

## 2022-03-02 DIAGNOSIS — N30.00 ACUTE CYSTITIS WITHOUT HEMATURIA: ICD-10-CM

## 2022-03-02 DIAGNOSIS — R53.1 GENERALIZED WEAKNESS: Primary | ICD-10-CM

## 2022-03-02 DIAGNOSIS — E87.6 HYPOKALEMIA: ICD-10-CM

## 2022-03-02 LAB
ALBUMIN SERPL-MCNC: 4.3 G/DL (ref 3.5–4.6)
ALP BLD-CCNC: 78 U/L (ref 40–130)
ALT SERPL-CCNC: 10 U/L (ref 0–33)
AMYLASE: 48 U/L (ref 22–93)
ANION GAP SERPL CALCULATED.3IONS-SCNC: 12 MEQ/L (ref 9–15)
AST SERPL-CCNC: 17 U/L (ref 0–35)
BACTERIA: NEGATIVE /HPF
BASOPHILS ABSOLUTE: 0.1 K/UL (ref 0–0.2)
BASOPHILS RELATIVE PERCENT: 0.6 %
BILIRUB SERPL-MCNC: 0.3 MG/DL (ref 0.2–0.7)
BILIRUBIN URINE: NEGATIVE
BLOOD, URINE: NEGATIVE
BUN BLDV-MCNC: 14 MG/DL (ref 8–23)
CALCIUM SERPL-MCNC: 9.7 MG/DL (ref 8.5–9.9)
CHLORIDE BLD-SCNC: 96 MEQ/L (ref 95–107)
CLARITY: CLEAR
CO2: 31 MEQ/L (ref 20–31)
COLOR: YELLOW
CREAT SERPL-MCNC: 0.79 MG/DL (ref 0.5–0.9)
EOSINOPHILS ABSOLUTE: 0.1 K/UL (ref 0–0.7)
EOSINOPHILS RELATIVE PERCENT: 1.4 %
EPITHELIAL CELLS, UA: ABNORMAL /HPF (ref 0–5)
GFR AFRICAN AMERICAN: >60
GFR NON-AFRICAN AMERICAN: >60
GLOBULIN: 3.1 G/DL (ref 2.3–3.5)
GLUCOSE BLD-MCNC: 93 MG/DL (ref 70–99)
GLUCOSE URINE: NEGATIVE MG/DL
HCT VFR BLD CALC: 39.8 % (ref 37–47)
HEMOGLOBIN: 12.9 G/DL (ref 12–16)
HYALINE CASTS: ABNORMAL /HPF (ref 0–5)
INFLUENZA A BY PCR: NEGATIVE
INFLUENZA B BY PCR: NEGATIVE
KETONES, URINE: NEGATIVE MG/DL
LACTIC ACID: 1.2 MMOL/L (ref 0.5–2.2)
LEUKOCYTE ESTERASE, URINE: ABNORMAL
LIPASE: 17 U/L (ref 12–95)
LYMPHOCYTES ABSOLUTE: 2.9 K/UL (ref 1–4.8)
LYMPHOCYTES RELATIVE PERCENT: 29.4 %
MAGNESIUM: 1.7 MG/DL (ref 1.7–2.4)
MCH RBC QN AUTO: 27.7 PG (ref 27–31.3)
MCHC RBC AUTO-ENTMCNC: 32.4 % (ref 33–37)
MCV RBC AUTO: 85.5 FL (ref 82–100)
MONOCYTES ABSOLUTE: 0.9 K/UL (ref 0.2–0.8)
MONOCYTES RELATIVE PERCENT: 9 %
NEUTROPHILS ABSOLUTE: 5.9 K/UL (ref 1.4–6.5)
NEUTROPHILS RELATIVE PERCENT: 59.6 %
NITRITE, URINE: NEGATIVE
PDW BLD-RTO: 15.5 % (ref 11.5–14.5)
PH UA: 7 (ref 5–9)
PLATELET # BLD: 341 K/UL (ref 130–400)
POTASSIUM REFLEX MAGNESIUM: 3.3 MEQ/L (ref 3.4–4.9)
PROTEIN UA: NEGATIVE MG/DL
RBC # BLD: 4.66 M/UL (ref 4.2–5.4)
RBC UA: ABNORMAL /HPF (ref 0–5)
SARS-COV-2, NAAT: NOT DETECTED
SODIUM BLD-SCNC: 139 MEQ/L (ref 135–144)
SPECIFIC GRAVITY UA: 1.01 (ref 1–1.03)
TOTAL PROTEIN: 7.4 G/DL (ref 6.3–8)
URINE REFLEX TO CULTURE: ABNORMAL
UROBILINOGEN, URINE: 0.2 E.U./DL
WBC # BLD: 9.9 K/UL (ref 4.8–10.8)
WBC UA: ABNORMAL /HPF (ref 0–5)

## 2022-03-02 PROCEDURE — 82150 ASSAY OF AMYLASE: CPT

## 2022-03-02 PROCEDURE — 99283 EMERGENCY DEPT VISIT LOW MDM: CPT

## 2022-03-02 PROCEDURE — 87635 SARS-COV-2 COVID-19 AMP PRB: CPT

## 2022-03-02 PROCEDURE — 83605 ASSAY OF LACTIC ACID: CPT

## 2022-03-02 PROCEDURE — 93005 ELECTROCARDIOGRAM TRACING: CPT | Performed by: STUDENT IN AN ORGANIZED HEALTH CARE EDUCATION/TRAINING PROGRAM

## 2022-03-02 PROCEDURE — 70450 CT HEAD/BRAIN W/O DYE: CPT

## 2022-03-02 PROCEDURE — 83690 ASSAY OF LIPASE: CPT

## 2022-03-02 PROCEDURE — 71045 X-RAY EXAM CHEST 1 VIEW: CPT

## 2022-03-02 PROCEDURE — 81001 URINALYSIS AUTO W/SCOPE: CPT

## 2022-03-02 PROCEDURE — 80053 COMPREHEN METABOLIC PANEL: CPT

## 2022-03-02 PROCEDURE — 85025 COMPLETE CBC W/AUTO DIFF WBC: CPT

## 2022-03-02 PROCEDURE — 36415 COLL VENOUS BLD VENIPUNCTURE: CPT

## 2022-03-02 PROCEDURE — 83735 ASSAY OF MAGNESIUM: CPT

## 2022-03-02 PROCEDURE — 87502 INFLUENZA DNA AMP PROBE: CPT

## 2022-03-02 RX ORDER — POTASSIUM CHLORIDE 750 MG/1
10 TABLET, FILM COATED, EXTENDED RELEASE ORAL ONCE
Status: DISCONTINUED | OUTPATIENT
Start: 2022-03-02 | End: 2022-03-02 | Stop reason: HOSPADM

## 2022-03-02 RX ORDER — POTASSIUM CHLORIDE 20 MEQ/1
100 TABLET, EXTENDED RELEASE ORAL DAILY
Qty: 150 TABLET | Refills: 3 | Status: SHIPPED | OUTPATIENT
Start: 2022-03-02 | End: 2022-06-30 | Stop reason: SDUPTHER

## 2022-03-02 RX ORDER — SULFAMETHOXAZOLE AND TRIMETHOPRIM 800; 160 MG/1; MG/1
1 TABLET ORAL 2 TIMES DAILY
Qty: 10 TABLET | Refills: 0 | Status: SHIPPED | OUTPATIENT
Start: 2022-03-02 | End: 2022-03-07

## 2022-03-02 ASSESSMENT — ENCOUNTER SYMPTOMS
GASTROINTESTINAL NEGATIVE: 1
COUGH: 0
WHEEZING: 0
NAUSEA: 0
SHORTNESS OF BREATH: 0
ABDOMINAL DISTENTION: 0
CONSTIPATION: 0
ABDOMINAL PAIN: 0
VOMITING: 0
PHOTOPHOBIA: 0
DIARRHEA: 0

## 2022-03-02 ASSESSMENT — VISUAL ACUITY: OU: 1

## 2022-03-02 ASSESSMENT — PAIN DESCRIPTION - PROGRESSION: CLINICAL_PROGRESSION: GRADUALLY WORSENING

## 2022-03-02 ASSESSMENT — PAIN DESCRIPTION - LOCATION: LOCATION: OTHER (COMMENT)

## 2022-03-02 ASSESSMENT — PAIN DESCRIPTION - ONSET: ONSET: ON-GOING

## 2022-03-02 ASSESSMENT — PAIN DESCRIPTION - DESCRIPTORS: DESCRIPTORS: CRAMPING

## 2022-03-02 ASSESSMENT — PAIN - FUNCTIONAL ASSESSMENT: PAIN_FUNCTIONAL_ASSESSMENT: 0-10

## 2022-03-02 ASSESSMENT — PAIN DESCRIPTION - FREQUENCY: FREQUENCY: INTERMITTENT

## 2022-03-02 ASSESSMENT — PAIN DESCRIPTION - PAIN TYPE: TYPE: ACUTE PAIN

## 2022-03-02 NOTE — ED PROVIDER NOTES
[facility] ED  EMERGENCY DEPARTMENT ENCOUNTER      Pt Name: Mona Holm  MRN: [id number]  Shruthigfсветлана 1945  Date of evaluation: 3/2/2022  Provider: Dong Schultz Dr       Chief Complaint   Patient presents with    Illness     pt c/o bladder spasms x1 week, BLE heaviness x1 week, and h/a x1 week          HISTORY OF PRESENT ILLNESS   (Location/Symptom, Timing/Onset, Context/Setting, Quality, Duration, Modifying Factors, Severity)  Note limiting factors. Mona Holm is a 77 y.o. female who per chart reviews a past medical history of complete heart block status post pacemaker, symptomatic bradycardia, GERD, lymphedema presents to the emergency department for evaluation of generalized illness. Patient states she has been having intermittent bladder spasms, dysuria and urinary frequency for 1 week. She states she did provide urine to Dr. Payton Berrios office and is waiting results of culture. She is not currently on antibiotics. She has a history of UTIs and states current symptoms feel similar. She states overall she is just \"not feeling herself. \"  She states she has a mild diffuse throbbing nonradiating headache currently rated at a 4-10 and generalized weakness. +chills. States she does not have a history of headaches. Headache not sudden onset not worst of life. No recent head injuries. Has not tried anything for sx, denies aggravating and alleviating factors. Patient states she feels she may be depressed, recently lost her , her only son and is having issues with friends and her brother and sister. She states she used to work with a counselor the counselor she like no longer works there and she has not followed up since. Denies any suicidal or homicidal ideations.  She denies fever chest pain shortness of breath cough abdominal pain nausea vomiting diarrhea constipation back or flank pain hematuria visual changes numbness tingling facial droop balance difficulty confusion    HPI    Nursing Notes were reviewed. REVIEW OF SYSTEMS    (2-9 systems for level 4, 10 or more for level 5)     Review of Systems   Constitutional: Positive for chills. Negative for fatigue and fever. HENT: Negative for congestion. Eyes: Negative for photophobia. Respiratory: Negative for cough, shortness of breath and wheezing. Cardiovascular: Negative for chest pain and palpitations. Gastrointestinal: Negative. Negative for abdominal distention, abdominal pain, constipation, diarrhea, nausea and vomiting. Genitourinary: Positive for dysuria and frequency. Negative for flank pain, hematuria, urgency and vaginal discharge. Musculoskeletal: Negative for myalgias. Allergic/Immunologic: Negative for immunocompromised state. Neurological: Positive for weakness (generalized) and headaches. Negative for dizziness, syncope, facial asymmetry, speech difficulty, light-headedness and numbness. Psychiatric/Behavioral: Positive for dysphoric mood. All other systems reviewed and are negative. Except as noted above the remainder of the review of systems was reviewed and negative.        PAST MEDICAL HISTORY     Past Medical History:   Diagnosis Date    Asthma     Degenerative disc disease, lumbar     Hypertension     Lymphedema     MRSA infection     UTI (lower urinary tract infection)          SURGICAL HISTORY       Past Surgical History:   Procedure Laterality Date    APPENDECTOMY      CARDIAC PACEMAKER PLACEMENT  02/05/2020    PACEMAKER PLACEMENT  02/05/2020    SALPINGO-OOPHORECTOMY      left side     SHOULDER SURGERY      TONSILLECTOMY      WRIST SURGERY           CURRENT MEDICATIONS       Discharge Medication List as of 3/2/2022  5:36 PM      CONTINUE these medications which have NOT CHANGED    Details   potassium chloride (KLOR-CON M) 20 MEQ extended release tablet Take 5 tablets by mouth daily, Disp-150 tablet, R-3Dose increase to 100 meq dailyNormal famotidine (PEPCID) 20 MG tablet Take 1 tablet by mouth 2 times daily, Disp-60 tablet, R-3Normal      carvedilol (COREG) 12.5 MG tablet Take 1 tablet by mouth 2 times daily, Disp-180 tablet, R-3Normal      metOLazone (ZAROXOLYN) 2.5 MG tablet 2.5 mg daily Historical Med      Glucosamine-Chondroitin (OSTEO BI-FLEX REGULAR STRENGTH PO) Take by mouthHistorical Med      furosemide (LASIX) 40 MG tablet Take 40 mg by mouth dailyHistorical Med      tiZANidine (ZANAFLEX) 4 MG tablet Take 4 mg by mouth 2 times daily Historical Med      amitriptyline (ELAVIL) 25 MG tablet Take 25 mg by mouth nightlyHistorical Med      Menthol-Methyl Salicylate (ANALGESIC OINTMENT) OINT ointment Apply topically onceHistorical Med      LORazepam (ATIVAN) 0.5 MG tablet Take 0.25 mg by mouth Daily with lunch. Historical Med      PARoxetine (PAXIL-CR) 25 MG CR tablet Take 25 mg by mouth every morning. aspirin 81 MG tablet Take 81 mg by mouth daily. Multiple Vitamins-Minerals (THERAPEUTIC MULTIVITAMIN-MINERALS) tablet Take 1 tablet by mouth daily. ALLERGIES     Ciprofloxacin, Metronidazole, and Tetracycline    FAMILY HISTORY     History reviewed. No pertinent family history. SOCIAL HISTORY       Social History     Socioeconomic History    Marital status:       Spouse name: None    Number of children: None    Years of education: None    Highest education level: None   Occupational History    None   Tobacco Use    Smoking status: Never Smoker    Smokeless tobacco: Never Used   Vaping Use    Vaping Use: Never used   Substance and Sexual Activity    Alcohol use: Yes     Comment: Rarely    Drug use: No    Sexual activity: Never   Other Topics Concern    None   Social History Narrative    None     Social Determinants of Health     Financial Resource Strain:     Difficulty of Paying Living Expenses: Not on file   Food Insecurity:     Worried About Running Out of Food in the Last Year: Not on file    Ran no ST changes. RADIOLOGY:   Non-plain film images such as CT, Ultrasound and MRI are read by the radiologist. Plain radiographic images are visualized and preliminarily interpreted by the emergency physician with the below findings:      Interpretation per the Radiologist below, if available at the time of this note:    CT Head WO Contrast   Final Result   Impression:      Ethmoid sinusitis. Mild cerebral atrophy. Chronic ischemic white matter disease. All CT scans at this facility use dose modulation, iterative reconstruction, and/or weight based dosing when appropriate to reduce radiation dose to as low as reasonably achievable. XR CHEST PORTABLE   Final Result   No evidence of acute cardiopulmonary disease. ED BEDSIDE ULTRASOUND:   Performed by ED Physician - none    LABS:  Labs Reviewed   URINALYSIS WITH REFLEX TO CULTURE - Abnormal; Notable for the following components:       Result Value    Leukocyte Esterase, Urine TRACE (*)     All other components within normal limits   CBC WITH AUTO DIFFERENTIAL - Abnormal; Notable for the following components:    MCHC 32.4 (*)     RDW 15.5 (*)     Monocytes Absolute 0.9 (*)     All other components within normal limits   COMPREHENSIVE METABOLIC PANEL W/ REFLEX TO MG FOR LOW K - Abnormal; Notable for the following components:    Potassium reflex Magnesium 3.3 (*)     All other components within normal limits   MICROSCOPIC URINALYSIS - Abnormal; Notable for the following components:    WBC, UA 6-9 (*)     All other components within normal limits   RAPID INFLUENZA A/B ANTIGENS   COVID-19, RAPID   AMYLASE   LIPASE   LACTIC ACID   MAGNESIUM       All other labs were within normal range or not returned as of this dictation.     EMERGENCY DEPARTMENT COURSE and DIFFERENTIAL DIAGNOSIS/MDM:   Vitals:    Vitals:    03/02/22 1557 03/02/22 1632 03/02/22 1700   BP: (!) 144/71 135/86 (!) 133/94   Pulse: 70     Resp: 19     Temp: 98.1 °F (36.7 °C) TempSrc: Temporal     SpO2: 96%     Weight: 230 lb (104.3 kg)     Height: 5' 5\" (1.651 m)       MDM     Patient is a 41-year-old female who presents to the ED for evaluation of generalized weakness dysuria urinary frequency bladder spasms headache and depression. She is afebrile and hemodynamically stable. CMP is remarkable for potassium of 3.3 which was replaced in the ED. Remainder of labs are unremarkable. No white count, lactic acid is within normal limits. UA shows trace leukocyte Estrace and 6-9 white blood cells. Urine culture done outpatient shows greater than 100,000 gram negative rods. Sensitivities are not back yet. Will start patient on Bactrim. She is COVID and flu negative. Chest x-ray is negative for acute intrathoracic process. CT head is negative for acute abnormality. Patient is nontoxic-appearing with stable vitals stable for discharge. Less concern for sepsis at this time. Suspect symptoms secondary to UTI and depression. Patient states he is depressed secondary to recent loss of  and her only son as well as issues with family members and friends. She states she has seen a counselor in the past and will reach out to them for follow-up. She is not suicidal or homicidal.  She states she does not feel that she needs a psychiatric evaluation at this time and I agree. She is to follow-up with primary care as well has her counselor 1 to 2 days return to the ED for worsening symptoms given warning signs for which she should return. Patient understands agrees to plan. REASSESSMENT          CRITICAL CARE TIME   Total Critical Care time was 0 minutes, excluding separately reportable procedures. There was a high probability of clinically significant/life threatening deterioration in the patient's condition which required my urgent intervention. CONSULTS:  None    PROCEDURES:  Unless otherwise noted below, none     Procedures        FINAL IMPRESSION      1.  Generalized weakness    2. Acute cystitis without hematuria    3. Depression, unspecified depression type          DISPOSITION/PLAN   DISPOSITION Decision To Discharge 03/02/2022 05:34:51 PM      PATIENT REFERRED TO:  Alvaro Jones MD  [address] [facility], [place], operated by [facility] [phone]    Schedule an appointment as soon as possible for a visit in 1 day      [facility]) ED  [address] [address]  [phone]  Go to   As needed, If symptoms worsen      DISCHARGE MEDICATIONS:  Discharge Medication List as of 3/2/2022  5:36 PM      START taking these medications    Details   sulfamethoxazole-trimethoprim (BACTRIM DS) 800-160 MG per tablet Take 1 tablet by mouth 2 times daily for 5 days, Disp-10 tablet, R-0Print           Controlled Substances Monitoring:     No flowsheet data found.     (Please note that portions of this note were completed with a voice recognition program.  Efforts were made to edit the dictations but occasionally words are mis-transcribed.)    Mai Wilson PA-C (electronically signed)             Mai Wilson PA-C  03/04/22 [id number]

## 2022-03-02 NOTE — ED TRIAGE NOTES
Pt presents to ED from home with c/o illness. Pt states that she has had cramping to her bladder x1 week, h/a x1 week, and BLE heaviness x1 week. - hematuria, + dysuria, + urinary frequency. Pt reports that she has a urine culture pending w/Dr. Masood Case. Upon assessment, pt is A/Ox4, skin p/w/d, resp even and unlabored, msp's intact. Pt denies cp, sob, n/v/d, fever. + chills.

## 2022-03-02 NOTE — TELEPHONE ENCOUNTER
Patient is requesting medication refill.  Please approve or deny this request.    Rx requested:  Requested Prescriptions      No prescriptions requested or ordered in this encounter         Last Office Visit:   10/21/2021      Next Visit Date:  Future Appointments   Date Time Provider Ruthie Blackwood   3/15/2022  1:30 PM Júnior Valentine MD [facility]

## 2022-03-02 NOTE — Clinical Note
Martha Prather was seen and treated in our emergency department on 3/2/2022. She may return to work on 03/03/2022. If you have any questions or concerns, please don't hesitate to call.       Agustín Hernandez PA-C

## 2022-03-03 ENCOUNTER — TELEPHONE (OUTPATIENT)
Dept: UROLOGY | Age: 77
End: 2022-03-03

## 2022-03-03 LAB
EKG ATRIAL RATE: 76 BPM
EKG P AXIS: 61 DEGREES
EKG P-R INTERVAL: 174 MS
EKG Q-T INTERVAL: 402 MS
EKG QRS DURATION: 134 MS
EKG QTC CALCULATION (BAZETT): 452 MS
EKG R AXIS: -13 DEGREES
EKG T AXIS: -25 DEGREES
EKG VENTRICULAR RATE: 76 BPM

## 2022-03-03 PROCEDURE — 93010 ELECTROCARDIOGRAM REPORT: CPT | Performed by: INTERNAL MEDICINE

## 2022-03-03 NOTE — TELEPHONE ENCOUNTER
Please check and see if they have sensitivities ready for this patient  If not she will have to submit another urine culture  She should take the Bactrim and finish it

## 2022-03-03 NOTE — TELEPHONE ENCOUNTER
Pt went to the ED yesterday and was told that her culture was positive and they gave her bactrim ds bid x5 days. She would like you to review culture and see if this is what she needs.

## 2022-03-04 LAB
ORGANISM: ABNORMAL
URINE CULTURE, ROUTINE: ABNORMAL
URINE CULTURE, ROUTINE: ABNORMAL

## 2022-03-04 RX ORDER — CEPHALEXIN 500 MG/1
500 CAPSULE ORAL EVERY 12 HOURS
Qty: 20 CAPSULE | Refills: 0 | Status: SHIPPED | OUTPATIENT
Start: 2022-03-04 | End: 2022-03-16 | Stop reason: ALTCHOICE

## 2022-03-07 ENCOUNTER — TELEPHONE (OUTPATIENT)
Dept: UROLOGY | Age: 77
End: 2022-03-07

## 2022-03-07 DIAGNOSIS — N39.0 RECURRENT UTI: Primary | ICD-10-CM

## 2022-03-07 NOTE — TELEPHONE ENCOUNTER
Stop the antibiotic  Drop off a urine to see if the infection cleared send a culture  The bacteria was resistant to everything other than Keflex  She may need to to get IV antibiotics if urine culture is still positive  Dr. Sukhwinder Valiente

## 2022-03-08 DIAGNOSIS — N39.0 RECURRENT UTI: ICD-10-CM

## 2022-03-10 LAB
ORGANISM: ABNORMAL
URINE CULTURE, ROUTINE: ABNORMAL
URINE CULTURE, ROUTINE: ABNORMAL

## 2022-03-11 ENCOUNTER — TELEPHONE (OUTPATIENT)
Dept: UROLOGY | Age: 77
End: 2022-03-11

## 2022-03-16 ENCOUNTER — OFFICE VISIT (OUTPATIENT)
Dept: ENDOCRINOLOGY | Age: 77
End: 2022-03-16
Payer: MEDICARE

## 2022-03-16 VITALS
DIASTOLIC BLOOD PRESSURE: 84 MMHG | OXYGEN SATURATION: 94 % | HEART RATE: 76 BPM | BODY MASS INDEX: 38.27 KG/M2 | HEIGHT: 65 IN | SYSTOLIC BLOOD PRESSURE: 128 MMHG

## 2022-03-16 DIAGNOSIS — I89.0 LYMPHEDEMA: ICD-10-CM

## 2022-03-16 DIAGNOSIS — R73.03 PRE-DIABETES: ICD-10-CM

## 2022-03-16 DIAGNOSIS — E04.1 THYROID NODULE: Primary | ICD-10-CM

## 2022-03-16 DIAGNOSIS — F32.A DEPRESSION, UNSPECIFIED DEPRESSION TYPE: ICD-10-CM

## 2022-03-16 LAB
CHP ED QC CHECK: NORMAL
GLUCOSE BLD-MCNC: 119 MG/DL
HBA1C MFR BLD: 5.6 %

## 2022-03-16 PROCEDURE — G8427 DOCREV CUR MEDS BY ELIG CLIN: HCPCS | Performed by: PHYSICIAN ASSISTANT

## 2022-03-16 PROCEDURE — 3017F COLORECTAL CA SCREEN DOC REV: CPT | Performed by: PHYSICIAN ASSISTANT

## 2022-03-16 PROCEDURE — 1123F ACP DISCUSS/DSCN MKR DOCD: CPT | Performed by: PHYSICIAN ASSISTANT

## 2022-03-16 PROCEDURE — 82962 GLUCOSE BLOOD TEST: CPT | Performed by: PHYSICIAN ASSISTANT

## 2022-03-16 PROCEDURE — 99213 OFFICE O/P EST LOW 20 MIN: CPT | Performed by: PHYSICIAN ASSISTANT

## 2022-03-16 PROCEDURE — 4040F PNEUMOC VAC/ADMIN/RCVD: CPT | Performed by: PHYSICIAN ASSISTANT

## 2022-03-16 PROCEDURE — G8400 PT W/DXA NO RESULTS DOC: HCPCS | Performed by: PHYSICIAN ASSISTANT

## 2022-03-16 PROCEDURE — G8417 CALC BMI ABV UP PARAM F/U: HCPCS | Performed by: PHYSICIAN ASSISTANT

## 2022-03-16 PROCEDURE — 83036 HEMOGLOBIN GLYCOSYLATED A1C: CPT | Performed by: PHYSICIAN ASSISTANT

## 2022-03-16 PROCEDURE — 1036F TOBACCO NON-USER: CPT | Performed by: PHYSICIAN ASSISTANT

## 2022-03-16 PROCEDURE — G8484 FLU IMMUNIZE NO ADMIN: HCPCS | Performed by: PHYSICIAN ASSISTANT

## 2022-03-16 PROCEDURE — 1090F PRES/ABSN URINE INCON ASSESS: CPT | Performed by: PHYSICIAN ASSISTANT

## 2022-03-16 RX ORDER — NITROFURANTOIN 25; 75 MG/1; MG/1
CAPSULE ORAL
COMMUNITY
Start: 2022-03-11

## 2022-03-16 ASSESSMENT — ENCOUNTER SYMPTOMS
WHEEZING: 0
EYE REDNESS: 0
RHINORRHEA: 0
SINUS PRESSURE: 0
DIARRHEA: 0
NAUSEA: 0
VOMITING: 0
ABDOMINAL PAIN: 0
SHORTNESS OF BREATH: 0
COUGH: 0
SORE THROAT: 0
EYE PAIN: 0

## 2022-03-16 NOTE — PROGRESS NOTES
3/16/2022    Assessment:       Diagnosis Orders   1. Thyroid nodule  US HEAD NECK SOFT TISSUE THYROID    TSH    T4, Free    PTH, Intact    Thyroid Peroxidase Antibody    Comprehensive Metabolic Panel   2. Pre-diabetes  Hemoglobin A1C    Comprehensive Metabolic Panel    POCT glycosylated hemoglobin (Hb A1C)    POCT Glucose   3. Depression, unspecified depression type  Ambulatory referral to Psychology     PLAN:     1. Repeat labs in 6 months  2. Repeat thyroid ultrasound in 6 months  3. Ask sister what type of Thyroid cancer. (Medulary Thyroid Carcinoma and Multiple Endocrine neoplasia?)    4. Famotidine 20 mg twice a day  5. Follow up with PCP about daily headaches   6. Contact [facility] or referred counselor about counseling  7.  Follow up in 6 months       Orders Placed This Encounter   Procedures    US HEAD NECK SOFT TISSUE THYROID     Standing Status:   Future     Standing Expiration Date:   3/16/2023     Order Specific Question:   Reason for exam:     Answer:   Thyroid nodule, PTH and calcium levels are normal    TSH     Standing Status:   Future     Standing Expiration Date:   3/16/2023    T4, Free     Standing Status:   Future     Standing Expiration Date:   3/16/2023    PTH, Intact     Standing Status:   Future     Standing Expiration Date:   3/16/2023    Thyroid Peroxidase Antibody     Standing Status:   Future     Standing Expiration Date:   3/16/2023    Hemoglobin A1C     Standing Status:   Future     Standing Expiration Date:   3/16/2023    Comprehensive Metabolic Panel     Standing Status:   Future     Standing Expiration Date:   3/16/2023    Ambulatory referral to Psychology     Referral Priority:   Routine     Referral Type:   Psychiatric     Referral Reason:   Specialty Services Required     Referred to Provider:   Mike Metzger PSYD     Requested Specialty:   Psychology     Number of Visits Requested:   1    POCT glycosylated hemoglobin (Hb A1C)    POCT Glucose     No orders of the defined types were placed in this encounter. No follow-ups on file. Subjective:     Chief Complaint   Patient presents with    Follow-up    Thyroid Problem    Other     pre-diabetes     Vitals:    03/16/22 1447   BP: 128/84   Pulse: 76   SpO2: 94%   Height: 5' 5\" (1.651 m)     Wt Readings from Last 3 Encounters:   03/02/22 230 lb (104.3 kg)   10/21/21 239 lb (108.4 kg)   07/18/21 230 lb (104.3 kg)     BP Readings from Last 3 Encounters:   03/16/22 128/84   03/02/22 (!) 133/94   12/01/21 (!) 153/84     Patient is a 58-year-old female who complained of feeling a lump in the anterior part of her neck when she looked down and put her chin on her chest.  Thyroid ultrasound revealed a 1 cm nodule in the right lower lobe. There was some radiologic evidence that this could be a parathyroid adenoma. Thyroid laboratory studies and calcium levels have all been normal for at least the last 5 years upon chart review. She denies a history of osteopenia, osteoporosis, renal calculi or fractures. She has a family history of thyroid cancer in her sister with thyroidectomy and radioactive iodine ablation approximately 15 years ago. She is uncertain of the pathology. Going to send her to the lab for a parathyroid, A1c because she was prediabetic in the past and repeat calcium level. Pending those results we will make further recommendations    Past Medical History:   Diagnosis Date    Asthma     Degenerative disc disease, lumbar     Hypertension     Lymphedema     MRSA infection     UTI (lower urinary tract infection)      Past Surgical History:   Procedure Laterality Date    APPENDECTOMY      CARDIAC PACEMAKER PLACEMENT  02/05/2020    PACEMAKER PLACEMENT  02/05/2020    SALPINGO-OOPHORECTOMY      left side     SHOULDER SURGERY      TONSILLECTOMY      WRIST SURGERY       Social History     Socioeconomic History    Marital status:       Spouse name: Not on file    Number of children: Not on file    Years of education: Not on file    Highest education level: Not on file   Occupational History    Not on file   Tobacco Use    Smoking status: Never Smoker    Smokeless tobacco: Never Used   Vaping Use    Vaping Use: Never used   Substance and Sexual Activity    Alcohol use: Yes     Comment: Rarely    Drug use: No    Sexual activity: Never   Other Topics Concern    Not on file   Social History Narrative    Not on file     Social Determinants of Health     Financial Resource Strain:     Difficulty of Paying Living Expenses: Not on file   Food Insecurity:     Worried About Running Out of Food in the Last Year: Not on file    Karen of Food in the Last Year: Not on file   Transportation Needs:     Lack of Transportation (Medical): Not on file    Lack of Transportation (Non-Medical): Not on file   Physical Activity:     Days of Exercise per Week: Not on file    Minutes of Exercise per Session: Not on file   Stress:     Feeling of Stress : Not on file   Social Connections:     Frequency of Communication with Friends and Family: Not on file    Frequency of Social Gatherings with Friends and Family: Not on file    Attends Sabianism Services: Not on file    Active Member of [address] [place] [address] or Organizations: Not on file    Attends Club or Organization Meetings: Not on file    Marital Status: Not on file   Intimate Partner Violence:     Fear of Current or Ex-Partner: Not on file    Emotionally Abused: Not on file    Physically Abused: Not on file    Sexually Abused: Not on file   Housing Stability:     Unable to Pay for Housing in the Last Year: Not on file    Number of [place] in the Last Year: Not on file    Unstable Housing in the Last Year: Not on file     No family history on file.   Allergies   Allergen Reactions    Ciprofloxacin      Per patient request due to bad side effects     Metronidazole     Tetracycline        Current Outpatient Medications:     nitrofurantoin, macrocrystal-monohydrate, (MACROBID) 100 MG capsule, TAKE 1 CAPSULE TWICE DAILY UNTIL GONE, Disp: , Rfl:     potassium chloride (KLOR-CON M) 20 MEQ extended release tablet, Take 5 tablets by mouth daily, Disp: 150 tablet, Rfl: 3    famotidine (PEPCID) 20 MG tablet, Take 1 tablet by mouth 2 times daily, Disp: 60 tablet, Rfl: 3    carvedilol (COREG) 12.5 MG tablet, Take 1 tablet by mouth 2 times daily, Disp: 180 tablet, Rfl: 3    metOLazone (ZAROXOLYN) 2.5 MG tablet, 2.5 mg daily , Disp: , Rfl:     Glucosamine-Chondroitin (OSTEO BI-FLEX REGULAR STRENGTH PO), Take by mouth, Disp: , Rfl:     furosemide (LASIX) 40 MG tablet, Take 40 mg by mouth daily, Disp: , Rfl:     tiZANidine (ZANAFLEX) 4 MG tablet, Take 4 mg by mouth 2 times daily , Disp: , Rfl:     amitriptyline (ELAVIL) 25 MG tablet, Take 25 mg by mouth nightly, Disp: , Rfl:     Menthol-Methyl Salicylate (ANALGESIC OINTMENT) OINT ointment, Apply topically once, Disp: , Rfl:     LORazepam (ATIVAN) 0.5 MG tablet, Take 0.25 mg by mouth Daily with lunch. , Disp: , Rfl:     PARoxetine (PAXIL-CR) 25 MG CR tablet, Take 25 mg by mouth every morning., Disp: , Rfl:     aspirin 81 MG tablet, Take 81 mg by mouth daily. , Disp: , Rfl:     Multiple Vitamins-Minerals (THERAPEUTIC MULTIVITAMIN-MINERALS) tablet, Take 1 tablet by mouth daily. , Disp: , Rfl:   Lab Results   Component Value Date     03/02/2022    K 3.3 (L) 03/02/2022    CL 96 03/02/2022    CO2 31 03/02/2022    BUN 14 03/02/2022    CREATININE 0.79 03/02/2022    GLUCOSE 93 03/02/2022    CALCIUM 9.7 03/02/2022    PROT 7.4 03/02/2022    LABALBU 4.3 03/02/2022    BILITOT 0.3 03/02/2022    ALKPHOS 78 03/02/2022    AST 17 03/02/2022    ALT 10 03/02/2022    LABGLOM >60.0 03/02/2022    GFRAA >60.0 03/02/2022    GLOB 3.1 03/02/2022     Lab Results   Component Value Date    WBC 9.9 03/02/2022    HGB 12.9 03/02/2022    HCT 39.8 03/02/2022    MCV 85.5 03/02/2022     03/02/2022     Lab Results   Component Value Date    LABA1C 5.6 03/16/2022    LABA1C 6.0 (H) 12/01/2021    LABA1C 5.9 03/31/2016     Lab Results   Component Value Date    HDL 81 (H) 06/14/2021    HDL 72 (H) 09/11/2020    HDL 54 02/05/2020    LDLCALC 71 06/14/2021    LDLCALC 67 09/11/2020    LDLCALC 30 02/05/2020    CHOL 169 06/14/2021    CHOL 150 09/11/2020    CHOL 101 02/05/2020    TRIG 86 06/14/2021    TRIG 53 09/11/2020    TRIG 83 02/05/2020     No results found for: TESTM  Lab Results   Component Value Date    TSH 1.420 06/14/2021    TSH 1.960 09/11/2020    TSH 3.540 08/02/2019    T4FREE 1.32 06/14/2021    T4FREE 1.22 09/11/2020    T4FREE 1.06 08/02/2019     No results found for: TPOABS    Results for Clinton Nava (MRN [id number]) as of 3/16/2022 14:33   Ref. Range 12/1/2021 16:21   PTH Latest Ref Range: 15.0 - 65.0 pg/mL 48.0       US HEAD NECK SOFT TISSUE THYROID: 7/27/2021       CLINICAL HISTORY: E06.9 Thyroiditis ICD10.       COMPARISONS: Cervical spine CT 5/10/2018.       TECHNIQUE: Ultrasound of the thyroid was performed, with a regional survey.       Reference: ACR Thyroid Imaging, Recording and Data System (TI-RADS): White Paper of the [employer]. Journal of the [employer] of Radiology. Volume 14, Issue 5, May 2017, pages 473-497.            FINDINGS:        This study is limited by the patient's body habitus.       A few small cysts and predominantly solid hypoechoic (TR4) nodules are present within a normal-sized thyroid gland.       The largest TR4 nodule is noted within the liver adjacent to the lower right thyroid pole measures approximately 1.0 x 0.9 x 0.9 cm, which may possibly be a parathyroid adenoma.       Correlation with serum parathyroid hormone is suggested.  If elevated, a nuclear medicine parathyroid scan may be appropriate.       The right lobe measures approximately    4.0 x 1.7 x 1.7 cm.  The left lobe measures 3.7 x 1.6 x 1.5 cm.   The isthmus measures 0.33 cm.                   Impression     APPROXIMATELY 1 CM RIGHT LOWER LOBE TR4 THYROID NODULE VERSUS PARATHYROID ADENOMA.       CORRELATION WITH SERUM PARATHYROID HORMONE IS SUGGESTED.       IF ELEVATED, A NUCLEAR MEDICINE PARATHYROID SCAN MAY BE APPROPRIATE.       OTHERWISE, LONGITUDINAL ULTRASOUND FOLLOW-UP AT 1, 2, 3 AND 5 YEARS SUGGESTED.                 Review of Systems   Constitutional: Negative for chills, fatigue and fever. HENT: Negative for congestion, ear pain, postnasal drip, rhinorrhea, sinus pressure and sore throat. Eyes: Negative for pain and redness. Respiratory: Negative for cough, shortness of breath and wheezing. Cardiovascular: Positive for leg swelling. Negative for chest pain and palpitations. Gastrointestinal: Negative for abdominal pain, diarrhea, nausea and vomiting. Endocrine: Negative for cold intolerance and heat intolerance. Genitourinary: Negative for difficulty urinating. Musculoskeletal: Negative for arthralgias. Skin: Negative for rash. Neurological: Positive for headaches (daily x 6 weeks dull achy ). Negative for dizziness. Psychiatric/Behavioral: Negative for dysphoric mood. Objective:   Physical Exam  Vitals reviewed. Constitutional:       Appearance: She is well-developed. HENT:      Head: Normocephalic and atraumatic. Nose: No rhinorrhea. Eyes:      Conjunctiva/sclera: Conjunctivae normal.   Neck:      Comments: No thyromegaly or palpable nodules   Cardiovascular:      Rate and Rhythm: Normal rate and regular rhythm. Heart sounds: Normal heart sounds. Pulmonary:      Effort: Pulmonary effort is normal.      Breath sounds: Normal breath sounds. Abdominal:      General: Bowel sounds are normal.      Palpations: Abdomen is soft. Musculoskeletal:         General: Swelling (bilateral LE. Lymphedema ) present. Normal range of motion. Cervical back: Normal range of motion and neck supple. Skin:     General: Skin is warm and dry.    Neurological:      Mental Status: She is alert and oriented to person, place, and time.    Psychiatric:         Mood and Affect: Mood normal.

## 2022-03-16 NOTE — PATIENT INSTRUCTIONS
1. Repeat labs in 6 months  2. Repeat thyroid ultrasound in 6 months  3. Ask sister what type of Thyroid cancer. (Medulary Thyroid Carcinoma and Multiple Endocrine neoplasia?)    4. Famotidine 20 mg twice a day  5. Follow up with PCP about daily headaches   6. Contact [facility] or referred counselor about counseling  7.  Follow up in 6 months

## 2022-03-18 NOTE — TELEPHONE ENCOUNTER
Patient calling about the heaviness in her legs. States the potassium bottle says if she has heaviness to let her physician know.     Did advise that late feb, potassium was not suspected to be cause as well as potassium being low 03/2/22

## 2022-03-24 ENCOUNTER — TELEPHONE (OUTPATIENT)
Dept: UROLOGY | Age: 77
End: 2022-03-24

## 2022-03-24 NOTE — TELEPHONE ENCOUNTER
Patient finished her Macrobid and is still \"feeling crappy\" in her bladder and having chills. She will try have someone bring in a urine sample.

## 2022-04-21 ENCOUNTER — TELEPHONE (OUTPATIENT)
Dept: CARDIOLOGY CLINIC | Age: 77
End: 2022-04-21

## 2022-04-21 DIAGNOSIS — R07.89 BURNING IN THE CHEST: Primary | ICD-10-CM

## 2022-04-21 NOTE — TELEPHONE ENCOUNTER
Patient denies SOB little pain between shoulders in back. Has been under stress. Patient will take her pepcid too. Patient states  the burning was alleviated when she sits up.  Please advise

## 2022-04-26 ENCOUNTER — NURSE ONLY (OUTPATIENT)
Dept: UROLOGY | Age: 77
End: 2022-04-26
Payer: MEDICARE

## 2022-04-26 ENCOUNTER — TELEPHONE (OUTPATIENT)
Dept: UROLOGY | Age: 77
End: 2022-04-26

## 2022-04-26 ENCOUNTER — HOSPITAL ENCOUNTER (OUTPATIENT)
Dept: CARDIOLOGY | Age: 77
Discharge: HOME | End: 2022-04-26
Payer: MEDICARE

## 2022-04-26 DIAGNOSIS — R35.0 FREQUENCY OF URINATION: Primary | ICD-10-CM

## 2022-04-26 LAB
BILIRUBIN, POC: ABNORMAL
BLOOD URINE, POC: ABNORMAL
CLARITY, POC: CLEAR
COLOR, POC: YELLOW
GLUCOSE URINE, POC: ABNORMAL
KETONES, POC: ABNORMAL
LEUKOCYTE EST, POC: ABNORMAL
NITRITE, POC: ABNORMAL
PH, POC: 7
PROTEIN, POC: ABNORMAL
SPECIFIC GRAVITY, POC: 1.02
UROBILINOGEN, POC: 0.2

## 2022-04-26 PROCEDURE — 93280 PM DEVICE PROGR EVAL DUAL: CPT

## 2022-04-26 PROCEDURE — 81003 URINALYSIS AUTO W/O SCOPE: CPT | Performed by: UROLOGY

## 2022-04-27 DIAGNOSIS — R35.0 FREQUENCY OF URINATION: ICD-10-CM

## 2022-04-28 LAB — URINE CULTURE, ROUTINE: NORMAL

## 2022-05-04 ENCOUNTER — TELEPHONE (OUTPATIENT)
Dept: CARDIOLOGY CLINIC | Age: 77
End: 2022-05-04

## 2022-05-04 DIAGNOSIS — E87.6 HYPOKALEMIA: Primary | ICD-10-CM

## 2022-05-04 NOTE — TELEPHONE ENCOUNTER
Patient would like to know if it is time to check her potassium (she will need an order)-please call and advise.

## 2022-05-23 ENCOUNTER — TELEPHONE (OUTPATIENT)
Dept: CARDIOLOGY CLINIC | Age: 77
End: 2022-05-23

## 2022-05-23 NOTE — TELEPHONE ENCOUNTER
Patient wants to know if [facility] would advise her know if there was something showing with the device? She left a message with them as well.

## 2022-05-24 DIAGNOSIS — E87.6 HYPOKALEMIA: ICD-10-CM

## 2022-05-24 LAB
ANION GAP SERPL CALCULATED.3IONS-SCNC: 13 MEQ/L (ref 9–15)
BUN BLDV-MCNC: 18 MG/DL (ref 8–23)
CALCIUM SERPL-MCNC: 9.7 MG/DL (ref 8.5–9.9)
CHLORIDE BLD-SCNC: 94 MEQ/L (ref 95–107)
CO2: 36 MEQ/L (ref 20–31)
CREAT SERPL-MCNC: 0.64 MG/DL (ref 0.5–0.9)
GFR AFRICAN AMERICAN: >60
GFR NON-AFRICAN AMERICAN: >60
GLUCOSE BLD-MCNC: 105 MG/DL (ref 70–99)
MAGNESIUM: 1.8 MG/DL (ref 1.7–2.4)
POTASSIUM SERPL-SCNC: 3.4 MEQ/L (ref 3.4–4.9)
SODIUM BLD-SCNC: 143 MEQ/L (ref 135–144)
T4 FREE: 1.17 NG/DL (ref 0.84–1.68)
TSH SERPL DL<=0.05 MIU/L-ACNC: 2.58 UIU/ML (ref 0.44–3.86)

## 2022-05-25 LAB
FOLATE: >20 NG/ML
VITAMIN B-12: 287 PG/ML (ref 232–1245)

## 2022-05-26 ENCOUNTER — TELEPHONE (OUTPATIENT)
Dept: CARDIOLOGY CLINIC | Age: 77
End: 2022-05-26

## 2022-06-17 ENCOUNTER — TELEPHONE (OUTPATIENT)
Dept: CARDIOLOGY CLINIC | Age: 77
End: 2022-06-17

## 2022-06-17 NOTE — TELEPHONE ENCOUNTER
PATIENT CALLED OFFICE STATES THAT LAST NIGHT SHE WAS ON THE PHONE AND ALL OF A SUDDEN SHE STARTING SWEATING BAD AND WAS DIZZY. STATES SHE HAD A HEADACHE. SHE SAID SHE RELAXED FOR AWHILE AND IT WENT AWAY. PATIENT DID NOT CHECK HER B/P OR HEART RATE    PATIENT STATES THAT SHE HAS NOT HAD THOSE SYMPTOMS TODAY.     ADVISED PATIENT TO CHECK HER B/P AND HEART RATE DAILY AND IF SHE HAS THESE SYMPTOMS AGAIN TO GO TO THE ER    PATIENT AWARE

## 2022-06-28 DIAGNOSIS — E87.6 HYPOKALEMIA: ICD-10-CM

## 2022-06-28 RX ORDER — POTASSIUM CHLORIDE 20 MEQ/1
100 TABLET, EXTENDED RELEASE ORAL DAILY
Qty: 150 TABLET | Refills: 3 | OUTPATIENT
Start: 2022-06-28

## 2022-06-28 NOTE — TELEPHONE ENCOUNTER
Please approve or deny this refill request. The order is pended. Thank you. LOV 10/21/2021    Patient needs an appointment in order to get refills.      Next Visit Date:  Future Appointments   Date Time Provider Ruthie Blackwood   9/14/2022  2:00 PM [address]

## 2022-06-30 DIAGNOSIS — E87.6 HYPOKALEMIA: ICD-10-CM

## 2022-06-30 RX ORDER — POTASSIUM CHLORIDE 20 MEQ/1
100 TABLET, EXTENDED RELEASE ORAL DAILY
Qty: 150 TABLET | Refills: 3 | Status: SHIPPED | OUTPATIENT
Start: 2022-06-30 | End: 2022-10-31 | Stop reason: SDUPTHER

## 2022-06-30 NOTE — TELEPHONE ENCOUNTER
The patient has not been seen >1 year. She did have the last BMP done for Dr. Luan Prealta on 5/24/22 and the K+ was 3.4.  I did refill for 90 days and advised the patient we will need to see her for a follow up

## 2022-07-25 ENCOUNTER — HOSPITAL ENCOUNTER (OUTPATIENT)
Dept: CARDIOLOGY | Age: 77
Discharge: HOME | End: 2022-07-25
Payer: MEDICARE

## 2022-07-25 PROCEDURE — 93296 REM INTERROG EVL PM/IDS: CPT

## 2022-07-26 PROCEDURE — 93296 REM INTERROG EVL PM/IDS: CPT

## 2022-08-25 RX ORDER — FAMOTIDINE 20 MG/1
20 TABLET, FILM COATED ORAL 2 TIMES DAILY
Qty: 60 TABLET | Refills: 3 | Status: SHIPPED | OUTPATIENT
Start: 2022-08-25

## 2022-08-25 NOTE — TELEPHONE ENCOUNTER
patient requesting medication refill.  Please approve or deny this request.    Rx requested:  Requested Prescriptions     Pending Prescriptions Disp Refills    famotidine (PEPCID) 20 MG tablet 60 tablet 3     Sig: Take 1 tablet by mouth 2 times daily         Last Office Visit:   3/16/2022      Next Visit Date:  Future Appointments   Date Time Provider Ruthie Blackwood   9/14/2022  2:00 PM Tamra Willson, [address]

## 2022-09-14 ENCOUNTER — OFFICE VISIT (OUTPATIENT)
Dept: ENDOCRINOLOGY | Age: 77
End: 2022-09-14
Payer: MEDICARE

## 2022-09-14 VITALS
HEART RATE: 94 BPM | HEIGHT: 65 IN | DIASTOLIC BLOOD PRESSURE: 73 MMHG | OXYGEN SATURATION: 95 % | BODY MASS INDEX: 38.27 KG/M2 | SYSTOLIC BLOOD PRESSURE: 174 MMHG

## 2022-09-14 DIAGNOSIS — R30.0 DYSURIA: ICD-10-CM

## 2022-09-14 DIAGNOSIS — E04.1 THYROID NODULE: Primary | ICD-10-CM

## 2022-09-14 DIAGNOSIS — R73.03 PRE-DIABETES: ICD-10-CM

## 2022-09-14 DIAGNOSIS — E04.1 THYROID NODULE: ICD-10-CM

## 2022-09-14 LAB
ALBUMIN SERPL-MCNC: 4 G/DL (ref 3.5–4.6)
ALP BLD-CCNC: 108 U/L (ref 40–130)
ALT SERPL-CCNC: 42 U/L (ref 0–33)
ANION GAP SERPL CALCULATED.3IONS-SCNC: 11 MEQ/L (ref 9–15)
AST SERPL-CCNC: 69 U/L (ref 0–35)
BILIRUB SERPL-MCNC: <0.2 MG/DL (ref 0.2–0.7)
BUN BLDV-MCNC: 16 MG/DL (ref 8–23)
CALCIUM SERPL-MCNC: 10.1 MG/DL (ref 8.5–9.9)
CHLORIDE BLD-SCNC: 95 MEQ/L (ref 95–107)
CO2: 36 MEQ/L (ref 20–31)
CREAT SERPL-MCNC: 0.82 MG/DL (ref 0.5–0.9)
GFR AFRICAN AMERICAN: >60
GFR NON-AFRICAN AMERICAN: >60
GLOBULIN: 3.5 G/DL (ref 2.3–3.5)
GLUCOSE BLD-MCNC: 84 MG/DL (ref 70–99)
HBA1C MFR BLD: 6 % (ref 4.8–5.9)
HCT VFR BLD CALC: 37.8 % (ref 37–47)
HEMOGLOBIN: 12.5 G/DL (ref 12–16)
MCH RBC QN AUTO: 28.8 PG (ref 27–31.3)
MCHC RBC AUTO-ENTMCNC: 33 % (ref 33–37)
MCV RBC AUTO: 87.4 FL (ref 82–100)
PDW BLD-RTO: 14 % (ref 11.5–14.5)
PLATELET # BLD: 448 K/UL (ref 130–400)
POTASSIUM SERPL-SCNC: 3.4 MEQ/L (ref 3.4–4.9)
RBC # BLD: 4.32 M/UL (ref 4.2–5.4)
SODIUM BLD-SCNC: 142 MEQ/L (ref 135–144)
T4 FREE: 1.27 NG/DL (ref 0.84–1.68)
TOTAL PROTEIN: 7.5 G/DL (ref 6.3–8)
TSH SERPL DL<=0.05 MIU/L-ACNC: 2.47 UIU/ML (ref 0.44–3.86)
WBC # BLD: 9.4 K/UL (ref 4.8–10.8)

## 2022-09-14 PROCEDURE — 1090F PRES/ABSN URINE INCON ASSESS: CPT | Performed by: PHYSICIAN ASSISTANT

## 2022-09-14 PROCEDURE — 99214 OFFICE O/P EST MOD 30 MIN: CPT | Performed by: PHYSICIAN ASSISTANT

## 2022-09-14 PROCEDURE — G8400 PT W/DXA NO RESULTS DOC: HCPCS | Performed by: PHYSICIAN ASSISTANT

## 2022-09-14 PROCEDURE — 1036F TOBACCO NON-USER: CPT | Performed by: PHYSICIAN ASSISTANT

## 2022-09-14 PROCEDURE — 3017F COLORECTAL CA SCREEN DOC REV: CPT | Performed by: PHYSICIAN ASSISTANT

## 2022-09-14 PROCEDURE — G8417 CALC BMI ABV UP PARAM F/U: HCPCS | Performed by: PHYSICIAN ASSISTANT

## 2022-09-14 PROCEDURE — 1123F ACP DISCUSS/DSCN MKR DOCD: CPT | Performed by: PHYSICIAN ASSISTANT

## 2022-09-14 PROCEDURE — G8427 DOCREV CUR MEDS BY ELIG CLIN: HCPCS | Performed by: PHYSICIAN ASSISTANT

## 2022-09-14 ASSESSMENT — ENCOUNTER SYMPTOMS
EYE REDNESS: 0
SORE THROAT: 0
SHORTNESS OF BREATH: 0
WHEEZING: 0
ABDOMINAL PAIN: 0
EYE PAIN: 0
COUGH: 0
RHINORRHEA: 0
NAUSEA: 0
VOMITING: 0
SINUS PRESSURE: 0
DIARRHEA: 0

## 2022-09-14 NOTE — PROGRESS NOTES
9/14/2022    Assessment:       Diagnosis Orders   1. Thyroid nodule  TSH    T4, Free      2. Pre-diabetes  Comprehensive Metabolic Panel    Hemoglobin A1C    CBC      3. Dysuria  Urinalysis with Reflex to Culture          PLAN:     Repeat labs in 6 months  Repeat thyroid ultrasound in 6 months  Famotidine 20 mg twice a day  Follow up with PCP about daily headaches   Contact [place] or referred counselor about counseling  Labs today will make further recommendations based those results    Follow up in 6 months       Orders Placed This Encounter   Procedures    TSH     Standing Status:   Future     Standing Expiration Date:   9/14/2023    T4, Free     Standing Status:   Future     Standing Expiration Date:   9/14/2023    Urinalysis with Reflex to Culture     Standing Status:   Future     Standing Expiration Date:   9/14/2023     Scheduling Instructions:      CC results to Dr Zeeshan Bailey and Dr Sylwia Martino     Order Specific Question:   SPECIFY(EX-CATH,MIDSTREAM,CYSTO,ETC)? Answer:   mid stram clean catch    Comprehensive Metabolic Panel     Standing Status:   Future     Standing Expiration Date:   9/14/2023    Hemoglobin A1C     Standing Status:   Future     Standing Expiration Date:   9/14/2023    CBC     Standing Status:   Future     Standing Expiration Date:   9/14/2023       No orders of the defined types were placed in this encounter. Return in about 6 months (around 3/14/2023) for Diabetes, Thyroid.   Subjective:     Chief Complaint   Patient presents with    Follow-up    Thyroid Problem       Vitals:    09/14/22 1414   BP: (!) 174/73   Site: Left Upper Arm   Pulse: 94   SpO2: 95%   Height: 5' 5\" (1.651 m)       Wt Readings from Last 3 Encounters:   03/02/22 230 lb (104.3 kg)   10/21/21 239 lb (108.4 kg)   07/18/21 230 lb (104.3 kg)     BP Readings from Last 3 Encounters:   09/14/22 (!) 174/73   03/16/22 128/84   03/02/22 (!) 133/94     Patient is a 64-year-old female who complained of feeling a lump in the anterior part of her neck when she looked down and put her chin on her chest.  Thyroid ultrasound revealed a 1 cm nodule in the right lower lobe. There was some radiologic evidence that this could be a parathyroid adenoma. Thyroid laboratory studies and calcium levels have all been normal for at least the last 5 years upon chart review. She denies a history of osteopenia, osteoporosis, renal calculi or fractures. She has a family history of thyroid cancer in her sister with thyroidectomy and radioactive iodine ablation approximately 15 years ago. She is uncertain of the pathology. Going to send her to the lab today for thyroid, A1c because she was prediabetic in the past and repeat calcium level. Pending those results we will make further recommendations    Past Medical History:   Diagnosis Date    Asthma     Degenerative disc disease, lumbar     Hypertension     Lymphedema     MRSA infection     UTI (lower urinary tract infection)      Past Surgical History:   Procedure Laterality Date    APPENDECTOMY      CARDIAC PACEMAKER PLACEMENT  02/05/2020    PACEMAKER PLACEMENT  02/05/2020    SALPINGO-OOPHORECTOMY      left side     SHOULDER SURGERY      TONSILLECTOMY      WRIST SURGERY       Social History     Socioeconomic History    Marital status:       Spouse name: Not on file    Number of children: Not on file    Years of education: Not on file    Highest education level: Not on file   Occupational History    Not on file   Tobacco Use    Smoking status: Never    Smokeless tobacco: Never   Vaping Use    Vaping Use: Never used   Substance and Sexual Activity    Alcohol use: Yes     Comment: Rarely    Drug use: No    Sexual activity: Never   Other Topics Concern    Not on file   Social History Narrative    Not on file     Social Determinants of Health     Financial Resource Strain: Not on file   Food Insecurity: Not on file   Transportation Needs: Not on file   Physical Activity: Not on file   Stress: Not on file   Social Connections: Not on file   Intimate Partner Violence: Not on file   Housing Stability: Not on file     No family history on file. Allergies   Allergen Reactions    Ciprofloxacin      Per patient request due to bad side effects     Metronidazole     Tetracycline        Current Outpatient Medications:     famotidine (PEPCID) 20 MG tablet, Take 1 tablet by mouth 2 times daily (Patient not taking: Reported on 9/14/2022), Disp: 60 tablet, Rfl: 3    potassium chloride (KLOR-CON M) 20 MEQ extended release tablet, Take 5 tablets by mouth daily, Disp: 150 tablet, Rfl: 3    carvedilol (COREG) 12.5 MG tablet, Take 1 tablet by mouth 2 times daily, Disp: 180 tablet, Rfl: 3    metOLazone (ZAROXOLYN) 2.5 MG tablet, 2.5 mg daily , Disp: , Rfl:     Glucosamine-Chondroitin (OSTEO BI-FLEX REGULAR STRENGTH PO), Take by mouth, Disp: , Rfl:     furosemide (LASIX) 40 MG tablet, Take 40 mg by mouth daily, Disp: , Rfl:     tiZANidine (ZANAFLEX) 4 MG tablet, Take 4 mg by mouth 2 times daily , Disp: , Rfl:     amitriptyline (ELAVIL) 25 MG tablet, Take 25 mg by mouth nightly, Disp: , Rfl:     Menthol-Methyl Salicylate (ANALGESIC OINTMENT) OINT ointment, Apply topically once, Disp: , Rfl:     LORazepam (ATIVAN) 0.5 MG tablet, Take 0.25 mg by mouth Daily with lunch. , Disp: , Rfl:     PARoxetine (PAXIL-CR) 25 MG CR tablet, Take 25 mg by mouth every morning., Disp: , Rfl:     aspirin 81 MG tablet, Take 81 mg by mouth daily. , Disp: , Rfl:     Multiple Vitamins-Minerals (THERAPEUTIC MULTIVITAMIN-MINERALS) tablet, Take 1 tablet by mouth daily. , Disp: , Rfl:     nitrofurantoin, macrocrystal-monohydrate, (MACROBID) 100 MG capsule, TAKE 1 CAPSULE TWICE DAILY UNTIL GONE (Patient not taking: Reported on 9/14/2022), Disp: , Rfl:   Lab Results   Component Value Date     05/24/2022    K 3.4 05/24/2022    CL 94 (L) 05/24/2022    CO2 36 (H) 05/24/2022    BUN 18 05/24/2022    CREATININE 0.64 05/24/2022    GLUCOSE 105 (H) 05/24/2022 CALCIUM 9.7 05/24/2022    PROT 7.4 03/02/2022    LABALBU 4.3 03/02/2022    BILITOT 0.3 03/02/2022    ALKPHOS 78 03/02/2022    AST 17 03/02/2022    ALT 10 03/02/2022    LABGLOM >60.0 05/24/2022    GFRAA >60.0 05/24/2022    GLOB 3.1 03/02/2022     Lab Results   Component Value Date    WBC 9.9 03/02/2022    HGB 12.9 03/02/2022    HCT 39.8 03/02/2022    MCV 85.5 03/02/2022     03/02/2022     Lab Results   Component Value Date    LABA1C 5.6 03/16/2022    LABA1C 6.0 (H) 12/01/2021    LABA1C 5.9 03/31/2016     Lab Results   Component Value Date    HDL 81 (H) 06/14/2021    HDL 72 (H) 09/11/2020    HDL 54 02/05/2020    LDLCALC 71 06/14/2021    LDLCALC 67 09/11/2020    LDLCALC 30 02/05/2020    CHOL 169 06/14/2021    CHOL 150 09/11/2020    CHOL 101 02/05/2020    TRIG 86 06/14/2021    TRIG 53 09/11/2020    TRIG 83 02/05/2020     No results found for: TESTM  Lab Results   Component Value Date    TSH 2.580 05/24/2022    TSH 1.420 06/14/2021    TSH 1.960 09/11/2020    T4FREE 1.17 05/24/2022    T4FREE 1.32 06/14/2021    T4FREE 1.22 09/11/2020     No results found for: TPOABS    Results for Vee Byrne (MRN [id number]) as of 3/16/2022 14:33   Ref. Range 12/1/2021 16:21   PTH Latest Ref Range: 15.0 - 65.0 pg/mL 48.0       US HEAD NECK SOFT TISSUE THYROID: 7/27/2021       CLINICAL HISTORY: E06.9 Thyroiditis ICD10. COMPARISONS: Cervical spine CT 5/10/2018. TECHNIQUE: Ultrasound of the thyroid was performed, with a regional survey. Reference: ACR Thyroid Imaging, Recording and Data System (TI-RADS): White Paper of the [employer]. Journal of the [employer] of Radiology. Volume 14, Issue 5, May 2017, pages 416-927. FINDINGS:        This study is limited by the patient's body habitus. A few small cysts and predominantly solid hypoechoic (TR4) nodules are present within a normal-sized thyroid gland.        The largest TR4 nodule is noted within the liver adjacent to the lower right thyroid pole measures approximately 1.0 x 0.9 x 0.9 cm, which may possibly be a parathyroid adenoma. Correlation with serum parathyroid hormone is suggested. If elevated, a nuclear medicine parathyroid scan may be appropriate. The right lobe measures approximately    4.0 x 1.7 x 1.7 cm. The left lobe measures 3.7 x 1.6 x 1.5 cm. The isthmus measures 0.33 cm. Impression       APPROXIMATELY 1 CM RIGHT LOWER LOBE TR4 THYROID NODULE VERSUS PARATHYROID ADENOMA. CORRELATION WITH SERUM PARATHYROID HORMONE IS SUGGESTED. IF ELEVATED, A NUCLEAR MEDICINE PARATHYROID SCAN MAY BE APPROPRIATE. OTHERWISE, LONGITUDINAL ULTRASOUND FOLLOW-UP AT 1, 2, 3 AND 5 YEARS SUGGESTED. Review of Systems   Constitutional:  Negative for chills, fatigue and fever. HENT:  Negative for congestion, ear pain, postnasal drip, rhinorrhea, sinus pressure and sore throat. Eyes:  Negative for pain and redness. Respiratory:  Negative for cough, shortness of breath and wheezing. Cardiovascular:  Positive for leg swelling. Negative for chest pain and palpitations. Gastrointestinal:  Negative for abdominal pain, diarrhea, nausea and vomiting. Endocrine: Negative for cold intolerance and heat intolerance. Genitourinary:  Negative for difficulty urinating. Musculoskeletal:  Negative for arthralgias. Subscapular pain    Skin:  Negative for rash. Neurological:  Positive for headaches (daily x 6 weeks dull achy ). Negative for dizziness. Psychiatric/Behavioral:  Negative for dysphoric mood. Objective:   Physical Exam  Vitals reviewed. Constitutional:       Appearance: She is well-developed. HENT:      Head: Normocephalic and atraumatic. Nose: No rhinorrhea. Eyes:      Conjunctiva/sclera: Conjunctivae normal.   Neck:      Comments: No thyromegaly or palpable nodules   Cardiovascular:      Rate and Rhythm: Normal rate and regular rhythm. Heart sounds: Normal heart sounds. Pulmonary:      Effort: Pulmonary effort is normal.      Breath sounds: Normal breath sounds. Abdominal:      General: Bowel sounds are normal.      Palpations: Abdomen is soft. Musculoskeletal:         General: Swelling (bilateral LE. Lymphedema ) present. Normal range of motion. Cervical back: Normal range of motion and neck supple. Skin:     General: Skin is warm and dry. Neurological:      Mental Status: She is alert and oriented to person, place, and time.    Psychiatric:         Mood and Affect: Mood normal.

## 2022-09-14 NOTE — PATIENT INSTRUCTIONS
Repeat labs in 6 months  Repeat thyroid ultrasound in 6 months  Famotidine 20 mg twice a day  Follow up with PCP about daily headaches   Contact [facility] or referred counselor about counseling  Labs today will make further recommendations based those results    Follow up in 6 months

## 2022-09-15 DIAGNOSIS — R30.0 DYSURIA: ICD-10-CM

## 2022-09-15 LAB
BACTERIA: ABNORMAL /HPF
BILIRUBIN URINE: NEGATIVE
BLOOD, URINE: NEGATIVE
CLARITY: CLEAR
COLOR: YELLOW
EPITHELIAL CELLS, UA: ABNORMAL /HPF (ref 0–5)
GLUCOSE URINE: NEGATIVE MG/DL
HYALINE CASTS: ABNORMAL /HPF (ref 0–5)
KETONES, URINE: NEGATIVE MG/DL
LEUKOCYTE ESTERASE, URINE: ABNORMAL
NITRITE, URINE: NEGATIVE
PH UA: 7 (ref 5–9)
PROTEIN UA: NEGATIVE MG/DL
RBC UA: ABNORMAL /HPF (ref 0–5)
SPECIFIC GRAVITY UA: 1.01 (ref 1–1.03)
URINE REFLEX TO CULTURE: ABNORMAL
UROBILINOGEN, URINE: 0.2 E.U./DL
WBC UA: ABNORMAL /HPF (ref 0–5)

## 2022-09-16 ENCOUNTER — TELEPHONE (OUTPATIENT)
Dept: ENDOCRINOLOGY | Age: 77
End: 2022-09-16

## 2022-09-16 LAB
MISCELLANEOUS LAB TEST ORDER: NORMAL
THYROID PEROXIDASE (TPO) ABS: <4 IU/ML (ref 0–25)
WHOPPER PROMPT: NORMAL

## 2022-09-16 NOTE — TELEPHONE ENCOUNTER
Patient called today because she feels like the lump on her throat is getting bigger. She states that she felt like this yesterday, but forgot to mention it to you. She would like to know what she should do. Please advise. Thanks! Patient just called back and stated that she called Dr. Ronda Chavira to make an appointment for her UTI and she was told that she does not have one and does not need an appointment.

## 2022-09-21 ENCOUNTER — TELEPHONE (OUTPATIENT)
Dept: ENDOCRINOLOGY | Age: 77
End: 2022-09-21

## 2022-09-22 NOTE — TELEPHONE ENCOUNTER
Please instruct the patient to contact her primary care provider to discuss her urinary signs and symptoms.

## 2022-09-23 ENCOUNTER — TELEPHONE (OUTPATIENT)
Dept: UROLOGY | Age: 77
End: 2022-09-23

## 2022-09-23 NOTE — TELEPHONE ENCOUNTER
----- Message from Dax Mensah sent at 9/23/2022 12:26 PM EDT -----  Regarding: passed clots while urinating  She has questions regarding clots she passed while urinating    [phone]

## 2022-09-23 NOTE — TELEPHONE ENCOUNTER
Spoke with patient and gave her the options of going to the ED to be checked or making an appt for Monday per Dr. Fredis Mata, she chose not to make the appt and to go be seen

## 2022-10-06 ENCOUNTER — NURSE ONLY (OUTPATIENT)
Dept: UROLOGY | Age: 77
End: 2022-10-06
Payer: MEDICARE

## 2022-10-06 ENCOUNTER — TELEPHONE (OUTPATIENT)
Dept: UROLOGY | Age: 77
End: 2022-10-06

## 2022-10-06 DIAGNOSIS — R31.0 GROSS HEMATURIA: Primary | ICD-10-CM

## 2022-10-06 DIAGNOSIS — R31.0 GROSS HEMATURIA: ICD-10-CM

## 2022-10-06 PROCEDURE — 81003 URINALYSIS AUTO W/O SCOPE: CPT | Performed by: UROLOGY

## 2022-10-06 NOTE — TELEPHONE ENCOUNTER
Urine drop off for passing blood clots and having some blood when wiping after urination     Phone: [phone]  Allergies: Cipro, Tetracyclines, Metronidazole  Pharm: [facility]    Component 14:08    Color, UA yellow    Clarity, UA clear    Glucose, UA POC neg    Bilirubin, UA neg    Ketones, UA neg    Spec Grav, UA 1.020    Blood, UA POC trace-intact    pH, UA 7.0    Protein, UA POC neg    Urobilinogen, UA 0.2    Leukocytes, UA neg    Nitrite, UA neg         Culture Sent per conversation with Dr. Alessio Noble

## 2022-10-09 LAB
ORGANISM: ABNORMAL
ORGANISM: ABNORMAL
URINE CULTURE, ROUTINE: ABNORMAL

## 2022-10-10 RX ORDER — NITROFURANTOIN 25; 75 MG/1; MG/1
100 CAPSULE ORAL 2 TIMES DAILY
Qty: 20 CAPSULE | Refills: 0 | Status: SHIPPED | OUTPATIENT
Start: 2022-10-10

## 2022-10-24 ENCOUNTER — TELEPHONE (OUTPATIENT)
Dept: CARDIOLOGY CLINIC | Age: 77
End: 2022-10-24

## 2022-10-24 NOTE — TELEPHONE ENCOUNTER
PATIENT IS OUT    Requesting medication refill. Please approve or deny this request.    Rx requested:  Requested Prescriptions     Pending Prescriptions Disp Refills    carvedilol (COREG) 12.5 MG tablet 180 tablet 3     Sig: Take 1 tablet by mouth 2 times daily         Last Office Visit:   10/21/2021      Next Visit Date:  Future Appointments   Date Time Provider Ruthie Blackwood   10/27/2022  1:30 PM [facility] [address] [facility]   3/15/2023  2:30 PM Fredrik Lennox, [address]               Last refill 10/21/21. Please approve or deny.

## 2022-10-25 RX ORDER — CARVEDILOL 12.5 MG/1
12.5 TABLET ORAL 2 TIMES DAILY
Qty: 60 TABLET | Refills: 1 | Status: SHIPPED | OUTPATIENT
Start: 2022-10-25

## 2022-10-25 RX ORDER — CARVEDILOL 12.5 MG/1
12.5 TABLET ORAL 2 TIMES DAILY
Qty: 180 TABLET | Refills: 3 | OUTPATIENT
Start: 2022-10-25

## 2022-10-25 NOTE — TELEPHONE ENCOUNTER
Please approve or deny this refill request. The order is pended. Thank you. LOV 10/21/2021    Pt needs an appt.      Next Visit Date:  Future Appointments   Date Time Provider Ruthie Blackwood   10/27/2022  1:30 PM [address]   3/15/2023  2:30 PM Jac Sinclair, [address]

## 2022-10-27 ENCOUNTER — HOSPITAL ENCOUNTER (OUTPATIENT)
Dept: CARDIOLOGY | Age: 77
Discharge: HOME | End: 2022-10-27
Payer: MEDICARE

## 2022-10-27 PROCEDURE — 93280 PM DEVICE PROGR EVAL DUAL: CPT

## 2022-10-31 DIAGNOSIS — E87.6 HYPOKALEMIA: ICD-10-CM

## 2022-10-31 RX ORDER — POTASSIUM CHLORIDE 20 MEQ/1
100 TABLET, EXTENDED RELEASE ORAL DAILY
Qty: 150 TABLET | Refills: 3 | Status: SHIPPED | OUTPATIENT
Start: 2022-10-31

## 2022-11-01 ENCOUNTER — NURSE ONLY (OUTPATIENT)
Dept: UROLOGY | Age: 77
End: 2022-11-01
Payer: MEDICARE

## 2022-11-01 ENCOUNTER — TELEPHONE (OUTPATIENT)
Dept: UROLOGY | Age: 77
End: 2022-11-01

## 2022-11-01 DIAGNOSIS — R31.0 INTERMITTENT GROSS HEMATURIA: Primary | ICD-10-CM

## 2022-11-01 DIAGNOSIS — R31.0 INTERMITTENT GROSS HEMATURIA: ICD-10-CM

## 2022-11-01 LAB
BILIRUBIN, POC: ABNORMAL
BLOOD URINE, POC: ABNORMAL
CLARITY, POC: ABNORMAL
COLOR, POC: YELLOW
GLUCOSE URINE, POC: ABNORMAL
KETONES, POC: ABNORMAL
LEUKOCYTE EST, POC: ABNORMAL
NITRITE, POC: POSITIVE
PH, POC: 6.5
PROTEIN, POC: ABNORMAL
SPECIFIC GRAVITY, POC: 1.02
UROBILINOGEN, POC: 0.2

## 2022-11-01 PROCEDURE — 81003 URINALYSIS AUTO W/O SCOPE: CPT | Performed by: UROLOGY

## 2022-11-01 NOTE — TELEPHONE ENCOUNTER
Component 11/1/22 1404    Color, UA yellow    Clarity, UA slightly cloudy    Glucose, UA POC neg    Bilirubin, UA neg    Ketones, UA neg    Spec Grav, UA 1.020    Blood, UA POC trace-intact    pH, UA 6.5    Protein, UA POC neg    Urobilinogen, UA 0.2    Leukocytes, UA trace    Nitrite, UA positive           Intermittent gross hematuria and odor  Culture sent

## 2022-11-02 RX ORDER — SULFAMETHOXAZOLE AND TRIMETHOPRIM 800; 160 MG/1; MG/1
1 TABLET ORAL 2 TIMES DAILY
Qty: 6 TABLET | Refills: 0 | Status: SHIPPED | OUTPATIENT
Start: 2022-11-02 | End: 2022-11-05 | Stop reason: SDUPTHER

## 2022-11-03 LAB
ORGANISM: ABNORMAL
URINE CULTURE, ROUTINE: ABNORMAL
URINE CULTURE, ROUTINE: ABNORMAL

## 2022-11-04 ENCOUNTER — TELEPHONE (OUTPATIENT)
Dept: UROLOGY | Age: 77
End: 2022-11-04

## 2022-11-05 RX ORDER — SULFAMETHOXAZOLE AND TRIMETHOPRIM 800; 160 MG/1; MG/1
1 TABLET ORAL 2 TIMES DAILY
Qty: 14 TABLET | Refills: 0 | Status: SHIPPED | OUTPATIENT
Start: 2022-11-05 | End: 2022-11-12

## 2022-12-01 ENCOUNTER — NURSE ONLY (OUTPATIENT)
Dept: UROLOGY | Age: 77
End: 2022-12-01
Payer: MEDICARE

## 2022-12-01 ENCOUNTER — TELEPHONE (OUTPATIENT)
Dept: UROLOGY | Age: 77
End: 2022-12-01

## 2022-12-01 DIAGNOSIS — R10.9 ABDOMINAL CRAMPING: Primary | ICD-10-CM

## 2022-12-01 LAB
BILIRUBIN, POC: ABNORMAL
BLOOD URINE, POC: ABNORMAL
CLARITY, POC: CLEAR
COLOR, POC: YELLOW
GLUCOSE URINE, POC: ABNORMAL
KETONES, POC: ABNORMAL
LEUKOCYTE EST, POC: ABNORMAL
NITRITE, POC: ABNORMAL
PH, POC: 6.5
PROTEIN, POC: ABNORMAL
SPECIFIC GRAVITY, POC: 1.01
UROBILINOGEN, POC: 0.2

## 2022-12-01 PROCEDURE — 81003 URINALYSIS AUTO W/O SCOPE: CPT | Performed by: UROLOGY

## 2022-12-01 NOTE — TELEPHONE ENCOUNTER
Pt called and stated that she is experiencing abdominal cramping, left kidney area aches and headaches. She would like to know if she can drop off a urine sample.

## 2022-12-01 NOTE — TELEPHONE ENCOUNTER
Component 12/1/22 1531    Color, UA yellow    Clarity, UA clear    Glucose, UA POC neg    Bilirubin, UA neg    Ketones, UA neg    Spec Grav, UA 1.015    Blood, UA POC neg    pH, UA 6.5    Protein, UA POC neg    Urobilinogen, UA 0.2    Leukocytes, UA neg    Nitrite, UA neg           Would you like a culture?

## 2022-12-28 NOTE — TELEPHONE ENCOUNTER
PATIENT OUT OF COREG  APPOINTMENT MADE FOR 1/17/23 WITH DR WHITFIELD    [facility]      COREG 12.5MG 1 TAB BID #60/0 REFILLS      SPOKE WITH PHARMACIST  MITZY       PATIENT AWARE TO KEEP 1/17/23 APPOINTMENT  FOR FURTHER REFILLS ON MEDICATIONS

## 2023-01-17 ENCOUNTER — OFFICE VISIT (OUTPATIENT)
Dept: CARDIOLOGY CLINIC | Age: 78
End: 2023-01-17
Payer: MEDICARE

## 2023-01-17 VITALS — SYSTOLIC BLOOD PRESSURE: 136 MMHG | OXYGEN SATURATION: 94 % | DIASTOLIC BLOOD PRESSURE: 82 MMHG | HEART RATE: 93 BPM

## 2023-01-17 DIAGNOSIS — R06.09 DOE (DYSPNEA ON EXERTION): ICD-10-CM

## 2023-01-17 DIAGNOSIS — E87.6 HYPOKALEMIA: ICD-10-CM

## 2023-01-17 DIAGNOSIS — Z95.0 PACEMAKER: ICD-10-CM

## 2023-01-17 DIAGNOSIS — R07.89 BURNING IN THE CHEST: ICD-10-CM

## 2023-01-17 DIAGNOSIS — R00.1 SYMPTOMATIC BRADYCARDIA: ICD-10-CM

## 2023-01-17 DIAGNOSIS — I45.9 HEART BLOCK: ICD-10-CM

## 2023-01-17 DIAGNOSIS — E04.1 THYROID NODULE: ICD-10-CM

## 2023-01-17 DIAGNOSIS — M25.462 EFFUSION OF LEFT KNEE: Primary | ICD-10-CM

## 2023-01-17 DIAGNOSIS — I89.0 LYMPHEDEMA: ICD-10-CM

## 2023-01-17 PROCEDURE — G8484 FLU IMMUNIZE NO ADMIN: HCPCS | Performed by: INTERNAL MEDICINE

## 2023-01-17 PROCEDURE — 1090F PRES/ABSN URINE INCON ASSESS: CPT | Performed by: INTERNAL MEDICINE

## 2023-01-17 PROCEDURE — 1123F ACP DISCUSS/DSCN MKR DOCD: CPT | Performed by: INTERNAL MEDICINE

## 2023-01-17 PROCEDURE — G8427 DOCREV CUR MEDS BY ELIG CLIN: HCPCS | Performed by: INTERNAL MEDICINE

## 2023-01-17 PROCEDURE — 1036F TOBACCO NON-USER: CPT | Performed by: INTERNAL MEDICINE

## 2023-01-17 PROCEDURE — 99214 OFFICE O/P EST MOD 30 MIN: CPT | Performed by: INTERNAL MEDICINE

## 2023-01-17 PROCEDURE — G8400 PT W/DXA NO RESULTS DOC: HCPCS | Performed by: INTERNAL MEDICINE

## 2023-01-17 PROCEDURE — G8417 CALC BMI ABV UP PARAM F/U: HCPCS | Performed by: INTERNAL MEDICINE

## 2023-01-17 ASSESSMENT — ENCOUNTER SYMPTOMS
SHORTNESS OF BREATH: 0
NAUSEA: 0
STRIDOR: 0
BLOOD IN STOOL: 0
EYES NEGATIVE: 1
CHEST TIGHTNESS: 0
GASTROINTESTINAL NEGATIVE: 1
RESPIRATORY NEGATIVE: 1
WHEEZING: 0
COUGH: 0

## 2023-01-17 NOTE — PROGRESS NOTES
Subsequent Progress Note  Patient: Aleksey Bhagat  YOB: 1945  MRN: [id number]    Chief Complaint:HB Tired. Edema  Chief Complaint   Patient presents with    Medication Refill     LOV:10/2021    Shortness of Breath    Fatigue       CV Data:  2//6/2020 PPM 2nd dgree Type II AVB   2/2020 Echo EF 60   2/2020 spect negative. Subjective/HPI: taking slaty food. Edema worse. No cp + salas. Waking some. No falls no bleed. 3/5/2020 no cp no sob no falls no bleed. Tired all the time. Leg swelling little worse. 12/7/2020 Patient and/or health care decision maker is aware that that he/she may receive a bill for this telephone service, depending on his insurance coverage, and has provided verbal consent to proceed. This visit was completed via telephone. Total time 14 minutes. Jens martino knees wwent to ER. CT shows Left knee effuison   Still with SALAS and leg edema no worse. No cp walking little   No bleed    9/22/21 Patient and/or health care decision maker is aware that that he/she may receive a bill for this telephone service, depending on his insurance coverage, and has provided verbal consent to proceed. This visit was completed via telephone. Total time 14 minutes. Pt does not feel well. Sob weak. Having little hematuria. Recent K remains low despite advancing to 20 bid. 10/21/21 no cp no sob no falls no bleed tired all the time. Labs reviewed.      1/17/22 tired no cp no sob occ flutter brief no falls no bleed    EKG:    Lives alone  Nonsmoker  Occ [employer]  Retired MA    Past Medical History:   Diagnosis Date    Asthma     Degenerative disc disease, lumbar     Hypertension     Lymphedema     MRSA infection     UTI (lower urinary tract infection)        Past Surgical History:   Procedure Laterality Date    APPENDECTOMY      CARDIAC PACEMAKER PLACEMENT  02/05/2020    PACEMAKER PLACEMENT  02/05/2020    SALPINGO-OOPHORECTOMY      left side     SHOULDER SURGERY      TONSILLECTOMY WRIST SURGERY         No family history on file. Social History     Socioeconomic History    Marital status:      Spouse name: None    Number of children: None    Years of education: None    Highest education level: None   Tobacco Use    Smoking status: Never    Smokeless tobacco: Never   Vaping Use    Vaping Use: Never used   Substance and Sexual Activity    Alcohol use: Yes     Comment: Rarely    Drug use: No    Sexual activity: Never       Allergies   Allergen Reactions    Ciprofloxacin      Per patient request due to bad side effects     Metronidazole     Tetracycline        Current Outpatient Medications   Medication Sig Dispense Refill    potassium chloride (KLOR-CON M) 20 MEQ extended release tablet Take 5 tablets by mouth daily 150 tablet 3    carvedilol (COREG) 12.5 MG tablet Take 1 tablet by mouth 2 times daily 60 tablet 1    nitrofurantoin, macrocrystal-monohydrate, (MACROBID) 100 MG capsule Take 1 capsule by mouth 2 times daily 20 capsule 0    famotidine (PEPCID) 20 MG tablet Take 1 tablet by mouth 2 times daily 60 tablet 3    nitrofurantoin, macrocrystal-monohydrate, (MACROBID) 100 MG capsule       metOLazone (ZAROXOLYN) 2.5 MG tablet 2.5 mg daily       Glucosamine-Chondroitin (OSTEO BI-FLEX REGULAR STRENGTH PO) Take by mouth      furosemide (LASIX) 40 MG tablet Take 40 mg by mouth daily      tiZANidine (ZANAFLEX) 4 MG tablet Take 4 mg by mouth 2 times daily       amitriptyline (ELAVIL) 25 MG tablet Take 25 mg by mouth nightly      Menthol-Methyl Salicylate (ANALGESIC OINTMENT) OINT ointment Apply topically once      LORazepam (ATIVAN) 0.5 MG tablet Take 0.25 mg by mouth Daily with lunch. PARoxetine (PAXIL-CR) 25 MG CR tablet Take 25 mg by mouth every morning. aspirin 81 MG tablet Take 81 mg by mouth daily. Multiple Vitamins-Minerals (THERAPEUTIC MULTIVITAMIN-MINERALS) tablet Take 1 tablet by mouth daily. No current facility-administered medications for this visit. Review of Systems:   Review of Systems   Constitutional: Negative. Negative for diaphoresis and fatigue. HENT: Negative. Eyes: Negative. Respiratory: Negative. Negative for cough, chest tightness, shortness of breath, wheezing and stridor. Cardiovascular:  Positive for leg swelling. Negative for chest pain and palpitations. Gastrointestinal: Negative. Negative for blood in stool and nausea. Genitourinary: Negative. Musculoskeletal:  Positive for gait problem. Skin: Negative. Neurological:  Positive for weakness. Negative for dizziness, syncope and light-headedness. Hematological: Negative. Psychiatric/Behavioral: Negative. Physical Examination:    /82 (Site: Right Upper Arm, Position: Sitting, Cuff Size: Large Adult)   Pulse 93   SpO2 94%    Physical Exam   Constitutional: She appears healthy. No distress. HENT:   Normal cephalic and Atraumatic   Eyes: Pupils are equal, round, and reactive to light. Neck: Thyroid normal. No JVD present. No neck adenopathy. No thyromegaly present. Cardiovascular: Normal rate, regular rhythm, normal heart sounds, intact distal pulses and normal pulses. Pulmonary/Chest: Effort normal and breath sounds normal. She has no wheezes. She has no rales. She exhibits no tenderness. Abdominal: Soft. Bowel sounds are normal. There is no abdominal tenderness. Musculoskeletal:         General: Edema present. No tenderness. Normal range of motion. Cervical back: Normal range of motion and neck supple. Neurological: She is alert and oriented to person, place, and time. Skin: Skin is warm. No cyanosis. Nails show no clubbing.      LABS:  CBC:   Lab Results   Component Value Date/Time    WBC 9.4 09/14/2022 02:52 PM    RBC 4.32 09/14/2022 02:52 PM    HGB 12.5 09/14/2022 02:52 PM    HCT 37.8 09/14/2022 02:52 PM    MCV 87.4 09/14/2022 02:52 PM    MCH 28.8 09/14/2022 02:52 PM    MCHC 33.0 09/14/2022 02:52 PM    RDW 14.0 09/14/2022 02:52 PM     09/14/2022 02:52 PM    MPV 8.5 05/26/2015 12:55 PM     Lipids:  Lab Results   Component Value Date    CHOL 169 06/14/2021    CHOL 150 09/11/2020    CHOL 101 02/05/2020     Lab Results   Component Value Date    TRIG 86 06/14/2021    TRIG 53 09/11/2020    TRIG 83 02/05/2020     Lab Results   Component Value Date    HDL 81 (H) 06/14/2021    HDL 72 (H) 09/11/2020    HDL 54 02/05/2020     Lab Results   Component Value Date    LDLCALC 71 06/14/2021    LDLCALC 67 09/11/2020    LDLCALC 30 02/05/2020     No results found for: LABVLDL, VLDL  No results found for: CHOLHDLRATIO  CMP:    Lab Results   Component Value Date/Time     09/14/2022 02:52 PM    K 3.4 09/14/2022 02:52 PM    K 3.3 03/02/2022 04:15 PM    CL 95 09/14/2022 02:52 PM    CO2 36 09/14/2022 02:52 PM    BUN 16 09/14/2022 02:52 PM    CREATININE 0.82 09/14/2022 02:52 PM    GFRAA >60.0 09/14/2022 02:52 PM    LABGLOM >60.0 09/14/2022 02:52 PM    GLUCOSE 84 09/14/2022 02:52 PM    PROT 7.5 09/14/2022 02:52 PM    LABALBU 4.0 09/14/2022 02:52 PM    CALCIUM 10.1 09/14/2022 02:52 PM    BILITOT <0.2 09/14/2022 02:52 PM    ALKPHOS 108 09/14/2022 02:52 PM    AST 69 09/14/2022 02:52 PM    ALT 42 09/14/2022 02:52 PM     BMP:    Lab Results   Component Value Date/Time     09/14/2022 02:52 PM    K 3.4 09/14/2022 02:52 PM    K 3.3 03/02/2022 04:15 PM    CL 95 09/14/2022 02:52 PM    CO2 36 09/14/2022 02:52 PM    BUN 16 09/14/2022 02:52 PM    LABALBU 4.0 09/14/2022 02:52 PM    CREATININE 0.82 09/14/2022 02:52 PM    CALCIUM 10.1 09/14/2022 02:52 PM    GFRAA >60.0 09/14/2022 02:52 PM    LABGLOM >60.0 09/14/2022 02:52 PM    GLUCOSE 84 09/14/2022 02:52 PM     Magnesium:    Lab Results   Component Value Date/Time    MG 1.8 05/24/2022 02:36 PM     TSH:  Lab Results   Component Value Date    TSH 2.470 09/14/2022       Patient Active Problem List   Diagnosis    Abscess or cellulitis of wrist    MRSA (methicillin resistant Staphylococcus aureus) infection Orthostasis    Anterior dislocation of left shoulder    Glenoid fracture of shoulder, left, closed, initial encounter    Symptomatic bradycardia    Heart block    Lymphedema    SALAS (dyspnea on exertion)    Thyroid nodule    Pre-diabetes    Gastroesophageal reflux disease without esophagitis       There are no discontinued medications. Modified Medications    No medications on file       No orders of the defined types were placed in this encounter. Assessment/Plan:    1. Symptomatic bradycardia   PPM placed - recent interrogation reviewed. Stable with 10 yrs and 4 months on Battery life. Reviewed w pt. 2. Heart block - PPM    3. Lymphedema   Enrolled Lymphedema clinic - need to go to clinic. Low salt diet. Walk     4. SALAS (dyspnea on exertion) stress negative. - continued dyspnea. - may need to consider Cath    5. Left Knee effusion-  Seeing Ortho    6. Thyroid Nodule - refer to Dr. Yelena Reese. - continue f/u          Counseling:  Heart Healthy Lifestyle, Improve BMI, Low Salt Diet, Volume Restriction 1500cc per day, Take Precautions to Prevent Falls and Walk Daily    Return in about 6 months (around 7/17/2023).       Electronically signed by Kevin Morris MD on 1/17/2023 at 3:03 PM

## 2023-01-25 ENCOUNTER — HOSPITAL ENCOUNTER (OUTPATIENT)
Dept: CARDIOLOGY | Age: 78
Discharge: HOME | End: 2023-01-25
Payer: MEDICARE

## 2023-01-25 PROCEDURE — 93296 REM INTERROG EVL PM/IDS: CPT

## 2023-01-31 RX ORDER — CARVEDILOL 12.5 MG/1
12.5 TABLET ORAL 2 TIMES DAILY
Qty: 60 TABLET | Refills: 1 | Status: SHIPPED | OUTPATIENT
Start: 2023-01-31

## 2023-02-11 ENCOUNTER — HOSPITAL ENCOUNTER (INPATIENT)
Age: 78
LOS: 2 days | Discharge: HOME | DRG: 155 | End: 2023-02-13
Attending: FAMILY MEDICINE | Admitting: INTERNAL MEDICINE
Payer: MEDICARE

## 2023-02-11 ENCOUNTER — APPOINTMENT (OUTPATIENT)
Dept: CT IMAGING | Age: 78
DRG: 155 | End: 2023-02-11
Payer: MEDICARE

## 2023-02-11 ENCOUNTER — APPOINTMENT (OUTPATIENT)
Dept: GENERAL RADIOLOGY | Age: 78
DRG: 155 | End: 2023-02-11
Payer: MEDICARE

## 2023-02-11 DIAGNOSIS — J35.1 TONSILLAR ENLARGEMENT: Primary | ICD-10-CM

## 2023-02-11 DIAGNOSIS — N39.0 ACUTE UTI: ICD-10-CM

## 2023-02-11 DIAGNOSIS — R06.09 DOE (DYSPNEA ON EXERTION): ICD-10-CM

## 2023-02-11 LAB
ALBUMIN SERPL-MCNC: 3.6 G/DL (ref 3.5–4.6)
ALP BLD-CCNC: 89 U/L (ref 40–130)
ALT SERPL-CCNC: 12 U/L (ref 0–33)
ANION GAP SERPL CALCULATED.3IONS-SCNC: 10 MEQ/L (ref 9–15)
AST SERPL-CCNC: 21 U/L (ref 0–35)
BACTERIA: ABNORMAL /HPF
BASOPHILS ABSOLUTE: 0 K/UL (ref 0–0.2)
BASOPHILS RELATIVE PERCENT: 0.4 %
BILIRUB SERPL-MCNC: 0.5 MG/DL (ref 0.2–0.7)
BILIRUBIN URINE: NEGATIVE
BLOOD, URINE: NEGATIVE
BUN BLDV-MCNC: 11 MG/DL (ref 8–23)
CALCIUM SERPL-MCNC: 9.2 MG/DL (ref 8.5–9.9)
CHLORIDE BLD-SCNC: 97 MEQ/L (ref 95–107)
CLARITY: CLEAR
CO2: 34 MEQ/L (ref 20–31)
COLOR: YELLOW
CREAT SERPL-MCNC: 0.7 MG/DL (ref 0.5–0.9)
EOSINOPHILS ABSOLUTE: 0.1 K/UL (ref 0–0.7)
EOSINOPHILS RELATIVE PERCENT: 1.2 %
EPITHELIAL CELLS, UA: ABNORMAL /HPF (ref 0–5)
GFR SERPL CREATININE-BSD FRML MDRD: >60 ML/MIN/{1.73_M2}
GLOBULIN: 2.8 G/DL (ref 2.3–3.5)
GLUCOSE BLD-MCNC: 93 MG/DL (ref 70–99)
GLUCOSE URINE: NEGATIVE MG/DL
HCT VFR BLD CALC: 37.1 % (ref 37–47)
HEMOGLOBIN: 12.2 G/DL (ref 12–16)
HYALINE CASTS: ABNORMAL /HPF (ref 0–5)
INFLUENZA A BY PCR: NEGATIVE
INFLUENZA B BY PCR: NEGATIVE
KETONES, URINE: NEGATIVE MG/DL
LEUKOCYTE ESTERASE, URINE: ABNORMAL
LYMPHOCYTES ABSOLUTE: 2.4 K/UL (ref 1–4.8)
LYMPHOCYTES RELATIVE PERCENT: 20.7 %
MCH RBC QN AUTO: 28.8 PG (ref 27–31.3)
MCHC RBC AUTO-ENTMCNC: 32.9 % (ref 33–37)
MCV RBC AUTO: 87.7 FL (ref 79.4–94.8)
MONOCYTES ABSOLUTE: 0.8 K/UL (ref 0.2–0.8)
MONOCYTES RELATIVE PERCENT: 7.2 %
NEUTROPHILS ABSOLUTE: 8.1 K/UL (ref 1.4–6.5)
NEUTROPHILS RELATIVE PERCENT: 70.5 %
NITRITE, URINE: POSITIVE
PDW BLD-RTO: 14.7 % (ref 11.5–14.5)
PH UA: 7 (ref 5–9)
PLATELET # BLD: 345 K/UL (ref 130–400)
POTASSIUM REFLEX MAGNESIUM: 4.3 MEQ/L (ref 3.4–4.9)
PROTEIN UA: NEGATIVE MG/DL
RBC # BLD: 4.22 M/UL (ref 4.2–5.4)
RBC UA: ABNORMAL /HPF (ref 0–5)
SARS-COV-2, NAAT: NOT DETECTED
SODIUM BLD-SCNC: 141 MEQ/L (ref 135–144)
SPECIFIC GRAVITY UA: 1.01 (ref 1–1.03)
STREP GRP A PCR: NEGATIVE
TOTAL PROTEIN: 6.4 G/DL (ref 6.3–8)
URINE REFLEX TO CULTURE: ABNORMAL
UROBILINOGEN, URINE: 0.2 E.U./DL
WBC # BLD: 11.5 K/UL (ref 4.8–10.8)
WBC UA: ABNORMAL /HPF (ref 0–5)

## 2023-02-11 PROCEDURE — 36415 COLL VENOUS BLD VENIPUNCTURE: CPT

## 2023-02-11 PROCEDURE — 85025 COMPLETE CBC W/AUTO DIFF WBC: CPT

## 2023-02-11 PROCEDURE — 96367 TX/PROPH/DG ADDL SEQ IV INF: CPT

## 2023-02-11 PROCEDURE — 87635 SARS-COV-2 COVID-19 AMP PRB: CPT

## 2023-02-11 PROCEDURE — 71046 X-RAY EXAM CHEST 2 VIEWS: CPT

## 2023-02-11 PROCEDURE — 87651 STREP A DNA AMP PROBE: CPT

## 2023-02-11 PROCEDURE — 6360000004 HC RX CONTRAST MEDICATION: Performed by: FAMILY MEDICINE

## 2023-02-11 PROCEDURE — 1210000000 HC MED SURG R&B

## 2023-02-11 PROCEDURE — 96365 THER/PROPH/DIAG IV INF INIT: CPT

## 2023-02-11 PROCEDURE — 81001 URINALYSIS AUTO W/SCOPE: CPT

## 2023-02-11 PROCEDURE — 70491 CT SOFT TISSUE NECK W/DYE: CPT

## 2023-02-11 PROCEDURE — 6360000002 HC RX W HCPCS: Performed by: FAMILY MEDICINE

## 2023-02-11 PROCEDURE — 2580000003 HC RX 258: Performed by: FAMILY MEDICINE

## 2023-02-11 PROCEDURE — 99285 EMERGENCY DEPT VISIT HI MDM: CPT

## 2023-02-11 PROCEDURE — 87502 INFLUENZA DNA AMP PROBE: CPT

## 2023-02-11 PROCEDURE — 80053 COMPREHEN METABOLIC PANEL: CPT

## 2023-02-11 RX ORDER — ACETAMINOPHEN 650 MG/1
650 SUPPOSITORY RECTAL EVERY 6 HOURS PRN
Status: DISCONTINUED | OUTPATIENT
Start: 2023-02-11 | End: 2023-02-13 | Stop reason: HOSPADM

## 2023-02-11 RX ORDER — DEXAMETHASONE SODIUM PHOSPHATE 4 MG/ML
4 INJECTION, SOLUTION INTRA-ARTICULAR; INTRALESIONAL; INTRAMUSCULAR; INTRAVENOUS; SOFT TISSUE ONCE
Status: DISCONTINUED | OUTPATIENT
Start: 2023-02-11 | End: 2023-02-11

## 2023-02-11 RX ORDER — ENOXAPARIN SODIUM 100 MG/ML
30 INJECTION SUBCUTANEOUS 2 TIMES DAILY
Status: DISCONTINUED | OUTPATIENT
Start: 2023-02-12 | End: 2023-02-13 | Stop reason: HOSPADM

## 2023-02-11 RX ORDER — SODIUM CHLORIDE 0.9 % (FLUSH) 0.9 %
5-40 SYRINGE (ML) INJECTION PRN
Status: DISCONTINUED | OUTPATIENT
Start: 2023-02-11 | End: 2023-02-13 | Stop reason: HOSPADM

## 2023-02-11 RX ORDER — ONDANSETRON 2 MG/ML
4 INJECTION INTRAMUSCULAR; INTRAVENOUS EVERY 6 HOURS PRN
Status: DISCONTINUED | OUTPATIENT
Start: 2023-02-11 | End: 2023-02-13 | Stop reason: HOSPADM

## 2023-02-11 RX ORDER — SODIUM CHLORIDE 0.9 % (FLUSH) 0.9 %
5-40 SYRINGE (ML) INJECTION EVERY 12 HOURS SCHEDULED
Status: DISCONTINUED | OUTPATIENT
Start: 2023-02-11 | End: 2023-02-13 | Stop reason: HOSPADM

## 2023-02-11 RX ORDER — ACETAMINOPHEN 325 MG/1
650 TABLET ORAL EVERY 6 HOURS PRN
Status: DISCONTINUED | OUTPATIENT
Start: 2023-02-11 | End: 2023-02-13 | Stop reason: HOSPADM

## 2023-02-11 RX ORDER — ONDANSETRON 4 MG/1
4 TABLET, ORALLY DISINTEGRATING ORAL EVERY 8 HOURS PRN
Status: DISCONTINUED | OUTPATIENT
Start: 2023-02-11 | End: 2023-02-13 | Stop reason: HOSPADM

## 2023-02-11 RX ORDER — POLYETHYLENE GLYCOL 3350 17 G/17G
17 POWDER, FOR SOLUTION ORAL DAILY PRN
Status: DISCONTINUED | OUTPATIENT
Start: 2023-02-11 | End: 2023-02-13 | Stop reason: HOSPADM

## 2023-02-11 RX ORDER — SODIUM CHLORIDE 9 MG/ML
INJECTION, SOLUTION INTRAVENOUS PRN
Status: DISCONTINUED | OUTPATIENT
Start: 2023-02-11 | End: 2023-02-13 | Stop reason: HOSPADM

## 2023-02-11 RX ADMIN — IOPAMIDOL 75 ML: 612 INJECTION, SOLUTION INTRAVENOUS at 17:48

## 2023-02-11 RX ADMIN — DEXAMETHASONE SODIUM PHOSPHATE 12 MG: 4 INJECTION, SOLUTION INTRAMUSCULAR; INTRAVENOUS at 21:05

## 2023-02-11 RX ADMIN — CEFTRIAXONE SODIUM 1000 MG: 1 INJECTION, POWDER, FOR SOLUTION INTRAMUSCULAR; INTRAVENOUS at 17:58

## 2023-02-11 ASSESSMENT — PAIN SCALES - GENERAL: PAINLEVEL_OUTOF10: 2

## 2023-02-11 ASSESSMENT — PAIN DESCRIPTION - DESCRIPTORS: DESCRIPTORS: ACHING

## 2023-02-11 ASSESSMENT — PAIN - FUNCTIONAL ASSESSMENT: PAIN_FUNCTIONAL_ASSESSMENT: NONE - DENIES PAIN

## 2023-02-11 ASSESSMENT — PAIN DESCRIPTION - LOCATION: LOCATION: HEAD

## 2023-02-11 NOTE — Clinical Note
Discharge Plan[de-identified] Other/[facility])   Telemetry/Cardiac Monitoring Required?: Yes

## 2023-02-11 NOTE — ED NOTES
Pt arrived to ED via EMS with c/o of cough and throat tightness. Pt states that she had a coughing fit last night and woke up this morning feel as if her throat was closing off. Pt states she feels relief now. Pt denies difficulty swallowing, drooling or SOB. Pt states she has a cyst on her thyroid. Pt seems mildly anxious during assessment. Pt denies chest pain, fevers, abd pain.  Pt is a&ox4     Sheridan Godinez RN  02/11/23 [id number]

## 2023-02-12 LAB
ALBUMIN SERPL-MCNC: 3.4 G/DL (ref 3.5–4.6)
ALP BLD-CCNC: 84 U/L (ref 40–130)
ALT SERPL-CCNC: 11 U/L (ref 0–33)
ANION GAP SERPL CALCULATED.3IONS-SCNC: 12 MEQ/L (ref 9–15)
AST SERPL-CCNC: 16 U/L (ref 0–35)
BASOPHILS ABSOLUTE: 0 K/UL (ref 0–0.2)
BASOPHILS RELATIVE PERCENT: 0.1 %
BILIRUB SERPL-MCNC: 0.4 MG/DL (ref 0.2–0.7)
BUN BLDV-MCNC: 13 MG/DL (ref 8–23)
CALCIUM SERPL-MCNC: 9.1 MG/DL (ref 8.5–9.9)
CHLORIDE BLD-SCNC: 96 MEQ/L (ref 95–107)
CO2: 32 MEQ/L (ref 20–31)
CREAT SERPL-MCNC: 0.77 MG/DL (ref 0.5–0.9)
EOSINOPHILS ABSOLUTE: 0 K/UL (ref 0–0.7)
EOSINOPHILS RELATIVE PERCENT: 0 %
GFR SERPL CREATININE-BSD FRML MDRD: >60 ML/MIN/{1.73_M2}
GLOBULIN: 2.9 G/DL (ref 2.3–3.5)
GLUCOSE BLD-MCNC: 156 MG/DL (ref 70–99)
HCT VFR BLD CALC: 35.7 % (ref 37–47)
HEMOGLOBIN: 12 G/DL (ref 12–16)
LYMPHOCYTES ABSOLUTE: 1 K/UL (ref 1–4.8)
LYMPHOCYTES RELATIVE PERCENT: 10.6 %
MAGNESIUM: 1.7 MG/DL (ref 1.7–2.4)
MCH RBC QN AUTO: 29.5 PG (ref 27–31.3)
MCHC RBC AUTO-ENTMCNC: 33.6 % (ref 33–37)
MCV RBC AUTO: 87.7 FL (ref 79.4–94.8)
MONOCYTES ABSOLUTE: 0.1 K/UL (ref 0.2–0.8)
MONOCYTES RELATIVE PERCENT: 1.1 %
NEUTROPHILS ABSOLUTE: 8.5 K/UL (ref 1.4–6.5)
NEUTROPHILS RELATIVE PERCENT: 88.2 %
PDW BLD-RTO: 14.6 % (ref 11.5–14.5)
PLATELET # BLD: 318 K/UL (ref 130–400)
POTASSIUM SERPL-SCNC: 3.5 MEQ/L (ref 3.4–4.9)
RBC # BLD: 4.07 M/UL (ref 4.2–5.4)
SODIUM BLD-SCNC: 140 MEQ/L (ref 135–144)
TOTAL PROTEIN: 6.3 G/DL (ref 6.3–8)
WBC # BLD: 9.6 K/UL (ref 4.8–10.8)

## 2023-02-12 PROCEDURE — 6370000000 HC RX 637 (ALT 250 FOR IP): Performed by: OTOLARYNGOLOGY

## 2023-02-12 PROCEDURE — 36415 COLL VENOUS BLD VENIPUNCTURE: CPT

## 2023-02-12 PROCEDURE — 1210000000 HC MED SURG R&B

## 2023-02-12 PROCEDURE — 80053 COMPREHEN METABOLIC PANEL: CPT

## 2023-02-12 PROCEDURE — 6370000000 HC RX 637 (ALT 250 FOR IP): Performed by: NURSE PRACTITIONER

## 2023-02-12 PROCEDURE — 83735 ASSAY OF MAGNESIUM: CPT

## 2023-02-12 PROCEDURE — 2580000003 HC RX 258: Performed by: NURSE PRACTITIONER

## 2023-02-12 PROCEDURE — 85025 COMPLETE CBC W/AUTO DIFF WBC: CPT

## 2023-02-12 PROCEDURE — 6360000002 HC RX W HCPCS: Performed by: INTERNAL MEDICINE

## 2023-02-12 PROCEDURE — 2580000003 HC RX 258: Performed by: INTERNAL MEDICINE

## 2023-02-12 RX ORDER — FUROSEMIDE 40 MG/1
40 TABLET ORAL DAILY
Status: DISCONTINUED | OUTPATIENT
Start: 2023-02-12 | End: 2023-02-13 | Stop reason: HOSPADM

## 2023-02-12 RX ORDER — METOLAZONE 2.5 MG/1
2.5 TABLET ORAL DAILY
Status: DISCONTINUED | OUTPATIENT
Start: 2023-02-12 | End: 2023-02-13 | Stop reason: HOSPADM

## 2023-02-12 RX ORDER — CARVEDILOL 12.5 MG/1
12.5 TABLET ORAL 2 TIMES DAILY
Status: DISCONTINUED | OUTPATIENT
Start: 2023-02-12 | End: 2023-02-13 | Stop reason: HOSPADM

## 2023-02-12 RX ORDER — SODIUM CHLORIDE 9 MG/ML
INJECTION, SOLUTION INTRAVENOUS CONTINUOUS
Status: DISCONTINUED | OUTPATIENT
Start: 2023-02-12 | End: 2023-02-12

## 2023-02-12 RX ORDER — LORAZEPAM 0.5 MG/1
0.25 TABLET ORAL NIGHTLY PRN
Status: DISCONTINUED | OUTPATIENT
Start: 2023-02-12 | End: 2023-02-13 | Stop reason: HOSPADM

## 2023-02-12 RX ORDER — FAMOTIDINE 20 MG/1
20 TABLET, FILM COATED ORAL 2 TIMES DAILY
Status: DISCONTINUED | OUTPATIENT
Start: 2023-02-12 | End: 2023-02-13 | Stop reason: HOSPADM

## 2023-02-12 RX ORDER — ASPIRIN 81 MG/1
81 TABLET ORAL DAILY
Status: DISCONTINUED | OUTPATIENT
Start: 2023-02-12 | End: 2023-02-13 | Stop reason: HOSPADM

## 2023-02-12 RX ORDER — AMITRIPTYLINE HYDROCHLORIDE 25 MG/1
25 TABLET, FILM COATED ORAL NIGHTLY
Status: DISCONTINUED | OUTPATIENT
Start: 2023-02-12 | End: 2023-02-13 | Stop reason: HOSPADM

## 2023-02-12 RX ORDER — POTASSIUM CHLORIDE 20 MEQ/1
20 TABLET, EXTENDED RELEASE ORAL DAILY
Status: DISCONTINUED | OUTPATIENT
Start: 2023-02-13 | End: 2023-02-13 | Stop reason: HOSPADM

## 2023-02-12 RX ORDER — PAROXETINE HYDROCHLORIDE 20 MG/1
20 TABLET, FILM COATED ORAL DAILY
Status: DISCONTINUED | OUTPATIENT
Start: 2023-02-12 | End: 2023-02-13 | Stop reason: HOSPADM

## 2023-02-12 RX ORDER — LIDOCAINE HYDROCHLORIDE 40 MG/ML
SOLUTION TOPICAL ONCE
Status: COMPLETED | OUTPATIENT
Start: 2023-02-12 | End: 2023-02-12

## 2023-02-12 RX ADMIN — FAMOTIDINE 20 MG: 20 TABLET, FILM COATED ORAL at 00:27

## 2023-02-12 RX ADMIN — FAMOTIDINE 20 MG: 20 TABLET, FILM COATED ORAL at 21:43

## 2023-02-12 RX ADMIN — CARVEDILOL 12.5 MG: 12.5 TABLET, FILM COATED ORAL at 00:27

## 2023-02-12 RX ADMIN — SODIUM CHLORIDE, PRESERVATIVE FREE 10 ML: 5 INJECTION INTRAVENOUS at 14:36

## 2023-02-12 RX ADMIN — FAMOTIDINE 20 MG: 20 TABLET, FILM COATED ORAL at 14:36

## 2023-02-12 RX ADMIN — CARVEDILOL 12.5 MG: 12.5 TABLET, FILM COATED ORAL at 21:43

## 2023-02-12 RX ADMIN — FUROSEMIDE 40 MG: 40 TABLET ORAL at 14:36

## 2023-02-12 RX ADMIN — PAROXETINE HYDROCHLORIDE 20 MG: 20 TABLET, FILM COATED ORAL at 14:36

## 2023-02-12 RX ADMIN — CARVEDILOL 12.5 MG: 12.5 TABLET, FILM COATED ORAL at 14:36

## 2023-02-12 RX ADMIN — ASPIRIN 81 MG: 81 TABLET, COATED ORAL at 14:36

## 2023-02-12 RX ADMIN — ACETAMINOPHEN 650 MG: 325 TABLET ORAL at 18:52

## 2023-02-12 RX ADMIN — SODIUM CHLORIDE, PRESERVATIVE FREE 10 ML: 5 INJECTION INTRAVENOUS at 00:18

## 2023-02-12 RX ADMIN — CEFTRIAXONE SODIUM 1000 MG: 1 INJECTION, POWDER, FOR SOLUTION INTRAMUSCULAR; INTRAVENOUS at 18:17

## 2023-02-12 RX ADMIN — ACETAMINOPHEN 650 MG: 325 TABLET ORAL at 00:55

## 2023-02-12 RX ADMIN — AMITRIPTYLINE HYDROCHLORIDE 25 MG: 25 TABLET, FILM COATED ORAL at 21:43

## 2023-02-12 RX ADMIN — LIDOCAINE HYDROCHLORIDE: 40 SOLUTION ORAL at 10:50

## 2023-02-12 RX ADMIN — SODIUM CHLORIDE, PRESERVATIVE FREE 10 ML: 5 INJECTION INTRAVENOUS at 21:45

## 2023-02-12 RX ADMIN — METOLAZONE 2.5 MG: 2.5 TABLET ORAL at 14:36

## 2023-02-12 RX ADMIN — AMITRIPTYLINE HYDROCHLORIDE 25 MG: 25 TABLET, FILM COATED ORAL at 00:27

## 2023-02-12 ASSESSMENT — ENCOUNTER SYMPTOMS
VOMITING: 0
SORE THROAT: 0
PHOTOPHOBIA: 0
COUGH: 1
BACK PAIN: 0
SHORTNESS OF BREATH: 0
ABDOMINAL PAIN: 0
ALLERGIC/IMMUNOLOGIC NEGATIVE: 1
EYES NEGATIVE: 1
WHEEZING: 0
RHINORRHEA: 0
NAUSEA: 0

## 2023-02-12 ASSESSMENT — PAIN DESCRIPTION - DESCRIPTORS: DESCRIPTORS: ACHING

## 2023-02-12 ASSESSMENT — PAIN SCALES - GENERAL
PAINLEVEL_OUTOF10: 3

## 2023-02-12 ASSESSMENT — PAIN DESCRIPTION - LOCATION: LOCATION: HEAD

## 2023-02-12 NOTE — PROGRESS NOTES
0740: This RN spoke with Dr. Sheela Sauceda-- request I reach out to Dr. GIRALDO [facility] for continuous IV fluids. [employer] sent. Call placed to maintenance regarding call light at this time. 0830: Assessment complete. Alert and oriented x4, calm and cooperative. VSS room air-- no complaints of SOB. Edema noted in BUE and BLE. Up with 1 assist. Safety maintained. Call light within reach. 1300: Pt now on regular diet-- tolerating well. Up with cane and 1 assist. Zinc to bottom.      Electronically signed by Anitha Boss, RN on 2/12/2023 at 7:45 AM

## 2023-02-12 NOTE — CARE COORDINATION
Case Management Assessment  Initial Evaluation    Date/Time of Evaluation: 2/12/2023 2:44 PM  Assessment Completed by: Carissa Castaneda RN    If patient is discharged prior to next notation, then this note serves as note for discharge by case management. Patient Name: Beth Ho                   YOB: 1945  Diagnosis: Tonsillar enlargement [J35.1]  Enlarged tonsils [J35.1]  Acute UTI [N39.0]                   Date / Time: 2/11/2023  3:27 PM    Patient Admission Status: Inpatient   Readmission Risk (Low < 19, Mod (19-27), High > 27): Readmission Risk Score: 14.2    Current PCP: Herb Velasco MD  PCP verified by CM? Yes    Chart Reviewed: Yes      History Provided by: Patient  Patient Orientation: Alert and Oriented    Patient Cognition: Alert    Hospitalization in the last 30 days (Readmission):  No    If yes, Readmission Assessment in CM Navigator will be completed. Advance Directives:      Code Status: Full Code   Patient's Primary Decision Maker is:        Discharge Planning:    Patient lives with: Alone Type of Home: House  Primary Care Giver: Self  Patient Support Systems include: Friends/Neighbors   Current Financial resources:    Current community resources:    Current services prior to admission: None            Current DME:              Type of Home Care services:  PT    ADLS  Prior functional level: Independent in ADLs/IADLs  Current functional level: Independent in ADLs/IADLs    PT AM-PAC:   /24  OT AM-PAC:   /24    Family can provide assistance at DC: Would you like Case Management to discuss the discharge plan with any other family members/significant others, and if so, who?     Plans to Return to Present Housing: Yes  Other Identified Issues/Barriers to RETURNING to current housing: NONE  Potential Assistance needed at discharge: [address] DME:    Patient expects to discharge to: [address] [place] [address] for transportation at discharge:      Financial    Payor: [facility] MEDICARE / Plan: [facility] MEDICARE COMPLETE / Product Type: *No Product type* /     Does insurance require precert for SNF: Yes    Potential assistance Purchasing Medications: No  Meds-to-Beds request:        [facility] [address]  [address] [address]  Phone: [phone] Fax: [phone]      Notes:    Factors facilitating achievement of predicted outcomes: Friend support    Barriers to discharge: Additional Case Management Notes: PATIENT THINKING ABOUT HOME CARE FOR PT    The Plan for Transition of Care is related to the following treatment goals of Tonsillar enlargement [J35.1]  Enlarged tonsils [J35.1]  Acute UTI [O89.6]    IF APPLICABLE: The Patient and/or patient representative Rula Amaya and her family were provided with a choice of provider and agrees with the discharge plan. Freedom of choice list with basic dialogue that supports the patient's individualized plan of care/goals and shares the quality data associated with the providers was provided to:     Patient Representative Name:       The Patient and/or Patient Representative Agree with the Discharge Plan?       Talia Reyes RN  Case Management Department  Ph: [phone] Fax: [phone]

## 2023-02-12 NOTE — PROGRESS NOTES
Department of Internal Medicine  General Internal Medicine  Attending Progress Note      SUBJECTIVE:  PT seen and examined. ENT ok with discharge but pt anxious to go home and would prefer to stay overnight and is ok being discharged tomorrow barring any setbacks. Will advance diet today and monitor. Pt agreeable to plan    OBJECTIVE      Medications    Current Facility-Administered Medications: amitriptyline (ELAVIL) tablet 25 mg, 25 mg, Oral, Nightly  aspirin EC tablet 81 mg, 81 mg, Oral, Daily  carvedilol (COREG) tablet 12.5 mg, 12.5 mg, Oral, BID  famotidine (PEPCID) tablet 20 mg, 20 mg, Oral, BID  furosemide (LASIX) tablet 40 mg, 40 mg, Oral, Daily  LORazepam (ATIVAN) tablet 0.25 mg, 0.25 mg, Oral, Nightly PRN  metOLazone (ZAROXOLYN) tablet 2.5 mg, 2.5 mg, Oral, Daily  PARoxetine (PAXIL) tablet 20 mg, 20 mg, Oral, Daily  cefTRIAXone (ROCEPHIN) 1,000 mg in sodium chloride 0.9 % 50 mL IVPB (mini-bag), 1,000 mg, IntraVENous, Q24H  sodium chloride flush 0.9 % injection 5-40 mL, 5-40 mL, IntraVENous, 2 times per day  sodium chloride flush 0.9 % injection 5-40 mL, 5-40 mL, IntraVENous, PRN  0.9 % sodium chloride infusion, , IntraVENous, PRN  enoxaparin Sodium (LOVENOX) injection 30 mg, 30 mg, SubCUTAneous, BID  ondansetron (ZOFRAN-ODT) disintegrating tablet 4 mg, 4 mg, Oral, Q8H PRN **OR** ondansetron (ZOFRAN) injection 4 mg, 4 mg, IntraVENous, Q6H PRN  polyethylene glycol (GLYCOLAX) packet 17 g, 17 g, Oral, Daily PRN  acetaminophen (TYLENOL) tablet 650 mg, 650 mg, Oral, Q6H PRN **OR** acetaminophen (TYLENOL) suppository 650 mg, 650 mg, Rectal, Q6H PRN  Physical    VITALS:  /60   Pulse 77   Temp 97.7 °F (36.5 °C) (Oral)   Resp 18   Ht 5' 5\" (1.651 m)   Wt 236 lb (107 kg)   SpO2 94%   BMI 39.27 kg/m²   Constitutional: Awake and alert in no acute distress.  Lying in bed comfortably, hoarse voice  Head: Normocephalic, atraumatic  Eyes: EOMI, PERRLA  ENT: moist mucous membranes  Neck: neck supple, trachea midline  Lungs: Good inspiratory effort, no wheeze, no rhonchi, no rales  Heart: RRR, normal S1 and S2  GI: Soft, non-distended, non tender, no guarding, no rebound, +BS  MSK: no edema noted  Skin: warm, dry  Psych: appropriate affect   Data    CBC:   Lab Results   Component Value Date/Time    WBC 9.6 02/12/2023 05:11 AM    RBC 4.07 02/12/2023 05:11 AM    HGB 12.0 02/12/2023 05:11 AM    HCT 35.7 02/12/2023 05:11 AM    MCV 87.7 02/12/2023 05:11 AM    MCH 29.5 02/12/2023 05:11 AM    MCHC 33.6 02/12/2023 05:11 AM    RDW 14.6 02/12/2023 05:11 AM     02/12/2023 05:11 AM    MPV 8.5 05/26/2015 12:55 PM     CMP:    Lab Results   Component Value Date/Time     02/12/2023 05:11 AM    K 3.5 02/12/2023 05:11 AM    K 4.3 02/11/2023 04:32 PM    CL 96 02/12/2023 05:11 AM    CO2 32 02/12/2023 05:11 AM    BUN 13 02/12/2023 05:11 AM    CREATININE 0.77 02/12/2023 05:11 AM    GFRAA >60.0 09/14/2022 02:52 PM    LABGLOM >60.0 02/12/2023 05:11 AM    GLUCOSE 156 02/12/2023 05:11 AM    PROT 6.3 02/12/2023 05:11 AM    LABALBU 3.4 02/12/2023 05:11 AM    CALCIUM 9.1 02/12/2023 05:11 AM    BILITOT 0.4 02/12/2023 05:11 AM    ALKPHOS 84 02/12/2023 05:11 AM    AST 16 02/12/2023 05:11 AM    ALT 11 02/12/2023 05:11 AM       ASSESSMENT AND PLAN      # Enlarged tonsils  - had tonsils removed as a child, but felt throat swelling. CT showed enlarged palantine tonsils. - pt given steroids with improvement  - ENT consulted - no urgent surgical needs and will follow up in the office  - ok to advance diet  - monitor respiratory status closely and move to ICU if worsening    # HTN/CHF/Heart block sp ICD  - cont home meds and monitor    Disposition: Ok for discharge per ENT. Will advance diet. Pt requesting to stay one more night for peace of mind. Will plan on discharge tomorrow if improved.        Brian Lenz,   Internal Medicine   Hospitalist    >45 minutes in total care time

## 2023-02-12 NOTE — CONSULTS
Department of Otolaryngology  Attending Consult Note      Reason for Consult: Evaluation for acute onset cough hoarseness of voice and enlarged tonsils on CT scan. Requesting Physician: Dr. Colette Rai: Difficulty swallowing and cough.     History Obtained From:  patient    HISTORY OF PRESENT ILLNESS:                The patient is a 69 y.o. female with significant past medical history of asthma hypertension who presents with cute onset cough and difficulty breathing and feeling of throat closing up    Past Medical History:        Diagnosis Date    Asthma     Degenerative disc disease, lumbar     Hypertension     Lymphedema     MRSA infection     UTI (lower urinary tract infection)      Past Surgical History:        Procedure Laterality Date    APPENDECTOMY      CARDIAC PACEMAKER PLACEMENT  02/05/2020    PACEMAKER PLACEMENT  02/05/2020    SALPINGO-OOPHORECTOMY      left side     SHOULDER SURGERY      TONSILLECTOMY      WRIST SURGERY       Current Medications:   Current Facility-Administered Medications: amitriptyline (ELAVIL) tablet 25 mg, 25 mg, Oral, Nightly  aspirin EC tablet 81 mg, 81 mg, Oral, Daily  carvedilol (COREG) tablet 12.5 mg, 12.5 mg, Oral, BID  famotidine (PEPCID) tablet 20 mg, 20 mg, Oral, BID  furosemide (LASIX) tablet 40 mg, 40 mg, Oral, Daily  LORazepam (ATIVAN) tablet 0.25 mg, 0.25 mg, Oral, Nightly PRN  metOLazone (ZAROXOLYN) tablet 2.5 mg, 2.5 mg, Oral, Daily  PARoxetine (PAXIL) tablet 20 mg, 20 mg, Oral, Daily  sodium chloride flush 0.9 % injection 5-40 mL, 5-40 mL, IntraVENous, 2 times per day  sodium chloride flush 0.9 % injection 5-40 mL, 5-40 mL, IntraVENous, PRN  0.9 % sodium chloride infusion, , IntraVENous, PRN  enoxaparin Sodium (LOVENOX) injection 30 mg, 30 mg, SubCUTAneous, BID  ondansetron (ZOFRAN-ODT) disintegrating tablet 4 mg, 4 mg, Oral, Q8H PRN **OR** ondansetron (ZOFRAN) injection 4 mg, 4 mg, IntraVENous, Q6H PRN  polyethylene glycol (GLYCOLAX) packet 17 g, 17 g, Oral, Daily PRN  acetaminophen (TYLENOL) tablet 650 mg, 650 mg, Oral, Q6H PRN **OR** acetaminophen (TYLENOL) suppository 650 mg, 650 mg, Rectal, Q6H PRN  Allergies:  Ciprofloxacin, Metronidazole, and Tetracycline    Social History:    TOBACCO:   reports that she has never smoked. She has never used smokeless tobacco.  ETOH:   reports current alcohol use. DRUGS:   reports no history of drug use. SEXUAL HISTORY:  @[facility]@  ACTIVITIES OF DAILY LIVING:  @[facility]@  MARITAL STATUS:  @[facility]@  OCCUPATION:  @[facility]@  LEVEL OF EDUCATION:  @[facility]@  Family History:   History reviewed. No pertinent family history. REVIEW OF SYSTEMS:    HEENT:  negative except for  hearing loss  RESPIRATORY:  negative except for  dry cough  GASTROINTESTINAL:  negative except for dysphagia    PHYSICAL EXAM:    VITALS:  /60   Pulse 77   Temp 97.7 °F (36.5 °C) (Oral)   Resp 18   Ht 5' 5\" (1.651 m)   Wt 236 lb (107 kg)   SpO2 94%   BMI 39.27 kg/m²   24HR INTAKE/OUTPUT:    Intake/Output Summary (Last 24 hours) at 2/12/2023 1139  Last data filed at 2/12/2023 0827  Gross per 24 hour   Intake 30 ml   Output 0 ml   Net 30 ml     URINARY CATHETER OUTPUT (Beard):     DRAIN/TUBE OUTPUT:     VENT SETTINGS:     Additional Respiratory Assessments  Heart Rate: 77  Resp: 18  SpO2: 94 %  CONSTITUTIONAL:  awake, alert, cooperative, no apparent distress, and appears stated age  ENT:  Normocephalic, without obvious abnormality, atraumatic, sinuses nontender on palpation, external ears without lesions, oral pharynx with moist mucus membranes, tonsils without erythema or exudates, gums normal and good dentition. Examination of the nose reveals no external nasal deformity and anterior rhinoscopy reveals airway to be patent. Mild deviated nasal septum.   Examination of the oral cavity reveals no asymmetry uvula is midline gag reflex is preserved flexible laryngoscopy examination performed for further evaluation and this reveals hypertrophy of the tongue base laterally without evidence of infection there is mild redundancy of the mucosa of the laryngopharynx. Vocal cord movement within normal limits and no lesions noted. NECK:  Supple, symmetrical, trachea midline, no adenopathy, thyroid symmetric, not enlarged and no tenderness, skin normal    DATA:    Recent Labs     02/11/23  1632 02/12/23  0511   WBC 11.5* 9.6   HGB 12.2 12.0   HCT 37.1 35.7*    318     Recent Labs     02/11/23 1632 02/12/23  0511    140   K 4.3 3.5   CL 97 96   CO2 34* 32*   BUN 11 13   CREATININE 0.70 0.77   CALCIUM 9.2 9.1     Recent Labs     02/11/23 1632 02/12/23 0511   AST 21 16   ALT 12 11   BILITOT 0.5 0.4   ALKPHOS 89 84     No results for input(s): INR in the last 72 hours. No results for input(s): Bianka Highman in the last 72 hours. XR CHEST (2 VW)    Result Date: 2/11/2023  EXAMINATION: TWO XRAY VIEWS OF THE CHEST 2/11/2023 3:55 pm COMPARISON: March 2, 2022 HISTORY: ORDERING SYSTEM PROVIDED HISTORY: cough TECHNOLOGIST PROVIDED HISTORY: Reason for exam:->cough What reading provider will be dictating this exam?->CRC FINDINGS: Left pacemaker is present. The heart is normal in size. No airspace opacity or pleural effusion. No pneumothorax. Internal fixating hardware at level of proximal right humerus. No evidence of pneumonia or pleural effusion. CT SOFT TISSUE NECK W CONTRAST    Result Date: 2/11/2023  EXAMINATION: CT OF THE NECK SOFT TISSUE WITH CONTRAST  2/11/2023 TECHNIQUE: CT of the neck was performed with the administration of intravenous contrast. Multiplanar reformatted images are provided for review. Automated exposure control, iterative reconstruction, and/or weight based adjustment of the mA/kV was utilized to reduce the radiation dose to as low as reasonably achievable. COMPARISON: None.  HISTORY: ORDERING SYSTEM PROVIDED HISTORY: sore throat TECHNOLOGIST PROVIDED HISTORY: Reason for exam:->sore throat Decision Support Exception - unselect if not a suspected or confirmed emergency medical condition->Emergency Medical Condition (MA) What reading provider will be dictating this exam?->CRC FINDINGS: PHARYNX/LARYNX:  \"Kissing\" palatine tonsils cause severe narrowing of the oropharyngeal airway. The nasopharyngeal tonsils are also enlarged. No abscess. No masses identified. SALIVARY GLANDS/THYROID:  Subcentimeter thyroid nodules. LYMPH NODES:  No lymph nodes greater than 1 cm short axis diameter or 1.5 cm long axis diameter on axial images. SOFT TISSUES:  No appreciable soft tissue swelling or mass is seen. BRAIN/ORBITS/SINUSES:  The visualized portion of the intracranial contents appear unremarkable. The visualized portion of the orbits, paranasal sinuses and mastoid air cells demonstrate no acute abnormality. LUNG APICES/SUPERIOR MEDIASTINUM:  Left-sided pacemaker. BONES:  Degenerative changes of the spine. Enlarged palatine tonsils cause severe narrowing of the oropharyngeal airway. The nasopharyngeal tonsils are also enlarged. No abscess. RECOMMENDATIONS: Subcentimeter incidental thyroid nodule. No follow-up imaging is recommended. Reference: J Am Bob Radiol. 2015 Feb;12(2): 143-50         IMPRESSION/RECOMMENDATIONS:      Acute onset cough and possible laryngospasm. History of thyroid cyst    No evidence of worrisome findings on the laryngoscope scopic examination. Recommendation from ENT point patient can be discharged as she is doing much better. If she has further issues I will see her in the office for follow-up. She may need further work-up including work-up for allergy and sinus disease and reflux disease.

## 2023-02-12 NOTE — PROGRESS NOTES
DVT / VTE PROPHYLAXIS EVALUATION    Estimated Creatinine Clearance: 83 mL/min (based on SCr of 0.7 mg/dL). Recent Labs     02/11/23  1632   BUN 11   CREATININE 0.70      HGB 12.2   HCT 37.1     ADMITTING DX OR CHIEF COMPLAINT? Enlarged tonsils  WARFARIN? DOAC'S? no  ANY APPARENT BLEEDING? no  SCHEDULED SURGERY? no     Current order:  Enoxaparin 40 mg SUBQ once daily      Plan:  Pharmacologic VTE prophylaxis modified based on patient weight per [facility]/P&T approved protocol     Patient Weight (kg)      50.9 and below .9 101-150.9 151-174.9 175 or greater   Estimated   CrCl  (ml/min) 30 or greater []   30 mg   SUBQ daily   []   40 mg   SUBQ daily [x]  30 mg SUBQ   BID  []  40 mg   SUBQ   BID []  60mg SUBQ BID    15-29.9 []  UFH 5000   units SUBQ BID []  30 mg   SUBQ daily [] 30 mg SUBQ   daily []  40 mg SUBQ   daily [] 60 mg SUBQ   daily    Less than 15 or dialysis []  UFH 5000   units SUBQ BID [] UFH 5000 units SUBQ TID []  UFH 7500   units   SUBQ TID       Change enoxaparin to 30mg bid per above protocol. CHERELLE Blanton Ph.  2/12/2023  12:18 AM

## 2023-02-12 NOTE — PROGRESS NOTES
Pt admission completed. Pt A&Ox4. Pt came in with complaints of a cough and \"feeling like my throat closed up. \" She denies any difficulty swallowing or breathing. Pt educated on NPO diet with sips only with meds. Gatito Partida was placed. She ambulated using a cane. Pt has a history of lymphedema- BLE and bilateral hand edema. Per patient the swelling is just a little bit more than normal.     Pt has had issues with her call light that is making her very anxious. This RN has called multiple people to fix the call light as it will not ring to my phone; the , house supervisor, and maintenance. We have tried changing the phones multiple times, switched out the call light, and still no luck. It does ring up to the . Pt made aware that someone is sitting outside her room. I also gave her the room phone and told her to Ron Torres and it will call my phone directly until we can figure this out. We cannot change her rooms as we are full. She would like to speak to the supervisor; buzz notified. Call light within reach. Will continue to monitor.  Electronically signed by Madhav Vilchis RN on 2/12/23 at 6:42 AM EST

## 2023-02-12 NOTE — H&P
[place] [address] MEDICINE    HISTORY AND PHYSICAL EXAM    PATIENT NAME:  Ilene Luz    MRN:  [id number]  SERVICE DATE:  2/11/2023   SERVICE TIME:  10:00 PM    Primary Care Physician: Rashaad Vargas MD         SUBJECTIVE  CHIEF COMPLAINT:   Throat tightness    HPI: Patient being admitted for evaluation of concern for oropharyngeal mass. Patient is a pleasant, alert and oriented x1 67-year-old  female. Patient states that this morning she coughed several times and in between her coughs she had the sensation her airway closing shut briefly but when she exhaled she was able to breathe just fine. Patient states that the cough that she has history of chronic cough for years but never felt this sensation before. She reports this happened several times earlier in the morning but throughout the day seem to go away but she decided to be evaluated in the ED because she knew she had a history of a thyroid nodule that is being monitored. Patient states she has no difficulty swallowing or breathing. Patient states that she feels no discomfort at this time. Neck CT showed [place] tonsils causing severe narrowing of the oropharyngeal airway. Although, patient had a tonsillectomy and a remote history. And by examination tonsils are not visible. Otherwise, patient denies fever, throat pain, shortness of breath, chest pain, nausea, vomiting. Patient also denies any dysuria. 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 Patient also has mild HF with EF of 60% and mild 1+ MR. Patient denies use of tobacco, alcohol, or illicit drugs.     PAST MEDICAL HISTORY:    Past Medical History:   Diagnosis Date    Asthma     Degenerative disc disease, lumbar     Hypertension     Lymphedema     MRSA infection     UTI (lower urinary tract infection)      PAST SURGICAL HISTORY:    Past Surgical History:   Procedure Laterality Date APPENDECTOMY      CARDIAC PACEMAKER PLACEMENT  02/05/2020    PACEMAKER PLACEMENT  02/05/2020    SALPINGO-OOPHORECTOMY      left side     SHOULDER SURGERY      TONSILLECTOMY      WRIST SURGERY       FAMILY HISTORY:  History reviewed. No pertinent family history. SOCIAL HISTORY:    Social History     Socioeconomic History    Marital status:      Spouse name: Not on file    Number of children: Not on file    Years of education: Not on file    Highest education level: Not on file   Occupational History    Not on file   Tobacco Use    Smoking status: Never    Smokeless tobacco: Never   Vaping Use    Vaping Use: Never used   Substance and Sexual Activity    Alcohol use: Yes     Comment: Rarely    Drug use: No    Sexual activity: Never   Other Topics Concern    Not on file   Social History Narrative    Not on file     Social Determinants of Health     Financial Resource Strain: Not on file   Food Insecurity: Not on file   Transportation Needs: Not on file   Physical Activity: Not on file   Stress: Not on file   Social Connections: Not on file   Intimate Partner Violence: Not on file   Housing Stability: Not on file     MEDICATIONS:   Prior to Admission medications    Medication Sig Start Date End Date Taking?  Authorizing Provider   carvedilol (COREG) 12.5 MG tablet Take 1 tablet by mouth 2 times daily 1/31/23   Brigette Ibarra MD   potassium chloride (KLOR-CON M) 20 MEQ extended release tablet Take 5 tablets by mouth daily 10/31/22   Brigette Ibarra MD   nitrofurantoin, macrocrystal-monohydrate, (MACROBID) 100 MG capsule Take 1 capsule by mouth 2 times daily 10/10/22   Zackary Soto MD   famotidine (PEPCID) 20 MG tablet Take 1 tablet by mouth 2 times daily 8/25/22   BABATUNDE Conroy   nitrofurantoin, macrocrystal-monohydrate, (MACROBID) 100 MG capsule  3/11/22   Historical Provider, MD   metOLazone (ZAROXOLYN) 2.5 MG tablet 2.5 mg daily  9/7/21   Historical Provider, MD   Glucosamine-Chondroitin (OSTEO BI-FLEX REGULAR STRENGTH PO) Take by mouth    Historical Provider, MD   furosemide (LASIX) 40 MG tablet Take 40 mg by mouth daily    Historical Provider, MD   tiZANidine (ZANAFLEX) 4 MG tablet Take 4 mg by mouth 2 times daily     Historical Provider, MD   amitriptyline (ELAVIL) 25 MG tablet Take 25 mg by mouth nightly    Historical Provider, MD   Menthol-Methyl Salicylate (ANALGESIC OINTMENT) OINT ointment Apply topically once    Historical Provider, MD   LORazepam (ATIVAN) 0.5 MG tablet Take 0.25 mg by mouth Daily with lunch. Historical Provider, MD   PARoxetine (PAXIL-CR) 25 MG CR tablet Take 25 mg by mouth every morning. Historical Provider, MD   aspirin 81 MG tablet Take 81 mg by mouth daily. Historical Provider, MD   Multiple Vitamins-Minerals (THERAPEUTIC MULTIVITAMIN-MINERALS) tablet Take 1 tablet by mouth daily. Historical Provider, MD       ALLERGIES: Ciprofloxacin, Metronidazole, and Tetracycline    REVIEW OF SYSTEM:   Review of Systems   Constitutional:  Negative for appetite change, fatigue, fever and unexpected weight change. HENT:  Negative for congestion, rhinorrhea and sore throat. Eyes: Negative. Negative for photophobia and visual disturbance. Respiratory:  Positive for cough. Negative for shortness of breath and wheezing. Cardiovascular:  Negative for chest pain. Gastrointestinal:  Negative for abdominal pain, nausea and vomiting. Endocrine: Negative. Negative for polydipsia, polyphagia and polyuria. Genitourinary:  Negative for difficulty urinating, dysuria and pelvic pain. Musculoskeletal:  Negative for back pain. Skin: Negative. Negative for rash. Allergic/Immunologic: Negative. Neurological: Negative. Negative for dizziness, speech difficulty and weakness. Hematological: Negative. Psychiatric/Behavioral: Negative. Negative for behavioral problems and confusion.         OBJECTIVE  PHYSICAL EXAM: /72   Pulse 82   Temp 97.9 °F (36.6 °C)   Resp 19 SpO2 100%     Physical Exam  Vitals and nursing note reviewed. Constitutional:       General: She is not in acute distress. Appearance: She is well-developed. She is not ill-appearing or toxic-appearing. HENT:      Head: Normocephalic and atraumatic. Nose: Nose normal.      Mouth/Throat:      Mouth: Mucous membranes are moist.      Pharynx: No oropharyngeal exudate or posterior oropharyngeal erythema. Tonsils: No tonsillar abscesses. 0 on the right. 0 on the left. Comments: No tonsils visible  Eyes:      Pupils: Pupils are equal, round, and reactive to light. Cardiovascular:      Rate and Rhythm: Normal rate and regular rhythm. Pulses: Normal pulses. Heart sounds: Normal heart sounds. Pulmonary:      Effort: Pulmonary effort is normal. No respiratory distress. Breath sounds: Normal breath sounds. No wheezing or rales. Abdominal:      General: Bowel sounds are normal. There is no distension. Palpations: Abdomen is soft. There is no mass. Tenderness: There is no abdominal tenderness. There is no guarding or rebound. Hernia: No hernia is present. Musculoskeletal:         General: Normal range of motion. Cervical back: Normal range of motion. Skin:     General: Skin is warm and dry. Capillary Refill: Capillary refill takes less than 2 seconds. Neurological:      Mental Status: She is alert and oriented to person, place, and time. Psychiatric:         Mood and Affect: Mood normal.         DATA:     Diagnostic tests reviewed for today's visit:    Most recent labs and imaging results reviewed.      LABS:    Recent Results (from the past 24 hour(s))   COVID-19, Rapid    Collection Time: 02/11/23  3:38 PM    Specimen: Nasopharyngeal Swab   Result Value Ref Range    SARS-CoV-2, NAAT Not Detected Not Detected   Rapid Influenza A/B Antigens    Collection Time: 02/11/23  3:38 PM    Specimen: Nasopharyngeal   Result Value Ref Range    Influenza A by PCR Negative     Influenza B by PCR Negative    CBC with Auto Differential    Collection Time: 02/11/23  4:32 PM   Result Value Ref Range    WBC 11.5 (H) 4.8 - 10.8 K/uL    RBC 4.22 4.20 - 5.40 M/uL    Hemoglobin 12.2 12.0 - 16.0 g/dL    Hematocrit 37.1 37.0 - 47.0 %    MCV 87.7 79.4 - 94.8 fL    MCH 28.8 27.0 - 31.3 pg    MCHC 32.9 (L) 33.0 - 37.0 %    RDW 14.7 (H) 11.5 - 14.5 %    Platelets 439 799 - 932 K/uL    Neutrophils % 70.5 %    Lymphocytes % 20.7 %    Monocytes % 7.2 %    Eosinophils % 1.2 %    Basophils % 0.4 %    Neutrophils Absolute 8.1 (H) 1.4 - 6.5 K/uL    Lymphocytes Absolute 2.4 1.0 - 4.8 K/uL    Monocytes Absolute 0.8 0.2 - 0.8 K/uL    Eosinophils Absolute 0.1 0.0 - 0.7 K/uL    Basophils Absolute 0.0 0.0 - 0.2 K/uL   Comprehensive Metabolic Panel w/ Reflex to MG    Collection Time: 02/11/23  4:32 PM   Result Value Ref Range    Sodium 141 135 - 144 mEq/L    Potassium reflex Magnesium 4.3 3.4 - 4.9 mEq/L    Chloride 97 95 - 107 mEq/L    CO2 34 (H) 20 - 31 mEq/L    Anion Gap 10 9 - 15 mEq/L    Glucose 93 70 - 99 mg/dL    BUN 11 8 - 23 mg/dL    Creatinine 0.70 0.50 - 0.90 mg/dL    Est, Glom Filt Rate >60.0 >60    Calcium 9.2 8.5 - 9.9 mg/dL    Total Protein 6.4 6.3 - 8.0 g/dL    Albumin 3.6 3.5 - 4.6 g/dL    Total Bilirubin 0.5 0.2 - 0.7 mg/dL    Alkaline Phosphatase 89 40 - 130 U/L    ALT 12 0 - 33 U/L    AST 21 0 - 35 U/L    Globulin 2.8 2.3 - 3.5 g/dL   Urinalysis with Reflex to Culture    Collection Time: 02/11/23  4:45 PM    Specimen: Urine   Result Value Ref Range    Color, UA Yellow Straw/Yellow    Clarity, UA Clear Clear    Glucose, Ur Negative Negative mg/dL    Bilirubin Urine Negative Negative    Ketones, Urine Negative Negative mg/dL    Specific Gravity, UA 1.007 1.005 - 1.030    Blood, Urine Negative Negative    pH, UA 7.0 5.0 - 9.0    Protein, UA Negative Negative mg/dL    Urobilinogen, Urine 0.2 <2.0 E.U./dL    Nitrite, Urine POSITIVE (A) Negative    Leukocyte Esterase, Urine TRACE (A) Negative    Urine Reflex to Culture Not Indicated    Microscopic Urinalysis    Collection Time: 02/11/23  4:45 PM   Result Value Ref Range    Bacteria, UA MANY (A) Negative /HPF    Hyaline Casts, UA 0-1 0 - 5 /HPF    WBC, UA 3-5 0 - 5 /HPF    RBC, UA 3-5 (A) 0 - 5 /HPF    Epithelial Cells, UA 0-2 0 - 5 /HPF   Rapid Strep Screen    Collection Time: 02/11/23  8:48 PM    Specimen: Throat   Result Value Ref Range    Strep Grp A PCR Negative        IMAGING:   XR CHEST (2 VW)    Result Date: 2/11/2023  No evidence of pneumonia or pleural effusion. CT SOFT TISSUE NECK W CONTRAST    Result Date: 2/11/2023  Enlarged palatine tonsils cause severe narrowing of the oropharyngeal airway. The nasopharyngeal tonsils are also enlarged. No abscess. RECOMMENDATIONS: Subcentimeter incidental thyroid nodule. No follow-up imaging is recommended. Reference: J Am Bob Radiol. 2015 Feb;12(2): 143-50        VTE Prophylaxis: low molecular weight heparin -  start     ASSESSMENT AND PLAN    Principal Problem:  Oropharyngeal swelling: Soft neck CT shows enlarged tonsils. History of tonsillectomy. More likely mass. Patient given 12 mg Decadron in ED. We will consult ENT. We will keep patient NPO. Active Problems:  Bradycardia/heart block: Presence of pacemaker. We will keep patient on telemetry monitoring. HF: EF 60%. Patient on beta-blocker and diuretic. We will resume home meds. We will monitor fluid status closely. HTN: PT on home meds to control. Will resume home meds, as tolerated. Anxiety: Patient on home meds to control. We will resume home meds  GERD: PT on home meds to control. Will resume home meds, as tolerated.       Plan of care discussed with: patient    SIGNATURE: LEIGH ANN Ryan CNP  DATE: February 11, 2023  TIME: 10:00 PM     Sherif Friday, MD - supervising physician

## 2023-02-13 VITALS
HEART RATE: 70 BPM | SYSTOLIC BLOOD PRESSURE: 141 MMHG | OXYGEN SATURATION: 99 % | RESPIRATION RATE: 18 BRPM | HEIGHT: 65 IN | DIASTOLIC BLOOD PRESSURE: 70 MMHG | BODY MASS INDEX: 39.32 KG/M2 | TEMPERATURE: 97.3 F | WEIGHT: 236 LBS

## 2023-02-13 LAB
ALBUMIN SERPL-MCNC: 3.1 G/DL (ref 3.5–4.6)
ALP BLD-CCNC: 76 U/L (ref 40–130)
ALT SERPL-CCNC: 9 U/L (ref 0–33)
ANION GAP SERPL CALCULATED.3IONS-SCNC: 15 MEQ/L (ref 9–15)
AST SERPL-CCNC: 18 U/L (ref 0–35)
BASOPHILS ABSOLUTE: 0 K/UL (ref 0–0.2)
BASOPHILS RELATIVE PERCENT: 0.1 %
BILIRUB SERPL-MCNC: 0.3 MG/DL (ref 0.2–0.7)
BUN BLDV-MCNC: 21 MG/DL (ref 8–23)
CALCIUM SERPL-MCNC: 8.9 MG/DL (ref 8.5–9.9)
CHLORIDE BLD-SCNC: 97 MEQ/L (ref 95–107)
CO2: 28 MEQ/L (ref 20–31)
CREAT SERPL-MCNC: 0.86 MG/DL (ref 0.5–0.9)
EOSINOPHILS ABSOLUTE: 0 K/UL (ref 0–0.7)
EOSINOPHILS RELATIVE PERCENT: 0.1 %
GFR SERPL CREATININE-BSD FRML MDRD: >60 ML/MIN/{1.73_M2}
GLOBULIN: 2.9 G/DL (ref 2.3–3.5)
GLUCOSE BLD-MCNC: 144 MG/DL (ref 70–99)
HCT VFR BLD CALC: 33.7 % (ref 37–47)
HEMOGLOBIN: 11.1 G/DL (ref 12–16)
LYMPHOCYTES ABSOLUTE: 3.3 K/UL (ref 1–4.8)
LYMPHOCYTES RELATIVE PERCENT: 28.1 %
MAGNESIUM: 1.7 MG/DL (ref 1.7–2.4)
MCH RBC QN AUTO: 29.1 PG (ref 27–31.3)
MCHC RBC AUTO-ENTMCNC: 33 % (ref 33–37)
MCV RBC AUTO: 88.2 FL (ref 79.4–94.8)
MONOCYTES ABSOLUTE: 0.9 K/UL (ref 0.2–0.8)
MONOCYTES RELATIVE PERCENT: 8 %
NEUTROPHILS ABSOLUTE: 7.4 K/UL (ref 1.4–6.5)
NEUTROPHILS RELATIVE PERCENT: 63.7 %
PDW BLD-RTO: 14.8 % (ref 11.5–14.5)
PLATELET # BLD: 293 K/UL (ref 130–400)
POTASSIUM SERPL-SCNC: 3.1 MEQ/L (ref 3.4–4.9)
RBC # BLD: 3.82 M/UL (ref 4.2–5.4)
SODIUM BLD-SCNC: 140 MEQ/L (ref 135–144)
TOTAL PROTEIN: 6 G/DL (ref 6.3–8)
WBC # BLD: 11.7 K/UL (ref 4.8–10.8)

## 2023-02-13 PROCEDURE — 36415 COLL VENOUS BLD VENIPUNCTURE: CPT

## 2023-02-13 PROCEDURE — 85025 COMPLETE CBC W/AUTO DIFF WBC: CPT

## 2023-02-13 PROCEDURE — 83735 ASSAY OF MAGNESIUM: CPT

## 2023-02-13 PROCEDURE — 6370000000 HC RX 637 (ALT 250 FOR IP): Performed by: INTERNAL MEDICINE

## 2023-02-13 PROCEDURE — 97162 PT EVAL MOD COMPLEX 30 MIN: CPT

## 2023-02-13 PROCEDURE — 6360000002 HC RX W HCPCS: Performed by: NURSE PRACTITIONER

## 2023-02-13 PROCEDURE — 6370000000 HC RX 637 (ALT 250 FOR IP): Performed by: NURSE PRACTITIONER

## 2023-02-13 PROCEDURE — 97165 OT EVAL LOW COMPLEX 30 MIN: CPT

## 2023-02-13 PROCEDURE — 80053 COMPREHEN METABOLIC PANEL: CPT

## 2023-02-13 RX ORDER — CEPHALEXIN 500 MG/1
500 CAPSULE ORAL 3 TIMES DAILY
Qty: 9 CAPSULE | Refills: 0 | Status: SHIPPED | OUTPATIENT
Start: 2023-02-13 | End: 2023-02-16

## 2023-02-13 RX ORDER — ALBUTEROL SULFATE 90 UG/1
2 AEROSOL, METERED RESPIRATORY (INHALATION) EVERY 6 HOURS PRN
Qty: 18 G | Refills: 3 | Status: SHIPPED | OUTPATIENT
Start: 2023-02-13

## 2023-02-13 RX ADMIN — CARVEDILOL 12.5 MG: 12.5 TABLET, FILM COATED ORAL at 10:25

## 2023-02-13 RX ADMIN — ENOXAPARIN SODIUM 30 MG: 100 INJECTION SUBCUTANEOUS at 10:28

## 2023-02-13 RX ADMIN — PAROXETINE HYDROCHLORIDE 20 MG: 20 TABLET, FILM COATED ORAL at 10:25

## 2023-02-13 RX ADMIN — ASPIRIN 81 MG: 81 TABLET, COATED ORAL at 10:25

## 2023-02-13 RX ADMIN — FUROSEMIDE 40 MG: 40 TABLET ORAL at 10:26

## 2023-02-13 RX ADMIN — METOLAZONE 2.5 MG: 2.5 TABLET ORAL at 10:25

## 2023-02-13 RX ADMIN — POTASSIUM CHLORIDE 20 MEQ: 1500 TABLET, EXTENDED RELEASE ORAL at 00:16

## 2023-02-13 RX ADMIN — FAMOTIDINE 20 MG: 20 TABLET, FILM COATED ORAL at 10:26

## 2023-02-13 NOTE — PROGRESS NOTES
Physical Therapy Med Surg Initial Assessment  Facility/Department: [facility]  [facility]       NAME: Celestino Bhagat  : 1945 (69 y.o.)  MRN: [id number]  CODE STATUS: Full Code    Date of Service: 2023    Patient Diagnosis(es): Tonsillar enlargement [J35.1]  Enlarged tonsils [J35.1]  Acute UTI [N39.0]   Chief Complaint   Patient presents with    Other     Pt states she was coughing last night and this morning felt like her throat was closed shut    Cough     Patient Active Problem List    Diagnosis Date Noted    Heart block 2020    Enlarged tonsils 2023    Thyroid nodule 2021    Pre-diabetes 2021    Gastroesophageal reflux disease without esophagitis 2021    SALAS (dyspnea on exertion) 2020    Lymphedema 2020    Symptomatic bradycardia 2020    Glenoid fracture of shoulder, left, closed, initial encounter 2019    Anterior dislocation of left shoulder 2019    Orthostasis 10/28/2019    Abscess or cellulitis of wrist 2012    MRSA (methicillin resistant Staphylococcus aureus) infection 2012        Past Medical History:   Diagnosis Date    Asthma     Degenerative disc disease, lumbar     Hypertension     Lymphedema     MRSA infection     UTI (lower urinary tract infection)      Past Surgical History:   Procedure Laterality Date    APPENDECTOMY      CARDIAC PACEMAKER PLACEMENT  2020    PACEMAKER PLACEMENT  2020    SALPINGO-OOPHORECTOMY      left side     SHOULDER SURGERY      TONSILLECTOMY      WRIST SURGERY         Patient assessed for rehabilitation services?: Yes  Family / Caregiver Present: No    Restrictions:   none     SUBJECTIVE:   Pain  Pain: L knee pain 2/10 pre and post tx- pt declined need for intervention    Prior Level of Function:  Social/Functional History  Lives With: Alone  Type of Home: House  Home Layout: Two level, Able to Live on Main level with bedroom/bathroom, Laundry in basement (pt sleeps on couch on 1st floor)  Home Access: Stairs to enter with rails  Entrance Stairs - Number of Steps: 2  Entrance Stairs - Rails: Left  Bathroom Toilet: Handicap height  Home Equipment: Rollator, Cane  Has the patient had two or more falls in the past year or any fall with injury in the past year?: No  Receives Help From: Friend(s)  ADL Assistance: Independent  Homemaking Assistance: Needs assistance (friend helps with laundry in basement; neighbor takes out garbage)  Homemaking Responsibilities: Yes  Ambulation Assistance: Independent (rollator or cane)  Transfer Assistance: Independent  Active : No  Patient's  Info: friends  Additional Comments: pt uses cane and 1 rail and friend helps lift pt up on the steps at home; pt uses motorized cart at [place]; pt states bone on bone B knees; pt sleeps on couch    OBJECTIVE:   Vision  Vision: Impaired  Vision Exceptions: Wears glasses for reading  Hearing: Within functional limits    Cognition:  Overall Orientation Status: Within Functional Limits  Orientation Level: Oriented X4  Follows Commands: Within Functional Limits  Overall Cognitive Status: WFL    Observation/Palpation  Posture: Fair  Observation: flexed posture.   Lymphedema noted B LEs    ROM:  RLE AROM: WFL  LLE AROM : WFL    Strength:  Strength RLE  Strength RLE: WFL  Strength LLE  Strength LLE: WFL    Neuro:  Balance  Sitting - Static: Good  Sitting - Dynamic: Good  Standing - Static: Fair;-  Standing - Dynamic: Fair;-    Sensation: Impaired (B LE neuropathy)    Bed mobility  Supine to Sit: Modified independent  Bed Mobility Comments: HOB elevated    Transfers  Sit to Stand: Stand by assistance  Stand to Sit: Stand by assistance  Comment: difficulty from low surfaces; standard walker used    Ambulation  Surface: Level tile  Device: Standard Walker  Assistance: Stand by assistance  Gait Deviations: Slow Ene;Decreased step length;Decreased step height  Distance: 15 ft X 2    Stairs/Curb  Stairs?: No    Activity Tolerance  Activity Tolerance: Patient limited by endurance; Patient limited by fatigue    Patient Education  Education Given To: Patient  Education Provided: Role of Therapy;Plan of Care  Education Method: Verbal  Education Outcome: Continued education needed       ASSESSMENT:   Body Structures, Functions, Activity Limitations Requiring Skilled Therapeutic Intervention: Decreased functional mobility ; Decreased strength;Decreased endurance;Decreased balance; Increased pain  Decision Making: Medium Complexity  History: high  Exam: med  Clinical Presentation: med    Therapy Prognosis: Good    DISCHARGE RECOMMENDATIONS:  Discharge Recommendations: Continue to assess pending progress    Assessment: Pt is 69 y.o. female to hospital due to enlarged tonsils. Pt reporting she has had difficulty with community distance amb and stair negotiation for approx 6 months to a year. Pt exhibits continued strength, balance, and activity tolerance deficits. Continued PT is required to progress toward indep for safe d/c home alone.   Requires PT Follow-Up: Yes       PLAN OF CARE:  Physcial Therapy Plan  General Plan: 1 time a day 3-6 times a week  Current Treatment Recommendations: Strengthening, ROM, Balance training, Transfer training, Endurance training, Gait training, Stair training, Neuromuscular re-education, Pain management, Home exercise program, Safety education & training, Patient/Caregiver education & training, Equipment evaluation, education, & procurement, Positioning, Therapeutic activities    Safety Devices  Type of Devices: Call light within reach, Chair alarm in place, Left in chair    Goals:  Long Term Goals  Long Term Goal 1: Pt will demonstrate bed mobility indep  Long Term Goal 2: Pt will demonstrate transfers mod indep with safest AD  Long Term Goal 3: Pt will demonstrate amb >/= 50ft mod indep with safest AD  Long Term Goal 4: Pt will demonstrate stair negotiation 2 steps with 1 rail and SPC CGA    [facility] (6 CLICK) BASIC MOBILITY  AM-PAC Inpatient Mobility Raw Score : 16     Therapy Time:   Individual   Time In 1047   Time Out 1111   Minutes 24       Eval X 24 min    Gisella Noel, PT, 02/13/23 at 12:36 PM         Definitions for assistance levels  Independent = pt does not require any physical supervision or assistance from another person for activity completion. Device may be needed.   Stand by assistance = pt requires verbal cues or instructions from another person, close to but not touching, to perform the activity  Minimal assistance= pt performs 75% or more of the activity; assistance is required to complete the activity  Moderate assistance= pt performs 50% of the activity; assistance is required to complete the activity  Maximal assistance = pt performs 25% of the activity; assistance is required to complete the activity  Dependent = pt requires total physical assistance to accomplish the task

## 2023-02-13 NOTE — CARE COORDINATION
DC PLAN FOR HOME TODAY WITH C. FOC IS OFFERED, PT WOULD LIKE [address]. REFERRAL CALLED TO [facility] OF [address]. PT HAS A CANE AND ROLLATOR. DENIES FURTHER NEEDS. PT STATES SHE HAS TRANSPORTATION FOR DISCHARGE.

## 2023-02-13 NOTE — PROGRESS NOTES
Department of Otolaryngology  Rhys Drake MD  Progress Note      SUBJECTIVE: Feeling somewhat better but I still have the cough. OBJECTIVE patient is in her room and appears comfortable    Physical    BP (!) 141/70   Pulse 70   Temp 97.3 °F (36.3 °C) (Oral)   Resp 18   Ht 5' 5\" (1.651 m)   Wt 236 lb (107 kg)   SpO2 99%   BMI 39.27 kg/m²     Intake/Output Summary (Last 24 hours) at 2/13/2023 1105  Last data filed at 2/13/2023 0631  Gross per 24 hour   Intake 780 ml   Output 650 ml   Net 130 ml         Physical Exam   Patient is awake alert and not in any distress vital signs stable. No examination was done today.     Data      Current Inpatient Medications  Current Facility-Administered Medications: amitriptyline (ELAVIL) tablet 25 mg, 25 mg, Oral, Nightly  aspirin EC tablet 81 mg, 81 mg, Oral, Daily  carvedilol (COREG) tablet 12.5 mg, 12.5 mg, Oral, BID  famotidine (PEPCID) tablet 20 mg, 20 mg, Oral, BID  furosemide (LASIX) tablet 40 mg, 40 mg, Oral, Daily  LORazepam (ATIVAN) tablet 0.25 mg, 0.25 mg, Oral, Nightly PRN  metOLazone (ZAROXOLYN) tablet 2.5 mg, 2.5 mg, Oral, Daily  PARoxetine (PAXIL) tablet 20 mg, 20 mg, Oral, Daily  cefTRIAXone (ROCEPHIN) 1,000 mg in sodium chloride 0.9 % 50 mL IVPB (mini-bag), 1,000 mg, IntraVENous, Q24H  potassium chloride (KLOR-CON M) extended release tablet 20 mEq, 20 mEq, Oral, Daily  sodium chloride flush 0.9 % injection 5-40 mL, 5-40 mL, IntraVENous, 2 times per day  sodium chloride flush 0.9 % injection 5-40 mL, 5-40 mL, IntraVENous, PRN  0.9 % sodium chloride infusion, , IntraVENous, PRN  enoxaparin Sodium (LOVENOX) injection 30 mg, 30 mg, SubCUTAneous, BID  ondansetron (ZOFRAN-ODT) disintegrating tablet 4 mg, 4 mg, Oral, Q8H PRN **OR** ondansetron (ZOFRAN) injection 4 mg, 4 mg, IntraVENous, Q6H PRN  polyethylene glycol (GLYCOLAX) packet 17 g, 17 g, Oral, Daily PRN  acetaminophen (TYLENOL) tablet 650 mg, 650 mg, Oral, Q6H PRN **OR** acetaminophen (TYLENOL) suppository 650 mg, 650 mg, Rectal, Q6H PRN  ASSESSMENT AND PLAN    Acute onset cough with episodes of probable laryngospasm. Enlarged lingual tonsils    Hypertension    Lymphedema    Plan patient is recommended follow-up in the office in the next 2 weeks. I did review the films with the radiologist and the radiologist's feels that this is lingual tonsil enlargement.         Electronically signed by Tex Gonzalez MD on 2/13/23 at 11:05 AM EST

## 2023-02-13 NOTE — FLOWSHEET NOTE
Patient is resting in bed,her breathing is effortless ,aventricular paced,no complain of pain. 20:10 patient was assisted to the bathroom with assistance,her gait is slow but steady . 21:00 patient external catheter was placed per her request.her labial part have no pain or blemishes. 21:25 patient wants her iv out since the site have dried blood,at this time she was teary eyed and demand that her potassium 5 tablets of the 100 meq need to be given to her,I have explained that I do not have that to give ,not ordered but I will contact the hospitalist.    21:50; she continue to insist that she have to take the potassium since her out going nurse told her that the incoming nurse will be the one to give it to her,at this time Marilia Payne R.N.was called in the room  as another pair of eyes to verify that potassium was not ordered and that she did not take potassium on admit. 22:07 Dr. Diana Robledo was notified via perfect serve about potassium and patient wish to talk to him. 22:20: I have explained to the patient not to worry and  Her potassium dosages will be verified from her pharmacy. 23:50 patient expressed that she is germain now and she does not need a     2:20 Dr. Diana Robledo came to the floor but patient was asleep;  06:30 patient remain in bed,she is sleeping,external catheter maintained.

## 2023-02-13 NOTE — DISCHARGE SUMMARY
Physician Discharge Summary     Patient ID:  Parvez Roque  [id number]  78 y.o.  1945    Admit date: 2/11/2023    Discharge date : 02/13/23     Admitting Physician: Leonie Brown MD     Discharge Physician: Izzy Bauer DO     Admission Diagnoses: Tonsillar enlargement [J35.1]  Enlarged tonsils [J35.1]  Acute UTI [N39.0]    Discharge Diagnoses: Enlarged tonsils [J35.1]  Acute UTI [N39.0]    Admission Condition: fair    Discharged Condition: good    Hospital Course: 69 y.o. female with a history of hypertension, GERD, asthma, bradycardia status post pacemaker, presents with throat tightness. She woke up and had a coughing spell after which she was unable to breathe feeling that her throat closed up. It improved later but she was concerned so she came to the ER. Denied any sore throat. No difficulty swallowing or difficulty breathing. Patient had tonsillectomy done when she was 4years old. She denies any issues with her throat in the past.  She mentioned that she has family history of thyroid cancer and throat cancer. Denied any weight loss or other symptoms. Pt also complained of urinary frequency and dysuria and UA positive for UTI and pt started on Rocephin. Pt was given solumedrol in the ED and admitted with consult to ENT. ENT did not recommend any surgical intervention and recommended follow up in the office after discharge and was ok with discharge on 2/12 but pt very anxious about going home so she was kept overnight and discharged home on 2/13 in stable and improved condition with Rx for Keflex for her UTI. Consults: ENT    Discharge Exam:  Constitutional: Awake and alert in no acute distress.  Lying in bed comfortably  Head: Normocephalic, atraumatic  Eyes: EOMI, PERRLA  ENT: moist mucous membranes  Neck: neck supple, trachea midline  Lungs: Good inspiratory effort, no wheeze, no rhonchi, no rales  Heart: RRR, normal S1 and S2  GI: Soft, non-distended, non tender, no guarding, no rebound, +BS  MSK: no edema noted  Skin: warm, dry  Psych: appropriate affect     Labs:   Recent Labs     02/11/23  1632 02/12/23  0511 02/13/23  0458   WBC 11.5* 9.6 11.7*   HGB 12.2 12.0 11.1*   HCT 37.1 35.7* 33.7*    318 293     Recent Labs     02/11/23  1632 02/12/23  0511 02/13/23  0458    140 140   K 4.3 3.5 3.1*   CL 97 96 97   CO2 34* 32* 28   BUN 11 13 21   CREATININE 0.70 0.77 0.86   CALCIUM 9.2 9.1 8.9     Recent Labs     02/11/23  1632 02/12/23  0511 02/13/23  0458   AST 21 16 18   ALT 12 11 9   BILITOT 0.5 0.4 0.3   ALKPHOS 89 84 76     No results for input(s): INR in the last 72 hours. No results for input(s): Ade Risk in the last 72 hours. Urinalysis:   Lab Results   Component Value Date/Time    NITRU POSITIVE 02/11/2023 04:45 PM    WBCUA 3-5 02/11/2023 04:45 PM    BACTERIA MANY 02/11/2023 04:45 PM    RBCUA 3-5 02/11/2023 04:45 PM    BLOODU Negative 02/11/2023 04:45 PM    SPECGRAV 1.007 02/11/2023 04:45 PM    GLUCOSEU Negative 02/11/2023 04:45 PM       Radiology:   Most recent    Chest CT      WITH CONTRAST:No results found for this or any previous visit. WITHOUT CONTRAST: No results found for this or any previous visit. CXR      2-view: Results for orders placed during the hospital encounter of 02/11/23    XR CHEST (2 VW)    Narrative  EXAMINATION:  TWO XRAY VIEWS OF THE CHEST    2/11/2023 3:55 pm    COMPARISON:  March 2, 2022    HISTORY:  ORDERING SYSTEM PROVIDED HISTORY: cough  TECHNOLOGIST PROVIDED HISTORY:  Reason for exam:->cough  What reading provider will be dictating this exam?->CRC    FINDINGS:  Left pacemaker is present. The heart is normal in size. No airspace opacity  or pleural effusion. No pneumothorax. Internal fixating hardware at level  of proximal right humerus. Impression  No evidence of pneumonia or pleural effusion.       Results for orders placed during the hospital encounter of 08/02/19    XR CHEST STANDARD (2 VW)    Narrative  EXAM: CHEST, 2 VIEWS    COMPARISON: 6/8/2019    REASON FOR EXAMINATION: COUGH, SORE THROAT, CHEST PAIN AND RIGHT SUPRACLAVICULAR LUMP    FINDINGS:   Two views of the chest demonstrate normal appearance of the heart. There is a tortuous aorta. The lungs appear clear. There is degenerative bone spurring throughout the spine. There is orthopedic hardware transfixing an old healed fracture of  the right humerus. There is osteoarthritis in the left shoulder. No change since 6/8/2019 PCXR. Impression  NO EVIDENCE OF ACTIVE DISEASE IN THE CHEST. Portable: Results for orders placed during the hospital encounter of 03/02/22    XR CHEST PORTABLE    Narrative  TECHNIQUE:  Single portable view of the chest.    CLINICAL INDICATION:  A 77year-old presenting with cough. COMPARISON:  Chest x-ray obtained on 7/18/2021    PROCEDURE AND FINDINGS:  AICD visualized in the left chest with 2 leads in the heart. Internal fixation of the right humerus is seen. The cardiomediastinal silhouette is unremarkable. The bronchovascular markings are unremarkable bilaterally. Subtle linear subsegmental atelectatic streaks are seen with mild interstitial prominence seen. The costophrenic angles are clear, no evidence of lung infiltrate, pleural effusion or parenchymal lung mass. Degenerative bone changes. Impression  No evidence of acute cardiopulmonary disease. Echo No results found for this or any previous visit. Disposition: home    In process/preliminary results:  Outstanding Order Results       No orders found from 1/13/2023 to 2/12/2023.             Patient Instructions:   Current Discharge Medication List        START taking these medications    Details   cephALEXin (KEFLEX) 500 MG capsule Take 1 capsule by mouth 3 times daily for 3 days  Qty: 9 capsule, Refills: 0      albuterol sulfate HFA (PROVENTIL HFA) 108 (90 Base) MCG/ACT inhaler Inhale 2 puffs into the lungs every 6 hours as needed for Wheezing  Qty: 18 g, Refills: 3           CONTINUE these medications which have NOT CHANGED    Details   carvedilol (COREG) 12.5 MG tablet Take 1 tablet by mouth 2 times daily  Qty: 60 tablet, Refills: 1    Comments: NEEDS TO BE SEEN IN OFFICE FOR FURTHER REFILLS      potassium chloride (KLOR-CON M) 20 MEQ extended release tablet Take 5 tablets by mouth daily  Qty: 150 tablet, Refills: 3    Comments: Dose increase to 100 meq daily  Associated Diagnoses: Hypokalemia      famotidine (PEPCID) 20 MG tablet Take 1 tablet by mouth 2 times daily  Qty: 60 tablet, Refills: 3      metOLazone (ZAROXOLYN) 2.5 MG tablet 2.5 mg daily       Glucosamine-Chondroitin (OSTEO BI-FLEX REGULAR STRENGTH PO) Take 1 tablet by mouth      furosemide (LASIX) 40 MG tablet Take 40 mg by mouth daily      tiZANidine (ZANAFLEX) 4 MG tablet Take 4 mg by mouth 2 times daily       amitriptyline (ELAVIL) 25 MG tablet Take 25 mg by mouth nightly      Menthol-Methyl Salicylate (ANALGESIC OINTMENT) OINT ointment Apply topically as needed Knee pain      LORazepam (ATIVAN) 0.5 MG tablet Take 0.5 mg by mouth 2 times daily. Half a pill in the AM (0.25 mg) and takes full pill (0.5mg) at night      PARoxetine (PAXIL-CR) 25 MG CR tablet Take 25 mg by mouth every morning. aspirin 81 MG tablet Take 81 mg by mouth daily. Multiple Vitamins-Minerals (THERAPEUTIC MULTIVITAMIN-MINERALS) tablet Take 1 tablet by mouth daily. STOP taking these medications       nitrofurantoin, macrocrystal-monohydrate, (MACROBID) 100 MG capsule Comments:   Reason for Stopping:         nitrofurantoin, macrocrystal-monohydrate, (MACROBID) 100 MG capsule Comments:   Reason for Stopping:             Activity: activity as tolerated  Diet: regular diet  Wound Care: none needed    Follow-up with Andrew Escamilla MD  in 1 week.     DC time 35 minutes    Signed:  Electronically signed by Charis Avalos DO on 2/13/2023 at 3:01 PM

## 2023-02-13 NOTE — PROGRESS NOTES
Assessment documented. Vital signs stable. Meds taken well. Denies any discomfort or pain. Call light within reach. BP (!) 141/70   Pulse 70   Temp 97.3 °F (36.3 °C) (Oral)   Resp 18   Ht 5' 5\" (1.651 m)   Wt 236 lb (107 kg)   SpO2 99%   BMI 39.27 kg/m²       1525 - Discharge instructions reviewed with patient. Verbalized understanding. All belongings gathered and with patient at time of discharge.

## 2023-02-14 NOTE — PROGRESS NOTES
Physician Progress Note      PATIENT:               Pop Khan  CSN #:                  [id number]  :                       1945  ADMIT DATE:       2023 3:27 PM  [address] DATE:        2023 4:22 PM  RESPONDING  PROVIDER #:        Irais Jean Baptiste DO          QUERY TEXT:    Pt admitted with enlarged tonsils and has CHF documented. H&P \" Patient also   has mild HF with EF of 60% and mild 1+ MR.\"  If possible, please document in progress notes and discharge summary further   specificity regarding the type and acuity of CHF:    The medical record reflects the following:  Risk Factors: HTN, h/o heart block s/p ICD  Clinical Indicators:  echo showed EF 60%. Normal diastolic filling pattern. 1+ MR. Last  2021.   CXR showed  the heart is normal in size. ? No airspace opacity or pleural   effusion. Treatment: Coreg, po Lasix, Zaroxolyn, ASA, CXR    Thank you, Sudha Jarquin RN BSN [facility]  [phone]  Options provided:  -- Chronic Diastolic CHF/HFpEF  -- Other - I will add my own diagnosis  -- Disagree - Not applicable / Not valid  -- Disagree - Clinically unable to determine / Unknown  -- Refer to Clinical Documentation Reviewer    PROVIDER RESPONSE TEXT:    This patient has chronic diastolic CHF/HFpEF.     Query created by: Maggi Sewell on 2023 8:17 AM      Electronically signed by:  Irais Jean Baptiste DO 2023 2:15 PM

## 2023-02-19 ASSESSMENT — ENCOUNTER SYMPTOMS
SORE THROAT: 1
RESPIRATORY NEGATIVE: 1

## 2023-02-19 NOTE — ED PROVIDER NOTES
[address]  eMERGENCY dEPARTMENT eNCOUnter      Pt Name: Lakhwinder Abebe  MRN: [id number]  [place] 1945  Date of evaluation: 2/11/2023  Provider: Patito Toledo MD    CHIEF COMPLAINT       Chief Complaint   Patient presents with    Other     Pt states she was coughing last night and this morning felt like her throat was closed shut    Cough         HISTORY OF PRESENT ILLNESS   (Location/Symptom, Timing/Onset,Context/Setting, Quality, Duration, Modifying Factors, Severity)  Note limiting factors. Lakhwinder Abebe is a 69 y.o. female who presents to the emergency department pharyngitis and throat closing    69years old presented to the ED with sore throat that started yesterday. States last night felt that her throat is closing and she is unable to take a deep breath also have been having some body ache  Possible UTI symptoms in the last few days    The history is provided by the patient. NursingNotes were reviewed. REVIEW OF SYSTEMS    (2-9 systems for level 4, 10 or more for level 5)     Review of Systems   Constitutional: Negative. HENT:  Positive for sore throat. Respiratory: Negative. Cardiovascular: Negative. Endocrine: Negative. Genitourinary:  Positive for dysuria and urgency. Skin: Negative. Psychiatric/Behavioral: Negative. Except as noted above the remainder of the review of systems was reviewed and negative.        PAST MEDICAL HISTORY     Past Medical History:   Diagnosis Date    Asthma     Degenerative disc disease, lumbar     Hypertension     Lymphedema     MRSA infection     UTI (lower urinary tract infection)          SURGICALHISTORY       Past Surgical History:   Procedure Laterality Date    APPENDECTOMY      CARDIAC PACEMAKER PLACEMENT  02/05/2020    PACEMAKER PLACEMENT  02/05/2020    SALPINGO-OOPHORECTOMY      left side     SHOULDER SURGERY      TONSILLECTOMY      WRIST SURGERY           CURRENT MEDICATIONS       Discharge Medication List as of 2/13/2023  3:18 PM        CONTINUE these medications which have NOT CHANGED    Details   carvedilol (COREG) 12.5 MG tablet Take 1 tablet by mouth 2 times daily, Disp-60 tablet, R-1NEEDS TO BE SEEN IN OFFICE FOR FURTHER REFILLSNormal      potassium chloride (KLOR-CON M) 20 MEQ extended release tablet Take 5 tablets by mouth daily, Disp-150 tablet, R-3Dose increase to 100 meq dailyNormal      famotidine (PEPCID) 20 MG tablet Take 1 tablet by mouth 2 times daily, Disp-60 tablet, R-3Normal      metOLazone (ZAROXOLYN) 2.5 MG tablet 2.5 mg daily Historical Med      Glucosamine-Chondroitin (OSTEO BI-FLEX REGULAR STRENGTH PO) Take 1 tablet by mouthHistorical Med      furosemide (LASIX) 40 MG tablet Take 40 mg by mouth dailyHistorical Med      tiZANidine (ZANAFLEX) 4 MG tablet Take 4 mg by mouth 2 times daily Historical Med      amitriptyline (ELAVIL) 25 MG tablet Take 25 mg by mouth nightlyHistorical Med      Menthol-Methyl Salicylate (ANALGESIC OINTMENT) OINT ointment Apply topically as needed Knee painHistorical Med      LORazepam (ATIVAN) 0.5 MG tablet Take 0.5 mg by mouth 2 times daily. Half a pill in the AM (0.25 mg) and takes full pill (0.5mg) at nightHistorical Med      PARoxetine (PAXIL-CR) 25 MG CR tablet Take 25 mg by mouth every morning. aspirin 81 MG tablet Take 81 mg by mouth daily. Multiple Vitamins-Minerals (THERAPEUTIC MULTIVITAMIN-MINERALS) tablet Take 1 tablet by mouth daily. Ciprofloxacin, Metronidazole, and Tetracycline    FAMILY HISTORY     History reviewed. No pertinent family history. SOCIAL HISTORY       Social History     Socioeconomic History    Marital status:       Spouse name: None    Number of children: None    Years of education: None    Highest education level: None   Tobacco Use    Smoking status: Never    Smokeless tobacco: Never   Vaping Use    Vaping Use: Never used   Substance and Sexual Activity    Alcohol use: Yes     Comment: Rarely    Drug use: No    Sexual activity: Never       SCREENINGS    [place] Coma Scale  Eye Opening: Spontaneous  Best Verbal Response: Oriented  Best Motor Response: Obeys commands  [place] Coma Scale Score: 15        PHYSICAL EXAM    (up to 7 for level 4, 8 or more for level 5)     ED Triage Vitals   BP Temp Temp Source Heart Rate Resp SpO2 Height Weight   02/11/23 1528 02/11/23 1528 02/12/23 0819 02/11/23 1528 02/11/23 1528 02/11/23 1528 02/11/23 2330 02/11/23 2330   (!) 127/90 97.8 °F (36.6 °C) Oral 75 18 94 % 5' 5\" (1.651 m) 236 lb 4.8 oz (107.2 kg)       Physical Exam  Vitals and nursing note reviewed. Constitutional:       Appearance: She is well-developed. HENT:      Head: Normocephalic and atraumatic. Right Ear: Tympanic membrane and external ear normal.      Left Ear: Tympanic membrane and external ear normal.      Nose: Nose normal.      Mouth/Throat:      Pharynx: Posterior oropharyngeal erythema present. Eyes:      Pupils: Pupils are equal, round, and reactive to light. Cardiovascular:      Rate and Rhythm: Normal rate and regular rhythm. Heart sounds: Normal heart sounds. Pulmonary:      Effort: Pulmonary effort is normal. No respiratory distress. Breath sounds: Normal breath sounds. No wheezing or rales. Chest:      Chest wall: No tenderness. Abdominal:      General: Bowel sounds are normal.      Palpations: Abdomen is soft. Musculoskeletal:         General: Normal range of motion. Cervical back: Normal range of motion and neck supple. Skin:     General: Skin is warm and dry. Neurological:      Mental Status: She is alert and oriented to person, place, and time. Cranial Nerves: No cranial nerve deficit. Sensory: No sensory deficit. Motor: No abnormal muscle tone. Coordination: Coordination normal.      Deep Tendon Reflexes: Reflexes normal.   Psychiatric:         Behavior: Behavior normal.         Thought Content:  Thought content normal.         Judgment: Judgment normal.       RESULTS     EKG: All EKG's are interpreted by the Emergency Department Physician who either signs or Co-signsthis chart in the absence of a cardiologist.        RADIOLOGY:   Monica Ink such as CT, Ultrasound and MRI are read by the radiologist. Plain radiographic images are visualized and preliminarily interpreted by the emergency physician with the below findings:      Interpretation per the Radiologist below, if available at the time ofthis note:    CT SOFT TISSUE NECK W CONTRAST   Final Result   Enlarged palatine tonsils cause severe narrowing of the oropharyngeal airway. The nasopharyngeal tonsils are also enlarged. No abscess. RECOMMENDATIONS:   Subcentimeter incidental thyroid nodule. No follow-up imaging is recommended. Reference: J Am Bob Radiol. 2015 Feb;12(2): 143-50         XR CHEST (2 VW)   Final Result   No evidence of pneumonia or pleural effusion.                ED BEDSIDE ULTRASOUND:   Performed by ED Physician - none    LABS:  Labs Reviewed   CBC WITH AUTO DIFFERENTIAL - Abnormal; Notable for the following components:       Result Value    WBC 11.5 (*)     MCHC 32.9 (*)     RDW 14.7 (*)     Neutrophils Absolute 8.1 (*)     All other components within normal limits   COMPREHENSIVE METABOLIC PANEL W/ REFLEX TO MG FOR LOW K - Abnormal; Notable for the following components:    CO2 34 (*)     All other components within normal limits   URINALYSIS WITH REFLEX TO CULTURE - Abnormal; Notable for the following components:    Nitrite, Urine POSITIVE (*)     Leukocyte Esterase, Urine TRACE (*)     All other components within normal limits   MICROSCOPIC URINALYSIS - Abnormal; Notable for the following components:    Bacteria, UA MANY (*)     RBC, UA 3-5 (*)     All other components within normal limits   COMPREHENSIVE METABOLIC PANEL - Abnormal; Notable for the following components:    CO2 32 (*)     Glucose 156 (*)     Albumin 3.4 (*)     All other components within normal limits   CBC WITH AUTO DIFFERENTIAL - Abnormal; Notable for the following components:    RBC 4.07 (*)     Hematocrit 35.7 (*)     RDW 14.6 (*)     Neutrophils Absolute 8.5 (*)     Monocytes Absolute 0.1 (*)     All other components within normal limits   COMPREHENSIVE METABOLIC PANEL - Abnormal; Notable for the following components:    Potassium 3.1 (*)     Glucose 144 (*)     Total Protein 6.0 (*)     Albumin 3.1 (*)     All other components within normal limits   CBC WITH AUTO DIFFERENTIAL - Abnormal; Notable for the following components:    WBC 11.7 (*)     RBC 3.82 (*)     Hemoglobin 11.1 (*)     Hematocrit 33.7 (*)     RDW 14.8 (*)     Neutrophils Absolute 7.4 (*)     Monocytes Absolute 0.9 (*)     All other components within normal limits   COVID-19, RAPID   RAPID INFLUENZA A/B ANTIGENS   RAPID STREP SCREEN   MAGNESIUM   MAGNESIUM       All other labs were within normal range or not returned as of this dictation. Medical Decision Making  Patient presented to the ED with concern for sore throat and feeling of throat closing last night strep was negative mono was negative but patient CT showed enlarged tonsil possibly affecting the airway also the patient remained hemodynamically stable in the ER was able to maintain her airway. Was consulted with ENT who advised to admit the patient and start her on steroids. Also was found to have a UTI symptoms urine confirmed positive leukocyte on right straight was started on antibiotic    Amount and/or Complexity of Data Reviewed  Labs: ordered. Radiology: ordered. Risk  Prescription drug management. Decision regarding hospitalization. Coding   FINAL IMPRESSION      1. Tonsillar enlargement    2. Acute UTI    3.  SALAS (dyspnea on exertion)          DISPOSITION/PLAN   DISPOSITION Decision To Admit 02/19/2023 01:45:16 PM      PATIENT REFERRED TO:  Sona Maier MD  [address] [place] [address] [place] [phone]    Follow up on 2/17/2023  @ 10:00am for hospital followup      DISCHARGE MEDICATIONS:  Discharge Medication List as of 2/13/2023  3:18 PM        START taking these medications    Details   cephALEXin (KEFLEX) 500 MG capsule Take 1 capsule by mouth 3 times daily for 3 days, Disp-9 capsule, R-0Normal      albuterol sulfate HFA (PROVENTIL HFA) 108 (90 Base) MCG/ACT inhaler Inhale 2 puffs into the lungs every 6 hours as needed for Wheezing, Disp-18 g, R-3Normal                (Please note thatportions of this note were completed with a voice recognition program.  Efforts were made to edit the dictations but occasionally words are mis-transcribed.)    Pat Hua MD (electronically signed)  Attending Emergency Physician          Ana Trujillo MD  02/19/23 [phone]

## 2023-03-01 DIAGNOSIS — E87.6 HYPOKALEMIA: ICD-10-CM

## 2023-03-01 RX ORDER — POTASSIUM CHLORIDE 20 MEQ/1
100 TABLET, EXTENDED RELEASE ORAL DAILY
Qty: 150 TABLET | Refills: 3 | Status: SHIPPED | OUTPATIENT
Start: 2023-03-01

## 2023-03-01 NOTE — TELEPHONE ENCOUNTER
Requesting medication refill. Please approve or deny this request.    Rx requested:  Requested Prescriptions     Pending Prescriptions Disp Refills    potassium chloride (KLOR-CON M) 20 MEQ extended release tablet 150 tablet 3     Sig: Take 5 tablets by mouth daily         Last Office Visit:   1/17/2023      Next Visit Date:  Future Appointments   Date Time Provider Ruthie Blackwood   4/25/2023  1:45 PM Dora Reese, [address]   7/11/2023  2:15 PM Francesca Brennan MD [facility]               Last refill 10/31/2022. Please approve or deny.

## 2023-04-05 RX ORDER — CARVEDILOL 12.5 MG/1
12.5 TABLET ORAL 2 TIMES DAILY
Qty: 60 TABLET | Refills: 1 | Status: SHIPPED | OUTPATIENT
Start: 2023-04-05

## 2023-04-05 NOTE — TELEPHONE ENCOUNTER
Please approve or deny this refill request. The order is pended. Thank you.     LOV 1/17/2023    Dr Gely Treviño PT - please refill     Next Visit Date:  Future Appointments   Date Time Provider Ruthie Blackwood   4/25/2023  1:45 PM BABATUNDE Moyer [address]   4/27/2023 11:00  [address] [facility] [address]   7/11/2023  2:15 PM Stephen Traore MD [facility]

## 2023-04-14 ENCOUNTER — TELEPHONE (OUTPATIENT)
Dept: CARDIOLOGY CLINIC | Age: 78
End: 2023-04-14

## 2023-04-25 ENCOUNTER — OFFICE VISIT (OUTPATIENT)
Dept: ENDOCRINOLOGY | Age: 78
End: 2023-04-25
Payer: MEDICARE

## 2023-04-25 VITALS
HEIGHT: 65 IN | DIASTOLIC BLOOD PRESSURE: 89 MMHG | SYSTOLIC BLOOD PRESSURE: 149 MMHG | HEART RATE: 77 BPM | OXYGEN SATURATION: 93 % | WEIGHT: 236 LBS | BODY MASS INDEX: 39.32 KG/M2

## 2023-04-25 DIAGNOSIS — R73.03 PRE-DIABETES: ICD-10-CM

## 2023-04-25 DIAGNOSIS — R73.03 PRE-DIABETES: Primary | ICD-10-CM

## 2023-04-25 DIAGNOSIS — E04.1 THYROID NODULE: ICD-10-CM

## 2023-04-25 LAB
ANION GAP SERPL CALCULATED.3IONS-SCNC: 6 MEQ/L (ref 9–15)
BUN SERPL-MCNC: 18 MG/DL (ref 8–23)
CALCIUM SERPL-MCNC: 9.8 MG/DL (ref 8.5–9.9)
CHLORIDE SERPL-SCNC: 94 MEQ/L (ref 95–107)
CHP ED QC CHECK: NORMAL
CO2 SERPL-SCNC: 36 MEQ/L (ref 20–31)
CREAT SERPL-MCNC: 0.77 MG/DL (ref 0.5–0.9)
GLUCOSE BLD-MCNC: 121 MG/DL
GLUCOSE SERPL-MCNC: 101 MG/DL (ref 70–99)
HBA1C MFR BLD: 5.5 %
POTASSIUM SERPL-SCNC: 4 MEQ/L (ref 3.4–4.9)
SODIUM SERPL-SCNC: 136 MEQ/L (ref 135–144)
T4 FREE SERPL-MCNC: 1.22 NG/DL (ref 0.84–1.68)
TSH SERPL-MCNC: 2.43 UIU/ML (ref 0.44–3.86)

## 2023-04-25 PROCEDURE — 1123F ACP DISCUSS/DSCN MKR DOCD: CPT | Performed by: PHYSICIAN ASSISTANT

## 2023-04-25 PROCEDURE — 1090F PRES/ABSN URINE INCON ASSESS: CPT | Performed by: PHYSICIAN ASSISTANT

## 2023-04-25 PROCEDURE — 1036F TOBACCO NON-USER: CPT | Performed by: PHYSICIAN ASSISTANT

## 2023-04-25 PROCEDURE — 83036 HEMOGLOBIN GLYCOSYLATED A1C: CPT | Performed by: PHYSICIAN ASSISTANT

## 2023-04-25 PROCEDURE — 82962 GLUCOSE BLOOD TEST: CPT | Performed by: PHYSICIAN ASSISTANT

## 2023-04-25 PROCEDURE — G8400 PT W/DXA NO RESULTS DOC: HCPCS | Performed by: PHYSICIAN ASSISTANT

## 2023-04-25 PROCEDURE — G8417 CALC BMI ABV UP PARAM F/U: HCPCS | Performed by: PHYSICIAN ASSISTANT

## 2023-04-25 PROCEDURE — G8427 DOCREV CUR MEDS BY ELIG CLIN: HCPCS | Performed by: PHYSICIAN ASSISTANT

## 2023-04-25 PROCEDURE — 99214 OFFICE O/P EST MOD 30 MIN: CPT | Performed by: PHYSICIAN ASSISTANT

## 2023-04-25 ASSESSMENT — ENCOUNTER SYMPTOMS
COUGH: 0
ABDOMINAL PAIN: 0
SHORTNESS OF BREATH: 0
VOMITING: 0
DIARRHEA: 0
EYE REDNESS: 0
WHEEZING: 0
SORE THROAT: 0
RHINORRHEA: 0
EYE PAIN: 0
NAUSEA: 0
SINUS PRESSURE: 0

## 2023-04-25 NOTE — PROGRESS NOTES
mouth daily, Disp: , Rfl:   Lab Results   Component Value Date     02/13/2023    K 3.1 (L) 02/13/2023    CL 97 02/13/2023    CO2 28 02/13/2023    BUN 21 02/13/2023    CREATININE 0.86 02/13/2023    GLUCOSE 121 04/25/2023    CALCIUM 8.9 02/13/2023    PROT 6.0 (L) 02/13/2023    LABALBU 3.1 (L) 02/13/2023    BILITOT 0.3 02/13/2023    ALKPHOS 76 02/13/2023    AST 18 02/13/2023    ALT 9 02/13/2023    LABGLOM >60.0 02/13/2023    GFRAA >60.0 09/14/2022    GLOB 2.9 02/13/2023     Lab Results   Component Value Date    WBC 11.7 (H) 02/13/2023    HGB 11.1 (L) 02/13/2023    HCT 33.7 (L) 02/13/2023    MCV 88.2 02/13/2023     02/13/2023     Lab Results   Component Value Date    LABA1C 5.5 04/25/2023    LABA1C 6.0 (H) 09/14/2022    LABA1C 5.6 03/16/2022     Lab Results   Component Value Date    HDL 81 (H) 06/14/2021    HDL 72 (H) 09/11/2020    HDL 54 02/05/2020    LDLCALC 71 06/14/2021    LDLCALC 67 09/11/2020    LDLCALC 30 02/05/2020    CHOL 169 06/14/2021    CHOL 150 09/11/2020    CHOL 101 02/05/2020    TRIG 86 06/14/2021    TRIG 53 09/11/2020    TRIG 83 02/05/2020     No results found for: TESTM  Lab Results   Component Value Date    TSH 2.470 09/14/2022    TSH 2.580 05/24/2022    TSH 1.420 06/14/2021    T4FREE 1.27 09/14/2022    T4FREE 1.17 05/24/2022    T4FREE 1.32 06/14/2021     Lab Results   Component Value Date    TPOABS <4.0 09/14/2022       Results for Marques Fink (MRN [id number]) as of 3/16/2022 14:33   Ref. Range 12/1/2021 16:21   PTH Latest Ref Range: 15.0 - 65.0 pg/mL 48.0       US HEAD NECK SOFT TISSUE THYROID: 7/27/2021       CLINICAL HISTORY: E06.9 Thyroiditis ICD10. COMPARISONS: Cervical spine CT 5/10/2018. TECHNIQUE: Ultrasound of the thyroid was performed, with a regional survey. Reference: ACR Thyroid Imaging, Recording and Data System (TI-RADS): White Paper of the [employer]. Journal of the [employer] of Radiology.  Volume 14, Issue 5, May 2017, pages

## 2023-04-25 NOTE — PATIENT INSTRUCTIONS
Repeat thyroid ultrasound in next 1-2 weeks, will make further recommendations based on those results   Famotidine 20 mg twice a day  Labs today will make further recommendations based those results    Follow up in 6 months

## 2023-04-27 LAB
COMMENT: NORMAL
MISCELLANEOUS LAB TEST RESULT: NORMAL

## 2023-05-03 ENCOUNTER — HOSPITAL ENCOUNTER (OUTPATIENT)
Dept: CARDIOLOGY | Age: 78
Discharge: HOME | End: 2023-05-03
Payer: MEDICARE

## 2023-05-03 PROCEDURE — 93280 PM DEVICE PROGR EVAL DUAL: CPT

## 2023-05-16 ENCOUNTER — HOSPITAL ENCOUNTER (OUTPATIENT)
Dept: ULTRASOUND IMAGING | Age: 78
Discharge: HOME | End: 2023-05-18
Payer: MEDICARE

## 2023-05-16 DIAGNOSIS — E04.1 THYROID NODULE: ICD-10-CM

## 2023-05-16 PROCEDURE — 76536 US EXAM OF HEAD AND NECK: CPT

## 2023-05-18 ENCOUNTER — TELEPHONE (OUTPATIENT)
Dept: ENDOCRINOLOGY | Age: 78
End: 2023-05-18

## 2023-05-18 DIAGNOSIS — E04.1 THYROID NODULE: Primary | ICD-10-CM

## 2023-05-18 NOTE — PROGRESS NOTES
Patient to repeat thyroid ultrasound. She has bilateral nodules. They have both grown in size by around 4 to 5 mm. She does not have Hashimoto's thyroiditis and her PTH was within normal limits. I am going to send her to Dr. Darren Marcano, ENT surgeon for an opinion regarding possible surgical biopsy. I called the patient and left a voicemail for today. I will ask my staff to reach out to her tomorrow.

## 2023-06-05 NOTE — TELEPHONE ENCOUNTER
Comments: pt called for refill    Last Office Visit (last PCP visit):   1/17/2023    Next Visit Date:  Future Appointments   Date Time Provider Ruthie Blackwood   7/11/2023  2:15 PM Rula Griffith MD [facility]   10/25/2023  2:00 PM [facility] [place]       **If hasn't been seen in over a year OR hasn't followed up according to last diabetes/ADHD visit, make appointment for patient before sending refill to provider.     Rx requested:  Requested Prescriptions     Pending Prescriptions Disp Refills    carvedilol (COREG) 12.5 MG tablet 60 tablet 1     Sig: Take 1 tablet by mouth 2 times daily

## 2023-06-06 RX ORDER — CARVEDILOL 12.5 MG/1
12.5 TABLET ORAL 2 TIMES DAILY
Qty: 60 TABLET | Refills: 1 | Status: SHIPPED | OUTPATIENT
Start: 2023-06-06

## 2023-07-03 DIAGNOSIS — E87.6 HYPOKALEMIA: ICD-10-CM

## 2023-07-03 NOTE — TELEPHONE ENCOUNTER
Comments: pt called for refill. Last Office Visit (last PCP visit):   1/17/2023    Next Visit Date:  Future Appointments   Date Time Provider [address]   7/11/2023  2:15 PM Ash Reyes MD [address] [place] [address]   10/25/2023  2:00 PM [facility]       **If hasn't been seen in over a year OR hasn't followed up according to last diabetes/ADHD visit, make appointment for patient before sending refill to provider.     Rx requested:  Requested Prescriptions     Pending Prescriptions Disp Refills    potassium chloride (KLOR-CON M) 20 MEQ extended release tablet [Pharmacy Med Name: potassium chloride ER 20 mEq tablet,extended release(part/cryst)] 150 tablet 3     Sig: Take 5 tablets by mouth daily

## 2023-07-05 RX ORDER — POTASSIUM CHLORIDE 20 MEQ/1
100 TABLET, EXTENDED RELEASE ORAL DAILY
Qty: 150 TABLET | Refills: 3 | Status: SHIPPED | OUTPATIENT
Start: 2023-07-05

## 2023-08-01 ENCOUNTER — HOSPITAL ENCOUNTER (OUTPATIENT)
Dept: CARDIOLOGY | Age: 78
Discharge: HOME | End: 2023-08-01
Payer: MEDICARE

## 2023-08-01 PROCEDURE — 93296 REM INTERROG EVL PM/IDS: CPT

## 2023-08-07 RX ORDER — CARVEDILOL 12.5 MG/1
12.5 TABLET ORAL 2 TIMES DAILY
Qty: 180 TABLET | Refills: 3 | Status: SHIPPED | OUTPATIENT
Start: 2023-08-07

## 2023-08-07 NOTE — TELEPHONE ENCOUNTER
Requesting medication refill. Please approve or deny this request.    Rx requested:  Requested Prescriptions     Pending Prescriptions Disp Refills    carvedilol (COREG) 12.5 MG tablet [Pharmacy Med Name: carvedilol 12.5 mg tablet] 60 tablet 1     Sig: Take 1 tablet by mouth 2 times daily         Last Office Visit:   1/17/2023      Next Visit Date:  Future Appointments   Date Time Provider [address] [place] [address]   8/8/2023  2:15 PM Kathleen Ventura MD [facility]   10/25/2023  2:00 PM Angelica Hughes, [address] [facility] [address] refill 6/6/23. Please approve or deny.

## 2023-09-11 ENCOUNTER — HOSPITAL ENCOUNTER (EMERGENCY)
Age: 78
Discharge: HOME | End: 2023-09-11
Attending: EMERGENCY MEDICINE
Payer: MEDICARE

## 2023-09-11 VITALS
TEMPERATURE: 98 F | DIASTOLIC BLOOD PRESSURE: 83 MMHG | WEIGHT: 230 LBS | BODY MASS INDEX: 38.32 KG/M2 | HEART RATE: 68 BPM | SYSTOLIC BLOOD PRESSURE: 147 MMHG | HEIGHT: 65 IN | RESPIRATION RATE: 18 BRPM | OXYGEN SATURATION: 98 %

## 2023-09-11 DIAGNOSIS — R53.81 MALAISE AND FATIGUE: ICD-10-CM

## 2023-09-11 DIAGNOSIS — R53.83 MALAISE AND FATIGUE: ICD-10-CM

## 2023-09-11 DIAGNOSIS — R35.0 URINARY FREQUENCY: Primary | ICD-10-CM

## 2023-09-11 LAB
ALBUMIN SERPL-MCNC: 3.8 G/DL (ref 3.5–4.6)
ALP SERPL-CCNC: 94 U/L (ref 40–130)
ALT SERPL-CCNC: 30 U/L (ref 0–33)
ANION GAP SERPL CALCULATED.3IONS-SCNC: 11 MEQ/L (ref 9–15)
AST SERPL-CCNC: 15 U/L (ref 0–35)
BASOPHILS # BLD: 0.1 K/UL (ref 0–0.2)
BASOPHILS NFR BLD: 0.7 %
BILIRUB SERPL-MCNC: <0.2 MG/DL (ref 0.2–0.7)
BILIRUB UR QL STRIP: NEGATIVE
BUN SERPL-MCNC: 17 MG/DL (ref 8–23)
CALCIUM SERPL-MCNC: 9.1 MG/DL (ref 8.5–9.9)
CHLORIDE SERPL-SCNC: 101 MEQ/L (ref 95–107)
CLARITY UR: CLEAR
CO2 SERPL-SCNC: 32 MEQ/L (ref 20–31)
COLOR UR: YELLOW
CREAT SERPL-MCNC: 0.73 MG/DL (ref 0.5–0.9)
EOSINOPHIL # BLD: 0.2 K/UL (ref 0–0.7)
EOSINOPHIL NFR BLD: 1.5 %
ERYTHROCYTE [DISTWIDTH] IN BLOOD BY AUTOMATED COUNT: 14.5 % (ref 11.5–14.5)
GLOBULIN SER CALC-MCNC: 3.2 G/DL (ref 2.3–3.5)
GLUCOSE SERPL-MCNC: 119 MG/DL (ref 70–99)
GLUCOSE UR STRIP-MCNC: NEGATIVE MG/DL
HCT VFR BLD AUTO: 36.6 % (ref 37–47)
HGB BLD-MCNC: 12.1 G/DL (ref 12–16)
HGB UR QL STRIP: NEGATIVE
KETONES UR STRIP-MCNC: NEGATIVE MG/DL
LEUKOCYTE ESTERASE UR QL STRIP: NEGATIVE
LYMPHOCYTES # BLD: 2.4 K/UL (ref 1–4.8)
LYMPHOCYTES NFR BLD: 24 %
MCH RBC QN AUTO: 29.2 PG (ref 27–31.3)
MCHC RBC AUTO-ENTMCNC: 33 % (ref 33–37)
MCV RBC AUTO: 88.6 FL (ref 79.4–94.8)
MONOCYTES # BLD: 0.8 K/UL (ref 0.2–0.8)
MONOCYTES NFR BLD: 7.9 %
NEUTROPHILS # BLD: 6.7 K/UL (ref 1.4–6.5)
NEUTS SEG NFR BLD: 65.9 %
NITRITE UR QL STRIP: NEGATIVE
PH UR STRIP: 6 [PH] (ref 5–9)
PLATELET # BLD AUTO: 304 K/UL (ref 130–400)
POTASSIUM SERPL-SCNC: 3.7 MEQ/L (ref 3.4–4.9)
PROT SERPL-MCNC: 7 G/DL (ref 6.3–8)
PROT UR STRIP-MCNC: NEGATIVE MG/DL
RBC # BLD AUTO: 4.13 M/UL (ref 4.2–5.4)
SODIUM SERPL-SCNC: 144 MEQ/L (ref 135–144)
SP GR UR STRIP: 1.01 (ref 1–1.03)
URINE REFLEX TO CULTURE: NORMAL
UROBILINOGEN UR STRIP-ACNC: 0.2 E.U./DL
WBC # BLD AUTO: 10.1 K/UL (ref 4.8–10.8)

## 2023-09-11 PROCEDURE — 99283 EMERGENCY DEPT VISIT LOW MDM: CPT

## 2023-09-11 PROCEDURE — 81003 URINALYSIS AUTO W/O SCOPE: CPT

## 2023-09-11 PROCEDURE — 36415 COLL VENOUS BLD VENIPUNCTURE: CPT

## 2023-09-11 PROCEDURE — 80053 COMPREHEN METABOLIC PANEL: CPT

## 2023-09-11 PROCEDURE — 85025 COMPLETE CBC W/AUTO DIFF WBC: CPT

## 2023-09-11 ASSESSMENT — PAIN - FUNCTIONAL ASSESSMENT
PAIN_FUNCTIONAL_ASSESSMENT: NONE - DENIES PAIN

## 2023-09-11 ASSESSMENT — ENCOUNTER SYMPTOMS
VOMITING: 0
NAUSEA: 0
CHEST TIGHTNESS: 0
SHORTNESS OF BREATH: 0
SORE THROAT: 0
EYE PAIN: 0
ABDOMINAL PAIN: 0

## 2023-09-11 NOTE — ED TRIAGE NOTES
The patient came to the ED for urinary frequency, lightheadedness, weakness. Pt states she occasionally has burning with urination and that the urine \"smells sweet:\".

## 2023-09-11 NOTE — ED NOTES
Discharge instructions reviewed with patient. Patient denies any further questions at this time. Pt encouraged to make follow up appointments with PCP and any speciality referrals.         Sanchez Han RN  09/11/23 [id number]

## 2023-09-11 NOTE — ED PROVIDER NOTES
[facility] ED  EMERGENCY DEPARTMENT ENCOUNTER      Pt Name: Beena Meza  MRN: [id number]  [address] [facility] [place] 1945  Date of evaluation: 9/11/2023  Provider: Rico Rene DO    CHIEF COMPLAINT       Chief Complaint   Patient presents with    Urinary Frequency     With weakness, dizziness, \"sweet\" smelling urine x1 week         HISTORY OF PRESENT ILLNESS   (Location/Symptom, Timing/Onset, Context/Setting, Quality, Duration, Modifying Factors, Severity)  Note limiting factors. Beena Meza is a 69 y.o. female who presents to the emergency department . She came in with urinary frequency and concerned about a urine infection. Patient has been feeling generally weak at times. She is able to ambulate. No cough shortness of breath or fevers. Patient lives alone and admits that she worries a lot about bad things happening. HPI    Nursing Notes were reviewed. REVIEW OF SYSTEMS    (2-9 systems for level 4, 10 or more for level 5)     Review of Systems   Constitutional:  Positive for fatigue. Negative for activity change and appetite change. HENT:  Negative for congestion and sore throat. Eyes:  Negative for pain and visual disturbance. Respiratory:  Negative for chest tightness and shortness of breath. Cardiovascular:  Negative for chest pain. Gastrointestinal:  Negative for abdominal pain, nausea and vomiting. Endocrine: Negative for polydipsia. Genitourinary:  Positive for frequency. Negative for flank pain and urgency. Musculoskeletal:  Negative for gait problem and neck stiffness. Skin:  Negative for rash. Neurological:  Positive for weakness. Negative for light-headedness and headaches. Psychiatric/Behavioral:  Negative for confusion and sleep disturbance. Except as noted above the remainder of the review of systems was reviewed and negative.        PAST MEDICAL HISTORY     Past Medical History:   Diagnosis Date    Asthma     Degenerative disc disease,

## 2023-10-30 ENCOUNTER — HOSPITAL ENCOUNTER (OUTPATIENT)
Dept: CARDIOLOGY | Age: 78
Discharge: HOME | End: 2023-10-30
Payer: MEDICARE

## 2023-10-30 DIAGNOSIS — E87.6 HYPOKALEMIA: ICD-10-CM

## 2023-10-30 PROCEDURE — 93296 REM INTERROG EVL PM/IDS: CPT

## 2023-10-30 RX ORDER — POTASSIUM CHLORIDE 20 MEQ/1
100 TABLET, EXTENDED RELEASE ORAL DAILY
Qty: 150 TABLET | Refills: 1 | Status: SHIPPED | OUTPATIENT
Start: 2023-10-30

## 2023-10-30 NOTE — TELEPHONE ENCOUNTER
WILL BE OUT 10/31/2023    Requesting medication refill. Please approve or deny this request.    Rx requested:  Requested Prescriptions      No prescriptions requested or ordered in this encounter         Last Office Visit:   1/17/2023      Next Visit Date:  Future Appointments   Date Time Provider [address]   10/30/2023  1:00 -[address]   11/1/2023  2:00 PM [place]   11/14/2023  1:45 PM Arnulfo Shells, [address] refill 7/5/2023. Please approve or deny.

## 2023-11-01 ENCOUNTER — HOSPITAL ENCOUNTER (OUTPATIENT)
Dept: ULTRASOUND IMAGING | Age: 78
Discharge: HOME | End: 2023-11-03
Attending: OTOLARYNGOLOGY
Payer: MEDICARE

## 2023-11-01 DIAGNOSIS — E04.1 THYROID NODULE: ICD-10-CM

## 2023-11-01 PROCEDURE — 76536 US EXAM OF HEAD AND NECK: CPT

## 2023-11-07 PROBLEM — J35.1 ENLARGED TONSILS: Status: ACTIVE | Noted: 2023-02-11

## 2023-11-07 PROBLEM — S42.142A GLENOID FRACTURE OF SHOULDER, LEFT, CLOSED, INITIAL ENCOUNTER: Status: ACTIVE | Noted: 2019-12-28

## 2023-11-07 PROBLEM — E04.1 THYROID NODULE: Status: ACTIVE | Noted: 2021-12-01

## 2023-11-07 PROBLEM — M17.11 PRIMARY OSTEOARTHRITIS OF RIGHT KNEE: Status: ACTIVE | Noted: 2017-10-20

## 2023-11-07 PROBLEM — R00.1 SYMPTOMATIC BRADYCARDIA: Status: ACTIVE | Noted: 2020-02-04

## 2023-11-07 PROBLEM — M17.12 PRIMARY OSTEOARTHRITIS OF LEFT KNEE: Status: ACTIVE | Noted: 2018-03-19

## 2023-11-07 PROBLEM — I95.1 ORTHOSTASIS: Status: ACTIVE | Noted: 2019-10-28

## 2023-11-07 PROBLEM — R20.2 NUMBNESS AND TINGLING OF BOTH LEGS: Status: ACTIVE | Noted: 2017-10-20

## 2023-11-07 PROBLEM — R20.0 NUMBNESS AND TINGLING OF BOTH LEGS: Status: ACTIVE | Noted: 2017-10-20

## 2023-11-07 PROBLEM — I45.9 HEART BLOCK: Status: ACTIVE | Noted: 2020-02-05

## 2023-11-07 PROBLEM — M48.062 SPINAL STENOSIS OF LUMBAR REGION WITH NEUROGENIC CLAUDICATION: Status: ACTIVE | Noted: 2017-10-20

## 2023-11-07 PROBLEM — R06.09 DOE (DYSPNEA ON EXERTION): Status: ACTIVE | Noted: 2020-03-05

## 2023-11-07 PROBLEM — S42.152A GLENOID FRACTURE OF SHOULDER, LEFT, CLOSED, INITIAL ENCOUNTER: Status: ACTIVE | Noted: 2019-12-28

## 2023-11-07 PROBLEM — S43.015A ANTERIOR DISLOCATION OF LEFT SHOULDER: Status: ACTIVE | Noted: 2019-12-27

## 2023-11-07 PROBLEM — R73.03 PRE-DIABETES: Status: ACTIVE | Noted: 2021-12-01

## 2023-11-07 PROBLEM — I89.0 LYMPHEDEMA: Status: ACTIVE | Noted: 2020-02-12

## 2023-11-07 PROBLEM — M43.10 ACQUIRED SPONDYLOLISTHESIS: Status: ACTIVE | Noted: 2018-05-30

## 2023-11-07 RX ORDER — ATORVASTATIN CALCIUM 20 MG/1
20 TABLET, FILM COATED ORAL
COMMUNITY

## 2023-11-07 RX ORDER — TIZANIDINE 4 MG/1
1 TABLET ORAL 2 TIMES DAILY
COMMUNITY

## 2023-11-07 RX ORDER — POTASSIUM CHLORIDE 20 MEQ/1
5 TABLET, EXTENDED RELEASE ORAL DAILY
COMMUNITY
Start: 2023-10-30

## 2023-11-07 RX ORDER — LORAZEPAM 0.5 MG/1
TABLET ORAL
COMMUNITY

## 2023-11-07 RX ORDER — MULTIVIT WITH IRON,MINERALS
1 TABLET,CHEWABLE ORAL
COMMUNITY

## 2023-11-07 RX ORDER — PAROXETINE HYDROCHLORIDE HEMIHYDRATE 25 MG/1
25 TABLET, FILM COATED, EXTENDED RELEASE ORAL DAILY
COMMUNITY
Start: 2004-11-26

## 2023-11-07 RX ORDER — LISINOPRIL 20 MG/1
20 TABLET ORAL
COMMUNITY

## 2023-11-07 RX ORDER — ALBUTEROL SULFATE 90 UG/1
2 AEROSOL, METERED RESPIRATORY (INHALATION) EVERY 6 HOURS PRN
COMMUNITY
Start: 2023-02-13

## 2023-11-07 RX ORDER — CARVEDILOL 12.5 MG/1
1 TABLET ORAL 2 TIMES DAILY
COMMUNITY
Start: 2023-08-07

## 2023-11-07 RX ORDER — ASPIRIN 81 MG/1
81 TABLET ORAL DAILY
COMMUNITY
Start: 2004-11-26

## 2023-11-07 RX ORDER — METOLAZONE 2.5 MG/1
1 TABLET ORAL DAILY
COMMUNITY
Start: 2021-09-07

## 2023-11-07 RX ORDER — CHLORTHALIDONE 25 MG/1
12.5 TABLET ORAL
COMMUNITY
Start: 2017-07-28

## 2023-11-07 RX ORDER — CYCLOBENZAPRINE HCL 5 MG
TABLET ORAL
COMMUNITY

## 2023-11-07 RX ORDER — FUROSEMIDE 40 MG/1
40 TABLET ORAL
COMMUNITY

## 2023-11-07 RX ORDER — AMITRIPTYLINE HYDROCHLORIDE 25 MG/1
25 TABLET, FILM COATED ORAL NIGHTLY
COMMUNITY

## 2023-11-07 RX ORDER — FAMOTIDINE 20 MG/1
1 TABLET, FILM COATED ORAL 2 TIMES DAILY
COMMUNITY
Start: 2022-08-25

## 2023-11-08 ENCOUNTER — OFFICE VISIT (OUTPATIENT)
Dept: OTOLARYNGOLOGY | Facility: CLINIC | Age: 78
End: 2023-11-08
Payer: COMMERCIAL

## 2023-11-08 DIAGNOSIS — E04.1 THYROID NODULE: Primary | ICD-10-CM

## 2023-11-08 DIAGNOSIS — M26.609 TEMPOROMANDIBULAR JOINT DISORDER: ICD-10-CM

## 2023-11-08 PROCEDURE — 99213 OFFICE O/P EST LOW 20 MIN: CPT | Performed by: OTOLARYNGOLOGY

## 2023-11-08 NOTE — PROGRESS NOTES
Candice Chi is a 77 y.o. year old female patient with THYROID US REVIEW     Patient returns today to review thyroid ultrasound.  In addition to thyroid ultrasound reviewed patient is also complaining of right-sided ear pain.  There is also pain at the right sternoclavicular junction as well.  Ultrasound was completed which shows stable appearance of thyroid nodules when compared to previous study.  All other ENT concerns are negative.  There have been no significant changes in past medical or past surgical histories except as mentioned.          Physical Exam:   General appearance: No acute distress. Normal facies. Symmetric facial movement. No gross lesions of the face are noted.  The external ear structures appear normal. The ear canals patent and the tympanic membranes are intact without evidence of air-fluid levels, retraction, or congenital defects.  Anterior rhinoscopy notes essentially a midline nasal septum. Examination is noted for normal healthy mucosal membranes without any evidence of lesions, polyps, or exudate. The tongue is normally mobile. There are no lesions on the gingiva, buccal, or oral mucosa. There are no oral cavity masses.  The neck is negative for mass lymphadenopathy. The trachea and parotid are clear. The thyroid bed is grossly unremarkable. The salivary gland structures are grossly unremarkable.    Assessment/Plan   1.  Thyroid nodule  2.  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  Sternoclavicular arthralgia    Patient seen in the office today for assessment and review of thyroid ultrasound.  This shows stable appearance.  At this time recommendation is to repeat thyroid ultrasound in October.  Additionally I suspect TMJ with referred otalgia and right sternoclavicular arthralgia.  I recommend heat and anti-inflammatories and see me back next October.

## 2023-11-13 ENCOUNTER — TELEPHONE (OUTPATIENT)
Dept: CARDIOLOGY CLINIC | Age: 78
End: 2023-11-13

## 2023-11-13 NOTE — TELEPHONE ENCOUNTER
Spoke to patient on the phone. Patient states that for about the last two weeks, she has been experiencing a burning sensation across the top of her chest. Patient states that she has been taking TUMS and sipping on ginger ale and it seems to help, but the sensation comes back. Patient states that she also has a decreased appetite. Patient has also noticed that she has an increased urine output. Patient's follow up appointment that was scheduled for toward the end of November moved up to this Thursday, November 16 1415.

## 2023-12-28 DIAGNOSIS — E87.6 HYPOKALEMIA: ICD-10-CM

## 2023-12-28 RX ORDER — POTASSIUM CHLORIDE 20 MEQ/1
100 TABLET, EXTENDED RELEASE ORAL DAILY
Qty: 150 TABLET | Refills: 0 | Status: SHIPPED | OUTPATIENT
Start: 2023-12-28

## 2023-12-28 NOTE — TELEPHONE ENCOUNTER
Comments: incoming fax from [facility] Visit (last PCP visit):   1/17/2023    Next Visit Date:  No future appointments. **If hasn't been seen in over a year OR hasn't followed up according to last diabetes/ADHD visit, make appointment for patient before sending refill to provider.     Rx requested:  Requested Prescriptions     Pending Prescriptions Disp Refills    potassium chloride (KLOR-CON M) 20 MEQ extended release tablet 150 tablet 1     Sig: Take 5 tablets by mouth daily

## 2024-01-06 DIAGNOSIS — E87.6 HYPOKALEMIA: ICD-10-CM

## 2024-01-07 RX ORDER — POTASSIUM CHLORIDE 20 MEQ/1
100 TABLET, EXTENDED RELEASE ORAL DAILY
Qty: 150 TABLET | Refills: 0 | OUTPATIENT
Start: 2024-01-07

## 2024-01-07 NOTE — TELEPHONE ENCOUNTER
30 day refill given 12/28/2023. Patient needs OV. No refills given.     Requesting medication refill. Please approve or deny this request.    Rx requested:  Requested Prescriptions     Pending Prescriptions Disp Refills    potassium chloride (KLOR-CON M) 20 MEQ extended release tablet [Pharmacy Med Name: potassium chloride ER 20 mEq tablet,extended release(part/cryst)] 150 tablet 0     Sig: Take 5 tablets by mouth daily         Last Office Visit:   1/17/2023      Next Visit Date:  No future appointments.            Last refill 12/28/2023. Please approve or deny.

## 2024-01-29 PROCEDURE — 93294 REM INTERROG EVL PM/LDLS PM: CPT | Performed by: INTERNAL MEDICINE

## 2024-01-29 PROCEDURE — 93296 REM INTERROG EVL PM/IDS: CPT | Performed by: INTERNAL MEDICINE

## 2024-02-23 DIAGNOSIS — E87.6 HYPOKALEMIA: ICD-10-CM

## 2024-02-23 RX ORDER — POTASSIUM CHLORIDE 20 MEQ/1
100 TABLET, EXTENDED RELEASE ORAL DAILY
Qty: 150 TABLET | Refills: 0 | Status: SHIPPED | OUTPATIENT
Start: 2024-02-23

## 2024-02-23 NOTE — TELEPHONE ENCOUNTER
PATIENT OUT OF MEDICATION AND HER RIDE WAS UNABLE TO GIVE HER A RIDE FOR APPT ON 2/27/24. HAD TO RESCHEDULE FOR 3/4/24 NEXT TIME RIDE COULD TAKE HER.    Requesting medication refill. Please approve or deny this request.    Rx requested:  Requested Prescriptions     Pending Prescriptions Disp Refills    potassium chloride (KLOR-CON M) 20 MEQ extended release tablet 150 tablet 0     Sig: Take 5 tablets by mouth daily         Last Office Visit:   1/17/2023      Next Visit Date:  Future Appointments   Date Time Provider Department Center   3/4/2024 12:00 PM Valentín Savage MD [facility]               Please approve or deny.

## 2024-03-04 ENCOUNTER — OFFICE VISIT (OUTPATIENT)
Dept: CARDIOLOGY CLINIC | Age: 79
End: 2024-03-04
Payer: MEDICARE

## 2024-03-04 VITALS
DIASTOLIC BLOOD PRESSURE: 62 MMHG | OXYGEN SATURATION: 95 % | BODY MASS INDEX: 38.27 KG/M2 | HEART RATE: 76 BPM | HEIGHT: 65 IN | SYSTOLIC BLOOD PRESSURE: 132 MMHG

## 2024-03-04 DIAGNOSIS — R07.89 BURNING IN THE CHEST: ICD-10-CM

## 2024-03-04 DIAGNOSIS — Z00.00 PE (PHYSICAL EXAM), ANNUAL: Primary | ICD-10-CM

## 2024-03-04 DIAGNOSIS — I45.9 HEART BLOCK: ICD-10-CM

## 2024-03-04 DIAGNOSIS — R00.1 SYMPTOMATIC BRADYCARDIA: ICD-10-CM

## 2024-03-04 DIAGNOSIS — R06.09 DOE (DYSPNEA ON EXERTION): ICD-10-CM

## 2024-03-04 DIAGNOSIS — I89.0 LYMPHEDEMA: ICD-10-CM

## 2024-03-04 PROCEDURE — G8417 CALC BMI ABV UP PARAM F/U: HCPCS | Performed by: INTERNAL MEDICINE

## 2024-03-04 PROCEDURE — G8427 DOCREV CUR MEDS BY ELIG CLIN: HCPCS | Performed by: INTERNAL MEDICINE

## 2024-03-04 PROCEDURE — G8400 PT W/DXA NO RESULTS DOC: HCPCS | Performed by: INTERNAL MEDICINE

## 2024-03-04 PROCEDURE — 93000 ELECTROCARDIOGRAM COMPLETE: CPT | Performed by: INTERNAL MEDICINE

## 2024-03-04 PROCEDURE — G8484 FLU IMMUNIZE NO ADMIN: HCPCS | Performed by: INTERNAL MEDICINE

## 2024-03-04 PROCEDURE — 1036F TOBACCO NON-USER: CPT | Performed by: INTERNAL MEDICINE

## 2024-03-04 PROCEDURE — 1123F ACP DISCUSS/DSCN MKR DOCD: CPT | Performed by: INTERNAL MEDICINE

## 2024-03-04 PROCEDURE — 99214 OFFICE O/P EST MOD 30 MIN: CPT | Performed by: INTERNAL MEDICINE

## 2024-03-04 PROCEDURE — 1090F PRES/ABSN URINE INCON ASSESS: CPT | Performed by: INTERNAL MEDICINE

## 2024-03-04 ASSESSMENT — ENCOUNTER SYMPTOMS
CHEST TIGHTNESS: 0
NAUSEA: 0
RESPIRATORY NEGATIVE: 1
EYES NEGATIVE: 1
GASTROINTESTINAL NEGATIVE: 1
SHORTNESS OF BREATH: 0
WHEEZING: 0
BLOOD IN STOOL: 0
COUGH: 0
STRIDOR: 0

## 2024-03-04 NOTE — PROGRESS NOTES
Subsequent Progress Note  Patient: Arlyn Bhagat  YOB: 1945  MRN: [id number]    Chief Complaint:HB Tired. Edema  Chief Complaint   Patient presents with    Follow-up     LOV: 1/2023    Bradycardia       CV Data:  2//6/2020 PPM 2nd dgree Type II AVB   2/2020 Echo EF 60   2/2020 spect negative.     Subjective/HPI: taking slaty food. Edema worse. No cp + salas. Waking some. No falls no bleed.     3/5/2020 no cp no sob no falls no bleed. Tired all the time. Leg swelling little worse.     12/7/2020 Patient and/or health care decision maker is aware that that he/she may receive a bill for this telephone service, depending on his insurance coverage, and has provided verbal consent to proceed.  This visit was completed via telephone.  Total time 14 minutes.    Fell onher knees wwent to ER. CT shows Left knee effuison   Still with SALAS and leg edema no worse. No cp walking little   No bleed    9/22/21 Patient and/or health care decision maker is aware that that he/she may receive a bill for this telephone service, depending on his insurance coverage, and has provided verbal consent to proceed.  This visit was completed via telephone.  Total time 14 minutes.    Pt does not feel well. Sob weak. Having little hematuria. Recent K remains low despite advancing to 20 bid.       10/21/21 no cp no sob no falls no bleed tired all the time. Labs reviewed.     1/17/22 tired no cp no sob occ flutter brief no falls no bleed    3/4/24 doing well no cp no sob has GERD. No falls no bleed     EKG: SR 76    Lives alone  Nonsmoker  Occ [employer]  Retired MA    Past Medical History:   Diagnosis Date    Asthma     Degenerative disc disease, lumbar     Hypertension     Lymphedema     MRSA infection     UTI (lower urinary tract infection)        Past Surgical History:   Procedure Laterality Date    APPENDECTOMY      CARDIAC PACEMAKER PLACEMENT  02/05/2020    PACEMAKER PLACEMENT  02/05/2020    SALPINGO-OOPHORECTOMY      left side      Jeff Shah)  2019 19:37:00

## 2024-03-28 DIAGNOSIS — E87.6 HYPOKALEMIA: ICD-10-CM

## 2024-03-28 NOTE — TELEPHONE ENCOUNTER
Pt requesting medication refill to go to [facility].  Please approve or deny this request.    Rx requested:  Requested Prescriptions     Pending Prescriptions Disp Refills    potassium chloride (KLOR-CON M) 20 MEQ extended release tablet 150 tablet 0     Sig: Take 5 tablets by mouth daily         Last Office Visit:   3/4/2024      Next Visit Date:  Future Appointments   Date Time Provider Department Center   9/4/2024  2:00 PM Valentín Savage MD [facility]

## 2024-03-29 RX ORDER — POTASSIUM CHLORIDE 20 MEQ/1
100 TABLET, EXTENDED RELEASE ORAL DAILY
Qty: 150 TABLET | Refills: 0 | Status: SHIPPED | OUTPATIENT
Start: 2024-03-29

## 2024-04-27 DIAGNOSIS — E87.6 HYPOKALEMIA: ICD-10-CM

## 2024-04-28 RX ORDER — POTASSIUM CHLORIDE 20 MEQ/1
100 TABLET, EXTENDED RELEASE ORAL DAILY
Qty: 150 TABLET | Refills: 3 | Status: SHIPPED | OUTPATIENT
Start: 2024-04-28

## 2024-07-07 ENCOUNTER — HOSPITAL ENCOUNTER (EMERGENCY)
Age: 79
Discharge: HOME | End: 2024-07-07
Attending: EMERGENCY MEDICINE
Payer: MEDICARE

## 2024-07-07 ENCOUNTER — APPOINTMENT (OUTPATIENT)
Dept: GENERAL RADIOLOGY | Age: 79
End: 2024-07-07
Payer: MEDICARE

## 2024-07-07 VITALS
WEIGHT: 240 LBS | HEIGHT: 65 IN | DIASTOLIC BLOOD PRESSURE: 66 MMHG | OXYGEN SATURATION: 90 % | TEMPERATURE: 99 F | SYSTOLIC BLOOD PRESSURE: 119 MMHG | RESPIRATION RATE: 17 BRPM | HEART RATE: 80 BPM | BODY MASS INDEX: 39.99 KG/M2

## 2024-07-07 DIAGNOSIS — N30.00 ACUTE CYSTITIS WITHOUT HEMATURIA: Primary | ICD-10-CM

## 2024-07-07 DIAGNOSIS — R53.81 MALAISE: ICD-10-CM

## 2024-07-07 LAB
ALBUMIN SERPL-MCNC: 3.6 G/DL (ref 3.5–4.6)
ALP SERPL-CCNC: 96 U/L (ref 40–130)
ALT SERPL-CCNC: 9 U/L (ref 0–33)
ANION GAP SERPL CALCULATED.3IONS-SCNC: 10 MEQ/L (ref 9–15)
AST SERPL-CCNC: 13 U/L (ref 0–35)
BACTERIA URNS QL MICRO: ABNORMAL /HPF
BASOPHILS # BLD: 0 K/UL (ref 0–0.2)
BASOPHILS NFR BLD: 0.2 %
BILIRUB SERPL-MCNC: 0.3 MG/DL (ref 0.2–0.7)
BILIRUB UR QL STRIP: NEGATIVE
BUN SERPL-MCNC: 11 MG/DL (ref 8–23)
CALCIUM SERPL-MCNC: 9.1 MG/DL (ref 8.5–9.9)
CHLORIDE SERPL-SCNC: 96 MEQ/L (ref 95–107)
CLARITY UR: ABNORMAL
CO2 SERPL-SCNC: 34 MEQ/L (ref 20–31)
COLOR UR: YELLOW
CREAT SERPL-MCNC: 0.69 MG/DL (ref 0.5–0.9)
EOSINOPHIL # BLD: 0.1 K/UL (ref 0–0.7)
EOSINOPHIL NFR BLD: 1.3 %
EPI CELLS #/AREA URNS AUTO: ABNORMAL /HPF (ref 0–5)
ERYTHROCYTE [DISTWIDTH] IN BLOOD BY AUTOMATED COUNT: 14.9 % (ref 11.5–14.5)
GLOBULIN SER CALC-MCNC: 3.2 G/DL (ref 2.3–3.5)
GLUCOSE SERPL-MCNC: 104 MG/DL (ref 70–99)
GLUCOSE UR STRIP-MCNC: NEGATIVE MG/DL
HCT VFR BLD AUTO: 36.7 % (ref 37–47)
HGB BLD-MCNC: 11.8 G/DL (ref 12–16)
HGB UR QL STRIP: ABNORMAL
HYALINE CASTS #/AREA URNS AUTO: ABNORMAL /HPF (ref 0–5)
KETONES UR STRIP-MCNC: NEGATIVE MG/DL
LACTATE BLDV-SCNC: 1.6 MMOL/L (ref 0.5–2.2)
LEUKOCYTE ESTERASE UR QL STRIP: ABNORMAL
LYMPHOCYTES # BLD: 2.5 K/UL (ref 1–4.8)
LYMPHOCYTES NFR BLD: 29 %
MAGNESIUM SERPL-MCNC: 1.7 MG/DL (ref 1.7–2.4)
MCH RBC QN AUTO: 28.1 PG (ref 27–31.3)
MCHC RBC AUTO-ENTMCNC: 32.2 % (ref 33–37)
MCV RBC AUTO: 87.4 FL (ref 79.4–94.8)
MONOCYTES # BLD: 0.7 K/UL (ref 0.2–0.8)
MONOCYTES NFR BLD: 8 %
NEUTROPHILS # BLD: 5.4 K/UL (ref 1.4–6.5)
NEUTS SEG NFR BLD: 61.3 %
NITRITE UR QL STRIP: POSITIVE
PH UR STRIP: 6.5 [PH] (ref 5–9)
PLATELET # BLD AUTO: 431 K/UL (ref 130–400)
POTASSIUM SERPL-SCNC: 3.2 MEQ/L (ref 3.4–4.9)
PROT SERPL-MCNC: 6.8 G/DL (ref 6.3–8)
PROT UR STRIP-MCNC: NEGATIVE MG/DL
RBC # BLD AUTO: 4.2 M/UL (ref 4.2–5.4)
RBC #/AREA URNS AUTO: ABNORMAL /HPF (ref 0–5)
SODIUM SERPL-SCNC: 140 MEQ/L (ref 135–144)
SP GR UR STRIP: 1.01 (ref 1–1.03)
TROPONIN, HIGH SENSITIVITY: 16 NG/L (ref 0–19)
TSH SERPL-MCNC: 2.2 UIU/ML (ref 0.44–3.86)
URINE REFLEX TO CULTURE: YES
UROBILINOGEN UR STRIP-ACNC: 0.2 E.U./DL
WBC # BLD AUTO: 8.8 K/UL (ref 4.8–10.8)
WBC #/AREA URNS AUTO: ABNORMAL /HPF (ref 0–5)

## 2024-07-07 PROCEDURE — 83605 ASSAY OF LACTIC ACID: CPT

## 2024-07-07 PROCEDURE — 87088 URINE BACTERIA CULTURE: CPT

## 2024-07-07 PROCEDURE — 85025 COMPLETE CBC W/AUTO DIFF WBC: CPT

## 2024-07-07 PROCEDURE — 84484 ASSAY OF TROPONIN QUANT: CPT

## 2024-07-07 PROCEDURE — 93005 ELECTROCARDIOGRAM TRACING: CPT | Performed by: EMERGENCY MEDICINE

## 2024-07-07 PROCEDURE — 71045 X-RAY EXAM CHEST 1 VIEW: CPT

## 2024-07-07 PROCEDURE — 87086 URINE CULTURE/COLONY COUNT: CPT

## 2024-07-07 PROCEDURE — 87186 SC STD MICRODIL/AGAR DIL: CPT

## 2024-07-07 PROCEDURE — 84443 ASSAY THYROID STIM HORMONE: CPT

## 2024-07-07 PROCEDURE — 81001 URINALYSIS AUTO W/SCOPE: CPT

## 2024-07-07 PROCEDURE — 83735 ASSAY OF MAGNESIUM: CPT

## 2024-07-07 PROCEDURE — 6370000000 HC RX 637 (ALT 250 FOR IP): Performed by: EMERGENCY MEDICINE

## 2024-07-07 PROCEDURE — 80053 COMPREHEN METABOLIC PANEL: CPT

## 2024-07-07 PROCEDURE — 99285 EMERGENCY DEPT VISIT HI MDM: CPT

## 2024-07-07 RX ORDER — CEPHALEXIN 500 MG/1
500 CAPSULE ORAL ONCE
Status: COMPLETED | OUTPATIENT
Start: 2024-07-07 | End: 2024-07-07

## 2024-07-07 RX ORDER — CEPHALEXIN 500 MG/1
500 CAPSULE ORAL 4 TIMES DAILY
Qty: 28 CAPSULE | Refills: 0 | Status: SHIPPED | OUTPATIENT
Start: 2024-07-07 | End: 2024-07-14

## 2024-07-07 RX ORDER — POTASSIUM CHLORIDE 20 MEQ/1
40 TABLET, EXTENDED RELEASE ORAL ONCE
Status: COMPLETED | OUTPATIENT
Start: 2024-07-07 | End: 2024-07-07

## 2024-07-07 RX ADMIN — CEPHALEXIN 500 MG: 500 CAPSULE ORAL at 15:01

## 2024-07-07 RX ADMIN — POTASSIUM CHLORIDE 40 MEQ: 1500 TABLET, EXTENDED RELEASE ORAL at 14:13

## 2024-07-07 ASSESSMENT — LIFESTYLE VARIABLES
HOW OFTEN DO YOU HAVE A DRINK CONTAINING ALCOHOL: NEVER
HOW MANY STANDARD DRINKS CONTAINING ALCOHOL DO YOU HAVE ON A TYPICAL DAY: PATIENT DOES NOT DRINK

## 2024-07-07 ASSESSMENT — PAIN - FUNCTIONAL ASSESSMENT: PAIN_FUNCTIONAL_ASSESSMENT: NONE - DENIES PAIN

## 2024-07-07 NOTE — ED TRIAGE NOTES
Pt arrived via EMS from home d/t weakness, lower leg swelling, and possible UTI.  Pt states her lower legs are always swollen but they seem to be worse.  Pt states she has been feeling weak for a few days and she has frequency when urinating.  Pt has a slight fever, has taken any medication for it.  Pt states she sometimes has nausea but not at this time.

## 2024-07-07 NOTE — ED PROVIDER NOTES
Negative for gait problem and neck stiffness.   Skin:  Negative for rash.   Neurological:  Positive for weakness and headaches. Negative for light-headedness.   Psychiatric/Behavioral:  Negative for confusion and sleep disturbance.        Except as noted above the remainder of the review of systems was reviewed and negative.       PAST MEDICAL HISTORY     Past Medical History:   Diagnosis Date    Asthma     Degenerative disc disease, lumbar     Hypertension     Lymphedema     MRSA infection     UTI (lower urinary tract infection)          SURGICAL HISTORY       Past Surgical History:   Procedure Laterality Date    APPENDECTOMY      CARDIAC PACEMAKER PLACEMENT  02/05/2020    PACEMAKER PLACEMENT  02/05/2020    SALPINGO-OOPHORECTOMY      left side     SHOULDER SURGERY      TONSILLECTOMY      WRIST SURGERY           CURRENT MEDICATIONS       Previous Medications    ALBUTEROL SULFATE HFA (PROVENTIL HFA) 108 (90 BASE) MCG/ACT INHALER    Inhale 2 puffs into the lungs every 6 hours as needed for Wheezing    AMITRIPTYLINE (ELAVIL) 25 MG TABLET    Take 1 tablet by mouth nightly    ASPIRIN 81 MG TABLET    Take 1 tablet by mouth daily    CARVEDILOL (COREG) 12.5 MG TABLET    Take 1 tablet by mouth 2 times daily    FAMOTIDINE (PEPCID) 20 MG TABLET    Take 1 tablet by mouth 2 times daily    FUROSEMIDE (LASIX) 40 MG TABLET    Take 1 tablet by mouth daily    GLUCOSAMINE-CHONDROITIN (OSTEO BI-FLEX REGULAR STRENGTH PO)    Take 1 tablet by mouth    LORAZEPAM (ATIVAN) 0.5 MG TABLET    Take 1 tablet by mouth 2 times daily. Half a pill in the AM (0.25 mg) and takes full pill (0.5mg) at night    MENTHOL-METHYL SALICYLATE (ANALGESIC OINTMENT) OINT OINTMENT    Apply topically as needed Knee pain    METOLAZONE (ZAROXOLYN) 2.5 MG TABLET    1 tablet daily    MULTIPLE VITAMINS-MINERALS (THERAPEUTIC MULTIVITAMIN-MINERALS) TABLET    Take 1 tablet by mouth daily    OMEPRAZOLE PO    Take by mouth    PAROXETINE (PAXIL-CR) 25 MG CR TABLET    Take 1

## 2024-07-09 LAB
BACTERIA UR CULT: ABNORMAL
BACTERIA UR CULT: ABNORMAL
ORGANISM: ABNORMAL

## 2024-07-10 LAB
EKG ATRIAL RATE: 77 BPM
EKG P AXIS: 48 DEGREES
EKG P-R INTERVAL: 178 MS
EKG Q-T INTERVAL: 454 MS
EKG QRS DURATION: 176 MS
EKG QTC CALCULATION (BAZETT): 513 MS
EKG R AXIS: -75 DEGREES
EKG T AXIS: 95 DEGREES
EKG VENTRICULAR RATE: 77 BPM

## 2024-07-25 RX ORDER — CARVEDILOL 12.5 MG/1
12.5 TABLET ORAL 2 TIMES DAILY
Qty: 180 TABLET | Refills: 2 | Status: SHIPPED | OUTPATIENT
Start: 2024-07-25

## 2024-07-25 NOTE — TELEPHONE ENCOUNTER
Requesting medication refill. Please approve or deny this request.    Rx requested:  Requested Prescriptions     Pending Prescriptions Disp Refills    carvedilol (COREG) 12.5 MG tablet [Pharmacy Med Name: carvedilol 12.5 mg tablet] 180 tablet 3     Sig: Take 1 tablet by mouth 2 times daily         Last Office Visit:   3/4/2024      Next Visit Date:  No future appointments.            Last refill 08/07/2023. Please approve or deny.

## 2024-07-26 ENCOUNTER — HOSPITAL ENCOUNTER (OUTPATIENT)
Dept: CARDIOLOGY | Age: 79
Discharge: HOME | End: 2024-07-26
Payer: MEDICARE

## 2024-07-26 DIAGNOSIS — Z95.0 PACEMAKER: Primary | ICD-10-CM

## 2024-07-26 PROCEDURE — 93296 REM INTERROG EVL PM/IDS: CPT

## 2024-07-29 PROCEDURE — 93296 REM INTERROG EVL PM/IDS: CPT | Performed by: INTERNAL MEDICINE

## 2024-07-29 PROCEDURE — 93294 REM INTERROG EVL PM/LDLS PM: CPT | Performed by: INTERNAL MEDICINE

## 2024-08-14 DIAGNOSIS — E87.6 HYPOKALEMIA: ICD-10-CM

## 2024-08-14 RX ORDER — POTASSIUM CHLORIDE 20 MEQ/1
100 TABLET, EXTENDED RELEASE ORAL DAILY
Qty: 150 TABLET | Refills: 3 | Status: SHIPPED | OUTPATIENT
Start: 2024-08-14

## 2024-08-14 NOTE — TELEPHONE ENCOUNTER
Requesting medication refill. Please approve or deny this request.    Rx requested:  Requested Prescriptions     Pending Prescriptions Disp Refills    potassium chloride (KLOR-CON M) 20 MEQ extended release tablet [Pharmacy Med Name: potassium chloride ER 20 mEq tablet,extended release(part/cryst)] 150 tablet 3     Sig: Take 5 tablets by mouth daily         Last Office Visit:   3/4/2024      Next Visit Date:  No future appointments.            Last refill 04/08/2024. Please approve or deny.

## 2024-09-06 ENCOUNTER — APPOINTMENT (OUTPATIENT)
Dept: CT IMAGING | Age: 79
DRG: 690 | End: 2024-09-06
Payer: MEDICARE

## 2024-09-06 ENCOUNTER — HOSPITAL ENCOUNTER (INPATIENT)
Age: 79
LOS: 4 days | Discharge: HOME HEALTH | DRG: 690 | End: 2024-09-10
Attending: INTERNAL MEDICINE | Admitting: INTERNAL MEDICINE
Payer: MEDICARE

## 2024-09-06 DIAGNOSIS — R26.89 NEED FOR ASSISTANCE DUE TO UNSTEADY GAIT: ICD-10-CM

## 2024-09-06 DIAGNOSIS — N39.0 URINARY TRACT INFECTION WITHOUT HEMATURIA, SITE UNSPECIFIED: Primary | ICD-10-CM

## 2024-09-06 LAB
ALBUMIN SERPL-MCNC: 3.9 G/DL (ref 3.5–4.6)
ALP SERPL-CCNC: 105 U/L (ref 40–130)
ALT SERPL-CCNC: 9 U/L (ref 0–33)
ANION GAP SERPL CALCULATED.3IONS-SCNC: 6 MEQ/L (ref 9–15)
AST SERPL-CCNC: 17 U/L (ref 0–35)
BACTERIA URNS QL MICRO: ABNORMAL /HPF
BASOPHILS # BLD: 0 K/UL (ref 0–0.2)
BASOPHILS NFR BLD: 0.3 %
BILIRUB SERPL-MCNC: <0.2 MG/DL (ref 0.2–0.7)
BILIRUB UR QL STRIP: NEGATIVE
BUN SERPL-MCNC: 16 MG/DL (ref 8–23)
CALCIUM SERPL-MCNC: 9.4 MG/DL (ref 8.5–9.9)
CHLORIDE SERPL-SCNC: 98 MEQ/L (ref 95–107)
CLARITY UR: ABNORMAL
CO2 SERPL-SCNC: 38 MEQ/L (ref 20–31)
COLOR UR: YELLOW
CREAT SERPL-MCNC: 0.71 MG/DL (ref 0.5–0.9)
EOSINOPHIL # BLD: 0.1 K/UL (ref 0–0.7)
EOSINOPHIL NFR BLD: 1.5 %
EPI CELLS #/AREA URNS AUTO: ABNORMAL /HPF (ref 0–5)
ERYTHROCYTE [DISTWIDTH] IN BLOOD BY AUTOMATED COUNT: 15 % (ref 11.5–14.5)
GLOBULIN SER CALC-MCNC: 2.9 G/DL (ref 2.3–3.5)
GLUCOSE SERPL-MCNC: 99 MG/DL (ref 70–99)
GLUCOSE UR STRIP-MCNC: NEGATIVE MG/DL
HCT VFR BLD AUTO: 38 % (ref 37–47)
HGB BLD-MCNC: 11.6 G/DL (ref 12–16)
HGB UR QL STRIP: NEGATIVE
HYALINE CASTS #/AREA URNS AUTO: ABNORMAL /HPF (ref 0–5)
KETONES UR STRIP-MCNC: NEGATIVE MG/DL
LEUKOCYTE ESTERASE UR QL STRIP: ABNORMAL
LYMPHOCYTES # BLD: 2 K/UL (ref 1–4.8)
LYMPHOCYTES NFR BLD: 23.5 %
MCH RBC QN AUTO: 27.4 PG (ref 27–31.3)
MCHC RBC AUTO-ENTMCNC: 30.5 % (ref 33–37)
MCV RBC AUTO: 89.8 FL (ref 79.4–94.8)
MONOCYTES # BLD: 0.7 K/UL (ref 0.2–0.8)
MONOCYTES NFR BLD: 8 %
NEUTROPHILS # BLD: 5.7 K/UL (ref 1.4–6.5)
NEUTS SEG NFR BLD: 66.5 %
NITRITE UR QL STRIP: POSITIVE
PH UR STRIP: 7.5 [PH] (ref 5–9)
PLATELET # BLD AUTO: 420 K/UL (ref 130–400)
POTASSIUM SERPL-SCNC: 3.7 MEQ/L (ref 3.4–4.9)
PROT SERPL-MCNC: 6.8 G/DL (ref 6.3–8)
PROT UR STRIP-MCNC: NEGATIVE MG/DL
RBC # BLD AUTO: 4.23 M/UL (ref 4.2–5.4)
RBC #/AREA URNS HPF: ABNORMAL /HPF (ref 0–2)
SARS-COV-2 RDRP RESP QL NAA+PROBE: NOT DETECTED
SODIUM SERPL-SCNC: 142 MEQ/L (ref 135–144)
SP GR UR STRIP: 1.02 (ref 1–1.03)
URINE REFLEX TO CULTURE: YES
UROBILINOGEN UR STRIP-ACNC: 1 E.U./DL
WBC # BLD AUTO: 8.6 K/UL (ref 4.8–10.8)
WBC #/AREA URNS AUTO: ABNORMAL /HPF (ref 0–5)

## 2024-09-06 PROCEDURE — 87086 URINE CULTURE/COLONY COUNT: CPT

## 2024-09-06 PROCEDURE — 1210000000 HC MED SURG R&B

## 2024-09-06 PROCEDURE — 6360000002 HC RX W HCPCS: Performed by: INTERNAL MEDICINE

## 2024-09-06 PROCEDURE — 2580000003 HC RX 258: Performed by: INTERNAL MEDICINE

## 2024-09-06 PROCEDURE — 87186 SC STD MICRODIL/AGAR DIL: CPT

## 2024-09-06 PROCEDURE — 80053 COMPREHEN METABOLIC PANEL: CPT

## 2024-09-06 PROCEDURE — 87635 SARS-COV-2 COVID-19 AMP PRB: CPT

## 2024-09-06 PROCEDURE — 93005 ELECTROCARDIOGRAM TRACING: CPT | Performed by: PHYSICIAN ASSISTANT

## 2024-09-06 PROCEDURE — 81001 URINALYSIS AUTO W/SCOPE: CPT

## 2024-09-06 PROCEDURE — 6370000000 HC RX 637 (ALT 250 FOR IP): Performed by: INTERNAL MEDICINE

## 2024-09-06 PROCEDURE — 87077 CULTURE AEROBIC IDENTIFY: CPT

## 2024-09-06 PROCEDURE — 87088 URINE BACTERIA CULTURE: CPT

## 2024-09-06 PROCEDURE — 99285 EMERGENCY DEPT VISIT HI MDM: CPT

## 2024-09-06 PROCEDURE — 70450 CT HEAD/BRAIN W/O DYE: CPT

## 2024-09-06 PROCEDURE — 85025 COMPLETE CBC W/AUTO DIFF WBC: CPT

## 2024-09-06 RX ORDER — SODIUM CHLORIDE 0.9 % (FLUSH) 0.9 %
5-40 SYRINGE (ML) INJECTION PRN
Status: DISCONTINUED | OUTPATIENT
Start: 2024-09-06 | End: 2024-09-10 | Stop reason: HOSPADM

## 2024-09-06 RX ORDER — ALBUTEROL SULFATE 90 UG/1
2 AEROSOL, METERED RESPIRATORY (INHALATION) EVERY 6 HOURS PRN
Status: DISCONTINUED | OUTPATIENT
Start: 2024-09-06 | End: 2024-09-10 | Stop reason: HOSPADM

## 2024-09-06 RX ORDER — NEOMYCIN/BACITRACIN/POLYMYXINB 3.5-400-5K
OINTMENT (GRAM) TOPICAL 2 TIMES DAILY
Status: DISCONTINUED | OUTPATIENT
Start: 2024-09-06 | End: 2024-09-10 | Stop reason: HOSPADM

## 2024-09-06 RX ORDER — ENOXAPARIN SODIUM 100 MG/ML
30 INJECTION SUBCUTANEOUS 2 TIMES DAILY
Status: DISCONTINUED | OUTPATIENT
Start: 2024-09-06 | End: 2024-09-10 | Stop reason: HOSPADM

## 2024-09-06 RX ORDER — CARVEDILOL 12.5 MG/1
12.5 TABLET ORAL 2 TIMES DAILY
Status: DISCONTINUED | OUTPATIENT
Start: 2024-09-06 | End: 2024-09-10 | Stop reason: HOSPADM

## 2024-09-06 RX ORDER — LORAZEPAM 0.5 MG/1
0.25 TABLET ORAL EVERY MORNING
COMMUNITY

## 2024-09-06 RX ORDER — OMEPRAZOLE 40 MG/1
40 CAPSULE, DELAYED RELEASE ORAL DAILY
COMMUNITY
Start: 2024-08-30

## 2024-09-06 RX ORDER — MAGNESIUM SULFATE IN WATER 40 MG/ML
2000 INJECTION, SOLUTION INTRAVENOUS PRN
Status: DISCONTINUED | OUTPATIENT
Start: 2024-09-06 | End: 2024-09-10 | Stop reason: HOSPADM

## 2024-09-06 RX ORDER — LORAZEPAM 0.5 MG/1
0.5 TABLET ORAL 2 TIMES DAILY
Status: DISCONTINUED | OUTPATIENT
Start: 2024-09-06 | End: 2024-09-06

## 2024-09-06 RX ORDER — POLYETHYLENE GLYCOL 3350 17 G/17G
17 POWDER, FOR SOLUTION ORAL DAILY PRN
Status: DISCONTINUED | OUTPATIENT
Start: 2024-09-06 | End: 2024-09-10 | Stop reason: HOSPADM

## 2024-09-06 RX ORDER — ACETAMINOPHEN 325 MG/1
650 TABLET ORAL EVERY 6 HOURS PRN
Status: DISCONTINUED | OUTPATIENT
Start: 2024-09-06 | End: 2024-09-10 | Stop reason: HOSPADM

## 2024-09-06 RX ORDER — POTASSIUM CHLORIDE 1500 MG/1
40 TABLET, EXTENDED RELEASE ORAL PRN
Status: DISCONTINUED | OUTPATIENT
Start: 2024-09-06 | End: 2024-09-10 | Stop reason: HOSPADM

## 2024-09-06 RX ORDER — FUROSEMIDE 40 MG
40 TABLET ORAL DAILY
Status: DISCONTINUED | OUTPATIENT
Start: 2024-09-06 | End: 2024-09-10 | Stop reason: HOSPADM

## 2024-09-06 RX ORDER — LANOLIN ALCOHOL/MO/W.PET/CERES
400 CREAM (GRAM) TOPICAL DAILY
COMMUNITY
Start: 2024-08-19

## 2024-09-06 RX ORDER — ONDANSETRON 2 MG/ML
4 INJECTION INTRAMUSCULAR; INTRAVENOUS EVERY 6 HOURS PRN
Status: DISCONTINUED | OUTPATIENT
Start: 2024-09-06 | End: 2024-09-10 | Stop reason: HOSPADM

## 2024-09-06 RX ORDER — PAROXETINE 10 MG/1
30 TABLET, FILM COATED ORAL DAILY
Status: DISCONTINUED | OUTPATIENT
Start: 2024-09-06 | End: 2024-09-10 | Stop reason: HOSPADM

## 2024-09-06 RX ORDER — ONDANSETRON 4 MG/1
4 TABLET, ORALLY DISINTEGRATING ORAL EVERY 8 HOURS PRN
Status: DISCONTINUED | OUTPATIENT
Start: 2024-09-06 | End: 2024-09-10 | Stop reason: HOSPADM

## 2024-09-06 RX ORDER — SODIUM CHLORIDE 0.9 % (FLUSH) 0.9 %
5-40 SYRINGE (ML) INJECTION EVERY 12 HOURS SCHEDULED
Status: DISCONTINUED | OUTPATIENT
Start: 2024-09-06 | End: 2024-09-10 | Stop reason: HOSPADM

## 2024-09-06 RX ORDER — SODIUM CHLORIDE 9 MG/ML
INJECTION, SOLUTION INTRAVENOUS PRN
Status: DISCONTINUED | OUTPATIENT
Start: 2024-09-06 | End: 2024-09-10 | Stop reason: HOSPADM

## 2024-09-06 RX ORDER — LORAZEPAM 0.5 MG/1
0.5 TABLET ORAL 2 TIMES DAILY
Status: DISCONTINUED | OUTPATIENT
Start: 2024-09-06 | End: 2024-09-07

## 2024-09-06 RX ORDER — LORAZEPAM 0.5 MG/1
0.5 TABLET ORAL NIGHTLY
COMMUNITY

## 2024-09-06 RX ORDER — ACETAMINOPHEN 650 MG/1
650 SUPPOSITORY RECTAL EVERY 6 HOURS PRN
Status: DISCONTINUED | OUTPATIENT
Start: 2024-09-06 | End: 2024-09-10 | Stop reason: HOSPADM

## 2024-09-06 RX ORDER — POTASSIUM CHLORIDE 7.45 MG/ML
10 INJECTION INTRAVENOUS PRN
Status: DISCONTINUED | OUTPATIENT
Start: 2024-09-06 | End: 2024-09-10 | Stop reason: HOSPADM

## 2024-09-06 RX ORDER — ASPIRIN 81 MG/1
81 TABLET ORAL DAILY
Status: DISCONTINUED | OUTPATIENT
Start: 2024-09-06 | End: 2024-09-10 | Stop reason: HOSPADM

## 2024-09-06 RX ADMIN — LORAZEPAM 0.5 MG: 0.5 TABLET ORAL at 16:23

## 2024-09-06 RX ADMIN — AMITRIPTYLINE HYDROCHLORIDE 25 MG: 25 TABLET, FILM COATED ORAL at 21:47

## 2024-09-06 RX ADMIN — ASPIRIN 81 MG: 81 TABLET, COATED ORAL at 16:21

## 2024-09-06 RX ADMIN — FUROSEMIDE 40 MG: 40 TABLET ORAL at 16:21

## 2024-09-06 RX ADMIN — ENOXAPARIN SODIUM 30 MG: 100 INJECTION SUBCUTANEOUS at 21:47

## 2024-09-06 RX ADMIN — CEFTRIAXONE SODIUM 1000 MG: 1 INJECTION, POWDER, FOR SOLUTION INTRAMUSCULAR; INTRAVENOUS at 14:40

## 2024-09-06 RX ADMIN — CARVEDILOL 12.5 MG: 12.5 TABLET, FILM COATED ORAL at 21:47

## 2024-09-06 RX ADMIN — PAROXETINE 30 MG: 10 TABLET, FILM COATED ORAL at 16:21

## 2024-09-06 RX ADMIN — ENOXAPARIN SODIUM 30 MG: 100 INJECTION SUBCUTANEOUS at 14:40

## 2024-09-06 RX ADMIN — Medication 10 ML: at 21:48

## 2024-09-06 RX ADMIN — BACITRACIN ZINC, NEOMYCIN, POLYMYXIN B 1 G: 400; 3.5; 5 OINTMENT TOPICAL at 21:47

## 2024-09-06 ASSESSMENT — PAIN - FUNCTIONAL ASSESSMENT
PAIN_FUNCTIONAL_ASSESSMENT: 0-10

## 2024-09-06 ASSESSMENT — PAIN DESCRIPTION - LOCATION
LOCATION: HEAD

## 2024-09-06 ASSESSMENT — PAIN DESCRIPTION - DESCRIPTORS
DESCRIPTORS: ACHING
DESCRIPTORS: DULL
DESCRIPTORS: ACHING;DULL

## 2024-09-06 ASSESSMENT — PAIN SCALES - GENERAL
PAINLEVEL_OUTOF10: 3
PAINLEVEL_OUTOF10: 0
PAINLEVEL_OUTOF10: 3

## 2024-09-07 LAB
ANION GAP SERPL CALCULATED.3IONS-SCNC: 7 MEQ/L (ref 9–15)
BASOPHILS # BLD: 0 K/UL (ref 0–0.2)
BASOPHILS NFR BLD: 0.2 %
BUN SERPL-MCNC: 15 MG/DL (ref 8–23)
CALCIUM SERPL-MCNC: 8.8 MG/DL (ref 8.5–9.9)
CHLORIDE SERPL-SCNC: 100 MEQ/L (ref 95–107)
CO2 SERPL-SCNC: 35 MEQ/L (ref 20–31)
CREAT SERPL-MCNC: 0.78 MG/DL (ref 0.5–0.9)
EOSINOPHIL # BLD: 0.2 K/UL (ref 0–0.7)
EOSINOPHIL NFR BLD: 1.9 %
ERYTHROCYTE [DISTWIDTH] IN BLOOD BY AUTOMATED COUNT: 15.1 % (ref 11.5–14.5)
GLUCOSE SERPL-MCNC: 95 MG/DL (ref 70–99)
HCT VFR BLD AUTO: 33.4 % (ref 37–47)
HGB BLD-MCNC: 10.5 G/DL (ref 12–16)
LYMPHOCYTES # BLD: 3.4 K/UL (ref 1–4.8)
LYMPHOCYTES NFR BLD: 40.2 %
MCH RBC QN AUTO: 27.9 PG (ref 27–31.3)
MCHC RBC AUTO-ENTMCNC: 31.4 % (ref 33–37)
MCV RBC AUTO: 88.6 FL (ref 79.4–94.8)
MONOCYTES # BLD: 0.8 K/UL (ref 0.2–0.8)
MONOCYTES NFR BLD: 9.1 %
NEUTROPHILS # BLD: 4.1 K/UL (ref 1.4–6.5)
NEUTS SEG NFR BLD: 48.4 %
PLATELET # BLD AUTO: 393 K/UL (ref 130–400)
POTASSIUM SERPL-SCNC: 3.3 MEQ/L (ref 3.4–4.9)
RBC # BLD AUTO: 3.77 M/UL (ref 4.2–5.4)
SODIUM SERPL-SCNC: 142 MEQ/L (ref 135–144)
WBC # BLD AUTO: 8.5 K/UL (ref 4.8–10.8)

## 2024-09-07 PROCEDURE — 6370000000 HC RX 637 (ALT 250 FOR IP): Performed by: NURSE PRACTITIONER

## 2024-09-07 PROCEDURE — 82728 ASSAY OF FERRITIN: CPT

## 2024-09-07 PROCEDURE — 83540 ASSAY OF IRON: CPT

## 2024-09-07 PROCEDURE — 80048 BASIC METABOLIC PNL TOTAL CA: CPT

## 2024-09-07 PROCEDURE — 82607 VITAMIN B-12: CPT

## 2024-09-07 PROCEDURE — 83550 IRON BINDING TEST: CPT

## 2024-09-07 PROCEDURE — 82746 ASSAY OF FOLIC ACID SERUM: CPT

## 2024-09-07 PROCEDURE — 1210000000 HC MED SURG R&B

## 2024-09-07 PROCEDURE — 6360000002 HC RX W HCPCS: Performed by: INTERNAL MEDICINE

## 2024-09-07 PROCEDURE — 2580000003 HC RX 258: Performed by: INTERNAL MEDICINE

## 2024-09-07 PROCEDURE — 36415 COLL VENOUS BLD VENIPUNCTURE: CPT

## 2024-09-07 PROCEDURE — 6370000000 HC RX 637 (ALT 250 FOR IP): Performed by: INTERNAL MEDICINE

## 2024-09-07 PROCEDURE — 85025 COMPLETE CBC W/AUTO DIFF WBC: CPT

## 2024-09-07 PROCEDURE — 97162 PT EVAL MOD COMPLEX 30 MIN: CPT

## 2024-09-07 RX ORDER — METOLAZONE 5 MG/1
2.5 TABLET ORAL DAILY
Status: DISCONTINUED | OUTPATIENT
Start: 2024-09-07 | End: 2024-09-10 | Stop reason: HOSPADM

## 2024-09-07 RX ORDER — TIZANIDINE 2 MG/1
4 TABLET ORAL EVERY 8 HOURS PRN
Status: DISCONTINUED | OUTPATIENT
Start: 2024-09-07 | End: 2024-09-10 | Stop reason: HOSPADM

## 2024-09-07 RX ORDER — POTASSIUM CHLORIDE 1500 MG/1
100 TABLET, EXTENDED RELEASE ORAL DAILY
Status: DISCONTINUED | OUTPATIENT
Start: 2024-09-07 | End: 2024-09-07

## 2024-09-07 RX ORDER — POTASSIUM CHLORIDE 1500 MG/1
20 TABLET, EXTENDED RELEASE ORAL DAILY
Status: DISCONTINUED | OUTPATIENT
Start: 2024-09-07 | End: 2024-09-08

## 2024-09-07 RX ORDER — LORAZEPAM 0.5 MG/1
0.5 TABLET ORAL 2 TIMES DAILY
Status: DISCONTINUED | OUTPATIENT
Start: 2024-09-07 | End: 2024-09-10 | Stop reason: HOSPADM

## 2024-09-07 RX ADMIN — POTASSIUM CHLORIDE 40 MEQ: 1500 TABLET, EXTENDED RELEASE ORAL at 09:00

## 2024-09-07 RX ADMIN — FUROSEMIDE 40 MG: 40 TABLET ORAL at 08:56

## 2024-09-07 RX ADMIN — BACITRACIN ZINC, NEOMYCIN, POLYMYXIN B 1 G: 400; 3.5; 5 OINTMENT TOPICAL at 08:58

## 2024-09-07 RX ADMIN — CARVEDILOL 12.5 MG: 12.5 TABLET, FILM COATED ORAL at 08:56

## 2024-09-07 RX ADMIN — AMITRIPTYLINE HYDROCHLORIDE 25 MG: 25 TABLET, FILM COATED ORAL at 21:01

## 2024-09-07 RX ADMIN — Medication 10 ML: at 09:02

## 2024-09-07 RX ADMIN — METOLAZONE 2.5 MG: 5 TABLET ORAL at 11:12

## 2024-09-07 RX ADMIN — PAROXETINE 30 MG: 10 TABLET, FILM COATED ORAL at 08:56

## 2024-09-07 RX ADMIN — LORAZEPAM 0.5 MG: 0.5 TABLET ORAL at 08:56

## 2024-09-07 RX ADMIN — Medication 10 ML: at 21:10

## 2024-09-07 RX ADMIN — BACITRACIN ZINC, NEOMYCIN, POLYMYXIN B 1 G: 400; 3.5; 5 OINTMENT TOPICAL at 21:02

## 2024-09-07 RX ADMIN — LORAZEPAM 0.5 MG: 0.5 TABLET ORAL at 02:05

## 2024-09-07 RX ADMIN — CEFTRIAXONE SODIUM 1000 MG: 1 INJECTION, POWDER, FOR SOLUTION INTRAMUSCULAR; INTRAVENOUS at 14:52

## 2024-09-07 RX ADMIN — CARVEDILOL 12.5 MG: 12.5 TABLET, FILM COATED ORAL at 21:01

## 2024-09-07 RX ADMIN — ENOXAPARIN SODIUM 30 MG: 100 INJECTION SUBCUTANEOUS at 21:02

## 2024-09-07 RX ADMIN — ASPIRIN 81 MG: 81 TABLET, COATED ORAL at 08:56

## 2024-09-07 RX ADMIN — LORAZEPAM 0.5 MG: 0.5 TABLET ORAL at 21:02

## 2024-09-07 RX ADMIN — POTASSIUM CHLORIDE 20 MEQ: 1500 TABLET, EXTENDED RELEASE ORAL at 12:28

## 2024-09-07 RX ADMIN — ACETAMINOPHEN 325MG 650 MG: 325 TABLET ORAL at 02:04

## 2024-09-07 RX ADMIN — ENOXAPARIN SODIUM 30 MG: 100 INJECTION SUBCUTANEOUS at 08:56

## 2024-09-07 ASSESSMENT — PAIN DESCRIPTION - DESCRIPTORS: DESCRIPTORS: ACHING

## 2024-09-07 ASSESSMENT — PAIN SCALES - GENERAL: PAINLEVEL_OUTOF10: 3

## 2024-09-07 ASSESSMENT — PAIN DESCRIPTION - LOCATION: LOCATION: HEAD

## 2024-09-08 LAB
ANION GAP SERPL CALCULATED.3IONS-SCNC: 7 MEQ/L (ref 9–15)
BACTERIA UR CULT: ABNORMAL
BASOPHILS # BLD: 0 K/UL (ref 0–0.2)
BASOPHILS NFR BLD: 0.2 %
BUN SERPL-MCNC: 17 MG/DL (ref 8–23)
CALCIUM SERPL-MCNC: 8.8 MG/DL (ref 8.5–9.9)
CHLORIDE SERPL-SCNC: 98 MEQ/L (ref 95–107)
CO2 SERPL-SCNC: 36 MEQ/L (ref 20–31)
CREAT SERPL-MCNC: 0.84 MG/DL (ref 0.5–0.9)
EOSINOPHIL # BLD: 0.2 K/UL (ref 0–0.7)
EOSINOPHIL NFR BLD: 2.6 %
ERYTHROCYTE [DISTWIDTH] IN BLOOD BY AUTOMATED COUNT: 15.1 % (ref 11.5–14.5)
FERRITIN: 43 NG/ML (ref 13–150)
FOLATE: 8 NG/ML (ref 4.8–24.2)
GLUCOSE SERPL-MCNC: 99 MG/DL (ref 70–99)
HCT VFR BLD AUTO: 33.4 % (ref 37–47)
HGB BLD-MCNC: 10.5 G/DL (ref 12–16)
IRON % SATURATION: 16 % (ref 20–55)
IRON: 48 UG/DL (ref 37–145)
LYMPHOCYTES # BLD: 3.4 K/UL (ref 1–4.8)
LYMPHOCYTES NFR BLD: 39.1 %
MCH RBC QN AUTO: 27.8 PG (ref 27–31.3)
MCHC RBC AUTO-ENTMCNC: 31.4 % (ref 33–37)
MCV RBC AUTO: 88.4 FL (ref 79.4–94.8)
MONOCYTES # BLD: 0.9 K/UL (ref 0.2–0.8)
MONOCYTES NFR BLD: 10.6 %
NEUTROPHILS # BLD: 4.1 K/UL (ref 1.4–6.5)
NEUTS SEG NFR BLD: 47.3 %
ORGANISM: ABNORMAL
ORGANISM: ABNORMAL
PLATELET # BLD AUTO: 363 K/UL (ref 130–400)
POTASSIUM SERPL-SCNC: 3.1 MEQ/L (ref 3.4–4.9)
RBC # BLD AUTO: 3.78 M/UL (ref 4.2–5.4)
SODIUM SERPL-SCNC: 141 MEQ/L (ref 135–144)
TOTAL IRON BINDING CAPACITY: 295 UG/DL (ref 250–450)
UNSATURATED IRON BINDING CAPACITY: 247 UG/DL (ref 112–347)
VITAMIN B-12: 232 PG/ML (ref 232–1245)
WBC # BLD AUTO: 8.6 K/UL (ref 4.8–10.8)

## 2024-09-08 PROCEDURE — 90792 PSYCH DIAG EVAL W/MED SRVCS: CPT | Performed by: PSYCHIATRY & NEUROLOGY

## 2024-09-08 PROCEDURE — 80048 BASIC METABOLIC PNL TOTAL CA: CPT

## 2024-09-08 PROCEDURE — 1210000000 HC MED SURG R&B

## 2024-09-08 PROCEDURE — 6360000002 HC RX W HCPCS: Performed by: INTERNAL MEDICINE

## 2024-09-08 PROCEDURE — 36415 COLL VENOUS BLD VENIPUNCTURE: CPT

## 2024-09-08 PROCEDURE — 6370000000 HC RX 637 (ALT 250 FOR IP): Performed by: NURSE PRACTITIONER

## 2024-09-08 PROCEDURE — 6370000000 HC RX 637 (ALT 250 FOR IP): Performed by: INTERNAL MEDICINE

## 2024-09-08 PROCEDURE — 2580000003 HC RX 258: Performed by: INTERNAL MEDICINE

## 2024-09-08 PROCEDURE — 85025 COMPLETE CBC W/AUTO DIFF WBC: CPT

## 2024-09-08 RX ORDER — POTASSIUM CHLORIDE 1500 MG/1
40 TABLET, EXTENDED RELEASE ORAL DAILY
Status: DISCONTINUED | OUTPATIENT
Start: 2024-09-09 | End: 2024-09-08

## 2024-09-08 RX ORDER — POTASSIUM CHLORIDE 1500 MG/1
40 TABLET, EXTENDED RELEASE ORAL ONCE
Status: COMPLETED | OUTPATIENT
Start: 2024-09-08 | End: 2024-09-08

## 2024-09-08 RX ORDER — POTASSIUM CHLORIDE 1500 MG/1
60 TABLET, EXTENDED RELEASE ORAL DAILY
Status: DISCONTINUED | OUTPATIENT
Start: 2024-09-09 | End: 2024-09-10 | Stop reason: HOSPADM

## 2024-09-08 RX ADMIN — Medication 10 ML: at 20:43

## 2024-09-08 RX ADMIN — CARVEDILOL 12.5 MG: 12.5 TABLET, FILM COATED ORAL at 20:35

## 2024-09-08 RX ADMIN — AMITRIPTYLINE HYDROCHLORIDE 25 MG: 25 TABLET, FILM COATED ORAL at 20:35

## 2024-09-08 RX ADMIN — Medication 5 ML: at 08:23

## 2024-09-08 RX ADMIN — LORAZEPAM 0.5 MG: 0.5 TABLET ORAL at 20:35

## 2024-09-08 RX ADMIN — CARVEDILOL 12.5 MG: 12.5 TABLET, FILM COATED ORAL at 08:13

## 2024-09-08 RX ADMIN — POTASSIUM CHLORIDE 40 MEQ: 1500 TABLET, EXTENDED RELEASE ORAL at 08:14

## 2024-09-08 RX ADMIN — ENOXAPARIN SODIUM 30 MG: 100 INJECTION SUBCUTANEOUS at 20:35

## 2024-09-08 RX ADMIN — DICLOFENAC SODIUM 4 G: 10 GEL TOPICAL at 20:35

## 2024-09-08 RX ADMIN — ACETAMINOPHEN 325MG 650 MG: 325 TABLET ORAL at 18:31

## 2024-09-08 RX ADMIN — POTASSIUM CHLORIDE 40 MEQ: 1500 TABLET, EXTENDED RELEASE ORAL at 14:08

## 2024-09-08 RX ADMIN — METOLAZONE 2.5 MG: 5 TABLET ORAL at 08:14

## 2024-09-08 RX ADMIN — PAROXETINE 30 MG: 10 TABLET, FILM COATED ORAL at 08:14

## 2024-09-08 RX ADMIN — FUROSEMIDE 40 MG: 40 TABLET ORAL at 08:13

## 2024-09-08 RX ADMIN — CEFTRIAXONE SODIUM 1000 MG: 1 INJECTION, POWDER, FOR SOLUTION INTRAMUSCULAR; INTRAVENOUS at 14:08

## 2024-09-08 RX ADMIN — LORAZEPAM 0.5 MG: 0.5 TABLET ORAL at 08:14

## 2024-09-08 RX ADMIN — DICLOFENAC SODIUM 4 G: 10 GEL TOPICAL at 18:14

## 2024-09-08 RX ADMIN — ENOXAPARIN SODIUM 30 MG: 100 INJECTION SUBCUTANEOUS at 08:13

## 2024-09-08 RX ADMIN — BACITRACIN ZINC, NEOMYCIN, POLYMYXIN B 1 G: 400; 3.5; 5 OINTMENT TOPICAL at 08:21

## 2024-09-08 RX ADMIN — ASPIRIN 81 MG: 81 TABLET, COATED ORAL at 08:14

## 2024-09-08 RX ADMIN — BACITRACIN ZINC, NEOMYCIN, POLYMYXIN B: 400; 3.5; 5 OINTMENT TOPICAL at 20:57

## 2024-09-08 RX ADMIN — POTASSIUM CHLORIDE 20 MEQ: 1500 TABLET, EXTENDED RELEASE ORAL at 08:13

## 2024-09-08 ASSESSMENT — PAIN DESCRIPTION - DESCRIPTORS
DESCRIPTORS: ACHING
DESCRIPTORS: DULL;ACHING

## 2024-09-08 ASSESSMENT — PAIN SCALES - GENERAL
PAINLEVEL_OUTOF10: 3
PAINLEVEL_OUTOF10: 2
PAINLEVEL_OUTOF10: 5

## 2024-09-08 ASSESSMENT — PAIN DESCRIPTION - LOCATION
LOCATION: HEAD
LOCATION: KNEE

## 2024-09-08 ASSESSMENT — PAIN DESCRIPTION - ORIENTATION
ORIENTATION: OUTER
ORIENTATION: LEFT;RIGHT

## 2024-09-08 ASSESSMENT — PAIN - FUNCTIONAL ASSESSMENT: PAIN_FUNCTIONAL_ASSESSMENT: PREVENTS OR INTERFERES SOME ACTIVE ACTIVITIES AND ADLS

## 2024-09-08 ASSESSMENT — PAIN SCALES - WONG BAKER: WONGBAKER_NUMERICALRESPONSE: HURTS A LITTLE BIT

## 2024-09-09 LAB
ANION GAP SERPL CALCULATED.3IONS-SCNC: 11 MEQ/L (ref 9–15)
BASOPHILS # BLD: 0 K/UL (ref 0–0.2)
BASOPHILS NFR BLD: 0.3 %
BUN SERPL-MCNC: 18 MG/DL (ref 8–23)
CALCIUM SERPL-MCNC: 8.7 MG/DL (ref 8.5–9.9)
CHLORIDE SERPL-SCNC: 95 MEQ/L (ref 95–107)
CO2 SERPL-SCNC: 34 MEQ/L (ref 20–31)
CREAT SERPL-MCNC: 0.81 MG/DL (ref 0.5–0.9)
EKG ATRIAL RATE: 93 BPM
EKG P AXIS: 62 DEGREES
EKG P-R INTERVAL: 176 MS
EKG Q-T INTERVAL: 428 MS
EKG QRS DURATION: 178 MS
EKG QTC CALCULATION (BAZETT): 532 MS
EKG R AXIS: -76 DEGREES
EKG T AXIS: 94 DEGREES
EKG VENTRICULAR RATE: 93 BPM
EOSINOPHIL # BLD: 0.2 K/UL (ref 0–0.7)
EOSINOPHIL NFR BLD: 2.2 %
ERYTHROCYTE [DISTWIDTH] IN BLOOD BY AUTOMATED COUNT: 14.9 % (ref 11.5–14.5)
GLUCOSE SERPL-MCNC: 97 MG/DL (ref 70–99)
HCT VFR BLD AUTO: 33.9 % (ref 37–47)
HGB BLD-MCNC: 10.8 G/DL (ref 12–16)
LYMPHOCYTES # BLD: 3.4 K/UL (ref 1–4.8)
LYMPHOCYTES NFR BLD: 34 %
MCH RBC QN AUTO: 28.2 PG (ref 27–31.3)
MCHC RBC AUTO-ENTMCNC: 31.9 % (ref 33–37)
MCV RBC AUTO: 88.5 FL (ref 79.4–94.8)
MONOCYTES # BLD: 1.2 K/UL (ref 0.2–0.8)
MONOCYTES NFR BLD: 11.9 %
NEUTROPHILS # BLD: 5.1 K/UL (ref 1.4–6.5)
NEUTS SEG NFR BLD: 51.3 %
PLATELET # BLD AUTO: 372 K/UL (ref 130–400)
POTASSIUM SERPL-SCNC: 3.5 MEQ/L (ref 3.4–4.9)
RBC # BLD AUTO: 3.83 M/UL (ref 4.2–5.4)
SODIUM SERPL-SCNC: 140 MEQ/L (ref 135–144)
WBC # BLD AUTO: 9.9 K/UL (ref 4.8–10.8)

## 2024-09-09 PROCEDURE — 36415 COLL VENOUS BLD VENIPUNCTURE: CPT

## 2024-09-09 PROCEDURE — 85025 COMPLETE CBC W/AUTO DIFF WBC: CPT

## 2024-09-09 PROCEDURE — 1210000000 HC MED SURG R&B

## 2024-09-09 PROCEDURE — 80048 BASIC METABOLIC PNL TOTAL CA: CPT

## 2024-09-09 PROCEDURE — 97116 GAIT TRAINING THERAPY: CPT

## 2024-09-09 PROCEDURE — 2580000003 HC RX 258: Performed by: INTERNAL MEDICINE

## 2024-09-09 PROCEDURE — 93010 ELECTROCARDIOGRAM REPORT: CPT | Performed by: INTERNAL MEDICINE

## 2024-09-09 PROCEDURE — 99212 OFFICE O/P EST SF 10 MIN: CPT

## 2024-09-09 PROCEDURE — 6370000000 HC RX 637 (ALT 250 FOR IP): Performed by: NURSE PRACTITIONER

## 2024-09-09 PROCEDURE — 6360000002 HC RX W HCPCS: Performed by: INTERNAL MEDICINE

## 2024-09-09 PROCEDURE — 97535 SELF CARE MNGMENT TRAINING: CPT

## 2024-09-09 PROCEDURE — 6370000000 HC RX 637 (ALT 250 FOR IP): Performed by: INTERNAL MEDICINE

## 2024-09-09 RX ORDER — SODIUM PHOSPHATE,MONO-DIBASIC 19G-7G/118
500 ENEMA (ML) RECTAL 3 TIMES DAILY
Status: DISCONTINUED | OUTPATIENT
Start: 2024-09-09 | End: 2024-09-09 | Stop reason: CLARIF

## 2024-09-09 RX ADMIN — CARVEDILOL 12.5 MG: 12.5 TABLET, FILM COATED ORAL at 09:29

## 2024-09-09 RX ADMIN — LORAZEPAM 0.5 MG: 0.5 TABLET ORAL at 09:29

## 2024-09-09 RX ADMIN — BACITRACIN ZINC, NEOMYCIN, POLYMYXIN B 1 G: 400; 3.5; 5 OINTMENT TOPICAL at 21:13

## 2024-09-09 RX ADMIN — ACETAMINOPHEN 325MG 650 MG: 325 TABLET ORAL at 09:52

## 2024-09-09 RX ADMIN — POTASSIUM CHLORIDE 60 MEQ: 1500 TABLET, EXTENDED RELEASE ORAL at 09:41

## 2024-09-09 RX ADMIN — LORAZEPAM 0.5 MG: 0.5 TABLET ORAL at 21:12

## 2024-09-09 RX ADMIN — Medication 10 ML: at 09:34

## 2024-09-09 RX ADMIN — METOLAZONE 2.5 MG: 5 TABLET ORAL at 09:28

## 2024-09-09 RX ADMIN — CARVEDILOL 12.5 MG: 12.5 TABLET, FILM COATED ORAL at 21:13

## 2024-09-09 RX ADMIN — ENOXAPARIN SODIUM 30 MG: 100 INJECTION SUBCUTANEOUS at 21:13

## 2024-09-09 RX ADMIN — PAROXETINE 30 MG: 10 TABLET, FILM COATED ORAL at 09:29

## 2024-09-09 RX ADMIN — ASPIRIN 81 MG: 81 TABLET, COATED ORAL at 09:28

## 2024-09-09 RX ADMIN — ENOXAPARIN SODIUM 30 MG: 100 INJECTION SUBCUTANEOUS at 09:28

## 2024-09-09 RX ADMIN — Medication 10 ML: at 21:14

## 2024-09-09 RX ADMIN — DICLOFENAC SODIUM 4 G: 10 GEL TOPICAL at 21:14

## 2024-09-09 RX ADMIN — DICLOFENAC SODIUM 4 G: 10 GEL TOPICAL at 09:44

## 2024-09-09 RX ADMIN — AMITRIPTYLINE HYDROCHLORIDE 25 MG: 25 TABLET, FILM COATED ORAL at 21:12

## 2024-09-09 RX ADMIN — FUROSEMIDE 40 MG: 40 TABLET ORAL at 09:29

## 2024-09-09 RX ADMIN — CEFTRIAXONE SODIUM 1000 MG: 1 INJECTION, POWDER, FOR SOLUTION INTRAMUSCULAR; INTRAVENOUS at 14:09

## 2024-09-09 RX ADMIN — BACITRACIN ZINC, NEOMYCIN, POLYMYXIN B 1 G: 400; 3.5; 5 OINTMENT TOPICAL at 09:53

## 2024-09-09 ASSESSMENT — PAIN DESCRIPTION - LOCATION
LOCATION: HEAD
LOCATION: KNEE

## 2024-09-09 ASSESSMENT — PAIN SCALES - GENERAL
PAINLEVEL_OUTOF10: 3
PAINLEVEL_OUTOF10: 3
PAINLEVEL_OUTOF10: 4

## 2024-09-09 ASSESSMENT — PAIN DESCRIPTION - DESCRIPTORS: DESCRIPTORS: ACHING

## 2024-09-09 ASSESSMENT — PAIN SCALES - WONG BAKER: WONGBAKER_NUMERICALRESPONSE: NO HURT

## 2024-09-09 ASSESSMENT — PAIN DESCRIPTION - ORIENTATION: ORIENTATION: RIGHT;LEFT

## 2024-09-10 VITALS
HEIGHT: 65 IN | BODY MASS INDEX: 38.32 KG/M2 | WEIGHT: 230 LBS | RESPIRATION RATE: 17 BRPM | SYSTOLIC BLOOD PRESSURE: 131 MMHG | TEMPERATURE: 100 F | DIASTOLIC BLOOD PRESSURE: 77 MMHG | HEART RATE: 88 BPM | OXYGEN SATURATION: 100 %

## 2024-09-10 PROCEDURE — 6360000002 HC RX W HCPCS: Performed by: INTERNAL MEDICINE

## 2024-09-10 PROCEDURE — 2580000003 HC RX 258: Performed by: INTERNAL MEDICINE

## 2024-09-10 PROCEDURE — 97116 GAIT TRAINING THERAPY: CPT

## 2024-09-10 PROCEDURE — 97535 SELF CARE MNGMENT TRAINING: CPT

## 2024-09-10 PROCEDURE — 97166 OT EVAL MOD COMPLEX 45 MIN: CPT

## 2024-09-10 PROCEDURE — 6370000000 HC RX 637 (ALT 250 FOR IP): Performed by: NURSE PRACTITIONER

## 2024-09-10 PROCEDURE — 6370000000 HC RX 637 (ALT 250 FOR IP): Performed by: INTERNAL MEDICINE

## 2024-09-10 RX ADMIN — POTASSIUM CHLORIDE 60 MEQ: 1500 TABLET, EXTENDED RELEASE ORAL at 08:47

## 2024-09-10 RX ADMIN — ENOXAPARIN SODIUM 30 MG: 100 INJECTION SUBCUTANEOUS at 08:46

## 2024-09-10 RX ADMIN — LORAZEPAM 0.5 MG: 0.5 TABLET ORAL at 08:47

## 2024-09-10 RX ADMIN — BACITRACIN ZINC, NEOMYCIN, POLYMYXIN B 1 G: 400; 3.5; 5 OINTMENT TOPICAL at 08:46

## 2024-09-10 RX ADMIN — CARVEDILOL 12.5 MG: 12.5 TABLET, FILM COATED ORAL at 08:47

## 2024-09-10 RX ADMIN — ASPIRIN 81 MG: 81 TABLET, COATED ORAL at 08:46

## 2024-09-10 RX ADMIN — Medication 10 ML: at 08:46

## 2024-09-10 RX ADMIN — CEFTRIAXONE SODIUM 1000 MG: 1 INJECTION, POWDER, FOR SOLUTION INTRAMUSCULAR; INTRAVENOUS at 14:17

## 2024-09-10 RX ADMIN — METOLAZONE 2.5 MG: 5 TABLET ORAL at 08:47

## 2024-09-10 RX ADMIN — PAROXETINE 30 MG: 10 TABLET, FILM COATED ORAL at 08:47

## 2024-09-10 RX ADMIN — DICLOFENAC SODIUM 4 G: 10 GEL TOPICAL at 08:45

## 2024-09-10 RX ADMIN — FUROSEMIDE 40 MG: 40 TABLET ORAL at 08:47

## 2024-09-11 ENCOUNTER — HOSPITAL ENCOUNTER (EMERGENCY)
Age: 79
Discharge: HOME | End: 2024-09-11
Payer: MEDICARE

## 2024-09-11 ENCOUNTER — APPOINTMENT (OUTPATIENT)
Dept: GENERAL RADIOLOGY | Age: 79
End: 2024-09-11
Payer: MEDICARE

## 2024-09-11 VITALS
TEMPERATURE: 99.9 F | OXYGEN SATURATION: 98 % | DIASTOLIC BLOOD PRESSURE: 60 MMHG | BODY MASS INDEX: 38.32 KG/M2 | RESPIRATION RATE: 35 BRPM | SYSTOLIC BLOOD PRESSURE: 124 MMHG | HEART RATE: 80 BPM | HEIGHT: 65 IN | WEIGHT: 230 LBS

## 2024-09-11 DIAGNOSIS — M70.51 BURSITIS OF OTHER BURSA OF RIGHT KNEE: ICD-10-CM

## 2024-09-11 DIAGNOSIS — R10.2 SUPRAPUBIC ABDOMINAL PAIN: Primary | ICD-10-CM

## 2024-09-11 LAB
ALBUMIN SERPL-MCNC: 3.7 G/DL (ref 3.5–4.6)
ALP SERPL-CCNC: 90 U/L (ref 40–130)
ALT SERPL-CCNC: 15 U/L (ref 0–33)
ANION GAP SERPL CALCULATED.3IONS-SCNC: 9 MEQ/L (ref 9–15)
ANISOCYTOSIS BLD QL SMEAR: ABNORMAL
AST SERPL-CCNC: 20 U/L (ref 0–35)
B PARAP IS1001 DNA NPH QL NAA+NON-PROBE: NOT DETECTED
B PERT.PT PRMT NPH QL NAA+NON-PROBE: NOT DETECTED
BASOPHILS # BLD: 0 K/UL (ref 0–0.2)
BASOPHILS NFR BLD: 0.1 %
BILIRUB SERPL-MCNC: 0.8 MG/DL (ref 0.2–0.7)
BILIRUB UR QL STRIP: NEGATIVE
BUN SERPL-MCNC: 22 MG/DL (ref 8–23)
C PNEUM DNA NPH QL NAA+NON-PROBE: NOT DETECTED
CALCIUM SERPL-MCNC: 9.4 MG/DL (ref 8.5–9.9)
CHLORIDE SERPL-SCNC: 94 MEQ/L (ref 95–107)
CLARITY UR: CLEAR
CO2 SERPL-SCNC: 34 MEQ/L (ref 20–31)
COLOR UR: YELLOW
CREAT SERPL-MCNC: 0.82 MG/DL (ref 0.5–0.9)
EOSINOPHIL # BLD: 0 K/UL (ref 0–0.7)
EOSINOPHIL NFR BLD: 0.4 %
ERYTHROCYTE [DISTWIDTH] IN BLOOD BY AUTOMATED COUNT: 14.8 % (ref 11.5–14.5)
FLUAV RNA NPH QL NAA+NON-PROBE: NOT DETECTED
FLUBV RNA NPH QL NAA+NON-PROBE: NOT DETECTED
GLOBULIN SER CALC-MCNC: 3.3 G/DL (ref 2.3–3.5)
GLUCOSE SERPL-MCNC: 107 MG/DL (ref 70–99)
GLUCOSE UR STRIP-MCNC: NEGATIVE MG/DL
HADV DNA NPH QL NAA+NON-PROBE: NOT DETECTED
HCOV 229E RNA NPH QL NAA+NON-PROBE: NOT DETECTED
HCOV HKU1 RNA NPH QL NAA+NON-PROBE: NOT DETECTED
HCOV NL63 RNA NPH QL NAA+NON-PROBE: NOT DETECTED
HCOV OC43 RNA NPH QL NAA+NON-PROBE: NOT DETECTED
HCT VFR BLD AUTO: 35.3 % (ref 37–47)
HGB BLD-MCNC: 11.3 G/DL (ref 12–16)
HGB UR QL STRIP: NEGATIVE
HMPV RNA NPH QL NAA+NON-PROBE: NOT DETECTED
HPIV1 RNA NPH QL NAA+NON-PROBE: NOT DETECTED
HPIV2 RNA NPH QL NAA+NON-PROBE: NOT DETECTED
HPIV3 RNA NPH QL NAA+NON-PROBE: NOT DETECTED
HPIV4 RNA NPH QL NAA+NON-PROBE: NOT DETECTED
KETONES UR STRIP-MCNC: NEGATIVE MG/DL
LEUKOCYTE ESTERASE UR QL STRIP: NEGATIVE
LYMPHOCYTES # BLD: 2 K/UL (ref 1–4.8)
LYMPHOCYTES NFR BLD: 15 %
M PNEUMO DNA NPH QL NAA+NON-PROBE: NOT DETECTED
MCH RBC QN AUTO: 28.7 PG (ref 27–31.3)
MCHC RBC AUTO-ENTMCNC: 32 % (ref 33–37)
MCV RBC AUTO: 89.6 FL (ref 79.4–94.8)
MONOCYTES # BLD: 1.4 K/UL (ref 0.2–0.8)
MONOCYTES NFR BLD: 9.5 %
NEUTROPHILS # BLD: 10.2 K/UL (ref 1.4–6.5)
NEUTS SEG NFR BLD: 75 %
NITRITE UR QL STRIP: NEGATIVE
OVALOCYTES BLD QL SMEAR: ABNORMAL
PH UR STRIP: 8 [PH] (ref 5–9)
PLATELET # BLD AUTO: 395 K/UL (ref 130–400)
PLATELET BLD QL SMEAR: ADEQUATE
POIKILOCYTOSIS BLD QL SMEAR: ABNORMAL
POTASSIUM SERPL-SCNC: 4 MEQ/L (ref 3.4–4.9)
PROT SERPL-MCNC: 7 G/DL (ref 6.3–8)
PROT UR STRIP-MCNC: NEGATIVE MG/DL
RBC # BLD AUTO: 3.94 M/UL (ref 4.2–5.4)
RSV RNA NPH QL NAA+NON-PROBE: NOT DETECTED
RV+EV RNA NPH QL NAA+NON-PROBE: NOT DETECTED
SARS-COV-2 RNA NPH QL NAA+NON-PROBE: NOT DETECTED
SODIUM SERPL-SCNC: 137 MEQ/L (ref 135–144)
SP GR UR STRIP: 1.01 (ref 1–1.03)
URINE REFLEX TO CULTURE: NORMAL
UROBILINOGEN UR STRIP-ACNC: 0.2 E.U./DL
WBC # BLD AUTO: 13.6 K/UL (ref 4.8–10.8)

## 2024-09-11 PROCEDURE — 6370000000 HC RX 637 (ALT 250 FOR IP): Performed by: PERSONAL EMERGENCY RESPONSE ATTENDANT

## 2024-09-11 PROCEDURE — 71045 X-RAY EXAM CHEST 1 VIEW: CPT

## 2024-09-11 PROCEDURE — 81003 URINALYSIS AUTO W/O SCOPE: CPT

## 2024-09-11 PROCEDURE — 96374 THER/PROPH/DIAG INJ IV PUSH: CPT

## 2024-09-11 PROCEDURE — 99284 EMERGENCY DEPT VISIT MOD MDM: CPT

## 2024-09-11 PROCEDURE — 73560 X-RAY EXAM OF KNEE 1 OR 2: CPT

## 2024-09-11 PROCEDURE — 80053 COMPREHEN METABOLIC PANEL: CPT

## 2024-09-11 PROCEDURE — 0202U NFCT DS 22 TRGT SARS-COV-2: CPT

## 2024-09-11 PROCEDURE — 85025 COMPLETE CBC W/AUTO DIFF WBC: CPT

## 2024-09-11 PROCEDURE — 6360000002 HC RX W HCPCS: Performed by: PERSONAL EMERGENCY RESPONSE ATTENDANT

## 2024-09-11 PROCEDURE — 2580000003 HC RX 258: Performed by: PERSONAL EMERGENCY RESPONSE ATTENDANT

## 2024-09-11 RX ORDER — ONDANSETRON 2 MG/ML
4 INJECTION INTRAMUSCULAR; INTRAVENOUS ONCE
Status: DISCONTINUED | OUTPATIENT
Start: 2024-09-11 | End: 2024-09-11

## 2024-09-11 RX ORDER — CEPHALEXIN 500 MG/1
500 CAPSULE ORAL ONCE
Status: COMPLETED | OUTPATIENT
Start: 2024-09-11 | End: 2024-09-11

## 2024-09-11 RX ORDER — KETOROLAC TROMETHAMINE 15 MG/ML
15 INJECTION, SOLUTION INTRAMUSCULAR; INTRAVENOUS ONCE
Status: COMPLETED | OUTPATIENT
Start: 2024-09-11 | End: 2024-09-11

## 2024-09-11 RX ORDER — METHYLPREDNISOLONE 4 MG
TABLET, DOSE PACK ORAL
Qty: 1 KIT | Refills: 0 | Status: SHIPPED | OUTPATIENT
Start: 2024-09-11 | End: 2024-09-17

## 2024-09-11 RX ORDER — 0.9 % SODIUM CHLORIDE 0.9 %
500 INTRAVENOUS SOLUTION INTRAVENOUS ONCE
Status: COMPLETED | OUTPATIENT
Start: 2024-09-11 | End: 2024-09-11

## 2024-09-11 RX ORDER — CEPHALEXIN 500 MG/1
1000 CAPSULE ORAL 2 TIMES DAILY
Qty: 20 CAPSULE | Refills: 0 | Status: SHIPPED | OUTPATIENT
Start: 2024-09-11 | End: 2024-09-16

## 2024-09-11 RX ADMIN — SODIUM CHLORIDE 500 ML: 9 INJECTION, SOLUTION INTRAVENOUS at 19:24

## 2024-09-11 RX ADMIN — KETOROLAC TROMETHAMINE 15 MG: 15 INJECTION, SOLUTION INTRAMUSCULAR; INTRAVENOUS at 19:24

## 2024-09-11 RX ADMIN — CEPHALEXIN 500 MG: 500 CAPSULE ORAL at 21:32

## 2024-09-11 ASSESSMENT — ENCOUNTER SYMPTOMS
VOMITING: 0
RHINORRHEA: 0
NAUSEA: 1
BLOOD IN STOOL: 0
COLOR CHANGE: 0
ABDOMINAL PAIN: 1
COUGH: 0
DIARRHEA: 0
SORE THROAT: 0
WHEEZING: 1
SHORTNESS OF BREATH: 0

## 2024-09-11 ASSESSMENT — PAIN DESCRIPTION - ORIENTATION: ORIENTATION: RIGHT

## 2024-09-11 ASSESSMENT — PAIN - FUNCTIONAL ASSESSMENT: PAIN_FUNCTIONAL_ASSESSMENT: 0-10

## 2024-09-11 ASSESSMENT — PAIN DESCRIPTION - ONSET: ONSET: ON-GOING

## 2024-09-11 ASSESSMENT — PAIN DESCRIPTION - PAIN TYPE: TYPE: ACUTE PAIN

## 2024-09-11 ASSESSMENT — PAIN DESCRIPTION - FREQUENCY: FREQUENCY: CONTINUOUS

## 2024-09-11 ASSESSMENT — PAIN SCALES - GENERAL: PAINLEVEL_OUTOF10: 3

## 2024-09-11 ASSESSMENT — PAIN DESCRIPTION - LOCATION: LOCATION: KNEE

## 2024-09-26 ENCOUNTER — HOSPITAL ENCOUNTER (EMERGENCY)
Age: 79
Discharge: HOME | End: 2024-09-26
Attending: STUDENT IN AN ORGANIZED HEALTH CARE EDUCATION/TRAINING PROGRAM
Payer: MEDICARE

## 2024-09-26 ENCOUNTER — APPOINTMENT (OUTPATIENT)
Dept: CT IMAGING | Age: 79
End: 2024-09-26
Attending: STUDENT IN AN ORGANIZED HEALTH CARE EDUCATION/TRAINING PROGRAM
Payer: MEDICARE

## 2024-09-26 ENCOUNTER — APPOINTMENT (OUTPATIENT)
Dept: GENERAL RADIOLOGY | Age: 79
End: 2024-09-26
Payer: MEDICARE

## 2024-09-26 VITALS
HEART RATE: 74 BPM | TEMPERATURE: 98.1 F | OXYGEN SATURATION: 95 % | HEIGHT: 65 IN | RESPIRATION RATE: 19 BRPM | WEIGHT: 230 LBS | BODY MASS INDEX: 38.32 KG/M2 | DIASTOLIC BLOOD PRESSURE: 57 MMHG | SYSTOLIC BLOOD PRESSURE: 115 MMHG

## 2024-09-26 DIAGNOSIS — R42 LIGHTHEADEDNESS: ICD-10-CM

## 2024-09-26 DIAGNOSIS — E87.6 HYPOKALEMIA: Primary | ICD-10-CM

## 2024-09-26 DIAGNOSIS — R53.1 GENERAL WEAKNESS: ICD-10-CM

## 2024-09-26 LAB
ALBUMIN SERPL-MCNC: 3.6 G/DL (ref 3.5–4.6)
ALP SERPL-CCNC: 92 U/L (ref 40–130)
ALT SERPL-CCNC: 8 U/L (ref 0–33)
ANION GAP SERPL CALCULATED.3IONS-SCNC: 8 MEQ/L (ref 9–15)
AST SERPL-CCNC: 12 U/L (ref 0–35)
BASOPHILS # BLD: 0 K/UL (ref 0–0.2)
BASOPHILS NFR BLD: 0.3 %
BILIRUB SERPL-MCNC: 0.3 MG/DL (ref 0.2–0.7)
BILIRUB UR QL STRIP: NEGATIVE
BUN SERPL-MCNC: 15 MG/DL (ref 8–23)
CALCIUM SERPL-MCNC: 9.2 MG/DL (ref 8.5–9.9)
CHLORIDE SERPL-SCNC: 98 MEQ/L (ref 95–107)
CLARITY UR: CLEAR
CO2 SERPL-SCNC: 35 MEQ/L (ref 20–31)
COLOR UR: YELLOW
CREAT SERPL-MCNC: 0.74 MG/DL (ref 0.5–0.9)
EOSINOPHIL # BLD: 0.1 K/UL (ref 0–0.7)
EOSINOPHIL NFR BLD: 1.7 %
ERYTHROCYTE [DISTWIDTH] IN BLOOD BY AUTOMATED COUNT: 15.1 % (ref 11.5–14.5)
GLOBULIN SER CALC-MCNC: 3.1 G/DL (ref 2.3–3.5)
GLUCOSE SERPL-MCNC: 102 MG/DL (ref 70–99)
GLUCOSE UR STRIP-MCNC: NEGATIVE MG/DL
HCT VFR BLD AUTO: 34.5 % (ref 37–47)
HGB BLD-MCNC: 10.8 G/DL (ref 12–16)
HGB UR QL STRIP: NEGATIVE
INFLUENZA A BY PCR: NEGATIVE
INFLUENZA B BY PCR: NEGATIVE
KETONES UR STRIP-MCNC: NEGATIVE MG/DL
LEUKOCYTE ESTERASE UR QL STRIP: NEGATIVE
LYMPHOCYTES # BLD: 2 K/UL (ref 1–4.8)
LYMPHOCYTES NFR BLD: 25.8 %
MAGNESIUM SERPL-MCNC: 2 MG/DL (ref 1.7–2.4)
MCH RBC QN AUTO: 27.8 PG (ref 27–31.3)
MCHC RBC AUTO-ENTMCNC: 31.3 % (ref 33–37)
MCV RBC AUTO: 88.9 FL (ref 79.4–94.8)
MONOCYTES # BLD: 0.7 K/UL (ref 0.2–0.8)
MONOCYTES NFR BLD: 9.5 %
NEUTROPHILS # BLD: 4.8 K/UL (ref 1.4–6.5)
NEUTS SEG NFR BLD: 62.4 %
NITRITE UR QL STRIP: NEGATIVE
PH UR STRIP: 7 [PH] (ref 5–9)
PLATELET # BLD AUTO: 462 K/UL (ref 130–400)
POTASSIUM SERPL-SCNC: 3.3 MEQ/L (ref 3.4–4.9)
PROT SERPL-MCNC: 6.7 G/DL (ref 6.3–8)
PROT UR STRIP-MCNC: NEGATIVE MG/DL
RBC # BLD AUTO: 3.88 M/UL (ref 4.2–5.4)
SARS-COV-2 RDRP RESP QL NAA+PROBE: NOT DETECTED
SODIUM SERPL-SCNC: 141 MEQ/L (ref 135–144)
SP GR UR STRIP: 1.01 (ref 1–1.03)
TROPONIN, HIGH SENSITIVITY: 17 NG/L (ref 0–19)
TROPONIN, HIGH SENSITIVITY: 19 NG/L (ref 0–19)
TROPONIN, HIGH SENSITIVITY: 20 NG/L (ref 0–19)
URINE REFLEX TO CULTURE: NORMAL
UROBILINOGEN UR STRIP-ACNC: 0.2 E.U./DL
WBC # BLD AUTO: 7.6 K/UL (ref 4.8–10.8)

## 2024-09-26 PROCEDURE — 6370000000 HC RX 637 (ALT 250 FOR IP): Performed by: STUDENT IN AN ORGANIZED HEALTH CARE EDUCATION/TRAINING PROGRAM

## 2024-09-26 PROCEDURE — 87635 SARS-COV-2 COVID-19 AMP PRB: CPT

## 2024-09-26 PROCEDURE — 81003 URINALYSIS AUTO W/O SCOPE: CPT

## 2024-09-26 PROCEDURE — 85025 COMPLETE CBC W/AUTO DIFF WBC: CPT

## 2024-09-26 PROCEDURE — 84484 ASSAY OF TROPONIN QUANT: CPT

## 2024-09-26 PROCEDURE — 2580000003 HC RX 258: Performed by: STUDENT IN AN ORGANIZED HEALTH CARE EDUCATION/TRAINING PROGRAM

## 2024-09-26 PROCEDURE — 71045 X-RAY EXAM CHEST 1 VIEW: CPT

## 2024-09-26 PROCEDURE — 80053 COMPREHEN METABOLIC PANEL: CPT

## 2024-09-26 PROCEDURE — 87502 INFLUENZA DNA AMP PROBE: CPT

## 2024-09-26 PROCEDURE — 36415 COLL VENOUS BLD VENIPUNCTURE: CPT

## 2024-09-26 PROCEDURE — 70450 CT HEAD/BRAIN W/O DYE: CPT

## 2024-09-26 PROCEDURE — 83735 ASSAY OF MAGNESIUM: CPT

## 2024-09-26 PROCEDURE — 93005 ELECTROCARDIOGRAM TRACING: CPT | Performed by: FAMILY MEDICINE

## 2024-09-26 PROCEDURE — 99285 EMERGENCY DEPT VISIT HI MDM: CPT

## 2024-09-26 RX ORDER — 0.9 % SODIUM CHLORIDE 0.9 %
500 INTRAVENOUS SOLUTION INTRAVENOUS ONCE
Status: COMPLETED | OUTPATIENT
Start: 2024-09-26 | End: 2024-09-26

## 2024-09-26 RX ORDER — POTASSIUM CHLORIDE 1500 MG/1
40 TABLET, EXTENDED RELEASE ORAL ONCE
Status: COMPLETED | OUTPATIENT
Start: 2024-09-26 | End: 2024-09-26

## 2024-09-26 RX ORDER — ACETAMINOPHEN 500 MG
1000 TABLET ORAL ONCE
Status: COMPLETED | OUTPATIENT
Start: 2024-09-26 | End: 2024-09-26

## 2024-09-26 RX ADMIN — POTASSIUM CHLORIDE 40 MEQ: 1500 TABLET, EXTENDED RELEASE ORAL at 15:44

## 2024-09-26 RX ADMIN — SODIUM CHLORIDE 500 ML: 9 INJECTION, SOLUTION INTRAVENOUS at 14:48

## 2024-09-26 RX ADMIN — ACETAMINOPHEN 1000 MG: 500 TABLET ORAL at 14:48

## 2024-09-26 ASSESSMENT — PAIN - FUNCTIONAL ASSESSMENT: PAIN_FUNCTIONAL_ASSESSMENT: NONE - DENIES PAIN

## 2024-09-26 ASSESSMENT — LIFESTYLE VARIABLES
HOW MANY STANDARD DRINKS CONTAINING ALCOHOL DO YOU HAVE ON A TYPICAL DAY: PATIENT DOES NOT DRINK
HOW OFTEN DO YOU HAVE A DRINK CONTAINING ALCOHOL: NEVER

## 2024-09-26 ASSESSMENT — PAIN DESCRIPTION - LOCATION: LOCATION: HEAD

## 2024-09-26 ASSESSMENT — PAIN SCALES - GENERAL
PAINLEVEL_OUTOF10: 3
PAINLEVEL_OUTOF10: 3

## 2024-09-27 LAB
EKG ATRIAL RATE: 70 BPM
EKG P AXIS: 77 DEGREES
EKG P-R INTERVAL: 176 MS
EKG Q-T INTERVAL: 456 MS
EKG QRS DURATION: 174 MS
EKG QTC CALCULATION (BAZETT): 492 MS
EKG R AXIS: -76 DEGREES
EKG T AXIS: 98 DEGREES
EKG VENTRICULAR RATE: 70 BPM

## 2024-09-27 PROCEDURE — 93010 ELECTROCARDIOGRAM REPORT: CPT | Performed by: INTERNAL MEDICINE

## 2024-10-02 ASSESSMENT — ENCOUNTER SYMPTOMS
ABDOMINAL DISTENTION: 0
SORE THROAT: 0
RHINORRHEA: 0
ABDOMINAL PAIN: 0
SHORTNESS OF BREATH: 0
CONSTIPATION: 0
EYE DISCHARGE: 0
COLOR CHANGE: 0

## 2024-10-06 PROBLEM — N39.0 UTI (URINARY TRACT INFECTION): Status: RESOLVED | Noted: 2024-09-06 | Resolved: 2024-10-06

## 2024-10-29 ENCOUNTER — APPOINTMENT (OUTPATIENT)
Dept: GENERAL RADIOLOGY | Age: 79
DRG: 690 | End: 2024-10-29
Payer: MEDICARE

## 2024-10-29 ENCOUNTER — HOSPITAL ENCOUNTER (INPATIENT)
Age: 79
LOS: 5 days | Discharge: HOME HEALTH | DRG: 690 | End: 2024-11-03
Attending: INTERNAL MEDICINE | Admitting: INTERNAL MEDICINE
Payer: MEDICARE

## 2024-10-29 DIAGNOSIS — N30.00 ACUTE CYSTITIS WITHOUT HEMATURIA: Primary | ICD-10-CM

## 2024-10-29 DIAGNOSIS — R53.1 GENERAL WEAKNESS: ICD-10-CM

## 2024-10-29 DIAGNOSIS — L03.115 CELLULITIS OF RIGHT FOOT: ICD-10-CM

## 2024-10-29 PROBLEM — N39.0 UTI (URINARY TRACT INFECTION): Status: ACTIVE | Noted: 2024-10-29

## 2024-10-29 LAB
ALBUMIN SERPL-MCNC: 3.9 G/DL (ref 3.5–4.6)
ALP SERPL-CCNC: 100 U/L (ref 40–130)
ALT SERPL-CCNC: 10 U/L (ref 0–33)
ANION GAP SERPL CALCULATED.3IONS-SCNC: 7 MEQ/L (ref 9–15)
AST SERPL-CCNC: 17 U/L (ref 0–35)
B PARAP IS1001 DNA NPH QL NAA+NON-PROBE: NOT DETECTED
B PERT.PT PRMT NPH QL NAA+NON-PROBE: NOT DETECTED
BACTERIA URNS QL MICRO: ABNORMAL /HPF
BASOPHILS # BLD: 0 K/UL (ref 0–0.2)
BASOPHILS NFR BLD: 0.2 %
BILIRUB SERPL-MCNC: 0.4 MG/DL (ref 0.2–0.7)
BILIRUB UR QL STRIP: NEGATIVE
BNP BLD-MCNC: 388 PG/ML
BUN SERPL-MCNC: 13 MG/DL (ref 8–23)
C PNEUM DNA NPH QL NAA+NON-PROBE: NOT DETECTED
CALCIUM SERPL-MCNC: 9.5 MG/DL (ref 8.5–9.9)
CHLORIDE SERPL-SCNC: 96 MEQ/L (ref 95–107)
CLARITY UR: CLEAR
CO2 SERPL-SCNC: 39 MEQ/L (ref 20–31)
COLOR UR: YELLOW
CREAT SERPL-MCNC: 0.7 MG/DL (ref 0.5–0.9)
EOSINOPHIL # BLD: 0.2 K/UL (ref 0–0.7)
EOSINOPHIL NFR BLD: 2 %
EPI CELLS #/AREA URNS AUTO: ABNORMAL /HPF (ref 0–5)
ERYTHROCYTE [DISTWIDTH] IN BLOOD BY AUTOMATED COUNT: 15.5 % (ref 11.5–14.5)
FLUAV RNA NPH QL NAA+NON-PROBE: NOT DETECTED
FLUBV RNA NPH QL NAA+NON-PROBE: NOT DETECTED
GLOBULIN SER CALC-MCNC: 3.1 G/DL (ref 2.3–3.5)
GLUCOSE SERPL-MCNC: 94 MG/DL (ref 70–99)
GLUCOSE UR STRIP-MCNC: NEGATIVE MG/DL
HADV DNA NPH QL NAA+NON-PROBE: NOT DETECTED
HCOV 229E RNA NPH QL NAA+NON-PROBE: NOT DETECTED
HCOV HKU1 RNA NPH QL NAA+NON-PROBE: NOT DETECTED
HCOV NL63 RNA NPH QL NAA+NON-PROBE: NOT DETECTED
HCOV OC43 RNA NPH QL NAA+NON-PROBE: NOT DETECTED
HCT VFR BLD AUTO: 37.7 % (ref 37–47)
HGB BLD-MCNC: 11.9 G/DL (ref 12–16)
HGB UR QL STRIP: NEGATIVE
HMPV RNA NPH QL NAA+NON-PROBE: NOT DETECTED
HPIV1 RNA NPH QL NAA+NON-PROBE: NOT DETECTED
HPIV2 RNA NPH QL NAA+NON-PROBE: NOT DETECTED
HPIV3 RNA NPH QL NAA+NON-PROBE: NOT DETECTED
HPIV4 RNA NPH QL NAA+NON-PROBE: NOT DETECTED
HYALINE CASTS #/AREA URNS AUTO: ABNORMAL /HPF (ref 0–5)
KETONES UR STRIP-MCNC: NEGATIVE MG/DL
LACTATE BLDV-SCNC: 1.3 MMOL/L (ref 0.5–2.2)
LEUKOCYTE ESTERASE UR QL STRIP: ABNORMAL
LYMPHOCYTES # BLD: 2.4 K/UL (ref 1–4.8)
LYMPHOCYTES NFR BLD: 29.9 %
M PNEUMO DNA NPH QL NAA+NON-PROBE: NOT DETECTED
MCH RBC QN AUTO: 28.1 PG (ref 27–31.3)
MCHC RBC AUTO-ENTMCNC: 31.6 % (ref 33–37)
MCV RBC AUTO: 88.9 FL (ref 79.4–94.8)
MONOCYTES # BLD: 0.8 K/UL (ref 0.2–0.8)
MONOCYTES NFR BLD: 9.4 %
NEUTROPHILS # BLD: 4.7 K/UL (ref 1.4–6.5)
NEUTS SEG NFR BLD: 58.3 %
NITRITE UR QL STRIP: NEGATIVE
PH UR STRIP: 7.5 [PH] (ref 5–9)
PLATELET # BLD AUTO: 482 K/UL (ref 130–400)
POTASSIUM SERPL-SCNC: 4.1 MEQ/L (ref 3.4–4.9)
PROT SERPL-MCNC: 7 G/DL (ref 6.3–8)
PROT UR STRIP-MCNC: NEGATIVE MG/DL
RBC # BLD AUTO: 4.24 M/UL (ref 4.2–5.4)
RBC #/AREA URNS AUTO: ABNORMAL /HPF (ref 0–5)
RSV RNA NPH QL NAA+NON-PROBE: NOT DETECTED
RV+EV RNA NPH QL NAA+NON-PROBE: NOT DETECTED
SARS-COV-2 RNA NPH QL NAA+NON-PROBE: NOT DETECTED
SODIUM SERPL-SCNC: 142 MEQ/L (ref 135–144)
SP GR UR STRIP: 1.01 (ref 1–1.03)
TROPONIN, HIGH SENSITIVITY: 17 NG/L (ref 0–19)
URINE REFLEX TO CULTURE: YES
UROBILINOGEN UR STRIP-ACNC: 0.2 E.U./DL
WBC # BLD AUTO: 8 K/UL (ref 4.8–10.8)
WBC #/AREA URNS AUTO: ABNORMAL /HPF (ref 0–5)

## 2024-10-29 PROCEDURE — 81001 URINALYSIS AUTO W/SCOPE: CPT

## 2024-10-29 PROCEDURE — 87641 MR-STAPH DNA AMP PROBE: CPT

## 2024-10-29 PROCEDURE — 93005 ELECTROCARDIOGRAM TRACING: CPT | Performed by: PERSONAL EMERGENCY RESPONSE ATTENDANT

## 2024-10-29 PROCEDURE — 2580000003 HC RX 258: Performed by: PERSONAL EMERGENCY RESPONSE ATTENDANT

## 2024-10-29 PROCEDURE — 87186 SC STD MICRODIL/AGAR DIL: CPT

## 2024-10-29 PROCEDURE — 0202U NFCT DS 22 TRGT SARS-COV-2: CPT

## 2024-10-29 PROCEDURE — 6360000002 HC RX W HCPCS: Performed by: NURSE PRACTITIONER

## 2024-10-29 PROCEDURE — 6370000000 HC RX 637 (ALT 250 FOR IP): Performed by: NURSE PRACTITIONER

## 2024-10-29 PROCEDURE — 83880 ASSAY OF NATRIURETIC PEPTIDE: CPT

## 2024-10-29 PROCEDURE — 1210000000 HC MED SURG R&B

## 2024-10-29 PROCEDURE — 2580000003 HC RX 258: Performed by: NURSE PRACTITIONER

## 2024-10-29 PROCEDURE — 87077 CULTURE AEROBIC IDENTIFY: CPT

## 2024-10-29 PROCEDURE — 80053 COMPREHEN METABOLIC PANEL: CPT

## 2024-10-29 PROCEDURE — 83605 ASSAY OF LACTIC ACID: CPT

## 2024-10-29 PROCEDURE — 85025 COMPLETE CBC W/AUTO DIFF WBC: CPT

## 2024-10-29 PROCEDURE — 6360000002 HC RX W HCPCS: Performed by: PERSONAL EMERGENCY RESPONSE ATTENDANT

## 2024-10-29 PROCEDURE — 96365 THER/PROPH/DIAG IV INF INIT: CPT

## 2024-10-29 PROCEDURE — 87086 URINE CULTURE/COLONY COUNT: CPT

## 2024-10-29 PROCEDURE — 71045 X-RAY EXAM CHEST 1 VIEW: CPT

## 2024-10-29 PROCEDURE — 84484 ASSAY OF TROPONIN QUANT: CPT

## 2024-10-29 PROCEDURE — 99285 EMERGENCY DEPT VISIT HI MDM: CPT

## 2024-10-29 RX ORDER — FUROSEMIDE 40 MG/1
40 TABLET ORAL DAILY
Status: DISCONTINUED | OUTPATIENT
Start: 2024-10-30 | End: 2024-11-03 | Stop reason: HOSPADM

## 2024-10-29 RX ORDER — ACETAMINOPHEN 325 MG/1
650 TABLET ORAL EVERY 6 HOURS PRN
Status: DISCONTINUED | OUTPATIENT
Start: 2024-10-29 | End: 2024-11-03 | Stop reason: HOSPADM

## 2024-10-29 RX ORDER — METOLAZONE 2.5 MG/1
2.5 TABLET ORAL DAILY
Status: DISCONTINUED | OUTPATIENT
Start: 2024-10-30 | End: 2024-11-03 | Stop reason: HOSPADM

## 2024-10-29 RX ORDER — M-VIT,TX,IRON,MINS/CALC/FOLIC 27MG-0.4MG
1 TABLET ORAL DAILY
Status: DISCONTINUED | OUTPATIENT
Start: 2024-10-30 | End: 2024-11-03 | Stop reason: HOSPADM

## 2024-10-29 RX ORDER — SODIUM CHLORIDE 9 MG/ML
INJECTION, SOLUTION INTRAVENOUS PRN
Status: DISCONTINUED | OUTPATIENT
Start: 2024-10-29 | End: 2024-11-03 | Stop reason: HOSPADM

## 2024-10-29 RX ORDER — SODIUM CHLORIDE 0.9 % (FLUSH) 0.9 %
5-40 SYRINGE (ML) INJECTION PRN
Status: DISCONTINUED | OUTPATIENT
Start: 2024-10-29 | End: 2024-11-03 | Stop reason: HOSPADM

## 2024-10-29 RX ORDER — PAROXETINE 20 MG/1
20 TABLET, FILM COATED ORAL DAILY
Status: DISCONTINUED | OUTPATIENT
Start: 2024-10-30 | End: 2024-11-03 | Stop reason: HOSPADM

## 2024-10-29 RX ORDER — ASPIRIN 81 MG/1
81 TABLET, CHEWABLE ORAL DAILY
Status: DISCONTINUED | OUTPATIENT
Start: 2024-10-30 | End: 2024-11-03 | Stop reason: HOSPADM

## 2024-10-29 RX ORDER — MAGNESIUM SULFATE IN WATER 40 MG/ML
2000 INJECTION, SOLUTION INTRAVENOUS PRN
Status: DISCONTINUED | OUTPATIENT
Start: 2024-10-29 | End: 2024-11-03 | Stop reason: HOSPADM

## 2024-10-29 RX ORDER — LORAZEPAM 0.5 MG/1
0.25 TABLET ORAL NIGHTLY
Status: DISCONTINUED | OUTPATIENT
Start: 2024-10-29 | End: 2024-10-30

## 2024-10-29 RX ORDER — ONDANSETRON 2 MG/ML
4 INJECTION INTRAMUSCULAR; INTRAVENOUS EVERY 6 HOURS PRN
Status: DISCONTINUED | OUTPATIENT
Start: 2024-10-29 | End: 2024-11-03 | Stop reason: HOSPADM

## 2024-10-29 RX ORDER — CARVEDILOL 12.5 MG/1
12.5 TABLET ORAL 2 TIMES DAILY
Status: DISCONTINUED | OUTPATIENT
Start: 2024-10-29 | End: 2024-11-03 | Stop reason: HOSPADM

## 2024-10-29 RX ORDER — POTASSIUM CHLORIDE 7.45 MG/ML
10 INJECTION INTRAVENOUS PRN
Status: DISCONTINUED | OUTPATIENT
Start: 2024-10-29 | End: 2024-10-31

## 2024-10-29 RX ORDER — ENOXAPARIN SODIUM 100 MG/ML
30 INJECTION SUBCUTANEOUS 2 TIMES DAILY
Status: DISCONTINUED | OUTPATIENT
Start: 2024-10-29 | End: 2024-11-03 | Stop reason: HOSPADM

## 2024-10-29 RX ORDER — ACETAMINOPHEN 650 MG/1
650 SUPPOSITORY RECTAL EVERY 6 HOURS PRN
Status: DISCONTINUED | OUTPATIENT
Start: 2024-10-29 | End: 2024-11-03 | Stop reason: HOSPADM

## 2024-10-29 RX ORDER — ONDANSETRON 4 MG/1
4 TABLET, ORALLY DISINTEGRATING ORAL EVERY 8 HOURS PRN
Status: DISCONTINUED | OUTPATIENT
Start: 2024-10-29 | End: 2024-11-03 | Stop reason: HOSPADM

## 2024-10-29 RX ORDER — POTASSIUM CHLORIDE 1500 MG/1
40 TABLET, EXTENDED RELEASE ORAL PRN
Status: DISCONTINUED | OUTPATIENT
Start: 2024-10-29 | End: 2024-10-31

## 2024-10-29 RX ORDER — SODIUM CHLORIDE 0.9 % (FLUSH) 0.9 %
5-40 SYRINGE (ML) INJECTION EVERY 12 HOURS SCHEDULED
Status: DISCONTINUED | OUTPATIENT
Start: 2024-10-29 | End: 2024-11-03 | Stop reason: HOSPADM

## 2024-10-29 RX ORDER — POLYETHYLENE GLYCOL 3350 17 G/17G
17 POWDER, FOR SOLUTION ORAL DAILY PRN
Status: DISCONTINUED | OUTPATIENT
Start: 2024-10-29 | End: 2024-11-03 | Stop reason: HOSPADM

## 2024-10-29 RX ADMIN — ENOXAPARIN SODIUM 30 MG: 100 INJECTION SUBCUTANEOUS at 23:51

## 2024-10-29 RX ADMIN — CARVEDILOL 12.5 MG: 12.5 TABLET, FILM COATED ORAL at 23:51

## 2024-10-29 RX ADMIN — Medication 10 ML: at 23:48

## 2024-10-29 RX ADMIN — AMITRIPTYLINE HYDROCHLORIDE 25 MG: 25 TABLET, FILM COATED ORAL at 23:48

## 2024-10-29 RX ADMIN — PIPERACILLIN AND TAZOBACTAM 3375 MG: 3; .375 INJECTION, POWDER, LYOPHILIZED, FOR SOLUTION INTRAVENOUS at 20:59

## 2024-10-29 RX ADMIN — LORAZEPAM 0.25 MG: 0.5 TABLET ORAL at 23:48

## 2024-10-29 ASSESSMENT — PAIN - FUNCTIONAL ASSESSMENT: PAIN_FUNCTIONAL_ASSESSMENT: 0-10

## 2024-10-29 ASSESSMENT — PAIN SCALES - GENERAL: PAINLEVEL_OUTOF10: 4

## 2024-10-29 NOTE — ED TRIAGE NOTES
Pt arrived with EMS due to frequent urination  Pt states that she has felt weak and has frequent UTI's  Pt states that she has home care with therapy since discharge from hospital   Pt states that she has felt like she has these symptoms since being discharged  Pt states that she has been taking 2 \"water pills\" since discharge  Pt is alert and oriented times 4  Pt is malodorous and has multiple briefs and pads on when arrived and is saturated

## 2024-10-29 NOTE — ED PROVIDER NOTES
the emergency physician with the below findings:        Interpretation per the Radiologist below, if available at the time ofthis note:    XR CHEST PORTABLE   Final Result   No evidence of pneumonia or pleural effusion.               ED BEDSIDE ULTRASOUND:   Performed by ED Physician - none    LABS:  Labs Reviewed   URINALYSIS WITH REFLEX TO CULTURE - Abnormal; Notable for the following components:       Result Value    Leukocyte Esterase, Urine SMALL (*)     All other components within normal limits   CBC WITH AUTO DIFFERENTIAL - Abnormal; Notable for the following components:    Hemoglobin 11.9 (*)     MCHC 31.6 (*)     RDW 15.5 (*)     Platelets 482 (*)     All other components within normal limits   COMPREHENSIVE METABOLIC PANEL - Abnormal; Notable for the following components:    CO2 39 (*)     Anion Gap 7 (*)     All other components within normal limits   MICROSCOPIC URINALYSIS - Abnormal; Notable for the following components:    Bacteria, UA RARE (*)     WBC, UA 20-50 (*)     All other components within normal limits   RESPIRATORY PANEL, MOLECULAR, WITH COVID-19   CULTURE, URINE   LACTIC ACID   BRAIN NATRIURETIC PEPTIDE   TROPONIN       All other labs were within normal range or not returned as of this dictation.    EMERGENCY DEPARTMENT COURSE and DIFFERENTIAL DIAGNOSIS/MDM:   Vitals:    Vitals:    10/29/24 1715   BP: 130/66   Pulse: 72   Resp: 18   Temp: 98.5 °F (36.9 °C)   TempSrc: Oral   SpO2: 98%   Weight: 104.3 kg (230 lb)   Height: 1.651 m (5' 5\")       MDM    Patient presents with 1 week history of chills, in the past couple days with dysuria, increased urinary frequency, shortness of breath, nausea, possible increased lower extremity swelling and worsening erythema to right dorsal foot    Personal interpretation and visualization:  Chest x-ray shows no acute process    Urine shows small leukocytes, WBC 20-50, with rare bacteria    Patient does also have erythema to dorsal aspect of right foot which

## 2024-10-29 NOTE — ED NOTES
Call placed to lab at this time to follow up regarding urine sent    updated at this time that provider added on BNP and troponin

## 2024-10-30 LAB
ALBUMIN SERPL-MCNC: 3.4 G/DL (ref 3.5–4.6)
ALP SERPL-CCNC: 84 U/L (ref 40–130)
ALT SERPL-CCNC: 9 U/L (ref 0–33)
ANION GAP SERPL CALCULATED.3IONS-SCNC: 8 MEQ/L (ref 9–15)
AST SERPL-CCNC: 18 U/L (ref 0–35)
BASOPHILS # BLD: 0 K/UL (ref 0–0.2)
BASOPHILS NFR BLD: 0.3 %
BILIRUB SERPL-MCNC: 0.5 MG/DL (ref 0.2–0.7)
BUN SERPL-MCNC: 17 MG/DL (ref 8–23)
CALCIUM SERPL-MCNC: 9 MG/DL (ref 8.5–9.9)
CHLORIDE SERPL-SCNC: 96 MEQ/L (ref 95–107)
CO2 SERPL-SCNC: 34 MEQ/L (ref 20–31)
CREAT SERPL-MCNC: 0.85 MG/DL (ref 0.5–0.9)
EKG ATRIAL RATE: 77 BPM
EKG P AXIS: 54 DEGREES
EKG P-R INTERVAL: 172 MS
EKG Q-T INTERVAL: 454 MS
EKG QRS DURATION: 182 MS
EKG QTC CALCULATION (BAZETT): 513 MS
EKG R AXIS: -80 DEGREES
EKG T AXIS: 91 DEGREES
EKG VENTRICULAR RATE: 77 BPM
EOSINOPHIL # BLD: 0.2 K/UL (ref 0–0.7)
EOSINOPHIL NFR BLD: 1.5 %
ERYTHROCYTE [DISTWIDTH] IN BLOOD BY AUTOMATED COUNT: 15.5 % (ref 11.5–14.5)
GLOBULIN SER CALC-MCNC: 2.9 G/DL (ref 2.3–3.5)
GLUCOSE SERPL-MCNC: 99 MG/DL (ref 70–99)
HCT VFR BLD AUTO: 32.7 % (ref 37–47)
HGB BLD-MCNC: 10.6 G/DL (ref 12–16)
LYMPHOCYTES # BLD: 3 K/UL (ref 1–4.8)
LYMPHOCYTES NFR BLD: 29.9 %
MAGNESIUM SERPL-MCNC: 2.1 MG/DL (ref 1.7–2.4)
MCH RBC QN AUTO: 28.3 PG (ref 27–31.3)
MCHC RBC AUTO-ENTMCNC: 32.4 % (ref 33–37)
MCV RBC AUTO: 87.2 FL (ref 79.4–94.8)
MONOCYTES # BLD: 1.2 K/UL (ref 0.2–0.8)
MONOCYTES NFR BLD: 12.3 %
NEUTROPHILS # BLD: 5.5 K/UL (ref 1.4–6.5)
NEUTS SEG NFR BLD: 55.8 %
PLATELET # BLD AUTO: 408 K/UL (ref 130–400)
POTASSIUM SERPL-SCNC: 3.6 MEQ/L (ref 3.4–4.9)
PROT SERPL-MCNC: 6.3 G/DL (ref 6.3–8)
RBC # BLD AUTO: 3.75 M/UL (ref 4.2–5.4)
SODIUM SERPL-SCNC: 138 MEQ/L (ref 135–144)
WBC # BLD AUTO: 9.9 K/UL (ref 4.8–10.8)

## 2024-10-30 PROCEDURE — 83735 ASSAY OF MAGNESIUM: CPT

## 2024-10-30 PROCEDURE — 99213 OFFICE O/P EST LOW 20 MIN: CPT

## 2024-10-30 PROCEDURE — 80053 COMPREHEN METABOLIC PANEL: CPT

## 2024-10-30 PROCEDURE — 36415 COLL VENOUS BLD VENIPUNCTURE: CPT

## 2024-10-30 PROCEDURE — 6370000000 HC RX 637 (ALT 250 FOR IP): Performed by: INTERNAL MEDICINE

## 2024-10-30 PROCEDURE — 1210000000 HC MED SURG R&B

## 2024-10-30 PROCEDURE — 2580000003 HC RX 258: Performed by: NURSE PRACTITIONER

## 2024-10-30 PROCEDURE — 97166 OT EVAL MOD COMPLEX 45 MIN: CPT

## 2024-10-30 PROCEDURE — 85025 COMPLETE CBC W/AUTO DIFF WBC: CPT

## 2024-10-30 PROCEDURE — 6360000002 HC RX W HCPCS: Performed by: NURSE PRACTITIONER

## 2024-10-30 PROCEDURE — 6370000000 HC RX 637 (ALT 250 FOR IP): Performed by: NURSE PRACTITIONER

## 2024-10-30 PROCEDURE — 6360000002 HC RX W HCPCS: Performed by: INTERNAL MEDICINE

## 2024-10-30 PROCEDURE — 93010 ELECTROCARDIOGRAM REPORT: CPT | Performed by: INTERNAL MEDICINE

## 2024-10-30 PROCEDURE — 2580000003 HC RX 258: Performed by: INTERNAL MEDICINE

## 2024-10-30 RX ORDER — LORAZEPAM 0.5 MG/1
0.5 TABLET ORAL NIGHTLY
Status: DISCONTINUED | OUTPATIENT
Start: 2024-10-30 | End: 2024-11-03 | Stop reason: HOSPADM

## 2024-10-30 RX ORDER — LORAZEPAM 0.5 MG/1
0.25 TABLET ORAL DAILY
Status: DISCONTINUED | OUTPATIENT
Start: 2024-10-30 | End: 2024-11-03 | Stop reason: HOSPADM

## 2024-10-30 RX ADMIN — MENTHOL AND METHYL SALICYLATE: 7.6; 29 OINTMENT TOPICAL at 04:27

## 2024-10-30 RX ADMIN — Medication 10 ML: at 21:09

## 2024-10-30 RX ADMIN — Medication 1 TABLET: at 09:51

## 2024-10-30 RX ADMIN — PIPERACILLIN AND TAZOBACTAM 3375 MG: 3; .375 INJECTION, POWDER, LYOPHILIZED, FOR SOLUTION INTRAVENOUS at 05:16

## 2024-10-30 RX ADMIN — CEFTRIAXONE SODIUM 1000 MG: 1 INJECTION, POWDER, FOR SOLUTION INTRAMUSCULAR; INTRAVENOUS at 10:04

## 2024-10-30 RX ADMIN — PAROXETINE HYDROCHLORIDE 20 MG: 20 TABLET, FILM COATED ORAL at 09:51

## 2024-10-30 RX ADMIN — ENOXAPARIN SODIUM 30 MG: 100 INJECTION SUBCUTANEOUS at 09:55

## 2024-10-30 RX ADMIN — ASPIRIN 81 MG 81 MG: 81 TABLET ORAL at 09:51

## 2024-10-30 RX ADMIN — CARVEDILOL 12.5 MG: 12.5 TABLET, FILM COATED ORAL at 21:06

## 2024-10-30 RX ADMIN — ACETAMINOPHEN 325MG 650 MG: 325 TABLET ORAL at 16:07

## 2024-10-30 RX ADMIN — ENOXAPARIN SODIUM 30 MG: 100 INJECTION SUBCUTANEOUS at 21:05

## 2024-10-30 RX ADMIN — METOLAZONE 2.5 MG: 2.5 TABLET ORAL at 09:52

## 2024-10-30 RX ADMIN — Medication 10 ML: at 10:00

## 2024-10-30 RX ADMIN — AMITRIPTYLINE HYDROCHLORIDE 25 MG: 25 TABLET, FILM COATED ORAL at 21:06

## 2024-10-30 RX ADMIN — LORAZEPAM 0.25 MG: 0.5 TABLET ORAL at 16:07

## 2024-10-30 RX ADMIN — FUROSEMIDE 40 MG: 40 TABLET ORAL at 09:51

## 2024-10-30 RX ADMIN — ACETAMINOPHEN 325MG 650 MG: 325 TABLET ORAL at 05:21

## 2024-10-30 RX ADMIN — CARVEDILOL 12.5 MG: 12.5 TABLET, FILM COATED ORAL at 09:52

## 2024-10-30 RX ADMIN — LORAZEPAM 0.5 MG: 0.5 TABLET ORAL at 21:06

## 2024-10-30 ASSESSMENT — PAIN DESCRIPTION - ORIENTATION: ORIENTATION: RIGHT

## 2024-10-30 ASSESSMENT — PAIN DESCRIPTION - LOCATION
LOCATION: HEAD
LOCATION: KNEE

## 2024-10-30 ASSESSMENT — PAIN SCALES - GENERAL
PAINLEVEL_OUTOF10: 3
PAINLEVEL_OUTOF10: 3

## 2024-10-30 ASSESSMENT — PAIN DESCRIPTION - DESCRIPTORS: DESCRIPTORS: DISCOMFORT

## 2024-10-30 NOTE — ACP (ADVANCE CARE PLANNING)
Advance Care Planning   Healthcare Decision Maker:    Primary Decision Maker: Mariya Smith - Other - [phone]    Secondary Decision Maker: ANNABEL WEN - Other - [phone]    Click here to complete Healthcare Decision Makers including selection of the Healthcare Decision Maker Relationship (ie \"Primary\").  Today we documented Decision Maker(s) consistent with Legal Next of Kin hierarchy.

## 2024-10-30 NOTE — H&P
Problem:  UTI: Urinary urgency and frequency.  Generalized weakness.  UA positive for 20-50 WBC.  Patient started on Zosyn in ED in the setting of concern for questionable BLE cellulitis.  Urine culture sent.  Patient afebrile.  Serum WBC normal.  Plan: We will continue Zosyn and follow cultures.  Will monitor CBC daily.    Lymphedema: BLE edema at baseline.  Mild erythema noted.  No obvious signs of acute infection.  Patient afebrile.  WBC 8.0.  Difficulty ambulating due to weakness in the setting of UTI along with significant chronic lymphedema.  Patient on diuretics daily.  We will resume patient's home meds, monitor inputs and outputs.  We will consult PT OT    Active Problems:  Bradycardia/heart block: Presence of pacemaker.  We will keep patient on telemetry monitoring.  HF: EF 60%.  Patient on beta-blocker and diuretic.  We will resume home meds.  We will monitor fluid status closely.  HTN: PT on home meds to control.  Will resume home meds, as tolerated.  Anxiety: Patient on home meds to control.  We will resume home meds  GERD: PT on home meds to control.  Will resume home meds, as tolerated    Plan of care discussed with: patient    SIGNATURE: Shelly Unger, APRN - CNP  DATE: October 29, 2024  TIME: 10:22 PM     Siobhan Oconnor DO - supervising physician

## 2024-10-30 NOTE — PLAN OF CARE
See OT evaluation for all goals and OT POC. Electronically signed by Nisha Vizcarra OT on 10/30/2024 at 11:39 AM

## 2024-10-31 LAB
ALBUMIN SERPL-MCNC: 3.2 G/DL (ref 3.5–4.6)
ALP SERPL-CCNC: 76 U/L (ref 40–130)
ALT SERPL-CCNC: 8 U/L (ref 0–33)
ANION GAP SERPL CALCULATED.3IONS-SCNC: 6 MEQ/L (ref 9–15)
AST SERPL-CCNC: 14 U/L (ref 0–35)
BASOPHILS # BLD: 0 K/UL (ref 0–0.2)
BASOPHILS NFR BLD: 0.3 %
BILIRUB SERPL-MCNC: 0.4 MG/DL (ref 0.2–0.7)
BUN SERPL-MCNC: 19 MG/DL (ref 8–23)
CALCIUM SERPL-MCNC: 8.6 MG/DL (ref 8.5–9.9)
CHLORIDE SERPL-SCNC: 99 MEQ/L (ref 95–107)
CO2 SERPL-SCNC: 36 MEQ/L (ref 20–31)
CREAT SERPL-MCNC: 0.78 MG/DL (ref 0.5–0.9)
EOSINOPHIL # BLD: 0.1 K/UL (ref 0–0.7)
EOSINOPHIL NFR BLD: 1.1 %
ERYTHROCYTE [DISTWIDTH] IN BLOOD BY AUTOMATED COUNT: 15.7 % (ref 11.5–14.5)
GLOBULIN SER CALC-MCNC: 2.4 G/DL (ref 2.3–3.5)
GLUCOSE SERPL-MCNC: 110 MG/DL (ref 70–99)
HCT VFR BLD AUTO: 31 % (ref 37–47)
HGB BLD-MCNC: 9.9 G/DL (ref 12–16)
LYMPHOCYTES # BLD: 2.5 K/UL (ref 1–4.8)
LYMPHOCYTES NFR BLD: 23.5 %
MAGNESIUM SERPL-MCNC: 2 MG/DL (ref 1.7–2.4)
MCH RBC QN AUTO: 28.1 PG (ref 27–31.3)
MCHC RBC AUTO-ENTMCNC: 31.9 % (ref 33–37)
MCV RBC AUTO: 88.1 FL (ref 79.4–94.8)
MONOCYTES # BLD: 1.4 K/UL (ref 0.2–0.8)
MONOCYTES NFR BLD: 13.2 %
MRSA, DNA, NASAL: NEGATIVE
NEUTROPHILS # BLD: 6.7 K/UL (ref 1.4–6.5)
NEUTS SEG NFR BLD: 61.5 %
PLATELET # BLD AUTO: 373 K/UL (ref 130–400)
POTASSIUM SERPL-SCNC: 3.2 MEQ/L (ref 3.4–4.9)
PROT SERPL-MCNC: 5.6 G/DL (ref 6.3–8)
RBC # BLD AUTO: 3.52 M/UL (ref 4.2–5.4)
SODIUM SERPL-SCNC: 141 MEQ/L (ref 135–144)
SPECIMEN DESCRIPTION: NORMAL
WBC # BLD AUTO: 10.8 K/UL (ref 4.8–10.8)

## 2024-10-31 PROCEDURE — 2580000003 HC RX 258: Performed by: INTERNAL MEDICINE

## 2024-10-31 PROCEDURE — 83735 ASSAY OF MAGNESIUM: CPT

## 2024-10-31 PROCEDURE — 6370000000 HC RX 637 (ALT 250 FOR IP): Performed by: NURSE PRACTITIONER

## 2024-10-31 PROCEDURE — 1210000000 HC MED SURG R&B

## 2024-10-31 PROCEDURE — 36415 COLL VENOUS BLD VENIPUNCTURE: CPT

## 2024-10-31 PROCEDURE — 6360000002 HC RX W HCPCS: Performed by: INTERNAL MEDICINE

## 2024-10-31 PROCEDURE — 97162 PT EVAL MOD COMPLEX 30 MIN: CPT

## 2024-10-31 PROCEDURE — 6370000000 HC RX 637 (ALT 250 FOR IP): Performed by: INTERNAL MEDICINE

## 2024-10-31 PROCEDURE — 6360000002 HC RX W HCPCS: Performed by: NURSE PRACTITIONER

## 2024-10-31 PROCEDURE — 80053 COMPREHEN METABOLIC PANEL: CPT

## 2024-10-31 PROCEDURE — 85025 COMPLETE CBC W/AUTO DIFF WBC: CPT

## 2024-10-31 PROCEDURE — 2580000003 HC RX 258: Performed by: NURSE PRACTITIONER

## 2024-10-31 RX ORDER — MECOBALAMIN 5000 MCG
5 TABLET,DISINTEGRATING ORAL ONCE
Status: COMPLETED | OUTPATIENT
Start: 2024-10-31 | End: 2024-10-31

## 2024-10-31 RX ORDER — POTASSIUM CHLORIDE 1500 MG/1
40 TABLET, EXTENDED RELEASE ORAL EVERY 4 HOURS
Status: COMPLETED | OUTPATIENT
Start: 2024-10-31 | End: 2024-10-31

## 2024-10-31 RX ADMIN — FUROSEMIDE 40 MG: 40 TABLET ORAL at 10:28

## 2024-10-31 RX ADMIN — POTASSIUM CHLORIDE 40 MEQ: 1500 TABLET, EXTENDED RELEASE ORAL at 13:58

## 2024-10-31 RX ADMIN — Medication 10 ML: at 19:47

## 2024-10-31 RX ADMIN — PAROXETINE HYDROCHLORIDE 20 MG: 20 TABLET, FILM COATED ORAL at 10:27

## 2024-10-31 RX ADMIN — Medication 1 TABLET: at 10:29

## 2024-10-31 RX ADMIN — ACETAMINOPHEN 325MG 650 MG: 325 TABLET ORAL at 04:22

## 2024-10-31 RX ADMIN — Medication 5 MG: at 20:49

## 2024-10-31 RX ADMIN — POTASSIUM CHLORIDE 40 MEQ: 1500 TABLET, EXTENDED RELEASE ORAL at 10:28

## 2024-10-31 RX ADMIN — AMITRIPTYLINE HYDROCHLORIDE 25 MG: 25 TABLET, FILM COATED ORAL at 20:48

## 2024-10-31 RX ADMIN — LORAZEPAM 0.5 MG: 0.5 TABLET ORAL at 20:49

## 2024-10-31 RX ADMIN — LORAZEPAM 0.25 MG: 0.5 TABLET ORAL at 10:27

## 2024-10-31 RX ADMIN — MENTHOL AND METHYL SALICYLATE: 7.6; 29 OINTMENT TOPICAL at 00:05

## 2024-10-31 RX ADMIN — ENOXAPARIN SODIUM 30 MG: 100 INJECTION SUBCUTANEOUS at 20:49

## 2024-10-31 RX ADMIN — ENOXAPARIN SODIUM 30 MG: 100 INJECTION SUBCUTANEOUS at 10:29

## 2024-10-31 RX ADMIN — ASPIRIN 81 MG 81 MG: 81 TABLET ORAL at 10:28

## 2024-10-31 RX ADMIN — METOLAZONE 2.5 MG: 2.5 TABLET ORAL at 10:27

## 2024-10-31 RX ADMIN — CARVEDILOL 12.5 MG: 12.5 TABLET, FILM COATED ORAL at 10:28

## 2024-10-31 RX ADMIN — CEFTRIAXONE SODIUM 1000 MG: 1 INJECTION, POWDER, FOR SOLUTION INTRAMUSCULAR; INTRAVENOUS at 10:31

## 2024-10-31 RX ADMIN — CARVEDILOL 12.5 MG: 12.5 TABLET, FILM COATED ORAL at 20:48

## 2024-10-31 ASSESSMENT — PAIN SCALES - GENERAL
PAINLEVEL_OUTOF10: 0
PAINLEVEL_OUTOF10: 0
PAINLEVEL_OUTOF10: 3
PAINLEVEL_OUTOF10: 0

## 2024-10-31 NOTE — PLAN OF CARE
Problem: Discharge Planning  Goal: Discharge to home or other facility with appropriate resources  10/31/2024 1231 by Sydney Centeno RN  Outcome: Progressing  10/30/2024 2257 by Alexandra Sainz RN  Outcome: Progressing  Flowsheets (Taken 10/30/2024 1005)  Discharge to home or other facility with appropriate resources: Identify barriers to discharge with patient and caregiver     Problem: Pain  Goal: Verbalizes/displays adequate comfort level or baseline comfort level  10/31/2024 1231 by Sydney Centeno RN  Outcome: Progressing  10/30/2024 2257 by Alexandra Sainz, RN  Outcome: Progressing     Problem: Safety - Adult  Goal: Free from fall injury  10/31/2024 1231 by Sydney Centeno RN  Outcome: Progressing  10/30/2024 2257 by Alexandra Sainz RN  Outcome: Progressing     Problem: ABCDS Injury Assessment  Goal: Absence of physical injury  10/30/2024 2257 by Alexandra Sainz, RN  Outcome: Progressing

## 2024-10-31 NOTE — PLAN OF CARE
Problem: Discharge Planning  Goal: Discharge to home or other facility with appropriate resources  Outcome: Progressing  Flowsheets (Taken 10/30/2024 1005)  Discharge to home or other facility with appropriate resources: Identify barriers to discharge with patient and caregiver     Problem: Pain  Goal: Verbalizes/displays adequate comfort level or baseline comfort level  Outcome: Progressing     Problem: Safety - Adult  Goal: Free from fall injury  Outcome: Progressing     Problem: ABCDS Injury Assessment  Goal: Absence of physical injury  Outcome: Progressing     Problem: Occupational Therapy - Adult  Goal: By Discharge: Performs self-care activities at highest level of function for planned discharge setting.  See evaluation for individualized goals.  10/30/2024 1139 by Nisha Vizcarra, OT  Outcome: Progressing     Problem: Skin/Tissue Integrity  Goal: Absence of new skin breakdown  Description: 1.  Monitor for areas of redness and/or skin breakdown  2.  Assess vascular access sites hourly  3.  Every 4-6 hours minimum:  Change oxygen saturation probe site  4.  Every 4-6 hours:  If on nasal continuous positive airway pressure, respiratory therapy assess nares and determine need for appliance change or resting period.  Outcome: Progressing

## 2024-11-01 PROBLEM — R20.2 NUMBNESS AND TINGLING OF BOTH LEGS: Status: ACTIVE | Noted: 2017-10-20

## 2024-11-01 PROBLEM — R20.0 NUMBNESS AND TINGLING OF BOTH LEGS: Status: ACTIVE | Noted: 2017-10-20

## 2024-11-01 PROBLEM — F32.A DEPRESSION, UNSPECIFIED: Status: ACTIVE | Noted: 2024-09-06

## 2024-11-01 PROBLEM — E66.01 MORBID OBESITY DUE TO EXCESS CALORIES: Status: ACTIVE | Noted: 2024-11-01

## 2024-11-01 PROBLEM — E87.6 HYPOKALEMIA: Status: ACTIVE | Noted: 2024-11-01

## 2024-11-01 PROBLEM — M48.062 SPINAL STENOSIS OF LUMBAR REGION WITH NEUROGENIC CLAUDICATION: Status: ACTIVE | Noted: 2017-10-20

## 2024-11-01 PROBLEM — M17.11 PRIMARY OSTEOARTHRITIS OF RIGHT KNEE: Status: ACTIVE | Noted: 2017-10-20

## 2024-11-01 PROBLEM — Z78.9 IMPAIRED MOBILITY AND ACTIVITIES OF DAILY LIVING: Status: ACTIVE | Noted: 2024-11-01

## 2024-11-01 PROBLEM — D64.9 ANEMIA: Status: ACTIVE | Noted: 2024-11-01

## 2024-11-01 PROBLEM — M26.609 TEMPOROMANDIBULAR JOINT DISORDER: Status: ACTIVE | Noted: 2024-11-01

## 2024-11-01 PROBLEM — M43.10 ACQUIRED SPONDYLOLISTHESIS: Status: ACTIVE | Noted: 2018-05-30

## 2024-11-01 PROBLEM — Z74.09 IMPAIRED MOBILITY AND ACTIVITIES OF DAILY LIVING: Status: ACTIVE | Noted: 2024-11-01

## 2024-11-01 LAB
ALBUMIN SERPL-MCNC: 3.1 G/DL (ref 3.5–4.6)
ALP SERPL-CCNC: 71 U/L (ref 40–130)
ALT SERPL-CCNC: 7 U/L (ref 0–33)
ANION GAP SERPL CALCULATED.3IONS-SCNC: 5 MEQ/L (ref 9–15)
AST SERPL-CCNC: 20 U/L (ref 0–35)
BACTERIA UR CULT: ABNORMAL
BACTERIA UR CULT: ABNORMAL
BASOPHILS # BLD: 0 K/UL (ref 0–0.2)
BASOPHILS NFR BLD: 0.2 %
BILIRUB SERPL-MCNC: 0.5 MG/DL (ref 0.2–0.7)
BUN SERPL-MCNC: 18 MG/DL (ref 8–23)
CALCIUM SERPL-MCNC: 8.5 MG/DL (ref 8.5–9.9)
CHLORIDE SERPL-SCNC: 97 MEQ/L (ref 95–107)
CO2 SERPL-SCNC: 36 MEQ/L (ref 20–31)
CREAT SERPL-MCNC: 0.75 MG/DL (ref 0.5–0.9)
EOSINOPHIL # BLD: 0.1 K/UL (ref 0–0.7)
EOSINOPHIL NFR BLD: 1.2 %
ERYTHROCYTE [DISTWIDTH] IN BLOOD BY AUTOMATED COUNT: 15.6 % (ref 11.5–14.5)
GLOBULIN SER CALC-MCNC: 2.6 G/DL (ref 2.3–3.5)
GLUCOSE SERPL-MCNC: 110 MG/DL (ref 70–99)
HCT VFR BLD AUTO: 30.1 % (ref 37–47)
HGB BLD-MCNC: 9.8 G/DL (ref 12–16)
LYMPHOCYTES # BLD: 3.3 K/UL (ref 1–4.8)
LYMPHOCYTES NFR BLD: 32.4 %
MAGNESIUM SERPL-MCNC: 2 MG/DL (ref 1.7–2.4)
MCH RBC QN AUTO: 28.6 PG (ref 27–31.3)
MCHC RBC AUTO-ENTMCNC: 32.6 % (ref 33–37)
MCV RBC AUTO: 87.8 FL (ref 79.4–94.8)
MONOCYTES # BLD: 1.3 K/UL (ref 0.2–0.8)
MONOCYTES NFR BLD: 12.3 %
NEUTROPHILS # BLD: 5.4 K/UL (ref 1.4–6.5)
NEUTS SEG NFR BLD: 53.5 %
ORGANISM: ABNORMAL
PLATELET # BLD AUTO: 343 K/UL (ref 130–400)
POTASSIUM SERPL-SCNC: 3 MEQ/L (ref 3.4–4.9)
PROT SERPL-MCNC: 5.7 G/DL (ref 6.3–8)
RBC # BLD AUTO: 3.43 M/UL (ref 4.2–5.4)
SODIUM SERPL-SCNC: 138 MEQ/L (ref 135–144)
WBC # BLD AUTO: 10.1 K/UL (ref 4.8–10.8)

## 2024-11-01 PROCEDURE — 97535 SELF CARE MNGMENT TRAINING: CPT

## 2024-11-01 PROCEDURE — 2580000003 HC RX 258: Performed by: NURSE PRACTITIONER

## 2024-11-01 PROCEDURE — 6370000000 HC RX 637 (ALT 250 FOR IP): Performed by: NURSE PRACTITIONER

## 2024-11-01 PROCEDURE — 99221 1ST HOSP IP/OBS SF/LOW 40: CPT | Performed by: PHYSICAL MEDICINE & REHABILITATION

## 2024-11-01 PROCEDURE — 85025 COMPLETE CBC W/AUTO DIFF WBC: CPT

## 2024-11-01 PROCEDURE — 80053 COMPREHEN METABOLIC PANEL: CPT

## 2024-11-01 PROCEDURE — 2580000003 HC RX 258: Performed by: INTERNAL MEDICINE

## 2024-11-01 PROCEDURE — 36415 COLL VENOUS BLD VENIPUNCTURE: CPT

## 2024-11-01 PROCEDURE — 83735 ASSAY OF MAGNESIUM: CPT

## 2024-11-01 PROCEDURE — 6360000002 HC RX W HCPCS: Performed by: INTERNAL MEDICINE

## 2024-11-01 PROCEDURE — 1210000000 HC MED SURG R&B

## 2024-11-01 PROCEDURE — 6370000000 HC RX 637 (ALT 250 FOR IP): Performed by: INTERNAL MEDICINE

## 2024-11-01 PROCEDURE — 97116 GAIT TRAINING THERAPY: CPT

## 2024-11-01 PROCEDURE — 6360000002 HC RX W HCPCS: Performed by: NURSE PRACTITIONER

## 2024-11-01 RX ORDER — POTASSIUM CHLORIDE 1500 MG/1
40 TABLET, EXTENDED RELEASE ORAL ONCE
Status: COMPLETED | OUTPATIENT
Start: 2024-11-01 | End: 2024-11-01

## 2024-11-01 RX ORDER — POTASSIUM CHLORIDE 1500 MG/1
40 TABLET, EXTENDED RELEASE ORAL EVERY 4 HOURS
Status: DISPENSED | OUTPATIENT
Start: 2024-11-01 | End: 2024-11-01

## 2024-11-01 RX ORDER — SPIRONOLACTONE 25 MG/1
25 TABLET ORAL DAILY
Status: DISCONTINUED | OUTPATIENT
Start: 2024-11-01 | End: 2024-11-03 | Stop reason: HOSPADM

## 2024-11-01 RX ORDER — SPIRONOLACTONE 25 MG/1
25 TABLET ORAL DAILY
Qty: 30 TABLET | Refills: 1 | Status: SHIPPED | OUTPATIENT
Start: 2024-11-02

## 2024-11-01 RX ORDER — POTASSIUM CHLORIDE 1500 MG/1
40 TABLET, EXTENDED RELEASE ORAL ONCE
Status: DISCONTINUED | OUTPATIENT
Start: 2024-11-01 | End: 2024-11-01

## 2024-11-01 RX ORDER — MECOBALAMIN 5000 MCG
5 TABLET,DISINTEGRATING ORAL NIGHTLY
Status: DISCONTINUED | OUTPATIENT
Start: 2024-11-01 | End: 2024-11-03 | Stop reason: HOSPADM

## 2024-11-01 RX ORDER — AMMONIUM LACTATE 12 G/100G
LOTION TOPICAL 3 TIMES DAILY
Status: DISCONTINUED | OUTPATIENT
Start: 2024-11-01 | End: 2024-11-03 | Stop reason: HOSPADM

## 2024-11-01 RX ORDER — CEPHALEXIN 500 MG/1
500 CAPSULE ORAL 3 TIMES DAILY
Status: DISCONTINUED | OUTPATIENT
Start: 2024-11-02 | End: 2024-11-03 | Stop reason: HOSPADM

## 2024-11-01 RX ORDER — CEPHALEXIN 500 MG/1
500 CAPSULE ORAL 3 TIMES DAILY
Qty: 12 CAPSULE | Refills: 0 | Status: SHIPPED | OUTPATIENT
Start: 2024-11-02 | End: 2024-11-06

## 2024-11-01 RX ADMIN — LORAZEPAM 0.5 MG: 0.5 TABLET ORAL at 21:56

## 2024-11-01 RX ADMIN — PAROXETINE HYDROCHLORIDE 20 MG: 20 TABLET, FILM COATED ORAL at 10:15

## 2024-11-01 RX ADMIN — Medication 1 TABLET: at 10:15

## 2024-11-01 RX ADMIN — ENOXAPARIN SODIUM 30 MG: 100 INJECTION SUBCUTANEOUS at 21:56

## 2024-11-01 RX ADMIN — FUROSEMIDE 40 MG: 40 TABLET ORAL at 10:15

## 2024-11-01 RX ADMIN — LORAZEPAM 0.25 MG: 0.5 TABLET ORAL at 10:14

## 2024-11-01 RX ADMIN — POTASSIUM CHLORIDE 40 MEQ: 1500 TABLET, EXTENDED RELEASE ORAL at 10:15

## 2024-11-01 RX ADMIN — CARVEDILOL 12.5 MG: 12.5 TABLET, FILM COATED ORAL at 21:57

## 2024-11-01 RX ADMIN — Medication 5 MG: at 22:59

## 2024-11-01 RX ADMIN — SPIRONOLACTONE 25 MG: 25 TABLET ORAL at 10:15

## 2024-11-01 RX ADMIN — ENOXAPARIN SODIUM 30 MG: 100 INJECTION SUBCUTANEOUS at 10:14

## 2024-11-01 RX ADMIN — POTASSIUM CHLORIDE 40 MEQ: 1500 TABLET, EXTENDED RELEASE ORAL at 18:35

## 2024-11-01 RX ADMIN — CARVEDILOL 12.5 MG: 12.5 TABLET, FILM COATED ORAL at 10:15

## 2024-11-01 RX ADMIN — ACETAMINOPHEN 325MG 650 MG: 325 TABLET ORAL at 11:18

## 2024-11-01 RX ADMIN — AMITRIPTYLINE HYDROCHLORIDE 25 MG: 25 TABLET, FILM COATED ORAL at 21:56

## 2024-11-01 RX ADMIN — POTASSIUM CHLORIDE 40 MEQ: 1500 TABLET, EXTENDED RELEASE ORAL at 23:15

## 2024-11-01 RX ADMIN — ASPIRIN 81 MG 81 MG: 81 TABLET ORAL at 10:15

## 2024-11-01 RX ADMIN — METOLAZONE 2.5 MG: 2.5 TABLET ORAL at 10:15

## 2024-11-01 RX ADMIN — Medication 10 ML: at 21:55

## 2024-11-01 RX ADMIN — WATER 1000 MG: 1 INJECTION INTRAMUSCULAR; INTRAVENOUS; SUBCUTANEOUS at 12:17

## 2024-11-01 ASSESSMENT — PAIN SCALES - GENERAL
PAINLEVEL_OUTOF10: 0
PAINLEVEL_OUTOF10: 4
PAINLEVEL_OUTOF10: 0
PAINLEVEL_OUTOF10: 2

## 2024-11-01 NOTE — DISCHARGE SUMMARY
[facility] Medicine Discharge Summary    Arlyn Bhagat  :  1945  MRN:  [id number]    Admit date:  10/29/2024    Discharge date:  11/3/2024    Admitting Physician:  Siobhan Oconnor DO  Primary Care Physician:  Francis Mills MD    Discharge Diagnoses:    Principal Problem:    UTI (urinary tract infection)  Active Problems:    Lymphedema    Spinal stenosis of lumbar region with neurogenic claudication    Morbid obesity due to excess calories    Impaired mobility and activities of daily living    Hypokalemia    Anemia  Resolved Problems:    * No resolved hospital problems. *    Chief Complaint   Patient presents with    Urinary Frequency     Feels weak and frequent urination   H/O UTI        Condition: improved   Activity: no restrictions   Diet: regular  Disposition: home  Functional Status: ambulatory    Significant Findings:     Pansensitive E. coli UTI      Hospital Course:   78-year-old female with class III obesity, anxiety on chronic benzodiazepine, history of complete heart block with dual-chamber pacemaker, hypertension was hospitalized for weakness related to UTI and chronic lymphedema.  During her hospitalization, her vital signs and blood work remained stable outside of mild hypokalemia.  She was treated for E. coli UTI with ceftriaxone and transition to oral cephalexin at discharge.  Spironolactone was also added to aid with management of hypertension, lymphedema, and hypokalemia.    She felt much improved on  and, if she is able to ambulate okay with nursing, she plans for discharge home.     Addendum: She ambulated well on  but then asked to stay another night.  On , she had multiple complaints about nursing and requested again to stay another night and went home in stable condition on 11/3.  She did have several behavioral issues during the hospitalization-she fired several nurses and had several complaints about other staff.  Overall with me, she was

## 2024-11-01 NOTE — FLOWSHEET NOTE
She expressed that she needed a sleeping pill since she have not sleep last night,I have explained to her that she has a lorazepam that she can get shortly ,but per patient it was not working ,she was pacified when she was informed that I will ask the  For  a melatonin.    20:25 she complained that her left leg and heel is itching,so I have rubbed  it with lotion.she voiced only a minute relief from her discomfort.    00:30 patient is resting quietly in bed.her respirations were unlabored,call light within her easy reach.    05:50: patient is sleeping,his breathing is effortless.her call light in reach.

## 2024-11-01 NOTE — PLAN OF CARE
Problem: Discharge Planning  Goal: Discharge to home or other facility with appropriate resources  Outcome: Progressing     Problem: Pain  Goal: Verbalizes/displays adequate comfort level or baseline comfort level  Outcome: Progressing     Problem: Safety - Adult  Goal: Free from fall injury  Outcome: Progressing      Bactrim Counseling:  I discussed with the patient the risks of sulfa antibiotics including but not limited to GI upset, allergic reaction, drug rash, diarrhea, dizziness, photosensitivity, and yeast infections.  Rarely, more serious reactions can occur including but not limited to aplastic anemia, agranulocytosis, methemoglobinemia, blood dyscrasias, liver or kidney failure, lung infiltrates or desquamative/blistering drug rashes.

## 2024-11-01 NOTE — CONSULTS
factors above including the patient's current medical status, social status/home environment, their functional needs, and their ability to participate in a therapy program, I feel that they would best be served at:  TBD--likely  skilled rehabilitation level of care.  It is my opinion that they will NOT be able to tolerate and benefit from 3 hours of therapy a day.  I reviewed the various other options re: levels of care with the patient and family.      Specialized nursing care to focus on:  Bowel and bladder issues-Monitor for urinary retention-check PVRs, bladder scan--cath if no void.  Wound risk and management   -pressure relief protocols-side to side turns  IVF medication administration      Monitor endurance and if necessary spread therapy out over a 7-day window-adding scheduled rest breaks when needed.  Focus on energy conservation.  Monitor heart rate and   cardiac medications effects on heart rate and blood pressure before, during and after therapy.  Progress toward endurance training with pulse ox monitoring for saturation and heart rate.    continue to monitor closely for dehydration-- Improve hydration and nutrition by adding Vitamin B12 shot times one, adding Protein supplements and push PO fluids.    Treat and monitor for higher level cognitive deficits, focus on difficulty with sequencing and problem-solving.    Focus on higher-level balance and falls risk issues focusing on balance training and monitoring for orthostasis.      Above recommendations are indicated to address medical complexity and need for appropriate rehab services.  Will tailor individual care and rehab plan per individuals needs re treat UTI IV ceftriaxone maintain integrity of skin.    Focus of today's plan-reevaluate PT OT assess for consistency of therapeutic goals and function      Required Certification Data (potential inpatient rehabilitation facility patient's only)    Deficits:weakness, mobility, high risk for falls,

## 2024-11-02 ENCOUNTER — APPOINTMENT (OUTPATIENT)
Dept: GENERAL RADIOLOGY | Age: 79
DRG: 690 | End: 2024-11-02
Payer: MEDICARE

## 2024-11-02 LAB
ALBUMIN SERPL-MCNC: 3.3 G/DL (ref 3.5–4.6)
ALP SERPL-CCNC: 73 U/L (ref 40–130)
ALT SERPL-CCNC: 10 U/L (ref 0–33)
ANION GAP SERPL CALCULATED.3IONS-SCNC: 7 MEQ/L (ref 9–15)
AST SERPL-CCNC: 19 U/L (ref 0–35)
BASOPHILS # BLD: 0 K/UL (ref 0–0.2)
BASOPHILS NFR BLD: 0.2 %
BILIRUB SERPL-MCNC: 0.6 MG/DL (ref 0.2–0.7)
BUN SERPL-MCNC: 18 MG/DL (ref 8–23)
CALCIUM SERPL-MCNC: 8.9 MG/DL (ref 8.5–9.9)
CHLORIDE SERPL-SCNC: 98 MEQ/L (ref 95–107)
CO2 SERPL-SCNC: 34 MEQ/L (ref 20–31)
CREAT SERPL-MCNC: 0.76 MG/DL (ref 0.5–0.9)
EOSINOPHIL # BLD: 0.1 K/UL (ref 0–0.7)
EOSINOPHIL NFR BLD: 1.2 %
ERYTHROCYTE [DISTWIDTH] IN BLOOD BY AUTOMATED COUNT: 15.6 % (ref 11.5–14.5)
GLOBULIN SER CALC-MCNC: 2.9 G/DL (ref 2.3–3.5)
GLUCOSE SERPL-MCNC: 105 MG/DL (ref 70–99)
HCT VFR BLD AUTO: 30.4 % (ref 37–47)
HGB BLD-MCNC: 9.9 G/DL (ref 12–16)
LYMPHOCYTES # BLD: 2.6 K/UL (ref 1–4.8)
LYMPHOCYTES NFR BLD: 24.1 %
MAGNESIUM SERPL-MCNC: 1.9 MG/DL (ref 1.7–2.4)
MCH RBC QN AUTO: 28.5 PG (ref 27–31.3)
MCHC RBC AUTO-ENTMCNC: 32.6 % (ref 33–37)
MCV RBC AUTO: 87.6 FL (ref 79.4–94.8)
MONOCYTES # BLD: 1.5 K/UL (ref 0.2–0.8)
MONOCYTES NFR BLD: 13.7 %
NEUTROPHILS # BLD: 6.4 K/UL (ref 1.4–6.5)
NEUTS SEG NFR BLD: 60.4 %
PLATELET # BLD AUTO: 349 K/UL (ref 130–400)
POTASSIUM SERPL-SCNC: 3.7 MEQ/L (ref 3.4–4.9)
PROT SERPL-MCNC: 6.2 G/DL (ref 6.3–8)
RBC # BLD AUTO: 3.47 M/UL (ref 4.2–5.4)
SODIUM SERPL-SCNC: 139 MEQ/L (ref 135–144)
WBC # BLD AUTO: 10.6 K/UL (ref 4.8–10.8)

## 2024-11-02 PROCEDURE — 83735 ASSAY OF MAGNESIUM: CPT

## 2024-11-02 PROCEDURE — 6370000000 HC RX 637 (ALT 250 FOR IP): Performed by: INTERNAL MEDICINE

## 2024-11-02 PROCEDURE — 85025 COMPLETE CBC W/AUTO DIFF WBC: CPT

## 2024-11-02 PROCEDURE — 97116 GAIT TRAINING THERAPY: CPT

## 2024-11-02 PROCEDURE — 1210000000 HC MED SURG R&B

## 2024-11-02 PROCEDURE — 80053 COMPREHEN METABOLIC PANEL: CPT

## 2024-11-02 PROCEDURE — 2500000003 HC RX 250 WO HCPCS: Performed by: PHYSICAL MEDICINE & REHABILITATION

## 2024-11-02 PROCEDURE — 71045 X-RAY EXAM CHEST 1 VIEW: CPT

## 2024-11-02 PROCEDURE — 6360000002 HC RX W HCPCS: Performed by: NURSE PRACTITIONER

## 2024-11-02 PROCEDURE — 6370000000 HC RX 637 (ALT 250 FOR IP): Performed by: NURSE PRACTITIONER

## 2024-11-02 PROCEDURE — 2580000003 HC RX 258: Performed by: NURSE PRACTITIONER

## 2024-11-02 PROCEDURE — 36415 COLL VENOUS BLD VENIPUNCTURE: CPT

## 2024-11-02 RX ORDER — POTASSIUM CHLORIDE 1500 MG/1
40 TABLET, EXTENDED RELEASE ORAL ONCE
Status: COMPLETED | OUTPATIENT
Start: 2024-11-02 | End: 2024-11-02

## 2024-11-02 RX ORDER — CALCIUM CARBONATE 500 MG/1
500 TABLET, CHEWABLE ORAL 3 TIMES DAILY PRN
Status: DISCONTINUED | OUTPATIENT
Start: 2024-11-02 | End: 2024-11-03 | Stop reason: HOSPADM

## 2024-11-02 RX ADMIN — SPIRONOLACTONE 25 MG: 25 TABLET ORAL at 09:37

## 2024-11-02 RX ADMIN — AMITRIPTYLINE HYDROCHLORIDE 25 MG: 25 TABLET, FILM COATED ORAL at 21:19

## 2024-11-02 RX ADMIN — ASPIRIN 81 MG 81 MG: 81 TABLET ORAL at 09:37

## 2024-11-02 RX ADMIN — Medication: at 16:23

## 2024-11-02 RX ADMIN — CEPHALEXIN 500 MG: 500 CAPSULE ORAL at 09:37

## 2024-11-02 RX ADMIN — Medication: at 10:07

## 2024-11-02 RX ADMIN — CARVEDILOL 12.5 MG: 12.5 TABLET, FILM COATED ORAL at 21:18

## 2024-11-02 RX ADMIN — CARVEDILOL 12.5 MG: 12.5 TABLET, FILM COATED ORAL at 09:36

## 2024-11-02 RX ADMIN — AMMONIUM LACTATE: 17 LOTION TOPICAL at 00:36

## 2024-11-02 RX ADMIN — ENOXAPARIN SODIUM 30 MG: 100 INJECTION SUBCUTANEOUS at 09:46

## 2024-11-02 RX ADMIN — ACETAMINOPHEN 325MG 650 MG: 325 TABLET ORAL at 06:27

## 2024-11-02 RX ADMIN — CEPHALEXIN 500 MG: 500 CAPSULE ORAL at 14:43

## 2024-11-02 RX ADMIN — Medication 5 MG: at 21:19

## 2024-11-02 RX ADMIN — ONDANSETRON 4 MG: 2 INJECTION, SOLUTION INTRAMUSCULAR; INTRAVENOUS at 14:43

## 2024-11-02 RX ADMIN — Medication 10 ML: at 21:19

## 2024-11-02 RX ADMIN — PAROXETINE HYDROCHLORIDE 20 MG: 20 TABLET, FILM COATED ORAL at 09:46

## 2024-11-02 RX ADMIN — LORAZEPAM 0.25 MG: 0.5 TABLET ORAL at 09:37

## 2024-11-02 RX ADMIN — ACETAMINOPHEN 325MG 650 MG: 325 TABLET ORAL at 16:20

## 2024-11-02 RX ADMIN — CEPHALEXIN 500 MG: 500 CAPSULE ORAL at 21:19

## 2024-11-02 RX ADMIN — POTASSIUM CHLORIDE 40 MEQ: 1500 TABLET, EXTENDED RELEASE ORAL at 09:36

## 2024-11-02 RX ADMIN — AMMONIUM LACTATE: 17 LOTION TOPICAL at 21:20

## 2024-11-02 RX ADMIN — Medication: at 00:35

## 2024-11-02 RX ADMIN — ANTACID TABLETS 500 MG: 500 TABLET, CHEWABLE ORAL at 16:30

## 2024-11-02 RX ADMIN — LORAZEPAM 0.5 MG: 0.5 TABLET ORAL at 21:18

## 2024-11-02 RX ADMIN — Medication 1 TABLET: at 09:37

## 2024-11-02 RX ADMIN — Medication: at 21:21

## 2024-11-02 RX ADMIN — AMMONIUM LACTATE: 17 LOTION TOPICAL at 16:22

## 2024-11-02 RX ADMIN — ENOXAPARIN SODIUM 30 MG: 100 INJECTION SUBCUTANEOUS at 21:19

## 2024-11-02 RX ADMIN — Medication 10 ML: at 09:42

## 2024-11-02 RX ADMIN — FUROSEMIDE 40 MG: 40 TABLET ORAL at 09:37

## 2024-11-02 RX ADMIN — AMMONIUM LACTATE: 17 LOTION TOPICAL at 09:48

## 2024-11-02 RX ADMIN — MENTHOL AND METHYL SALICYLATE: 7.6; 29 OINTMENT TOPICAL at 06:24

## 2024-11-02 RX ADMIN — METOLAZONE 2.5 MG: 2.5 TABLET ORAL at 09:46

## 2024-11-02 ASSESSMENT — PAIN SCALES - WONG BAKER: WONGBAKER_NUMERICALRESPONSE: HURTS A LITTLE BIT

## 2024-11-02 ASSESSMENT — PAIN SCALES - GENERAL
PAINLEVEL_OUTOF10: 3
PAINLEVEL_OUTOF10: 0
PAINLEVEL_OUTOF10: 1
PAINLEVEL_OUTOF10: 3
PAINLEVEL_OUTOF10: 0

## 2024-11-02 ASSESSMENT — PAIN DESCRIPTION - ORIENTATION
ORIENTATION: RIGHT;LEFT
ORIENTATION: MID

## 2024-11-02 ASSESSMENT — PAIN DESCRIPTION - DESCRIPTORS: DESCRIPTORS: ACHING

## 2024-11-02 ASSESSMENT — PAIN DESCRIPTION - LOCATION
LOCATION: NECK
LOCATION: BACK

## 2024-11-02 NOTE — PLAN OF CARE
Problem: Discharge Planning  Goal: Discharge to home or other facility with appropriate resources  Outcome: Progressing  Flowsheets (Taken 11/1/2024 2323 by Petrona Nunez, RN)  Discharge to home or other facility with appropriate resources: Identify barriers to discharge with patient and caregiver     Problem: Pain  Goal: Verbalizes/displays adequate comfort level or baseline comfort level  Outcome: Progressing     Problem: Safety - Adult  Goal: Free from fall injury  Outcome: Progressing     Problem: ABCDS Injury Assessment  Goal: Absence of physical injury  11/2/2024 1026 by Jazz Yusuf, RN  Outcome: Progressing  11/1/2024 2358 by Petrona Nunez, RN  Outcome: Progressing     Problem: Skin/Tissue Integrity  Goal: Absence of new skin breakdown  Description: 1.  Monitor for areas of redness and/or skin breakdown  2.  Assess vascular access sites hourly  3.  Every 4-6 hours minimum:  Change oxygen saturation probe site  4.  Every 4-6 hours:  If on nasal continuous positive airway pressure, respiratory therapy assess nares and determine need for appliance change or resting period.  11/2/2024 1026 by Jazz Yusuf, RN  Outcome: Progressing  11/1/2024 2358 by Petrona Nunez, RN  Outcome: Progressing

## 2024-11-02 NOTE — PLAN OF CARE
Problem: Discharge Planning  Goal: Discharge to home or other facility with appropriate resources  11/1/2024 1130 by Sydney Centeno RN  Outcome: Progressing     Problem: Pain  Goal: Verbalizes/displays adequate comfort level or baseline comfort level  11/1/2024 1130 by Sydney Centeno RN  Outcome: Progressing     Problem: Safety - Adult  Goal: Free from fall injury  11/1/2024 1130 by Sydney Centeno RN  Outcome: Progressing     Problem: ABCDS Injury Assessment  Goal: Absence of physical injury  Outcome: Progressing     Problem: Skin/Tissue Integrity  Goal: Absence of new skin breakdown  Description: 1.  Monitor for areas of redness and/or skin breakdown  2.  Assess vascular access sites hourly  3.  Every 4-6 hours minimum:  Change oxygen saturation probe site  4.  Every 4-6 hours:  If on nasal continuous positive airway pressure, respiratory therapy assess nares and determine need for appliance change or resting period.  Outcome: Progressing

## 2024-11-02 NOTE — FLOWSHEET NOTE
This supervisor spoke to pt three times today per staff request.  Pt has continuous long explanations of complaints.  Pt complaining of the male dietary staff not be given permission by the pt to enter.  Nurse reports complaints are long enough to consume time taken for the pts assessment and interrupts care for others.  This supervisor has offered several measures to resolve complaints.  Pt continues to verbalized non associated complaints.  Pt accepts no reasonable measures by the supervisor to relieve the list of complaints which leads to more complaints expressed.  To offer more encouragement for the pt to express complaints, pt instructed to verbalized non medical complaints to the supervisor and medical complaints to the staff on 4w.  Pt also very interruptive when being spoken to.  Staff report pt difficult to take care of by the comments being made by pt.  Pt redirecting questions of assessment by the staff to complaints and won't answer medical questions but rather roles into list of complaints.

## 2024-11-03 VITALS
HEART RATE: 83 BPM | WEIGHT: 238.8 LBS | TEMPERATURE: 98.1 F | OXYGEN SATURATION: 95 % | RESPIRATION RATE: 18 BRPM | HEIGHT: 65 IN | BODY MASS INDEX: 39.79 KG/M2 | DIASTOLIC BLOOD PRESSURE: 58 MMHG | SYSTOLIC BLOOD PRESSURE: 114 MMHG

## 2024-11-03 LAB
ALBUMIN SERPL-MCNC: 3.2 G/DL (ref 3.5–4.6)
ALP SERPL-CCNC: 74 U/L (ref 40–130)
ALT SERPL-CCNC: 13 U/L (ref 0–33)
ANION GAP SERPL CALCULATED.3IONS-SCNC: 8 MEQ/L (ref 9–15)
AST SERPL-CCNC: 20 U/L (ref 0–35)
BASOPHILS # BLD: 0 K/UL (ref 0–0.2)
BASOPHILS NFR BLD: 0.2 %
BILIRUB SERPL-MCNC: 0.5 MG/DL (ref 0.2–0.7)
BUN SERPL-MCNC: 22 MG/DL (ref 8–23)
CALCIUM SERPL-MCNC: 8.8 MG/DL (ref 8.5–9.9)
CHLORIDE SERPL-SCNC: 94 MEQ/L (ref 95–107)
CO2 SERPL-SCNC: 36 MEQ/L (ref 20–31)
CREAT SERPL-MCNC: 0.82 MG/DL (ref 0.5–0.9)
EOSINOPHIL # BLD: 0.2 K/UL (ref 0–0.7)
EOSINOPHIL NFR BLD: 1.6 %
ERYTHROCYTE [DISTWIDTH] IN BLOOD BY AUTOMATED COUNT: 15.5 % (ref 11.5–14.5)
GLOBULIN SER CALC-MCNC: 3.2 G/DL (ref 2.3–3.5)
GLUCOSE SERPL-MCNC: 111 MG/DL (ref 70–99)
HCT VFR BLD AUTO: 29.5 % (ref 37–47)
HGB BLD-MCNC: 9.5 G/DL (ref 12–16)
LYMPHOCYTES # BLD: 2.6 K/UL (ref 1–4.8)
LYMPHOCYTES NFR BLD: 23.8 %
MAGNESIUM SERPL-MCNC: 2 MG/DL (ref 1.7–2.4)
MCH RBC QN AUTO: 28.2 PG (ref 27–31.3)
MCHC RBC AUTO-ENTMCNC: 32.2 % (ref 33–37)
MCV RBC AUTO: 87.5 FL (ref 79.4–94.8)
MONOCYTES # BLD: 1.4 K/UL (ref 0.2–0.8)
MONOCYTES NFR BLD: 12.7 %
NEUTROPHILS # BLD: 6.6 K/UL (ref 1.4–6.5)
NEUTS SEG NFR BLD: 61.2 %
PLATELET # BLD AUTO: 359 K/UL (ref 130–400)
POTASSIUM SERPL-SCNC: 3.4 MEQ/L (ref 3.4–4.9)
PROCALCITONIN SERPL IA-MCNC: 0.09 NG/ML (ref 0–0.15)
PROT SERPL-MCNC: 6.4 G/DL (ref 6.3–8)
RBC # BLD AUTO: 3.37 M/UL (ref 4.2–5.4)
SODIUM SERPL-SCNC: 138 MEQ/L (ref 135–144)
WBC # BLD AUTO: 10.8 K/UL (ref 4.8–10.8)

## 2024-11-03 PROCEDURE — 2580000003 HC RX 258: Performed by: NURSE PRACTITIONER

## 2024-11-03 PROCEDURE — 6360000002 HC RX W HCPCS: Performed by: NURSE PRACTITIONER

## 2024-11-03 PROCEDURE — 84145 PROCALCITONIN (PCT): CPT

## 2024-11-03 PROCEDURE — 80053 COMPREHEN METABOLIC PANEL: CPT

## 2024-11-03 PROCEDURE — 6370000000 HC RX 637 (ALT 250 FOR IP): Performed by: INTERNAL MEDICINE

## 2024-11-03 PROCEDURE — 85025 COMPLETE CBC W/AUTO DIFF WBC: CPT

## 2024-11-03 PROCEDURE — 36415 COLL VENOUS BLD VENIPUNCTURE: CPT

## 2024-11-03 PROCEDURE — 83735 ASSAY OF MAGNESIUM: CPT

## 2024-11-03 PROCEDURE — 6370000000 HC RX 637 (ALT 250 FOR IP): Performed by: NURSE PRACTITIONER

## 2024-11-03 RX ORDER — POTASSIUM CHLORIDE 1500 MG/1
40 TABLET, EXTENDED RELEASE ORAL ONCE
Status: COMPLETED | OUTPATIENT
Start: 2024-11-03 | End: 2024-11-03

## 2024-11-03 RX ADMIN — FUROSEMIDE 40 MG: 40 TABLET ORAL at 09:12

## 2024-11-03 RX ADMIN — SPIRONOLACTONE 25 MG: 25 TABLET ORAL at 09:12

## 2024-11-03 RX ADMIN — POTASSIUM CHLORIDE 40 MEQ: 1500 TABLET, EXTENDED RELEASE ORAL at 09:12

## 2024-11-03 RX ADMIN — Medication: at 15:01

## 2024-11-03 RX ADMIN — CEPHALEXIN 500 MG: 500 CAPSULE ORAL at 14:55

## 2024-11-03 RX ADMIN — METOLAZONE 2.5 MG: 2.5 TABLET ORAL at 09:12

## 2024-11-03 RX ADMIN — ANTACID TABLETS 500 MG: 500 TABLET, CHEWABLE ORAL at 12:56

## 2024-11-03 RX ADMIN — PAROXETINE HYDROCHLORIDE 20 MG: 20 TABLET, FILM COATED ORAL at 09:13

## 2024-11-03 RX ADMIN — ENOXAPARIN SODIUM 30 MG: 100 INJECTION SUBCUTANEOUS at 09:13

## 2024-11-03 RX ADMIN — ASPIRIN 81 MG 81 MG: 81 TABLET ORAL at 09:13

## 2024-11-03 RX ADMIN — Medication: at 05:57

## 2024-11-03 RX ADMIN — AMMONIUM LACTATE: 17 LOTION TOPICAL at 09:19

## 2024-11-03 RX ADMIN — CEPHALEXIN 500 MG: 500 CAPSULE ORAL at 09:13

## 2024-11-03 RX ADMIN — Medication 1 TABLET: at 09:13

## 2024-11-03 RX ADMIN — Medication 10 ML: at 09:20

## 2024-11-03 RX ADMIN — LORAZEPAM 0.25 MG: 0.5 TABLET ORAL at 09:11

## 2024-11-03 RX ADMIN — CARVEDILOL 12.5 MG: 12.5 TABLET, FILM COATED ORAL at 09:12

## 2024-11-03 NOTE — PLAN OF CARE
Problem: Discharge Planning  Goal: Discharge to home or other facility with appropriate resources  11/3/2024 1413 by Jazz Yusuf RN  Outcome: Progressing  Flowsheets (Taken 11/3/2024 0755)  Discharge to home or other facility with appropriate resources: Identify barriers to discharge with patient and caregiver  11/3/2024 0203 by Khai Hernandez RN  Outcome: Progressing     Problem: Pain  Goal: Verbalizes/displays adequate comfort level or baseline comfort level  11/3/2024 1413 by Jazz Yusuf RN  Outcome: Progressing  11/3/2024 0203 by Khai Hernandez RN  Outcome: Progressing     Problem: Safety - Adult  Goal: Free from fall injury  11/3/2024 1413 by Jazz Yusuf RN  Outcome: Progressing  11/3/2024 0203 by Khai Hernandez RN  Outcome: Progressing     Problem: ABCDS Injury Assessment  Goal: Absence of physical injury  11/3/2024 1413 by Jazz Yusuf RN  Outcome: Progressing  11/3/2024 0203 by Khai Hernandez RN  Outcome: Progressing     Problem: Skin/Tissue Integrity  Goal: Absence of new skin breakdown  Description: 1.  Monitor for areas of redness and/or skin breakdown  2.  Assess vascular access sites hourly  3.  Every 4-6 hours minimum:  Change oxygen saturation probe site  4.  Every 4-6 hours:  If on nasal continuous positive airway pressure, respiratory therapy assess nares and determine need for appliance change or resting period.  11/3/2024 1413 by Jazz Yusuf RN  Outcome: Progressing  11/3/2024 0203 by Khai Hernandez RN  Outcome: Progressing

## 2024-11-03 NOTE — DISCHARGE INSTR - COC
tingling of both legs R20.0, R20.2    Primary osteoarthritis of right knee M17.11    Temporomandibular joint disorder M26.609    Spinal stenosis of lumbar region with neurogenic claudication M48.062    Morbid obesity due to excess calories E66.01    Impaired mobility and activities of daily living Z74.09, Z78.9    Hypokalemia E87.6    Anemia D64.9       Isolation/Infection:   Isolation            No Isolation          Patient Infection Status       None to display                     Nurse Assessment:  Last Vital Signs: /60   Pulse 81   Temp 98.1 °F (36.7 °C) (Oral)   Resp 18   Ht 1.651 m (5' 5\")   Wt 108.3 kg (238 lb 12.8 oz)   SpO2 100%   BMI 39.74 kg/m²     Last documented pain score (0-10 scale): Pain Level: 1  Last Weight:   Wt Readings from Last 1 Encounters:   11/01/24 108.3 kg (238 lb 12.8 oz)     Mental Status:  A&O x4     IV Access:  - None    Nursing Mobility/ADLs:  Walking   Assisted  Transfer  Assisted  Bathing  Assisted  Dressing  Assisted  Toileting  Assisted  Feeding  Assisted  Med Admin  Assisted  Med Delivery   whole    Wound Care Documentation and Therapy:  Wound 09/06/24 Pretibial Left multiple open blisters and scabs with scratches to BLE; Lymphedema to BLE; Opening below L great toe (Active)   Number of days: 57       Wound 09/06/24 Pretibial Right multiple open blisters and scabs with scratches to BLE; Lymphedema to BLE; Opening below L great toe (Active)   Number of days: 57        Elimination:  Continence:   Bowel: Yes  Bladder: Yes  Urinary Catheter: None   Colostomy/Ileostomy/Ileal Conduit: No       Date of Last BM: 11/2/2024    Intake/Output Summary (Last 24 hours) at 11/3/2024 1416  Last data filed at 11/3/2024 1415  Gross per 24 hour   Intake 880 ml   Output --   Net 880 ml     I/O last 3 completed shifts:  In: 840 [P.O.:840]  Out: 800 [Urine:800]    Safety Concerns:     At Risk for Falls    Impairments/Disabilities:      None    Nutrition Therapy:  Current Nutrition

## 2024-11-04 NOTE — PROGRESS NOTES
Shift assessments complete, see flowsheets. Pt alert and oriented x 4. Medication administered per MAR, VSS. Pt able to make needs and wants known. Pt requested to speak with house supervisor. Fariba house supervisor notified that pt would like to speak to her, and she stated that someone will speak to pt in the morning. Pt made aware, no further needs verbalized at this time call light left within reach.               
10:00Patient re-educated on all current medications, Patient stated she does not feel like she can go home today.  updated.      10:15 - patient up to bedside commode, x1 assist with walker and had large BM    10:20- Patient requesting purewick.  Patient educated on need of purewick, patient is continent of BM & URINE. A&O x4 and able to transfer with stand guard assist, also able to walk with stand guard assist.     10:45- Patient had \"accident\" in bed but refused to get up to bedside commode\"   This nurse and PCA provided full bed bath, wash up and bed change and stand guard assist as patient transferred to bed side commode.     12:20- patient continues to be unhappy about having to get up to use bed side commode instead of purewick.    1:30- Tristan, Nursing supervisor called to assist in educating patient. Patient states \" she uses restroom at home by herself, clean herself up but is not able to void on bed side commode\"  PT re-educated on purewick need, s/sx of a UTI.    PT also stated at this time she had a piece of food stuck in her throat, in which she did successfully cough up. Spo2 96% on RA, lung sounds diminished, Respiratory on floor and assessed patient with this nurse.      2:30- patient asked to speak to - social workers came to floor and spoke with patient.     3:30-   Dr. Wakefield updated on resident feeling wheezy and stating she has \" Long clear strands of mucus\"  - New order for TUMS.        4:00- Tx done per order, patient stated \"maybe I aspirated\" Dr. Wakefield updated. Resident is persistently trying to clear throat and spitting up clear phlegm.        5:00 Tristan, Supervisor called to the floor for more complaints from patient.     5:10- New order for portable CXR, patient aware.     5:15- Tristan, supervisor called to patients room again.   
1130: Alert and oriented x4. Repositioned. Mediated per MAR for R knee discomfort. Pt states she feels stronger today than yesterday. PerfectServe earlier to Dr. Wakefield regarding AM labs-- see new orders. Bed alarm on. Call light within reach.     1500: Pt ambulated out of room into sanchez with walker and standby. Pt states she is confident in ambulation at home with her own rollator/walker with wheels. Tolerated ambulation well. Dr. Wakefield notified on floor. D/c in place.     1800: Pt refusing d/c, stating she was told she can stay until tomorrow to \"get stronger.\" Pt was informed for hours she will be d/c. Per pt, was given refusal paper by case management. Paper was signed by pt, but did not notify staff and had document folded in purse at bedside. Dr. Wakefield PerfectServed regarding following events. Per Dr. Corral-- \"I guess she'll just go home tomorrow.\" Dressing to LLE changed per order. Continues to refuse BLE ace wrap.     Electronically signed by Sydney Centeno RN on 11/1/2024 at 11:34 AM    
1230: Alert and oriented x4, calm and cooperative. Heavy 2 assist to BSC/chair. No complaints of pain, pt only reports feeling fatigued today. Safety maintained. Call light within reach. Bed alarm on.     1430: Pt to chair at this time with no complaints at this time. Tolerating well. Pt reports wheeze-- request Dr. Wakefield make aware as she was hospitalized for wheeze in February of this year. Dr. Wakefield made aware on floor.     1750: Dressing to LLE changed per order-- pt tolerated well.     Electronically signed by Sydney Centeno RN on 10/31/2024 at 12:32 PM    
[facility] OCCUPATIONAL THERAPY EVALUATION - ACUTE     NAME: Arlyn Bhagat  : 1945 (78 y.o.)  MRN: [id number]  CODE STATUS: Full Code  [facility]    Date of Service: 10/30/2024    Patient Diagnosis(es): UTI (urinary tract infection) [N39.0]  General weakness [R53.1]  Cellulitis of right foot [L03.115]  Acute cystitis without hematuria [N30.00]   Patient Active Problem List    Diagnosis Date Noted    Heart block 2020    Enlarged tonsils 2023    UTI (urinary tract infection) 10/29/2024    Thyroid nodule 2021    Pre-diabetes 2021    Gastroesophageal reflux disease without esophagitis 2021    SALAS (dyspnea on exertion) 2020    Lymphedema 2020    Symptomatic bradycardia 2020    Glenoid fracture of shoulder, left, closed, initial encounter 2019    Anterior dislocation of left shoulder 2019    Orthostasis 10/28/2019    Abscess or cellulitis of wrist 2012    MRSA (methicillin resistant Staphylococcus aureus) infection 2012        Past Medical History:   Diagnosis Date    Asthma     Degenerative disc disease, lumbar     Hypertension     Lymphedema     MRSA infection     UTI (lower urinary tract infection)      Past Surgical History:   Procedure Laterality Date    APPENDECTOMY      CARDIAC PACEMAKER PLACEMENT  2020    PACEMAKER PLACEMENT  2020    SALPINGO-OOPHORECTOMY      left side     SHOULDER SURGERY      TONSILLECTOMY      WRIST SURGERY          Restrictions  Restrictions/Precautions: Fall Risk (clinical judgment)              Safety Devices: Safety Devices  Type of Devices: Bed alarm in place;Call light within reach;Nurse notified;Left in bed     Patient's date of birth confirmed: Yes    General:       Subjective  Subjective: \"I think I could use some help with laundry and groceries.\"  2/10 shoulder ache    Pain at start of treatment: Yes: 2/10    Pain at end of treatment: Yes: 2/10    Location: L 
Physical Therapy  Facility/Department: [facility] SUR[facility] W476/W476-01  Physical Therapy Discharge      NAME: Arlyn Bhagat    : 1945 (78 y.o.)  MRN: [id number]    Account: [id number]  Gender: female      Patient has been discharged from acute care hospital. DC patient from current PT program.      Electronically signed by Sheridan Gregory PT on 24 at 4:29 PM EST    
Physical Therapy Med Surg Daily Treatment Note  Facility/Department: [address] MED SURG UNIT  Room: [facility] [address]       NAME: Arlyn Bhagat  : 1945 (78 y.o.)  MRN: [id number]  CODE STATUS: Full Code    Date of Service: 2024    Patient Diagnosis(es): UTI (urinary tract infection) [N39.0]  General weakness [R53.1]  Cellulitis of right foot [L03.115]  Acute cystitis without hematuria [N30.00]   Chief Complaint   Patient presents with    Urinary Frequency     Feels weak and frequent urination   H/O UTI      Patient Active Problem List    Diagnosis Date Noted    Heart block 2020    Enlarged tonsils 2023    UTI (urinary tract infection) 10/29/2024    Thyroid nodule 2021    Pre-diabetes 2021    Gastroesophageal reflux disease without esophagitis 2021    SALAS (dyspnea on exertion) 2020    Lymphedema 2020    Symptomatic bradycardia 2020    Glenoid fracture of shoulder, left, closed, initial encounter 2019    Anterior dislocation of left shoulder 2019    Orthostasis 10/28/2019    Abscess or cellulitis of wrist 2012    MRSA (methicillin resistant Staphylococcus aureus) infection 2012        Past Medical History:   Diagnosis Date    Asthma     Degenerative disc disease, lumbar     Hypertension     Lymphedema     MRSA infection     UTI (lower urinary tract infection)      Past Surgical History:   Procedure Laterality Date    APPENDECTOMY      CARDIAC PACEMAKER PLACEMENT  2020    PACEMAKER PLACEMENT  2020    SALPINGO-OOPHORECTOMY      left side     SHOULDER SURGERY      TONSILLECTOMY      WRIST SURGERY              Restrictions:  Restrictions/Precautions: Fall Risk    SUBJECTIVE:   Subjective: I didn't sleep much last night.  I'm so happy that I could do better today.    Pain  Pain: 0/10 pre and post session.    OBJECTIVE:        Bed mobility  Rolling to Left: Modified independent  Rolling to Right: Modified independent  Supine to 
Physical Therapy Med Surg Daily Treatment Note  Facility/Department: [address] MED SURG UNIT  Room: [place] [address]       NAME: Arlyn Bhagat  : 1945 (78 y.o.)  MRN: [id number]  CODE STATUS: Full Code    Date of Service: 2024    Patient Diagnosis(es): UTI (urinary tract infection) [N39.0]  General weakness [R53.1]  Cellulitis of right foot [L03.115]  Acute cystitis without hematuria [N30.00]   Chief Complaint   Patient presents with    Urinary Frequency     Feels weak and frequent urination   H/O UTI      Patient Active Problem List    Diagnosis Date Noted    Heart block 2020    Enlarged tonsils 2023    Temporomandibular joint disorder 2024    Morbid obesity due to excess calories 2024    Impaired mobility and activities of daily living 2024    Hypokalemia 2024    Anemia 2024    UTI (urinary tract infection) 10/29/2024    Depression, unspecified 2024    Thyroid nodule 2021    Pre-diabetes 2021    Gastroesophageal reflux disease without esophagitis 2021    SALAS (dyspnea on exertion) 2020    Lymphedema 2020    Symptomatic bradycardia 2020    Glenoid fracture of shoulder, left, closed, initial encounter 2019    Anterior dislocation of left shoulder 2019    Orthostasis 10/28/2019    Acquired spondylolisthesis 2018    Numbness and tingling of both legs 10/20/2017    Primary osteoarthritis of right knee 10/20/2017    Spinal stenosis of lumbar region with neurogenic claudication 10/20/2017    Lumbar radiculopathy 2015    Abscess or cellulitis of wrist 2012    MRSA (methicillin resistant Staphylococcus aureus) infection 2012    Dysphagia 2006    Chronic otitis externa 2005        Past Medical History:   Diagnosis Date    Asthma     Degenerative disc disease, lumbar     Hypertension     Lumbosacral disc disease     Lymphedema     MRSA infection     UTI (lower urinary tract infection) 
Physical Therapy Med Surg Initial Assessment  Facility/Department: [address] MED SURG UNIT  Room: [place] [address]       NAME: Arlyn Bhagat  : 1945 (78 y.o.)  MRN: [id number]  CODE STATUS: Full Code    Date of Service: 10/31/2024    Patient Diagnosis(es): UTI (urinary tract infection) [N39.0]  General weakness [R53.1]  Cellulitis of right foot [L03.115]  Acute cystitis without hematuria [N30.00]   Chief Complaint   Patient presents with    Urinary Frequency     Feels weak and frequent urination   H/O UTI      Patient Active Problem List    Diagnosis Date Noted    Heart block 2020    Enlarged tonsils 2023    UTI (urinary tract infection) 10/29/2024    Thyroid nodule 2021    Pre-diabetes 2021    Gastroesophageal reflux disease without esophagitis 2021    SALAS (dyspnea on exertion) 2020    Lymphedema 2020    Symptomatic bradycardia 2020    Glenoid fracture of shoulder, left, closed, initial encounter 2019    Anterior dislocation of left shoulder 2019    Orthostasis 10/28/2019    Abscess or cellulitis of wrist 2012    MRSA (methicillin resistant Staphylococcus aureus) infection 2012        Past Medical History:   Diagnosis Date    Asthma     Degenerative disc disease, lumbar     Hypertension     Lymphedema     MRSA infection     UTI (lower urinary tract infection)      Past Surgical History:   Procedure Laterality Date    APPENDECTOMY      CARDIAC PACEMAKER PLACEMENT  2020    PACEMAKER PLACEMENT  2020    SALPINGO-OOPHORECTOMY      left side     SHOULDER SURGERY      TONSILLECTOMY      WRIST SURGERY         Chart Reviewed: Yes  Family / Caregiver Present: No    Restrictions:  Restrictions/Precautions: Fall Risk     SUBJECTIVE:        Pain  Pain: 3/10 R Leg pain pre and post session - nursing aware of pain level    Prior Level of Function:  Social/Functional History  Lives With: Alone  Type of Home: House  Home Layout: Two level, 
Physician Progress Note    10/30/2024   4:09 PM    Name:  Arlyn Bhagat  MRN:    [id number]     IP Day: 1     Admit Date: 10/29/2024  5:07 PM  PCP: Francis Mills MD    Code Status:  Full Code    Assessment and Plan:        1.  Weakness related to UTI and chronic lymphedema  -Treat UTI  -Supportive care for chronic lymphedema  -PT/OT.  May require short-term placement.  Otherwise patient is medically stable    Hypertension  Class III obesity  Anxiety on chronic benzodiazepine  History of complete heart block with dual-chamber pacemaker     Diet: ADULT DIET; Regular  Ppx: lovenox  Full Code    Electronically signed by Ramírez Wakefield DO on 10/30/2024 at 4:09 PM    Subjective:     Overall feels weak and nervous about going home    Current Facility-Administered Medications   Medication Dose Route Frequency Provider Last Rate Last Admin    cefTRIAXone (ROCEPHIN) 1,000 mg in sodium chloride 0.9 % 50 mL IVPB (mini-bag)  1,000 mg IntraVENous Q24H Ramírez Wakefield DO   Stopped at 10/30/24 1031    LORazepam (ATIVAN) tablet 0.25 mg  0.25 mg Oral Daily Ramírez Wakefield DO        LORazepam (ATIVAN) tablet 0.5 mg  0.5 mg Oral Nightly Ramírez Wakefield DO        sodium chloride flush 0.9 % injection 5-40 mL  5-40 mL IntraVENous 2 times per day Venkateshzaabelardo-Shelly Romero APRN - CNP   10 mL at 10/30/24 1000    sodium chloride flush 0.9 % injection 5-40 mL  5-40 mL IntraVENous PRN Bolzan-RocheShelly APRN - CNP        0.9 % sodium chloride infusion   IntraVENous PRN Bolzan-RocheShelly APRN - CNP        potassium chloride (KLOR-CON M) extended release tablet 40 mEq  40 mEq Oral PRN Bolzan-Roche Nicosoni APRN - CNP        Or    potassium bicarb-citric acid (EFFER-K) effervescent tablet 40 mEq  40 mEq Oral PRN Bolzan-Roche Nicosoni APRN - CNP        Or    potassium chloride 10 mEq/100 mL IVPB (Peripheral Line)  10 mEq IntraVENous PRN Bolzan-RocheShelly APRN - CNP        magnesium sulfate 2000 mg in 50 mL IVPB 
Physician Progress Note    10/31/2024   4:41 PM    Name:  Arlyn Bhagat  MRN:    [id number]     IP Day: 2     Admit Date: 10/29/2024  5:07 PM  PCP: Francis Mills MD    Code Status:  Full Code    Assessment and Plan:        1.  Weakness related to UTI and chronic lymphedema  -Treat UTI-noted culture shows GNR  -Supportive care for chronic lymphedema  -PT/OT.  Rehab evaluation pending.  Otherwise medically stable    Hypertension  Class III obesity  Anxiety on chronic benzodiazepine  History of complete heart block with dual-chamber pacemaker     Diet: ADULT DIET; Regular; No Added Salt (3-4 gm)  Ppx: lovenox  Full Code    Electronically signed by Ramírez Wakefield DO on 10/31/2024 at 4:41 PM    Subjective:     She thinks she had wheezing earlier.  She is also sad because she was told they are not sure if her insurance will cover rehab    Current Facility-Administered Medications   Medication Dose Route Frequency Provider Last Rate Last Admin    cefTRIAXone (ROCEPHIN) 1,000 mg in sodium chloride 0.9 % 50 mL IVPB (mini-bag)  1,000 mg IntraVENous Q24H Ramírez Wakefield DO   Stopped at 10/31/24 1232    LORazepam (ATIVAN) tablet 0.25 mg  0.25 mg Oral Daily Ramírez Wakefield DO   0.25 mg at 10/31/24 1027    LORazepam (ATIVAN) tablet 0.5 mg  0.5 mg Oral Nightly Ramírez Wakefield DO   0.5 mg at 10/30/24 2106    sodium chloride flush 0.9 % injection 5-40 mL  5-40 mL IntraVENous 2 times per day Shelly Unger APRN - CNP   10 mL at 10/30/24 2109    sodium chloride flush 0.9 % injection 5-40 mL  5-40 mL IntraVENous PRN Shelly Unger APRN - CNP        0.9 % sodium chloride infusion   IntraVENous PRN Shelly Unger APRN - CNP        magnesium sulfate 2000 mg in 50 mL IVPB premix  2,000 mg IntraVENous PRN Bolzan-Shelly Romero APRN - CNP        enoxaparin Sodium (LOVENOX) injection 30 mg  30 mg SubCUTAneous BID Shelly Unger APRN - CNP   30 mg at 10/31/24 1029    ondansetron 
Physician Progress Note    11/3/2024   1:48 PM    Name:  Arlyn Bhagat  MRN:    [id number]      Day: 5     Admit Date: 10/29/2024  5:07 PM  PCP: Francis Mills MD    Code Status:  Full Code    Assessment and Plan:        1.  Weakness related to pansensitive E. coli UTI and chronic lymphedema  -7-day course of antibiotics.  Transitioned to oral Keflex  -Supportive care for chronic lymphedema  -Added spironolactone to aid in management of hypertension, hypokalemia, lymphedema  -She ambulated well with nursing and had advertised to me she wanted to go home 11/1.  She later told nursing she did not feel comfortable leaving.  11/2, she again says she does not feel comfortable leaving.  She tells me she will do her best to move around today and plan on leaving 11/2. She says she feels comfortable going home today at 4PM    2.  Behavioral issues:  -see 11/2 note    Hypertension  Class III obesity  Anxiety on chronic benzodiazepine  History of complete heart block with dual-chamber pacemaker     Diet: ADULT DIET; Regular; No Added Salt (3-4 gm)  Ppx: lovenox  Full Code    Electronically signed by Ramírez Wakefield DO on 11/3/2024 at 1:48 PM    Subjective:     Moving adequately. Wants to go home today    Current Facility-Administered Medications   Medication Dose Route Frequency Provider Last Rate Last Admin    calcium carbonate (TUMS) chewable tablet 500 mg  500 mg Oral TID PRN Ramírez Wakefield DO   500 mg at 11/03/24 1256    spironolactone (ALDACTONE) tablet 25 mg  25 mg Oral Daily Ramírez Wakefield DO   25 mg at 11/03/24 0912    cephALEXin (KEFLEX) capsule 500 mg  500 mg Oral TID Ramírez Wakefield DO   500 mg at 11/03/24 0913    ammonium lactate (LAC-HYDRIN) 12 % lotion   Topical TID Ramírez Wakefield DO   Given at 11/03/24 0919    nystatin, stomahesive in petrolatum (ET MIX)   Topical 3 times per day Gisella Levi, DO   Given at 11/03/24 0557    melatonin disintegrating tablet 5 mg  5 mg Oral Nightly 
Pt asking for Ativan. Pt explains that she takes half tablet every morning and whole tablet at bedtime.  Secure message sent to hospitalist, order received for Ativan 0.25 q morning and 0.5mg at bedtime.  Pt updated.   
Pt assessment completed this AM.  A&O x4.   Lungs clear bilat on RA.  Pt urinating using external catheter without difficulty.  Pt observed ambulating in room using walker with therapy.  Pt does report some BLE weakness.  Wound care consulted r/t pts BLE lymphedema/blisters/open areas.  Scant amount of dry drainage noted on the bed sheet.  BLE red, dry and flaky.  IV antibiotics given per order.  Pt tolerating regular diet.  Pt currently resting in bed, safety maintained.    
Pt insisted on removing both ace wraps on her legs. Dressing on left leg is still intact.  
Tele removed per order  
Wound Ostomy Continence Nurse  Consult Note       NAME:  Arlyn Bhagat  MEDICAL RECORD NUMBER:  [id number]  AGE: 78 y.o.   GENDER: female  : 1945  TODAY'S DATE:  10/30/2024    Subjective   Reason for WOC Nurse Evaluation and Assessment: BLE, lymphedema      Arlyn Bhagat is a 78 y.o. female referred by:   [] Physician  [x] Nursing  [] Other:     Wound Identification:  Wound Type: lymphedema  Contributing Factors: edema, venous stasis, and lymphedema    Wound History: Patient admitted to [facility] on 10/29/24 with lymphedema to BLE. LLE with intact fluid filled blister to distal anterior foot and superficial dry wounds.   Current Wound Care Treatment:  Recommending 1) skin barrier film to intact blister 2) xeroform to dry superficial open wounds 3) daily ACE wraps for light comprssion from base of toes to base of knees    Patient Goal of Care:  [x] Wound Healing  [] Odor Control  [] Palliative Care  [] Pain Control   [] Other:         PAST MEDICAL HISTORY        Diagnosis Date    Asthma     Degenerative disc disease, lumbar     Hypertension     Lymphedema     MRSA infection     UTI (lower urinary tract infection)        PAST SURGICAL HISTORY    Past Surgical History:   Procedure Laterality Date    APPENDECTOMY      CARDIAC PACEMAKER PLACEMENT  2020    PACEMAKER PLACEMENT  2020    SALPINGO-OOPHORECTOMY      left side     SHOULDER SURGERY      TONSILLECTOMY      WRIST SURGERY         FAMILY HISTORY    No family history on file.    SOCIAL HISTORY    Social History     Tobacco Use    Smoking status: Never    Smokeless tobacco: Never   Vaping Use    Vaping status: Never Used   Substance Use Topics    Alcohol use: Yes     Comment: Rarely    Drug use: No       ALLERGIES    Allergies   Allergen Reactions    Ciprofloxacin      Per patient request due to bad side effects     Metronidazole     Tetracycline        MEDICATIONS    No current facility-administered medications on file prior to 
ammonium lactate (LAC-HYDRIN) 12 % lotion   Topical TID Ramírez Wakefield, DO   Given at 11/02/24 1622    nystatin, stomahesive in petrolatum (ET MIX)   Topical 3 times per day Gisella Levi, DO   Given at 11/02/24 1623    melatonin disintegrating tablet 5 mg  5 mg Oral Nightly Jennifern-Shelly Romero APRN - CNP   5 mg at 11/01/24 2259    LORazepam (ATIVAN) tablet 0.25 mg  0.25 mg Oral Daily Ramírez Wakefield DO   0.25 mg at 11/02/24 0937    LORazepam (ATIVAN) tablet 0.5 mg  0.5 mg Oral Nightly Ramírez Wakefield DO   0.5 mg at 11/01/24 2156    sodium chloride flush 0.9 % injection 5-40 mL  5-40 mL IntraVENous 2 times per day Keenan-Shelly Romero APRN - CNP   10 mL at 11/02/24 0942    sodium chloride flush 0.9 % injection 5-40 mL  5-40 mL IntraVENous PRN Jennifern-Roche, Nicosoni APRN - CNP        0.9 % sodium chloride infusion   IntraVENous PRN Keenan-Shelly Romero APRN - CNP        magnesium sulfate 2000 mg in 50 mL IVPB premix  2,000 mg IntraVENous PRN Jenniefrn-Shelly Romero APRN - CNP        enoxaparin Sodium (LOVENOX) injection 30 mg  30 mg SubCUTAneous BID Shelly Unger APRN - CNP   30 mg at 11/02/24 0946    ondansetron (ZOFRAN-ODT) disintegrating tablet 4 mg  4 mg Oral Q8H PRN Jennifern-Shelly Romero APRN - CNP        Or    ondansetron (ZOFRAN) injection 4 mg  4 mg IntraVENous Q6H PRN Jennifern-Shelly Romero APRN - CNP   4 mg at 11/02/24 1443    polyethylene glycol (GLYCOLAX) packet 17 g  17 g Oral Daily PRN Jennifern-Shelly Romero APRN - CNP        acetaminophen (TYLENOL) tablet 650 mg  650 mg Oral Q6H PRN Jennifern-Shelly Romero APRN - CNP   650 mg at 11/02/24 1620    Or    acetaminophen (TYLENOL) suppository 650 mg  650 mg Rectal Q6H PRN Shelly Unger APRN - CNP        amitriptyline (ELAVIL) tablet 25 mg  25 mg Oral Nightly Shelly Unger APRN - CNP   25 mg at 11/01/24 2156    aspirin chewable tablet 81 mg  81 mg Oral Daily Shelly Unger APRN - CNP

## 2024-11-04 NOTE — CARE COORDINATION
AFTER REVIEWING DR PEREZ NOTE, PT WILL NOT QUALIFY FOR [facility].  SPEAKING TO PT SHE PREFERS TO GO HOME WITH [facility].      CALL TO SOFIA FROM [facility], THEY ARE AWARE OF POTENTIAL DC OVER THE WEEKEND.  VO GIVEN FOR PAM.    
CALL FROM ELIZABETH AT REHAB, PT IS NOT A CANDIDATE.  CALL TO PT, [facility], BUT WOULD LIKE MORE TIME TO THINK ABOUT THIS.  LIST PROVIDED TO ROOM.    
CALL FROM SUPERVISOR, PT REQUESTING LSW. CM AND LSW WENT TO SPEAK WITH PATIENT. SHE STATED SHE THREW UP TWICE AND WAS REALLY PROUD OF HER THERAPY OUTCOME. PT DECLINED SNF AND PLANS TO DC HOME TOMORROW WITH [facility]. PER RN, MD APPROVED FOR DC TOMORROW.   
CALL TO [facility] CONFIRMING IF THEY ARE GOING TO TRY TO TAKE PT.  PT IS TELLING STAFF SHE'S GOING HOME.  PENDING CALL BACK.    
Case Management Assessment  Initial Evaluation    Date/Time of Evaluation: 10/30/2024 12:24 PM  Assessment Completed by: Yashira Coronado    If patient is discharged prior to next notation, then this note serves as note for discharge by case management.    Patient Name: Arlyn Bhagat                   YOB: 1945  Diagnosis: UTI (urinary tract infection) [N39.0]  General weakness [R53.1]  Cellulitis of right foot [L03.115]  Acute cystitis without hematuria [N30.00]                   Date / Time: 10/29/2024  5:07 PM    Patient Admission Status: Inpatient   Readmission Risk (Low < 19, Mod (19-27), High > 27): Readmission Risk Score: 17.6    Current PCP: Francis Mills MD  PCP verified by CM? Yes    Chart Reviewed: Yes      History Provided by: Patient  Patient Orientation: Alert and Oriented, Person, Place, Situation, Self    Patient Cognition: Alert    Hospitalization in the last 30 days (Readmission):  No    If yes, Readmission Assessment in CM Navigator will be completed.    Advance Directives:      Code Status: Full Code   Patient's Primary Decision Maker is: Named in Scanned ACP Document      Discharge Planning:    Patient lives with: Alone Type of Home: House  Primary Care Giver: Self  Patient Support Systems include: Yarsanism/Huma Community, Friends/Neighbors   Current Financial resources:    Current community resources:    Current services prior to admission: None            Current DME:              Type of Home Care services:  None    ADLS  Prior functional level: Assistance with the following:, Mobility, Shopping, Housework  Current functional level: Assistance with the following:, Housework, Shopping, Mobility    PT AM-PAC:   /24  OT AM-PAC: 16 /24    Family can provide assistance at DC: Yes  Would you like Case Management to discuss the discharge plan with any other family members/significant others, and if so, who? Yes  Plans to Return to Present Housing: Yes  Other Identified 
DC PLAN REMAINS HOME WITH [facility]. DC SUMMARY FAXED.   
DC PLAN REMAINS HOME WITH [facility] ONCE MEDICALLY CLEARED.   
Inpatient Rehab referral received and met with patient to discuss further. Patient tearful and crying throughout entire encounter. Discussed with patient her therapy evaluations and her interest in [facility] acute rehab. Patient reports she wants to stay here because it's close to her home. I did advise that it could likely be very hard to get insurance to approve the request as she was admitted with Dx of UTI. I explained insurance auth at length and I am skeptical of the chances of approval with her insurance. Patient reports she has been \"couped\" up in her home for the last month. She was recently hospitalized for UTI and went home with [facility] ([facility]). She uses cane/ rollator to assist her in her mobility and has a ramped entrance into her one level home. Her social support is limited. She does have people who help her out such as bringing her groceries but does not have consistent support. She reports to having a friend Mariya, however has not been able to reach her in a few weeks, a brother in [place] whom she reports she does not have the best relationship with, Goddaughter in [place] who is not able to bring patient her requested items and her sister lives in /. Her  passed away in 2006 and son passed away in 2013. I expressed to patient referral will be reviewed with PM&R physician to determine appropriateness of attempting precert for IRF.  Electronically signed by Avril Harris on 10/31/2024 at 4:14 PM    Discussed with patient Rehab program and requirements, including 3 hours of intense therapy daily, anticipated length of stay, weekly team meetings and  and goal of discharge to home. Emotional support provided to patient throughout encounter.  Electronically signed by Avril Harris on 10/31/2024 at 4:17 PM    
RECEIVED CALL FROM PT INFORMING SHE IS TRYING TO REACH [facility] WITHOUT SUCCESS. CALLED KEVIN FROM [facility] AND SHE SAID SHE IS UNAWARE OF PT TRYING TO CALL. SHE WILL REACH OUT TO PT. ALSO PROVIDED PRIMARY CONTACT'S PHONE NUMBERS.   
at baseline. Assist to don/francisco Couch for ambulation. (10/30/24 1126)  Toileting: Moderate assistance (10/30/24 1126)  Functional Mobility: Stand by assistance (10/30/24 1126)  Functional Mobility Skilled Clinical Factors: w/ RW; increased time (10/30/24 1126)  Additional Comments: ADLs simulated as above except where specified. Pt is limited by generalized weakness, decreased endurance, and decreased standing balance. (10/30/24 1126)  Toilet Transfers  Toilet Transfers Comments: not assessed- anticipate SBA/Min A (10/30/24 1128)            Speech Language Pathology n/t           Diet/Swallow:   Regular; no added salt            Rehab evaluation plan: Recommend SNF  Rehabilitation Impairment Group Code:

## 2024-11-28 PROBLEM — N39.0 UTI (URINARY TRACT INFECTION): Status: RESOLVED | Noted: 2024-10-29 | Resolved: 2024-11-28

## 2024-12-02 DIAGNOSIS — E87.6 HYPOKALEMIA: ICD-10-CM

## 2024-12-02 RX ORDER — POTASSIUM CHLORIDE 1500 MG/1
100 TABLET, EXTENDED RELEASE ORAL DAILY
Qty: 150 TABLET | Refills: 3 | Status: SHIPPED | OUTPATIENT
Start: 2024-12-02

## 2024-12-02 NOTE — TELEPHONE ENCOUNTER
Requesting medication refill. Please approve or deny this request.    Rx requested:  Requested Prescriptions     Pending Prescriptions Disp Refills    potassium chloride (KLOR-CON M) 20 MEQ extended release tablet 150 tablet 3     Sig: Take 5 tablets by mouth daily         Last Office Visit:   3/4/2024      Next Visit Date:  No future appointments.

## 2024-12-19 ENCOUNTER — HOSPITAL ENCOUNTER (OUTPATIENT)
Dept: CARDIOLOGY | Age: 79
Discharge: HOME | End: 2024-12-19
Payer: MEDICARE

## 2024-12-19 PROCEDURE — 93296 REM INTERROG EVL PM/IDS: CPT

## 2025-03-27 ENCOUNTER — HOSPITAL ENCOUNTER (OUTPATIENT)
Dept: CARDIOLOGY | Age: 80
Discharge: HOME | End: 2025-03-27
Payer: MEDICARE

## 2025-03-27 DIAGNOSIS — Z95.0 PACEMAKER: Primary | ICD-10-CM

## 2025-03-27 PROCEDURE — 93296 REM INTERROG EVL PM/IDS: CPT

## 2025-05-01 DIAGNOSIS — E87.6 HYPOKALEMIA: ICD-10-CM

## 2025-05-01 RX ORDER — POTASSIUM CHLORIDE 1500 MG/1
100 TABLET, EXTENDED RELEASE ORAL DAILY
Qty: 150 TABLET | Refills: 0 | Status: SHIPPED | OUTPATIENT
Start: 2025-05-01

## 2025-05-01 NOTE — TELEPHONE ENCOUNTER
Requesting medication refill. Please approve or deny this request.    Rx requested:  Requested Prescriptions     Pending Prescriptions Disp Refills    potassium chloride (KLOR-CON M) 20 MEQ extended release tablet 150 tablet 3     Sig: Take 5 tablets by mouth daily         Last Office Visit:   3/4/2024      Next Visit Date:  Future Appointments   Date Time Provider Department Center   7/24/2025 11:30 AM [facility]                Please approve or deny.

## 2025-05-14 NOTE — TELEPHONE ENCOUNTER
Requesting medication refill. Please approve or deny this request.    Rx requested:  Requested Prescriptions     Pending Prescriptions Disp Refills    carvedilol (COREG) 12.5 MG tablet 180 tablet 2     Sig: Take 1 tablet by mouth 2 times daily         Last Office Visit:   3/4/2024      Next Visit Date:  Future Appointments   Date Time Provider Department Center   7/24/2025 11:30 AM [facility]

## 2025-05-15 RX ORDER — CARVEDILOL 12.5 MG/1
12.5 TABLET ORAL 2 TIMES DAILY
Qty: 60 TABLET | Refills: 0 | Status: SHIPPED | OUTPATIENT
Start: 2025-05-15

## 2025-05-15 RX ORDER — CARVEDILOL 12.5 MG/1
12.5 TABLET ORAL 2 TIMES DAILY
Qty: 180 TABLET | Refills: 2 | Status: SHIPPED | OUTPATIENT
Start: 2025-05-15

## 2025-05-15 NOTE — TELEPHONE ENCOUNTER
Requesting medication refill. Please approve or deny this request.    Rx requested:  Requested Prescriptions     Pending Prescriptions Disp Refills    carvedilol (COREG) 12.5 MG tablet [Pharmacy Med Name: carvedilol 12.5 mg tablet] 180 tablet 2     Sig: Take 1 tablet by mouth 2 times daily         Last Office Visit:   3/4/2024      Next Visit Date:  Future Appointments   Date Time Provider Department Center   7/24/2025 11:30 AM [facility]               Last refill 7/25/24. Please approve or deny.

## 2025-05-28 DIAGNOSIS — E87.6 HYPOKALEMIA: ICD-10-CM

## 2025-05-28 RX ORDER — POTASSIUM CHLORIDE 1500 MG/1
100 TABLET, EXTENDED RELEASE ORAL DAILY
Qty: 150 TABLET | Refills: 0 | OUTPATIENT
Start: 2025-05-28

## 2025-05-28 NOTE — TELEPHONE ENCOUNTER
Requesting medication refill. Please approve or deny this request.    Rx requested:  Requested Prescriptions     Pending Prescriptions Disp Refills    potassium chloride (KLOR-CON M) 20 MEQ extended release tablet 150 tablet 0     Sig: Take 5 tablets by mouth daily         Last Office Visit:   3/4/2024      Next Visit Date:  Future Appointments   Date Time Provider Department Center   7/24/2025 11:30 AM [facility]

## 2025-05-29 DIAGNOSIS — E87.6 HYPOKALEMIA: ICD-10-CM

## 2025-05-29 RX ORDER — POTASSIUM CHLORIDE 1500 MG/1
100 TABLET, EXTENDED RELEASE ORAL DAILY
Qty: 150 TABLET | Refills: 0 | OUTPATIENT
Start: 2025-05-29

## 2025-05-29 NOTE — TELEPHONE ENCOUNTER
30 day refill given 05/01/2025. Patient needs follow up with Dr. Savage.   Attempted to call patient to schedule appointment, no answer, voicemail left for patient to call back. No refills given.    Requesting medication refill. Please approve or deny this request.    Rx requested:  Requested Prescriptions     Refused Prescriptions Disp Refills    potassium chloride (KLOR-CON M) 20 MEQ extended release tablet [Pharmacy Med Name: potassium chloride ER 20 mEq tablet,extended release(part/cryst)] 150 tablet 0     Sig: Take 5 tablets by mouth daily         Last Office Visit:   3/4/2024      Next Visit Date:  Future Appointments   Date Time Provider Department Center   7/24/2025 11:30 AM [facility]

## 2025-05-29 NOTE — TELEPHONE ENCOUNTER
PT called back and is aware only a 30 day supply was sent in. Pt did not want to schedule a appt. She said she wants to hold off for now on a appt

## 2025-05-30 DIAGNOSIS — E87.6 HYPOKALEMIA: ICD-10-CM

## 2025-05-30 RX ORDER — POTASSIUM CHLORIDE 1500 MG/1
100 TABLET, EXTENDED RELEASE ORAL DAILY
Qty: 150 TABLET | Refills: 1 | Status: SHIPPED | OUTPATIENT
Start: 2025-05-30

## 2025-05-30 NOTE — TELEPHONE ENCOUNTER
PATIENT OUT AND REQUESTING REFILLS TO HOLD OVER UNTIL APPOINTMENT    Requesting medication refill. Please approve or deny this request.    Rx requested:  Requested Prescriptions     Pending Prescriptions Disp Refills    potassium chloride (KLOR-CON M) 20 MEQ extended release tablet 150 tablet 1     Sig: Take 5 tablets by mouth daily         Last Office Visit:   3/4/2024      Next Visit Date:  Future Appointments   Date Time Provider Department Center   7/21/2025  1:30 PM Valentín Savage MD [facility]   7/24/2025 11:30 AM [facility]             Last refill . Please approve or deny.

## 2025-06-02 ENCOUNTER — HOSPITAL ENCOUNTER (EMERGENCY)
Age: 80
Discharge: HOME | End: 2025-06-02
Attending: EMERGENCY MEDICINE
Payer: MEDICARE

## 2025-06-02 VITALS
HEIGHT: 65 IN | TEMPERATURE: 98.7 F | WEIGHT: 254.5 LBS | RESPIRATION RATE: 17 BRPM | OXYGEN SATURATION: 99 % | HEART RATE: 79 BPM | DIASTOLIC BLOOD PRESSURE: 61 MMHG | BODY MASS INDEX: 42.4 KG/M2 | SYSTOLIC BLOOD PRESSURE: 133 MMHG

## 2025-06-02 DIAGNOSIS — R30.0 DYSURIA: ICD-10-CM

## 2025-06-02 DIAGNOSIS — I89.0 CHRONIC ACQUIRED LYMPHEDEMA: Primary | ICD-10-CM

## 2025-06-02 LAB
ALBUMIN SERPL-MCNC: 3.5 G/DL (ref 3.5–4.6)
ALP SERPL-CCNC: 97 U/L (ref 40–130)
ALT SERPL-CCNC: 15 U/L (ref 0–33)
ANION GAP SERPL CALCULATED.3IONS-SCNC: 13 MEQ/L (ref 9–15)
AST SERPL-CCNC: 20 U/L (ref 0–35)
BACTERIA URNS QL MICRO: ABNORMAL /HPF
BASOPHILS # BLD: 0 K/UL (ref 0–0.2)
BASOPHILS NFR BLD: 0.3 %
BILIRUB SERPL-MCNC: 0.3 MG/DL (ref 0.2–0.7)
BILIRUB UR QL STRIP: NEGATIVE
BNP BLD-MCNC: 440 PG/ML
BUN SERPL-MCNC: 13 MG/DL (ref 8–23)
CALCIUM SERPL-MCNC: 9 MG/DL (ref 8.5–9.9)
CHLORIDE SERPL-SCNC: 95 MEQ/L (ref 95–107)
CLARITY UR: CLEAR
CO2 SERPL-SCNC: 31 MEQ/L (ref 20–31)
COLOR UR: YELLOW
CREAT SERPL-MCNC: 0.73 MG/DL (ref 0.5–0.9)
EOSINOPHIL # BLD: 0.2 K/UL (ref 0–0.7)
EOSINOPHIL NFR BLD: 1.7 %
EPI CELLS #/AREA URNS AUTO: ABNORMAL /HPF (ref 0–5)
ERYTHROCYTE [DISTWIDTH] IN BLOOD BY AUTOMATED COUNT: 14.6 % (ref 11.5–14.5)
GLOBULIN SER CALC-MCNC: 3.4 G/DL (ref 2.3–3.5)
GLUCOSE SERPL-MCNC: 89 MG/DL (ref 70–99)
GLUCOSE UR STRIP-MCNC: NEGATIVE MG/DL
HCT VFR BLD AUTO: 35.6 % (ref 37–47)
HGB BLD-MCNC: 11.6 G/DL (ref 12–16)
HGB UR QL STRIP: NEGATIVE
HYALINE CASTS #/AREA URNS AUTO: ABNORMAL /HPF (ref 0–5)
KETONES UR STRIP-MCNC: NEGATIVE MG/DL
LEUKOCYTE ESTERASE UR QL STRIP: ABNORMAL
LYMPHOCYTES # BLD: 3 K/UL (ref 1–4.8)
LYMPHOCYTES NFR BLD: 32.7 %
MCH RBC QN AUTO: 29 PG (ref 27–31.3)
MCHC RBC AUTO-ENTMCNC: 32.6 % (ref 33–37)
MCV RBC AUTO: 89 FL (ref 79.4–94.8)
MONOCYTES # BLD: 0.8 K/UL (ref 0.2–0.8)
MONOCYTES NFR BLD: 9 %
NEUTROPHILS # BLD: 5.2 K/UL (ref 1.4–6.5)
NEUTS SEG NFR BLD: 56.1 %
NITRITE UR QL STRIP: POSITIVE
PH UR STRIP: 7 [PH] (ref 5–9)
PLATELET # BLD AUTO: 427 K/UL (ref 130–400)
POTASSIUM SERPL-SCNC: 3.1 MEQ/L (ref 3.4–4.9)
PROCALCITONIN SERPL IA-MCNC: 0.04 NG/ML (ref 0–0.15)
PROT SERPL-MCNC: 6.9 G/DL (ref 6.3–8)
PROT UR STRIP-MCNC: NEGATIVE MG/DL
RBC # BLD AUTO: 4 M/UL (ref 4.2–5.4)
RBC #/AREA URNS AUTO: ABNORMAL /HPF (ref 0–5)
SODIUM SERPL-SCNC: 139 MEQ/L (ref 135–144)
SP GR UR STRIP: 1.01 (ref 1–1.03)
URINE REFLEX TO CULTURE: ABNORMAL
UROBILINOGEN UR STRIP-ACNC: 0.2 E.U./DL
WBC # BLD AUTO: 9.3 K/UL (ref 4.8–10.8)
WBC #/AREA URNS AUTO: ABNORMAL /HPF (ref 0–5)

## 2025-06-02 PROCEDURE — 84145 PROCALCITONIN (PCT): CPT

## 2025-06-02 PROCEDURE — 99284 EMERGENCY DEPT VISIT MOD MDM: CPT

## 2025-06-02 PROCEDURE — 6370000000 HC RX 637 (ALT 250 FOR IP): Performed by: EMERGENCY MEDICINE

## 2025-06-02 PROCEDURE — 85025 COMPLETE CBC W/AUTO DIFF WBC: CPT

## 2025-06-02 PROCEDURE — 80053 COMPREHEN METABOLIC PANEL: CPT

## 2025-06-02 PROCEDURE — 83880 ASSAY OF NATRIURETIC PEPTIDE: CPT

## 2025-06-02 PROCEDURE — 36415 COLL VENOUS BLD VENIPUNCTURE: CPT

## 2025-06-02 PROCEDURE — 93005 ELECTROCARDIOGRAM TRACING: CPT | Performed by: EMERGENCY MEDICINE

## 2025-06-02 PROCEDURE — 81001 URINALYSIS AUTO W/SCOPE: CPT

## 2025-06-02 PROCEDURE — 87040 BLOOD CULTURE FOR BACTERIA: CPT

## 2025-06-02 RX ORDER — CEPHALEXIN 500 MG/1
500 CAPSULE ORAL 2 TIMES DAILY
Qty: 14 CAPSULE | Refills: 0 | Status: SHIPPED | OUTPATIENT
Start: 2025-06-02 | End: 2025-06-09

## 2025-06-02 RX ORDER — CEPHALEXIN 500 MG/1
500 CAPSULE ORAL ONCE
Status: COMPLETED | OUTPATIENT
Start: 2025-06-02 | End: 2025-06-02

## 2025-06-02 RX ADMIN — CEPHALEXIN 500 MG: 500 CAPSULE ORAL at 19:49

## 2025-06-02 ASSESSMENT — ENCOUNTER SYMPTOMS
SHORTNESS OF BREATH: 0
ABDOMINAL PAIN: 0
EYE PAIN: 0
VOMITING: 0
COLOR CHANGE: 1
SORE THROAT: 0
CHEST TIGHTNESS: 0
NAUSEA: 0

## 2025-06-02 ASSESSMENT — PAIN - FUNCTIONAL ASSESSMENT: PAIN_FUNCTIONAL_ASSESSMENT: 0-10

## 2025-06-02 ASSESSMENT — PAIN SCALES - GENERAL: PAINLEVEL_OUTOF10: 3

## 2025-06-02 ASSESSMENT — PAIN DESCRIPTION - LOCATION: LOCATION: HEAD

## 2025-06-02 NOTE — DISCHARGE INSTRUCTIONS
See your doctor if not feeling better    Call wound care to follow-up regarding your leg swelling    Take your antibiotics as prescribed and to completion.    Return emergency department for chest pain shortness of breath worsening redness or pain coughing up blood abdominal pain nausea vomiting lightheadedness fevers chills body aches or other worsening symptoms or concerns

## 2025-06-02 NOTE — ED TRIAGE NOTES
Pt BIB EMS from home. Pt states she is having UTI symptoms, worsening burning while peeing. Pt also complaining of worsening lymphedema and a sore on left foot.

## 2025-06-03 LAB
EKG ATRIAL RATE: 84 BPM
EKG DIAGNOSIS: NORMAL
EKG P AXIS: 40 DEGREES
EKG P-R INTERVAL: 154 MS
EKG Q-T INTERVAL: 436 MS
EKG QRS DURATION: 180 MS
EKG QTC CALCULATION (BAZETT): 515 MS
EKG R AXIS: -82 DEGREES
EKG T AXIS: 85 DEGREES
EKG VENTRICULAR RATE: 84 BPM

## 2025-06-03 NOTE — ED NOTES
Pt stating she does not feel comfortable going home and she thinks she has cellulitis. Dr. Navarro at bedside speaking with pt

## 2025-06-03 NOTE — ED PROVIDER NOTES
Patient signed out pending transport back home  Patient wanted to discuss her leg swelling and redness, did not appear as obvious cellulitis but I informed her that started initially on Keflex prophylactically will also cover any for urinary symptoms although not obviously infected, she did take her first dose, I did write for the prescription outpatient to her pharmacy of choosing  Additionally advised her to continue to elevate her legs and to follow-up with wound care regarding her lymphedema and to wear compressive stockings.  Offered to do Ace wrap's however she was concerned about being able to get her feet back in her shoes as they already pretty tight.  At this point patient felt comfortable going home, all questions answered and return precautions verbalized understanding    Diagnosis: Chronic lymphedema  Dysuria    DISPOSITION Decision To Discharge 06/02/2025 08:58:32 PM         Freddie Navarro DO  Emergency Medicine  06/03/25 12:55 AM       Freddie Navarro DO  06/03/25 0055    
intact.      Conjunctiva/sclera: Conjunctivae normal.      Pupils: Pupils are equal, round, and reactive to light.   Neck:      Thyroid: No thyromegaly.      Vascular: No JVD.      Trachea: No tracheal deviation.   Cardiovascular:      Rate and Rhythm: Normal rate and regular rhythm.      Heart sounds: Normal heart sounds. No murmur heard.  Pulmonary:      Effort: Pulmonary effort is normal. No respiratory distress.      Breath sounds: Normal breath sounds. No wheezing.   Abdominal:      General: Bowel sounds are normal.      Palpations: Abdomen is soft.      Tenderness: There is no abdominal tenderness. There is no guarding.   Musculoskeletal:         General: Normal range of motion.      Cervical back: Normal range of motion and neck supple.      Right lower leg: No edema.      Left lower leg: No edema.      Comments: Legs with severe lymphedema bilaterally.  Multiple red lesions over the dorsum of the legs.  Dorsum of feet bilaterally mildly erythematous.  Not warm to touch   Skin:     General: Skin is warm and dry.      Findings: No rash.   Neurological:      Mental Status: She is alert and oriented to person, place, and time.      Cranial Nerves: No cranial nerve deficit.   Psychiatric:         Behavior: Behavior normal.         DIAGNOSTIC RESULTS     EKG: All EKG's are interpreted by the Emergency Department Physician who either signs or Co-signs this chart in the absence of a cardiologist.    Atrial sensed ventricular paced rhythm 84 bpm    RADIOLOGY:   Non-plain film images such as CT, Ultrasound and MRI are read by the radiologist. Plain radiographic images are visualized and preliminarily interpreted by the emergency physician with the below findings:        Interpretation per the Radiologist below, if available at the time of this note:    No orders to display         ED BEDSIDE ULTRASOUND:   Performed by ED Physician - none    LABS:  Labs Reviewed   URINALYSIS WITH REFLEX TO CULTURE - Abnormal; Notable

## 2025-06-07 LAB
BACTERIA BLD CULT ORG #2: NORMAL
BACTERIA BLD CULT: NORMAL

## 2025-06-27 ENCOUNTER — APPOINTMENT (OUTPATIENT)
Dept: CT IMAGING | Age: 80
DRG: 554 | End: 2025-06-27
Payer: MEDICARE

## 2025-06-27 ENCOUNTER — APPOINTMENT (OUTPATIENT)
Dept: GENERAL RADIOLOGY | Age: 80
DRG: 554 | End: 2025-06-27
Payer: MEDICARE

## 2025-06-27 ENCOUNTER — HOSPITAL ENCOUNTER (INPATIENT)
Age: 80
LOS: 7 days | Discharge: HOME | DRG: 554 | End: 2025-07-04
Attending: INTERNAL MEDICINE | Admitting: INTERNAL MEDICINE
Payer: MEDICARE

## 2025-06-27 DIAGNOSIS — R53.1 GENERALIZED WEAKNESS: ICD-10-CM

## 2025-06-27 DIAGNOSIS — I50.9 CONGESTIVE HEART FAILURE, UNSPECIFIED HF CHRONICITY, UNSPECIFIED HEART FAILURE TYPE (HCC): ICD-10-CM

## 2025-06-27 DIAGNOSIS — M25.562 ARTHRALGIA OF KNEE, LEFT: ICD-10-CM

## 2025-06-27 DIAGNOSIS — N39.0 ACUTE UTI: Primary | ICD-10-CM

## 2025-06-27 DIAGNOSIS — I87.2 VENOUS STASIS DERMATITIS OF BOTH LOWER EXTREMITIES: ICD-10-CM

## 2025-06-27 DIAGNOSIS — I89.0 LYMPHEDEMA: ICD-10-CM

## 2025-06-27 LAB
ALBUMIN SERPL-MCNC: 3.8 G/DL (ref 3.5–4.6)
ALP SERPL-CCNC: 101 U/L (ref 40–130)
ALT SERPL-CCNC: 11 U/L (ref 0–33)
ANION GAP SERPL CALCULATED.3IONS-SCNC: 11 MEQ/L (ref 9–15)
AST SERPL-CCNC: 17 U/L (ref 0–35)
BACTERIA URNS QL MICRO: ABNORMAL /HPF
BASOPHILS # BLD: 0 K/UL (ref 0–0.2)
BASOPHILS NFR BLD: 0.2 %
BILIRUB SERPL-MCNC: 0.4 MG/DL (ref 0.2–0.7)
BILIRUB UR QL STRIP: NEGATIVE
BUN SERPL-MCNC: 13 MG/DL (ref 8–23)
CALCIUM SERPL-MCNC: 9.1 MG/DL (ref 8.5–9.9)
CHLORIDE SERPL-SCNC: 95 MEQ/L (ref 95–107)
CLARITY UR: CLEAR
CO2 SERPL-SCNC: 33 MEQ/L (ref 20–31)
COLOR UR: YELLOW
CREAT SERPL-MCNC: 0.65 MG/DL (ref 0.5–0.9)
EOSINOPHIL # BLD: 0.2 K/UL (ref 0–0.7)
EOSINOPHIL NFR BLD: 2.5 %
EPI CELLS #/AREA URNS AUTO: ABNORMAL /HPF (ref 0–5)
ERYTHROCYTE [DISTWIDTH] IN BLOOD BY AUTOMATED COUNT: 14.2 % (ref 11.5–14.5)
GLOBULIN SER CALC-MCNC: 3 G/DL (ref 2.3–3.5)
GLUCOSE SERPL-MCNC: 112 MG/DL (ref 70–99)
GLUCOSE UR STRIP-MCNC: NEGATIVE MG/DL
HCT VFR BLD AUTO: 36.6 % (ref 37–47)
HGB BLD-MCNC: 11.8 G/DL (ref 12–16)
HGB UR QL STRIP: NEGATIVE
HYALINE CASTS #/AREA URNS AUTO: ABNORMAL /HPF (ref 0–5)
KETONES UR STRIP-MCNC: NEGATIVE MG/DL
LEUKOCYTE ESTERASE UR QL STRIP: ABNORMAL
LYMPHOCYTES # BLD: 2.4 K/UL (ref 1–4.8)
LYMPHOCYTES NFR BLD: 29.4 %
MAGNESIUM SERPL-MCNC: 1.9 MG/DL (ref 1.7–2.4)
MCH RBC QN AUTO: 28.8 PG (ref 27–31.3)
MCHC RBC AUTO-ENTMCNC: 32.2 % (ref 33–37)
MCV RBC AUTO: 89.3 FL (ref 79.4–94.8)
MONOCYTES # BLD: 0.7 K/UL (ref 0.2–0.8)
MONOCYTES NFR BLD: 8.8 %
NEUTROPHILS # BLD: 4.8 K/UL (ref 1.4–6.5)
NEUTS SEG NFR BLD: 58.9 %
NITRITE UR QL STRIP: NEGATIVE
PH UR STRIP: 7 [PH] (ref 5–9)
PLATELET # BLD AUTO: 429 K/UL (ref 130–400)
POTASSIUM SERPL-SCNC: 3.4 MEQ/L (ref 3.4–4.9)
PROT SERPL-MCNC: 6.8 G/DL (ref 6.3–8)
PROT UR STRIP-MCNC: NEGATIVE MG/DL
RBC # BLD AUTO: 4.1 M/UL (ref 4.2–5.4)
RBC #/AREA URNS AUTO: ABNORMAL /HPF (ref 0–5)
SODIUM SERPL-SCNC: 139 MEQ/L (ref 135–144)
SP GR UR STRIP: 1.01 (ref 1–1.03)
URINE REFLEX TO CULTURE: ABNORMAL
UROBILINOGEN UR STRIP-ACNC: 0.2 E.U./DL
WBC # BLD AUTO: 8.1 K/UL (ref 4.8–10.8)
WBC #/AREA URNS AUTO: ABNORMAL /HPF (ref 0–5)

## 2025-06-27 PROCEDURE — 1210000000 HC MED SURG R&B

## 2025-06-27 PROCEDURE — 99285 EMERGENCY DEPT VISIT HI MDM: CPT

## 2025-06-27 PROCEDURE — 80053 COMPREHEN METABOLIC PANEL: CPT

## 2025-06-27 PROCEDURE — 96374 THER/PROPH/DIAG INJ IV PUSH: CPT

## 2025-06-27 PROCEDURE — 73560 X-RAY EXAM OF KNEE 1 OR 2: CPT

## 2025-06-27 PROCEDURE — 85025 COMPLETE CBC W/AUTO DIFF WBC: CPT

## 2025-06-27 PROCEDURE — G0378 HOSPITAL OBSERVATION PER HR: HCPCS

## 2025-06-27 PROCEDURE — 2500000003 HC RX 250 WO HCPCS: Performed by: STUDENT IN AN ORGANIZED HEALTH CARE EDUCATION/TRAINING PROGRAM

## 2025-06-27 PROCEDURE — 83735 ASSAY OF MAGNESIUM: CPT

## 2025-06-27 PROCEDURE — 81003 URINALYSIS AUTO W/O SCOPE: CPT

## 2025-06-27 PROCEDURE — 73630 X-RAY EXAM OF FOOT: CPT

## 2025-06-27 PROCEDURE — 6360000002 HC RX W HCPCS: Performed by: STUDENT IN AN ORGANIZED HEALTH CARE EDUCATION/TRAINING PROGRAM

## 2025-06-27 PROCEDURE — 36415 COLL VENOUS BLD VENIPUNCTURE: CPT

## 2025-06-27 PROCEDURE — 74176 CT ABD & PELVIS W/O CONTRAST: CPT

## 2025-06-27 PROCEDURE — 6370000000 HC RX 637 (ALT 250 FOR IP): Performed by: INTERNAL MEDICINE

## 2025-06-27 RX ORDER — ONDANSETRON 2 MG/ML
4 INJECTION INTRAMUSCULAR; INTRAVENOUS EVERY 6 HOURS PRN
Status: DISCONTINUED | OUTPATIENT
Start: 2025-06-27 | End: 2025-07-04 | Stop reason: HOSPADM

## 2025-06-27 RX ORDER — ACETAMINOPHEN 650 MG/1
650 SUPPOSITORY RECTAL EVERY 6 HOURS PRN
Status: DISCONTINUED | OUTPATIENT
Start: 2025-06-27 | End: 2025-07-04 | Stop reason: HOSPADM

## 2025-06-27 RX ORDER — LORAZEPAM 0.5 MG/1
0.25 TABLET ORAL EVERY MORNING
Status: DISCONTINUED | OUTPATIENT
Start: 2025-06-28 | End: 2025-07-04 | Stop reason: HOSPADM

## 2025-06-27 RX ORDER — LANOLIN ALCOHOL/MO/W.PET/CERES
400 CREAM (GRAM) TOPICAL DAILY
Status: DISCONTINUED | OUTPATIENT
Start: 2025-06-28 | End: 2025-07-04 | Stop reason: HOSPADM

## 2025-06-27 RX ORDER — ASPIRIN 81 MG/1
81 TABLET ORAL DAILY
Status: DISCONTINUED | OUTPATIENT
Start: 2025-06-28 | End: 2025-07-04 | Stop reason: HOSPADM

## 2025-06-27 RX ORDER — KETOROLAC TROMETHAMINE 15 MG/ML
15 INJECTION, SOLUTION INTRAMUSCULAR; INTRAVENOUS ONCE
Status: DISCONTINUED | OUTPATIENT
Start: 2025-06-27 | End: 2025-06-27 | Stop reason: HOSPADM

## 2025-06-27 RX ORDER — ENOXAPARIN SODIUM 100 MG/ML
40 INJECTION SUBCUTANEOUS DAILY
Status: DISCONTINUED | OUTPATIENT
Start: 2025-06-28 | End: 2025-06-30

## 2025-06-27 RX ORDER — PAROXETINE 20 MG/1
20 TABLET, FILM COATED ORAL DAILY
Status: DISCONTINUED | OUTPATIENT
Start: 2025-06-28 | End: 2025-07-04 | Stop reason: HOSPADM

## 2025-06-27 RX ORDER — MELOXICAM 7.5 MG/1
7.5 TABLET ORAL DAILY
Qty: 10 TABLET | Refills: 0 | Status: SHIPPED | OUTPATIENT
Start: 2025-06-27 | End: 2025-07-07

## 2025-06-27 RX ORDER — SODIUM PHOSPHATE, DIBASIC AND SODIUM PHOSPHATE, MONOBASIC 7; 19 G/230ML; G/230ML
1 ENEMA RECTAL DAILY PRN
Status: DISCONTINUED | OUTPATIENT
Start: 2025-06-27 | End: 2025-07-04 | Stop reason: HOSPADM

## 2025-06-27 RX ORDER — POTASSIUM CHLORIDE 1500 MG/1
100 TABLET, EXTENDED RELEASE ORAL DAILY
Status: DISCONTINUED | OUTPATIENT
Start: 2025-06-28 | End: 2025-07-04 | Stop reason: HOSPADM

## 2025-06-27 RX ORDER — POTASSIUM CHLORIDE 1500 MG/1
40 TABLET, EXTENDED RELEASE ORAL PRN
Status: DISCONTINUED | OUTPATIENT
Start: 2025-06-27 | End: 2025-07-04 | Stop reason: HOSPADM

## 2025-06-27 RX ORDER — CARVEDILOL 12.5 MG/1
12.5 TABLET ORAL 2 TIMES DAILY
Status: DISCONTINUED | OUTPATIENT
Start: 2025-06-27 | End: 2025-07-04 | Stop reason: HOSPADM

## 2025-06-27 RX ORDER — M-VIT,TX,IRON,MINS/CALC/FOLIC 27MG-0.4MG
1 TABLET ORAL DAILY
Status: DISCONTINUED | OUTPATIENT
Start: 2025-06-28 | End: 2025-07-04 | Stop reason: HOSPADM

## 2025-06-27 RX ORDER — SODIUM CHLORIDE 0.9 % (FLUSH) 0.9 %
5-40 SYRINGE (ML) INJECTION EVERY 12 HOURS SCHEDULED
Status: DISCONTINUED | OUTPATIENT
Start: 2025-06-27 | End: 2025-07-04 | Stop reason: HOSPADM

## 2025-06-27 RX ORDER — ACETAMINOPHEN 500 MG
1000 TABLET ORAL ONCE
Status: COMPLETED | OUTPATIENT
Start: 2025-06-27 | End: 2025-06-27

## 2025-06-27 RX ORDER — ONDANSETRON 4 MG/1
4 TABLET, ORALLY DISINTEGRATING ORAL EVERY 8 HOURS PRN
Status: DISCONTINUED | OUTPATIENT
Start: 2025-06-27 | End: 2025-07-04 | Stop reason: HOSPADM

## 2025-06-27 RX ORDER — POTASSIUM CHLORIDE 7.45 MG/ML
10 INJECTION INTRAVENOUS PRN
Status: DISCONTINUED | OUTPATIENT
Start: 2025-06-27 | End: 2025-07-04 | Stop reason: HOSPADM

## 2025-06-27 RX ORDER — LORAZEPAM 0.5 MG/1
0.5 TABLET ORAL NIGHTLY
Status: DISCONTINUED | OUTPATIENT
Start: 2025-06-27 | End: 2025-07-04 | Stop reason: HOSPADM

## 2025-06-27 RX ORDER — SODIUM CHLORIDE 9 MG/ML
INJECTION, SOLUTION INTRAVENOUS PRN
Status: DISCONTINUED | OUTPATIENT
Start: 2025-06-27 | End: 2025-07-04 | Stop reason: HOSPADM

## 2025-06-27 RX ORDER — SODIUM CHLORIDE 0.9 % (FLUSH) 0.9 %
5-40 SYRINGE (ML) INJECTION PRN
Status: DISCONTINUED | OUTPATIENT
Start: 2025-06-27 | End: 2025-07-04 | Stop reason: HOSPADM

## 2025-06-27 RX ORDER — MAGNESIUM SULFATE IN WATER 40 MG/ML
2000 INJECTION, SOLUTION INTRAVENOUS PRN
Status: DISCONTINUED | OUTPATIENT
Start: 2025-06-27 | End: 2025-07-04 | Stop reason: HOSPADM

## 2025-06-27 RX ORDER — ACETAMINOPHEN 325 MG/1
650 TABLET ORAL EVERY 6 HOURS PRN
Status: DISCONTINUED | OUTPATIENT
Start: 2025-06-27 | End: 2025-07-04 | Stop reason: HOSPADM

## 2025-06-27 RX ORDER — METOLAZONE 2.5 MG/1
2.5 TABLET ORAL DAILY
Status: DISCONTINUED | OUTPATIENT
Start: 2025-06-28 | End: 2025-07-04 | Stop reason: HOSPADM

## 2025-06-27 RX ORDER — FUROSEMIDE 10 MG/ML
40 INJECTION INTRAMUSCULAR; INTRAVENOUS 2 TIMES DAILY
Status: DISCONTINUED | OUTPATIENT
Start: 2025-06-28 | End: 2025-06-29

## 2025-06-27 RX ADMIN — WATER 1000 MG: 1 INJECTION INTRAMUSCULAR; INTRAVENOUS; SUBCUTANEOUS at 20:22

## 2025-06-27 RX ADMIN — ACETAMINOPHEN 1000 MG: 500 TABLET ORAL at 18:32

## 2025-06-27 ASSESSMENT — PAIN DESCRIPTION - ORIENTATION: ORIENTATION: LEFT

## 2025-06-27 ASSESSMENT — PAIN DESCRIPTION - DESCRIPTORS: DESCRIPTORS: ACHING;CRAMPING

## 2025-06-27 ASSESSMENT — LIFESTYLE VARIABLES
HOW MANY STANDARD DRINKS CONTAINING ALCOHOL DO YOU HAVE ON A TYPICAL DAY: PATIENT DOES NOT DRINK
HOW OFTEN DO YOU HAVE A DRINK CONTAINING ALCOHOL: NEVER
HOW OFTEN DO YOU HAVE A DRINK CONTAINING ALCOHOL: NEVER

## 2025-06-27 ASSESSMENT — PAIN DESCRIPTION - LOCATION
LOCATION: KNEE
LOCATION: BACK;ABDOMEN

## 2025-06-27 ASSESSMENT — PAIN SCALES - GENERAL
PAINLEVEL_OUTOF10: 0
PAINLEVEL_OUTOF10: 3
PAINLEVEL_OUTOF10: 3

## 2025-06-27 ASSESSMENT — PAIN DESCRIPTION - PAIN TYPE: TYPE: ACUTE PAIN

## 2025-06-27 ASSESSMENT — PAIN - FUNCTIONAL ASSESSMENT
PAIN_FUNCTIONAL_ASSESSMENT: 0-10
PAIN_FUNCTIONAL_ASSESSMENT: PREVENTS OR INTERFERES SOME ACTIVE ACTIVITIES AND ADLS

## 2025-06-27 NOTE — ED TRIAGE NOTES
Arrived with report of left knee not being able to bend all the way   Also reports frequent urination and burning  Reports pressure over left kidney   Reports pain 3/10   Pt has bilateral leg swelling and untreated wounds, pt states she has been putting neosporin on the left foot wound

## 2025-06-27 NOTE — ED PROVIDER NOTES
Basic Information   Time Seen: 3:06 PM   Primary Care Provider: Francis Mills MD     Chief Complaint   Patient presents with    Knee Pain    Urinary Frequency      HPI   Arlyn Bhagat is a 79 yrs female who presents with left knee pain and urinary frequency.  Patient states that she knows that she has arthritis in both knees however the knee has been bothering her more recently.  She also endorses increased swelling to bilateral legs.  Wound was noted in triage.  Patient states that she has been putting Neosporin on it at home.  She states she has an appointment on Monday for this as well.  She took all her morning medications.  She is not on blood thinners.  She denies calf pain.  She denies fever or chills   Physical Exam     /69 (06/27/25 1501)    Temp 98.1 °F (36.7 °C) (06/27/25 1458)    Pulse 70 (06/27/25 1458)   Resp 20 (06/27/25 1458)    SpO2 94 % (06/27/25 1458)       General: Awake and Alert, no acute distress   CV: RRR, S1, S2   Resp: LCTAB, even and non labored   Other:   Impression and Plan     Labs Reviewed   CBC WITH AUTO DIFFERENTIAL   COMPREHENSIVE METABOLIC PANEL   MAGNESIUM   URINALYSIS WITH REFLEX TO CULTURE        XR KNEE LEFT (3 VIEWS)    (Results Pending)   XR FOOT LEFT (MIN 3 VIEWS)    (Results Pending)      Final Impression   I have performed a medical screening exam on Arlyn Bhagat. Based on this patient's chief complaint/symptoms of   Chief Complaint   Patient presents with    Knee Pain    Urinary Frequency    and my focused exam, their care will be started and transitioned to provider when room is available       Fadi Goodman PA-C  06/27/25 8869

## 2025-06-27 NOTE — ED PROVIDER NOTES
and DIFFERENTIAL DIAGNOSIS/MDM:   Vitals:    Vitals:    06/27/25 1800 06/27/25 2144 06/28/25 0030 06/28/25 0031   BP: (!) 159/98 (!) 117/52 (!) 112/54    Pulse: 74 73 73 80   Resp: 18 20 18    Temp:  98 °F (36.7 °C) 97.9 °F (36.6 °C)    TempSrc:  Oral     SpO2: 95% 96% (!) 85% 99%   Weight:  109.6 kg (241 lb 11.2 oz)     Height:  1.651 m (5' 5\")             Medical Decision Making  Differential diagnosis: osteoarthritis, septic arthritis, UTI, ureteral colic, pyelonephritis, renal stone, other    Assess patient for knee pain and left flank pain as well as dysuria.  Patient notes dysuria has been present for multiple days.  Patient also having left knee pain for multiple days.  X-ray showing significant arthritis.  No significant erythema and patient is able to have range of motion of the left knee although it is decreased.  Septic arthritis considered less likely.  Exam most consistent with osteoarthritis in the knee.  Neuro vas intact distally.  Patient does have significant lymphedema.  Patient states she uses a rollator at home.  Patient also noted to have dysuria.  Patient does have findings consistent with UTI.  Patient notes having left flank pain.  CT scan negative.  Went to reassess patient discussed outpatient treatment.  Patient indicates that she is concerned because she sits on the couch at home because she cannot get upstairs and intermittently needs to call for EMS to get her off the couch.  I have discussed concern the patient is a fall risk and may need to return to ED if she cannot ambulate at home.  Will perform ambulatory trial in the ED.    Patient care transferred oncoming physician    Amount and/or Complexity of Data Reviewed  Radiology: ordered.    Risk  OTC drugs.  Prescription drug management.  Decision regarding hospitalization.            REASSESSMENT     ED Course as of 06/28/25 0726 Fri Jun 27, 2025 1924 Updated patient with findings.  Patient notes she has difficulty ambulating.   Will perform ambulatory trial [JA]      ED Course User Index  [JA] Zeyad Ruiz DO         CRITICAL CARE TIME   None    CONSULTS:  IP CONSULT TO CASE MANAGEMENT  IP CONSULT TO SPIRITUAL SERVICES    PROCEDURES:  Unless otherwise noted below, none     Procedures      FINAL IMPRESSION      1. Acute UTI    2. Arthralgia of knee, left    3. Lymphedema    4. Generalized weakness    5. Congestive heart failure, unspecified HF chronicity, unspecified heart failure type (HCC)          DISPOSITION/PLAN   DISPOSITION Admitted 06/27/2025 09:43:04 PM      PATIENT REFERRED TO:  Francis Mills MD  [facility] [id number] [phone]    Call in 1 day  Acute UTI/knee pain, if symptoms worsen or having other concerns please return to emergency department      DISCHARGE MEDICATIONS:  Current Discharge Medication List        START taking these medications    Details   meloxicam (MOBIC) 7.5 MG tablet Take 1 tablet by mouth daily for 10 days  Qty: 10 tablet, Refills: 0      cephALEXin (KEFLEX) 250 MG capsule Take 2 capsules by mouth 3 times daily for 10 days  Qty: 60 capsule, Refills: 0           Controlled Substances Monitoring:          No data to display                (Please note that portions of this note were completed with a voice recognition program.  Efforts were made to edit the dictations but occasionally words are mis-transcribed.)    Zeyad Ruiz DO (electronically signed)  Attending Emergency Physician    Supervising Attending Physician: Dr. Ruiz.     Zeyad Ruiz DO  06/28/25 0270

## 2025-06-28 ENCOUNTER — APPOINTMENT (OUTPATIENT)
Age: 80
DRG: 554 | End: 2025-06-28
Attending: INTERNAL MEDICINE
Payer: MEDICARE

## 2025-06-28 LAB
ALBUMIN SERPL-MCNC: 3.6 G/DL (ref 3.5–4.6)
ALP SERPL-CCNC: 90 U/L (ref 40–130)
ALT SERPL-CCNC: 12 U/L (ref 0–33)
ANION GAP SERPL CALCULATED.3IONS-SCNC: 9 MEQ/L (ref 9–15)
AST SERPL-CCNC: 41 U/L (ref 0–35)
BASOPHILS # BLD: 0 K/UL (ref 0–0.2)
BASOPHILS NFR BLD: 0.2 %
BILIRUB SERPL-MCNC: 0.3 MG/DL (ref 0.2–0.7)
BUN SERPL-MCNC: 13 MG/DL (ref 8–23)
CALCIUM SERPL-MCNC: 9.2 MG/DL (ref 8.5–9.9)
CHLORIDE SERPL-SCNC: 95 MEQ/L (ref 95–107)
CO2 SERPL-SCNC: 33 MEQ/L (ref 20–31)
CREAT SERPL-MCNC: 0.67 MG/DL (ref 0.5–0.9)
ECHO AO ROOT DIAM: 2.8 CM
ECHO AO ROOT INDEX: 1.31 CM/M2
ECHO AV AREA PEAK VELOCITY: 1.5 CM2
ECHO AV AREA VTI: 1.8 CM2
ECHO AV AREA/BSA PEAK VELOCITY: 0.7 CM2/M2
ECHO AV AREA/BSA VTI: 0.8 CM2/M2
ECHO AV CUSP MM: 2 CM
ECHO AV MEAN GRADIENT: 8 MMHG
ECHO AV MEAN VELOCITY: 1.3 M/S
ECHO AV PEAK GRADIENT: 14 MMHG
ECHO AV PEAK VELOCITY: 2 M/S
ECHO AV VELOCITY RATIO: 0.6
ECHO AV VTI: 40.7 CM
ECHO BSA: 2.24 M2
ECHO EST RA PRESSURE: 3 MMHG
ECHO LA DIAMETER INDEX: 1.92 CM/M2
ECHO LA DIAMETER: 4.1 CM
ECHO LA TO AORTIC ROOT RATIO: 1.46
ECHO LA VOL A-L A2C: 76 ML (ref 22–52)
ECHO LA VOL A-L A4C: 67 ML (ref 22–52)
ECHO LA VOL MOD A2C: 72 ML (ref 22–52)
ECHO LA VOL MOD A4C: 62 ML (ref 22–52)
ECHO LA VOLUME AREA LENGTH: 74 ML
ECHO LA VOLUME INDEX A-L A2C: 36 ML/M2 (ref 16–34)
ECHO LA VOLUME INDEX A-L A4C: 31 ML/M2 (ref 16–34)
ECHO LA VOLUME INDEX AREA LENGTH: 35 ML/M2 (ref 16–34)
ECHO LA VOLUME INDEX MOD A2C: 34 ML/M2 (ref 16–34)
ECHO LA VOLUME INDEX MOD A4C: 29 ML/M2 (ref 16–34)
ECHO LV E' LATERAL VELOCITY: 10.18 CM/S
ECHO LV E' SEPTAL VELOCITY: 10.18 CM/S
ECHO LV EDV A2C: 85 ML
ECHO LV EDV A4C: 117 ML
ECHO LV EDV BP: 101 ML (ref 56–104)
ECHO LV EDV INDEX A4C: 55 ML/M2
ECHO LV EDV INDEX BP: 47 ML/M2
ECHO LV EDV NDEX A2C: 40 ML/M2
ECHO LV EF PHYSICIAN: 55 %
ECHO LV EJECTION FRACTION A2C: 42 %
ECHO LV EJECTION FRACTION A4C: 60 %
ECHO LV EJECTION FRACTION BIPLANE: 52 % (ref 55–100)
ECHO LV ESV A2C: 50 ML
ECHO LV ESV A4C: 47 ML
ECHO LV ESV BP: 49 ML (ref 19–49)
ECHO LV ESV INDEX A2C: 23 ML/M2
ECHO LV ESV INDEX A4C: 22 ML/M2
ECHO LV ESV INDEX BP: 23 ML/M2
ECHO LV FRACTIONAL SHORTENING: 35 % (ref 28–44)
ECHO LV INTERNAL DIMENSION DIASTOLE INDEX: 1.73 CM/M2
ECHO LV INTERNAL DIMENSION DIASTOLIC: 3.7 CM (ref 3.9–5.3)
ECHO LV INTERNAL DIMENSION SYSTOLIC INDEX: 1.12 CM/M2
ECHO LV INTERNAL DIMENSION SYSTOLIC: 2.4 CM
ECHO LV IVSD: 1.5 CM (ref 0.6–0.9)
ECHO LV IVSS: 1.6 CM
ECHO LV MASS 2D: 176.6 G (ref 67–162)
ECHO LV MASS INDEX 2D: 82.5 G/M2 (ref 43–95)
ECHO LV POSTERIOR WALL DIASTOLIC: 1.2 CM (ref 0.6–0.9)
ECHO LV POSTERIOR WALL SYSTOLIC: 1.4 CM
ECHO LV RELATIVE WALL THICKNESS RATIO: 0.65
ECHO LVOT AREA: 2.5 CM2
ECHO LVOT AV VTI INDEX: 0.7
ECHO LVOT DIAM: 1.8 CM
ECHO LVOT MEAN GRADIENT: 3 MMHG
ECHO LVOT PEAK GRADIENT: 5 MMHG
ECHO LVOT PEAK VELOCITY: 1.2 M/S
ECHO LVOT STROKE VOLUME INDEX: 33.9 ML/M2
ECHO LVOT SV: 72.5 ML
ECHO LVOT VTI: 28.5 CM
ECHO MV A VELOCITY: 1.13 M/S
ECHO MV E DECELERATION TIME (DT): 233.2 MS
ECHO MV E VELOCITY: 0.95 M/S
ECHO MV E/A RATIO: 0.84
ECHO MV E/E' LATERAL: 9.33
ECHO MV E/E' RATIO (AVERAGED): 9.33
ECHO MV E/E' SEPTAL: 9.33
ECHO RIGHT VENTRICULAR SYSTOLIC PRESSURE (RVSP): 35 MMHG
ECHO RV INTERNAL DIMENSION: 2.8 CM
ECHO RV TAPSE: 2.5 CM (ref 1.7–?)
ECHO TV REGURGITANT MAX VELOCITY: 2.83 M/S
ECHO TV REGURGITANT PEAK GRADIENT: 32 MMHG
EOSINOPHIL # BLD: 0.2 K/UL (ref 0–0.7)
EOSINOPHIL NFR BLD: 1.7 %
ERYTHROCYTE [DISTWIDTH] IN BLOOD BY AUTOMATED COUNT: 14.2 % (ref 11.5–14.5)
GLOBULIN SER CALC-MCNC: 3.3 G/DL (ref 2.3–3.5)
GLUCOSE SERPL-MCNC: 103 MG/DL (ref 70–99)
HCT VFR BLD AUTO: 34.6 % (ref 37–47)
HGB BLD-MCNC: 11.2 G/DL (ref 12–16)
LYMPHOCYTES # BLD: 1.9 K/UL (ref 1–4.8)
LYMPHOCYTES NFR BLD: 18.8 %
MCH RBC QN AUTO: 29 PG (ref 27–31.3)
MCHC RBC AUTO-ENTMCNC: 32.4 % (ref 33–37)
MCV RBC AUTO: 89.6 FL (ref 79.4–94.8)
MONOCYTES # BLD: 1.1 K/UL (ref 0.2–0.8)
MONOCYTES NFR BLD: 10.8 %
NEUTROPHILS # BLD: 6.9 K/UL (ref 1.4–6.5)
NEUTS SEG NFR BLD: 68.1 %
PLATELET # BLD AUTO: 384 K/UL (ref 130–400)
POTASSIUM SERPL-SCNC: 4.1 MEQ/L (ref 3.4–4.9)
PROT SERPL-MCNC: 6.9 G/DL (ref 6.3–8)
RBC # BLD AUTO: 3.86 M/UL (ref 4.2–5.4)
REASON FOR REJECTION: NORMAL
REJECTED TEST: NORMAL
SODIUM SERPL-SCNC: 137 MEQ/L (ref 135–144)
WBC # BLD AUTO: 10.2 K/UL (ref 4.8–10.8)

## 2025-06-28 PROCEDURE — 96376 TX/PRO/DX INJ SAME DRUG ADON: CPT

## 2025-06-28 PROCEDURE — 96375 TX/PRO/DX INJ NEW DRUG ADDON: CPT

## 2025-06-28 PROCEDURE — 93306 TTE W/DOPPLER COMPLETE: CPT | Performed by: INTERNAL MEDICINE

## 2025-06-28 PROCEDURE — 6360000002 HC RX W HCPCS: Performed by: INTERNAL MEDICINE

## 2025-06-28 PROCEDURE — 6370000000 HC RX 637 (ALT 250 FOR IP): Performed by: INTERNAL MEDICINE

## 2025-06-28 PROCEDURE — 96372 THER/PROPH/DIAG INJ SC/IM: CPT

## 2025-06-28 PROCEDURE — 80053 COMPREHEN METABOLIC PANEL: CPT

## 2025-06-28 PROCEDURE — 2500000003 HC RX 250 WO HCPCS: Performed by: INTERNAL MEDICINE

## 2025-06-28 PROCEDURE — G0378 HOSPITAL OBSERVATION PER HR: HCPCS

## 2025-06-28 PROCEDURE — 1210000000 HC MED SURG R&B

## 2025-06-28 PROCEDURE — 93306 TTE W/DOPPLER COMPLETE: CPT

## 2025-06-28 PROCEDURE — 36415 COLL VENOUS BLD VENIPUNCTURE: CPT

## 2025-06-28 PROCEDURE — 85025 COMPLETE CBC W/AUTO DIFF WBC: CPT

## 2025-06-28 RX ORDER — LACTULOSE 10 G/15ML
30 SOLUTION ORAL EVERY 4 HOURS
Status: DISCONTINUED | OUTPATIENT
Start: 2025-06-28 | End: 2025-06-28

## 2025-06-28 RX ORDER — KETOROLAC TROMETHAMINE 15 MG/ML
15 INJECTION, SOLUTION INTRAMUSCULAR; INTRAVENOUS ONCE
Status: COMPLETED | OUTPATIENT
Start: 2025-06-28 | End: 2025-06-28

## 2025-06-28 RX ADMIN — POTASSIUM CHLORIDE 40 MEQ: 1500 TABLET, EXTENDED RELEASE ORAL at 10:16

## 2025-06-28 RX ADMIN — PAROXETINE HYDROCHLORIDE 20 MG: 20 TABLET, FILM COATED ORAL at 10:05

## 2025-06-28 RX ADMIN — AMITRIPTYLINE HYDROCHLORIDE 25 MG: 25 TABLET, FILM COATED ORAL at 00:31

## 2025-06-28 RX ADMIN — DICLOFENAC SODIUM 2 G: 10 GEL TOPICAL at 22:40

## 2025-06-28 RX ADMIN — CARVEDILOL 12.5 MG: 12.5 TABLET, FILM COATED ORAL at 22:05

## 2025-06-28 RX ADMIN — Medication 1 TABLET: at 10:04

## 2025-06-28 RX ADMIN — ASPIRIN 81 MG: 81 TABLET, DELAYED RELEASE ORAL at 10:05

## 2025-06-28 RX ADMIN — SODIUM CHLORIDE, PRESERVATIVE FREE 10 ML: 5 INJECTION INTRAVENOUS at 00:32

## 2025-06-28 RX ADMIN — SODIUM CHLORIDE, PRESERVATIVE FREE 10 ML: 5 INJECTION INTRAVENOUS at 10:16

## 2025-06-28 RX ADMIN — CARVEDILOL 12.5 MG: 12.5 TABLET, FILM COATED ORAL at 00:31

## 2025-06-28 RX ADMIN — LORAZEPAM 0.5 MG: 0.5 TABLET ORAL at 00:31

## 2025-06-28 RX ADMIN — TIZANIDINE 4 MG: 4 TABLET ORAL at 22:10

## 2025-06-28 RX ADMIN — Medication 3 MG: at 22:04

## 2025-06-28 RX ADMIN — KETOROLAC TROMETHAMINE 15 MG: 15 INJECTION, SOLUTION INTRAMUSCULAR; INTRAVENOUS at 15:10

## 2025-06-28 RX ADMIN — ENOXAPARIN SODIUM 40 MG: 100 INJECTION SUBCUTANEOUS at 10:05

## 2025-06-28 RX ADMIN — FUROSEMIDE 40 MG: 10 INJECTION, SOLUTION INTRAMUSCULAR; INTRAVENOUS at 19:14

## 2025-06-28 RX ADMIN — WATER 1000 MG: 1 INJECTION INTRAMUSCULAR; INTRAVENOUS; SUBCUTANEOUS at 22:04

## 2025-06-28 RX ADMIN — AMITRIPTYLINE HYDROCHLORIDE 25 MG: 25 TABLET, FILM COATED ORAL at 22:04

## 2025-06-28 RX ADMIN — LORAZEPAM 0.25 MG: 0.5 TABLET ORAL at 10:04

## 2025-06-28 RX ADMIN — TIZANIDINE 4 MG: 4 TABLET ORAL at 10:05

## 2025-06-28 RX ADMIN — Medication 400 MG: at 10:05

## 2025-06-28 RX ADMIN — FUROSEMIDE 40 MG: 10 INJECTION, SOLUTION INTRAMUSCULAR; INTRAVENOUS at 10:05

## 2025-06-28 RX ADMIN — POTASSIUM CHLORIDE 40 MEQ: 1500 TABLET, EXTENDED RELEASE ORAL at 01:01

## 2025-06-28 RX ADMIN — LORAZEPAM 0.5 MG: 0.5 TABLET ORAL at 22:04

## 2025-06-28 RX ADMIN — CARVEDILOL 12.5 MG: 12.5 TABLET, FILM COATED ORAL at 10:05

## 2025-06-28 RX ADMIN — METOLAZONE 2.5 MG: 2.5 TABLET ORAL at 10:05

## 2025-06-28 ASSESSMENT — PAIN SCALES - GENERAL
PAINLEVEL_OUTOF10: 4
PAINLEVEL_OUTOF10: 4
PAINLEVEL_OUTOF10: 3
PAINLEVEL_OUTOF10: 4

## 2025-06-28 ASSESSMENT — PAIN DESCRIPTION - DESCRIPTORS
DESCRIPTORS: ACHING
DESCRIPTORS: ACHING

## 2025-06-28 ASSESSMENT — PAIN DESCRIPTION - ORIENTATION
ORIENTATION: LEFT
ORIENTATION: LEFT

## 2025-06-28 ASSESSMENT — PAIN - FUNCTIONAL ASSESSMENT: PAIN_FUNCTIONAL_ASSESSMENT: ACTIVITIES ARE NOT PREVENTED

## 2025-06-28 ASSESSMENT — PAIN DESCRIPTION - LOCATION
LOCATION: KNEE
LOCATION: KNEE

## 2025-06-28 NOTE — FLOWSHEET NOTE
Patient arrived to the floor from E.D. via cart.she is alert and oriented and no complain of shortness of breath.    05:50 she is resting quietly in bed,she denies pain,ventricular  pace rhythm,her call light is within her easy reach.

## 2025-06-28 NOTE — PLAN OF CARE
Problem: Safety - Adult  Goal: Free from fall injury  Outcome: Progressing     Problem: Discharge Planning  Goal: Discharge to home or other facility with appropriate resources  Outcome: Progressing  Flowsheets (Taken 6/28/2025 1003)  Discharge to home or other facility with appropriate resources:   Identify barriers to discharge with patient and caregiver   Arrange for needed discharge resources and transportation as appropriate   Identify discharge learning needs (meds, wound care, etc)     Problem: Pain  Goal: Verbalizes/displays adequate comfort level or baseline comfort level  Outcome: Progressing

## 2025-06-28 NOTE — CONSULTS
Spiritual Health History and Assessment/Progress Note  [facility]    Spiritual/Emotional Needs, Initial Encounter,  , Grief and loss, Life Adjustments,      Name: Arlyn Bhagat MRN: [id number]    Age: 79 y.o.     Sex: female   Language: English   Roman Catholic: Zoroastrianism   UTI (urinary tract infection)     Date: 6/28/2025                           Patient resting in bed, light dim, some distress, and feeling nervous about test results, awaiting doctor to update.  Pt engaged in conversation about huma and grieving loss of spouse and only son.  Pt has strong huma tradition of Zoroastrianism, very active in her community.  Pt supported by friends, huma community, home health, community organizations for meals and transportation.   provided active listening, spiritual support, grief support, encouragement and prayer.  Pt less anxious, hopeful and expressed gratitude.  Pt wishes to have someone come to provide Zoroastrianism Communion.   remains available for support.    Spiritual Assessment began in [facility]  4W MED SURG UNIT            Encounter Overview/Reason: Spiritual/Emotional Needs, Initial Encounter  Service Provided For: Patient    Huma, Belief, Meaning:   Patient is connected with a huma tradition or spiritual practice  Family/Friends No family/friends present      Importance and Influence:  Patient has spiritual/personal beliefs that influence decisions regarding their health  Family/Friends No family/friends present    Community:  Patient is connected with a spiritual community and feels well-supported. Support system includes: Huma Community, Friends, and Other: home health, office of aging, meals on wheels  Family/Friends No family/friends present    Assessment and Plan of Care:     Patient Interventions include: Facilitated expression of thoughts and feelings, Explored spiritual coping/struggle/distress, Affirmed coping skills/support systems, Provided sacramental/Church ritual, and

## 2025-06-28 NOTE — H&P
DEPARTMENT OF HOSPITAL MEDICINE    HISTORY AND PHYSICAL EXAM    PATIENT NAME:  Arlyn Bhagat    MRN:  [id number]  SERVICE DATE:  6/27/2025   SERVICE TIME:  9:15 PM    Primary Care Physician: Francis Mills MD         SUBJECTIVE  CHIEF COMPLAINT:  Knee Pain and Urinary Frequency       HPI:   This is a 79-year-old morbidly obese female being admitted for further evaluation of left knee pain and urinary tract infection.  She does have a known history of UTIs and osteoarthritis, however, the pain in both knees have worsened recently.  She is also had bilateral lower extremity swelling to the legs.  She is also had some wound with some drainage in the past couple of weeks.  She has been putting Neosporin on it.  Also she has been complaining of dysuria with left-sided flank pain.  She does have some chills but denies any fever, nausea or vomiting.    PAST MEDICAL HISTORY:    Past Medical History:   Diagnosis Date    Asthma     Degenerative disc disease, lumbar     Hypertension     Lumbosacral disc disease     Lymphedema     MRSA infection     UTI (lower urinary tract infection)      PAST SURGICAL HISTORY:    Past Surgical History:   Procedure Laterality Date    APPENDECTOMY      CARDIAC PACEMAKER PLACEMENT  02/05/2020    PACEMAKER PLACEMENT  02/05/2020    SALPINGO-OOPHORECTOMY      left side     SHOULDER SURGERY      TONSILLECTOMY      WRIST SURGERY       FAMILY HISTORY:  History reviewed. No pertinent family history.  SOCIAL HISTORY:    Social History     Socioeconomic History    Marital status:      Spouse name: Not on file    Number of children: Not on file    Years of education: Not on file    Highest education level: Not on file   Occupational History    Not on file   Tobacco Use    Smoking status: Never    Smokeless tobacco: Never   Vaping Use    Vaping status: Never Used   Substance and Sexual Activity    Alcohol use: Yes     Comment: Rarely    Drug use: No    Sexual activity: Never   Other Topics  present.      Comments: Decreased range of motion on the left.   Skin:     General: Skin is warm.      Findings: Erythema present.   Neurological:      General: No focal deficit present.      Mental Status: She is alert.   Psychiatric:         Mood and Affect: Mood normal.           DATA:     Diagnostic tests reviewed for today's visit:    Most recent labs and imaging results reviewed.     LABS:    Recent Results (from the past 24 hours)   CBC with Auto Differential    Collection Time: 06/27/25  3:25 PM   Result Value Ref Range    WBC 8.1 4.8 - 10.8 K/uL    RBC 4.10 (L) 4.20 - 5.40 M/uL    Hemoglobin 11.8 (L) 12.0 - 16.0 g/dL    Hematocrit 36.6 (L) 37.0 - 47.0 %    MCV 89.3 79.4 - 94.8 fL    MCH 28.8 27.0 - 31.3 pg    MCHC 32.2 (L) 33.0 - 37.0 %    RDW 14.2 11.5 - 14.5 %    Platelets 429 (H) 130 - 400 K/uL    Neutrophils % 58.9 %    Lymphocytes % 29.4 %    Monocytes % 8.8 %    Eosinophils % 2.5 %    Basophils % 0.2 %    Neutrophils Absolute 4.8 1.4 - 6.5 K/uL    Lymphocytes Absolute 2.4 1.0 - 4.8 K/uL    Monocytes Absolute 0.7 0.2 - 0.8 K/uL    Eosinophils Absolute 0.2 0.0 - 0.7 K/uL    Basophils Absolute 0.0 0.0 - 0.2 K/uL   CMP    Collection Time: 06/27/25  3:25 PM   Result Value Ref Range    Sodium 139 135 - 144 mEq/L    Potassium 3.4 3.4 - 4.9 mEq/L    Chloride 95 95 - 107 mEq/L    CO2 33 (H) 20 - 31 mEq/L    Anion Gap 11 9 - 15 mEq/L    Glucose 112 (H) 70 - 99 mg/dL    BUN 13 8 - 23 mg/dL    Creatinine 0.65 0.50 - 0.90 mg/dL    Est, Glom Filt Rate 89.7 >60    Calcium 9.1 8.5 - 9.9 mg/dL    Total Protein 6.8 6.3 - 8.0 g/dL    Albumin 3.8 3.5 - 4.6 g/dL    Total Bilirubin 0.4 0.2 - 0.7 mg/dL    Alkaline Phosphatase 101 40 - 130 U/L    ALT 11 0 - 33 U/L    AST 17 0 - 35 U/L    Globulin 3.0 2.3 - 3.5 g/dL   Magnesium    Collection Time: 06/27/25  3:25 PM   Result Value Ref Range    Magnesium 1.9 1.7 - 2.4 mg/dL   Urinalysis with Reflex to Culture    Collection Time: 06/27/25  5:43 PM    Specimen: Urine   Result  Value Ref Range    Color, UA Yellow Straw/Yellow    Clarity, UA Clear Clear    Glucose, Ur Negative Negative mg/dL    Bilirubin, Urine Negative Negative    Ketones, Urine Negative Negative mg/dL    Specific Gravity, UA 1.007 1.005 - 1.030    Blood, Urine Negative Negative    pH, Urine 7.0 5.0 - 9.0    Protein, UA Negative Negative mg/dL    Urobilinogen, Urine 0.2 <2.0 E.U./dL    Nitrite, Urine Negative Negative    Leukocyte Esterase, Urine MODERATE (A) Negative    Urine Reflex to Culture Not Indicated    Microscopic Urinalysis    Collection Time: 06/27/25  5:43 PM   Result Value Ref Range    Bacteria, UA FEW (A) Negative /HPF    Hyaline Casts, UA 0-1 0 - 5 /HPF    WBC, UA 6-9 (A) 0 - 5 /HPF    RBC, UA 3-5 (A) 0 - 5 /HPF    Epithelial Cells, UA 0-2 0 - 5 /HPF       IMAGING:   CT ABDOMEN PELVIS WO CONTRAST Additional Contrast? None  Result Date: 6/27/2025  The right kidney is absent.  Normal right pelvic transplant kidney. No left-sided urinary tract stones or hydronephrosis. Diffuse colonic fecal retention with significant stool burden.     XR FOOT LEFT (MIN 3 VIEWS)  Result Date: 6/27/2025  No acute osseous abnormality.     XR KNEE LEFT (1-2 VIEWS)  Result Date: 6/27/2025  No acute abnormality of the knee. Tri compartment degenerative changes.        VTE Prophylaxis: low molecular weight heparin -  start     ASSESSMENT AND PLAN    Principal Problem:    UTI (urinary tract infection)  Left knee osteoarthritis  Morbid obesity    Plan:       Admit to [facility] with continuous telemetry.  She has received 1 g of Rocephin in the emergency department, will continue to Rocephin for now.  Follow-up culture results for identification and sensitivity.  I reviewed her x-ray and there is no sizable effusion worth performing an arthrocentesis.  She is however going to need extensive physical therapy.  I did discuss with her about being placed in a rehab facility for further convalescence care, but at this time she is refusing.

## 2025-06-28 NOTE — PROGRESS NOTES
Physician Progress Note    6/28/2025   3:11 PM    Name:  Arlyn Bhagat  MRN:    [id number]     IP Day: 1     Admit Date: 6/27/2025  5:12 PM  PCP: Francis Mills MD    Code Status:  Full Code    Assessment and Plan:        1.  Weakness related to possible UTI, left knee osteoarthritis, class III obesity, chronic lymphedema:  - Empiric antibiotic.  Follow culture  - Toradol.  Consult with Ortho to consider injection  - On diuresis  - PT/OT    Hypertension  Class III obesity  Anxiety on chronic benzodiazepine  History of complete heart block with dual-chamber pacemaker    Diet: ADULT DIET; Regular; Low Sodium (2 gm)  Ppx: lovenox  Full Code      Electronically signed by Ramírez Wakefield DO on 6/28/2025 at 3:11 PM    Subjective:     Feels better    Current Facility-Administered Medications   Medication Dose Route Frequency Provider Last Rate Last Admin    ketorolac (TORADOL) injection 15 mg  15 mg IntraVENous Once Ramírez Wakefield DO        diclofenac sodium (VOLTAREN) 1 % gel 2 g  2 g Topical BID Ramírez Wakefield DO        sodium chloride flush 0.9 % injection 5-40 mL  5-40 mL IntraVENous 2 times per day Anatoly Carreon MD   10 mL at 06/28/25 1016    sodium chloride flush 0.9 % injection 5-40 mL  5-40 mL IntraVENous PRN Anatoly Carreon MD        0.9 % sodium chloride infusion   IntraVENous PRN Anatoly Carreon MD        potassium chloride (KLOR-CON M) extended release tablet 40 mEq  40 mEq Oral PRN Anatoly Carreon MD   40 mEq at 06/28/25 1016    Or    potassium bicarb-citric acid (EFFER-K) effervescent tablet 40 mEq  40 mEq Oral PRN Anatoly Carreon MD        Or    potassium chloride 10 mEq/100 mL IVPB (Peripheral Line)  10 mEq IntraVENous PRN Anatoly Carreon MD        magnesium sulfate 2000 mg in 50 mL IVPB premix  2,000 mg IntraVENous PRN Anatoly Carreon MD        enoxaparin (LOVENOX) injection 40 mg  40 mg SubCUTAneous Daily Anatoly Carreon MD   40 mg at 06/28/25 1005    ondansetron  (ZOFRAN-ODT) disintegrating tablet 4 mg  4 mg Oral Q8H PRN Anatoly Carreon MD        Or    ondansetron (ZOFRAN) injection 4 mg  4 mg IntraVENous Q6H PRN Anatoly Carreon MD        melatonin tablet 3 mg  3 mg Oral Nightly PRN Anatoly Carreon MD        acetaminophen (TYLENOL) tablet 650 mg  650 mg Oral Q6H PRN Anatoly Carreon MD        Or    acetaminophen (TYLENOL) suppository 650 mg  650 mg Rectal Q6H PRN Anatoly Carreon MD        cefTRIAXone (ROCEPHIN) 1,000 mg in sterile water 10 mL IV syringe  1,000 mg IntraVENous Q24H Anatoly Carreon MD        sodium phosphate (FLEET) rectal enema 1 enema  1 enema Rectal Daily PRN Anatoly Carreon MD        amitriptyline (ELAVIL) tablet 25 mg  25 mg Oral Nightly Anatoly Carreon MD   25 mg at 06/28/25 0031    aspirin EC tablet 81 mg  81 mg Oral Daily Anatoly Carreon MD   81 mg at 06/28/25 1005    carvedilol (COREG) tablet 12.5 mg  12.5 mg Oral BID Anatoly Carreon MD   12.5 mg at 06/28/25 1005    LORazepam (ATIVAN) tablet 0.5 mg  0.5 mg Oral Nightly Anatoly Carreon MD   0.5 mg at 06/28/25 0031    LORazepam (ATIVAN) tablet 0.25 mg  0.25 mg Oral QAM Anatoly Carreon MD   0.25 mg at 06/28/25 1004    magnesium oxide (MAG-OX) tablet 400 mg  400 mg Oral Daily Anatoly Carreon MD   400 mg at 06/28/25 1005    analgesic ointment ointment   Topical PRN Anatoly Carreon MD        metOLazone (ZAROXOLYN) tablet 2.5 mg  2.5 mg Oral Daily Anatoly Carreon MD   2.5 mg at 06/28/25 1005    therapeutic multivitamin-minerals 1 tablet  1 tablet Oral Daily Anatoly Carreon MD   1 tablet at 06/28/25 1004    PARoxetine (PAXIL) tablet 20 mg  20 mg Oral Daily Anatoly Carreon MD   20 mg at 06/28/25 1005    potassium chloride (KLOR-CON M) extended release tablet 100 mEq  100 mEq Oral Daily Anatoly Carreon MD        tiZANidine (ZANAFLEX) tablet 4 mg  4 mg Oral BID Anatoly Carreon MD   4 mg at 06/28/25 1005    furosemide (LASIX) injection 40 mg  40 mg

## 2025-06-29 LAB
ALBUMIN SERPL-MCNC: 3.4 G/DL (ref 3.5–4.6)
ALP SERPL-CCNC: 87 U/L (ref 40–130)
ALT SERPL-CCNC: 6 U/L (ref 0–33)
ANION GAP SERPL CALCULATED.3IONS-SCNC: 7 MEQ/L (ref 9–15)
AST SERPL-CCNC: 15 U/L (ref 0–35)
BASOPHILS # BLD: 0 K/UL (ref 0–0.2)
BASOPHILS NFR BLD: 0.1 %
BILIRUB SERPL-MCNC: <0.2 MG/DL (ref 0.2–0.7)
BUN SERPL-MCNC: 21 MG/DL (ref 8–23)
CALCIUM SERPL-MCNC: 8.6 MG/DL (ref 8.5–9.9)
CHLORIDE SERPL-SCNC: 94 MEQ/L (ref 95–107)
CO2 SERPL-SCNC: 39 MEQ/L (ref 20–31)
CREAT SERPL-MCNC: 1.03 MG/DL (ref 0.5–0.9)
EOSINOPHIL # BLD: 0.2 K/UL (ref 0–0.7)
EOSINOPHIL NFR BLD: 1.6 %
ERYTHROCYTE [DISTWIDTH] IN BLOOD BY AUTOMATED COUNT: 14.3 % (ref 11.5–14.5)
GLOBULIN SER CALC-MCNC: 2.8 G/DL (ref 2.3–3.5)
GLUCOSE SERPL-MCNC: 174 MG/DL (ref 70–99)
HCT VFR BLD AUTO: 32.1 % (ref 37–47)
HGB BLD-MCNC: 10.5 G/DL (ref 12–16)
LYMPHOCYTES # BLD: 3.4 K/UL (ref 1–4.8)
LYMPHOCYTES NFR BLD: 35.1 %
MAGNESIUM SERPL-MCNC: 1.8 MG/DL (ref 1.7–2.4)
MCH RBC QN AUTO: 28.8 PG (ref 27–31.3)
MCHC RBC AUTO-ENTMCNC: 32.7 % (ref 33–37)
MCV RBC AUTO: 88.2 FL (ref 79.4–94.8)
MONOCYTES # BLD: 1.1 K/UL (ref 0.2–0.8)
MONOCYTES NFR BLD: 10.8 %
NEUTROPHILS # BLD: 5.1 K/UL (ref 1.4–6.5)
NEUTS SEG NFR BLD: 52.2 %
PLATELET # BLD AUTO: 368 K/UL (ref 130–400)
POTASSIUM SERPL-SCNC: 3.1 MEQ/L (ref 3.4–4.9)
PROT SERPL-MCNC: 6.2 G/DL (ref 6.3–8)
RBC # BLD AUTO: 3.64 M/UL (ref 4.2–5.4)
SODIUM SERPL-SCNC: 140 MEQ/L (ref 135–144)
WBC # BLD AUTO: 9.8 K/UL (ref 4.8–10.8)

## 2025-06-29 PROCEDURE — 2500000003 HC RX 250 WO HCPCS: Performed by: INTERNAL MEDICINE

## 2025-06-29 PROCEDURE — 20610 DRAIN/INJ JOINT/BURSA W/O US: CPT | Performed by: ORTHOPAEDIC SURGERY

## 2025-06-29 PROCEDURE — 36415 COLL VENOUS BLD VENIPUNCTURE: CPT

## 2025-06-29 PROCEDURE — 99222 1ST HOSP IP/OBS MODERATE 55: CPT | Performed by: ORTHOPAEDIC SURGERY

## 2025-06-29 PROCEDURE — G0378 HOSPITAL OBSERVATION PER HR: HCPCS

## 2025-06-29 PROCEDURE — 6370000000 HC RX 637 (ALT 250 FOR IP): Performed by: INTERNAL MEDICINE

## 2025-06-29 PROCEDURE — 6360000002 HC RX W HCPCS: Performed by: INTERNAL MEDICINE

## 2025-06-29 PROCEDURE — 83735 ASSAY OF MAGNESIUM: CPT

## 2025-06-29 PROCEDURE — 3E0U33Z INTRODUCTION OF ANTI-INFLAMMATORY INTO JOINTS, PERCUTANEOUS APPROACH: ICD-10-PCS | Performed by: INTERNAL MEDICINE

## 2025-06-29 PROCEDURE — 85025 COMPLETE CBC W/AUTO DIFF WBC: CPT

## 2025-06-29 PROCEDURE — 96376 TX/PRO/DX INJ SAME DRUG ADON: CPT

## 2025-06-29 PROCEDURE — 96372 THER/PROPH/DIAG INJ SC/IM: CPT

## 2025-06-29 PROCEDURE — 1210000000 HC MED SURG R&B

## 2025-06-29 PROCEDURE — 6360000002 HC RX W HCPCS: Performed by: ORTHOPAEDIC SURGERY

## 2025-06-29 PROCEDURE — 80053 COMPREHEN METABOLIC PANEL: CPT

## 2025-06-29 RX ORDER — LIDOCAINE HYDROCHLORIDE 10 MG/ML
20 INJECTION, SOLUTION INFILTRATION; PERINEURAL ONCE
Status: COMPLETED | OUTPATIENT
Start: 2025-06-29 | End: 2025-06-29

## 2025-06-29 RX ORDER — TRIAMCINOLONE ACETONIDE 40 MG/ML
40 INJECTION, SUSPENSION INTRA-ARTICULAR; INTRAMUSCULAR ONCE
Status: COMPLETED | OUTPATIENT
Start: 2025-06-29 | End: 2025-06-29

## 2025-06-29 RX ORDER — FUROSEMIDE 40 MG/1
40 TABLET ORAL DAILY
Status: DISCONTINUED | OUTPATIENT
Start: 2025-06-30 | End: 2025-07-04 | Stop reason: HOSPADM

## 2025-06-29 RX ORDER — PANTOPRAZOLE SODIUM 40 MG/1
40 TABLET, DELAYED RELEASE ORAL
Status: DISCONTINUED | OUTPATIENT
Start: 2025-06-29 | End: 2025-07-04 | Stop reason: HOSPADM

## 2025-06-29 RX ADMIN — ASPIRIN 81 MG: 81 TABLET, DELAYED RELEASE ORAL at 08:44

## 2025-06-29 RX ADMIN — LORAZEPAM 0.5 MG: 0.5 TABLET ORAL at 20:16

## 2025-06-29 RX ADMIN — TIZANIDINE 4 MG: 4 TABLET ORAL at 08:43

## 2025-06-29 RX ADMIN — PAROXETINE HYDROCHLORIDE 20 MG: 20 TABLET, FILM COATED ORAL at 08:43

## 2025-06-29 RX ADMIN — POTASSIUM CHLORIDE 100 MEQ: 1500 TABLET, EXTENDED RELEASE ORAL at 08:40

## 2025-06-29 RX ADMIN — AMITRIPTYLINE HYDROCHLORIDE 25 MG: 25 TABLET, FILM COATED ORAL at 20:16

## 2025-06-29 RX ADMIN — Medication 1 TABLET: at 08:41

## 2025-06-29 RX ADMIN — Medication 3 MG: at 20:16

## 2025-06-29 RX ADMIN — Medication 400 MG: at 08:41

## 2025-06-29 RX ADMIN — FUROSEMIDE 40 MG: 10 INJECTION, SOLUTION INTRAMUSCULAR; INTRAVENOUS at 08:40

## 2025-06-29 RX ADMIN — WATER 1000 MG: 1 INJECTION INTRAMUSCULAR; INTRAVENOUS; SUBCUTANEOUS at 20:16

## 2025-06-29 RX ADMIN — CARVEDILOL 12.5 MG: 12.5 TABLET, FILM COATED ORAL at 08:41

## 2025-06-29 RX ADMIN — ENOXAPARIN SODIUM 40 MG: 100 INJECTION SUBCUTANEOUS at 08:44

## 2025-06-29 RX ADMIN — METOLAZONE 2.5 MG: 2.5 TABLET ORAL at 08:43

## 2025-06-29 RX ADMIN — SODIUM CHLORIDE, PRESERVATIVE FREE 10 ML: 5 INJECTION INTRAVENOUS at 08:41

## 2025-06-29 RX ADMIN — LIDOCAINE HYDROCHLORIDE 20 ML: 10 INJECTION, SOLUTION INFILTRATION; PERINEURAL at 12:40

## 2025-06-29 RX ADMIN — TIZANIDINE 4 MG: 4 TABLET ORAL at 20:16

## 2025-06-29 RX ADMIN — TRIAMCINOLONE ACETONIDE 40 MG: 40 INJECTION, SUSPENSION INTRA-ARTICULAR; INTRAMUSCULAR at 12:40

## 2025-06-29 RX ADMIN — DICLOFENAC SODIUM 2 G: 10 GEL TOPICAL at 08:43

## 2025-06-29 RX ADMIN — PANTOPRAZOLE SODIUM 40 MG: 40 TABLET, DELAYED RELEASE ORAL at 10:24

## 2025-06-29 RX ADMIN — CARVEDILOL 12.5 MG: 12.5 TABLET, FILM COATED ORAL at 20:16

## 2025-06-29 RX ADMIN — DICLOFENAC SODIUM 2 G: 10 GEL TOPICAL at 20:19

## 2025-06-29 RX ADMIN — LORAZEPAM 0.25 MG: 0.5 TABLET ORAL at 08:41

## 2025-06-29 ASSESSMENT — PAIN DESCRIPTION - DESCRIPTORS: DESCRIPTORS: ACHING

## 2025-06-29 ASSESSMENT — PAIN DESCRIPTION - ORIENTATION: ORIENTATION: LEFT

## 2025-06-29 ASSESSMENT — PAIN DESCRIPTION - LOCATION: LOCATION: HEAD;KNEE

## 2025-06-29 ASSESSMENT — PAIN SCALES - GENERAL
PAINLEVEL_OUTOF10: 3

## 2025-06-29 NOTE — ACP (ADVANCE CARE PLANNING)
Advance Care Planning   Healthcare Decision Maker:    Primary Decision Maker: Mariya Smith - Other - [phone]    Secondary Decision Maker: ANNABEL WEN - Other - [phone]

## 2025-06-29 NOTE — CONSULTS
Consult Note               Date: 6/29/2025        Patient Name: Arlyn Bhagat     MRN: [id number] YOB: 1945      Age:  79 y.o.      Assessment/Plan      Severe osteoarthritis left knee    Weightbearing status: Weight-bearing as tolerated left lower extremity  Discussed with patient that she has severe osteoarthritis of the knee which she is aware of and has previously received nonoperative treatment for.  She has had intra-articular injections previously which have helped.  Most recently she has had HA injections, discussed with her that typically we need insurance pre-CERT to be able to do these and so I do not think we would be able to do those on the floor while we could probably do an intra-articular steroid injection.  She would like an injection and so I will see if I can have appropriate medications delivered to her room and we can do this for her while she is here  Long-term discussed with patient that radiographically I think her arthritis is severe enough to warrant consideration for arthroplasty.  Patient's overall health status, lymphedema, and BMI are relative contraindications however to that majorly invasive surgery.  We also discussed peripheral nerve ablation as a possible pain relief modality which could be done here locally as an outpatient  Following discharge patient is welcome to follow-up with me in the office as needed and we can work on referral to pain management for consideration of nerve ablation or one of my total joints partners for further discussion of surgical options      Follow-up: Patient welcome to follow-up with me as needed after discharge if knee continues to be symptomatic    **PROCEDURE NOTE: Left knee steroid injection  Left knee prepped with alcohol  Skin anesthetized with ethylene chloride  4 cc 1% lidocaine plain and 1 cc (40 mg) Kenalog injected into the Left knee joint using a superolateral approach  Injection site covered with Band-Aid  Patient      In-Patient Consult    I personally reviewed the patient's chart and other related provider notes    The patient is a 79 y.o. female who presents with left knee pain and history of knee arthritis.  Patient notes previously she has received nonoperative treatment for her arthritis which included intra-articular injections combined with joint aspiration, oral medications, and physical therapy.  She was recently admitted to the hospital with a UTI.  She is deconditioned and working towards discharge but having difficulty with this because of her left knee pain.  No recent injuries or changes in regards to her knee pain.  It has been persistent for a long time and bothers her most when she moves her knee or is weightbearing    Review of Systems     Negative except as otherwise noted in the HPI above    Physical Exam     BP (!) 142/76   Pulse 59   Temp 98.2 °F (36.8 °C) (Oral)   Resp 18   Ht 1.651 m (5' 5\")   Wt 109.6 kg (241 lb 11.2 oz)   SpO2 95%   BMI 40.22 kg/m²     Examination of patient's left lower extremity notable for mild varus deformity at the knee.  Do not appreciate any significant knee effusion present.  Crepitance and pain with range of motion of the knee.  Sensation and motor grossly intact.  Extremity perfused  Ortho Exam  Physical Exam    Imaging     Dixie Read(s):  X-rays left knee from 6/27/2025 ordered by another provider independently reviewed.  Images negative for any acute fractures or dislocations.  Patient has severe end-stage tricompartmental osteoarthritis of the knee    Labs      Lab Results   Component Value Date    WBC 9.8 06/29/2025    HGB 10.5 (L) 06/29/2025    HCT 32.1 (L) 06/29/2025    MCV 88.2 06/29/2025     06/29/2025     No results found for: \"SEDRATE\"  No results found for: \"CRP\"    Past Medical History     Past Medical History:   Diagnosis Date    Asthma     Degenerative disc disease, lumbar     Hypertension     Lumbosacral disc disease     Lymphedema     MRSA  SURGERY          Social History     Social History     Socioeconomic History    Marital status:      Spouse name: Not on file    Number of children: Not on file    Years of education: Not on file    Highest education level: Not on file   Occupational History    Not on file   Tobacco Use    Smoking status: Never    Smokeless tobacco: Never   Vaping Use    Vaping status: Never Used   Substance and Sexual Activity    Alcohol use: Yes     Comment: Rarely    Drug use: No    Sexual activity: Never   Other Topics Concern    Not on file   Social History Narrative         Lives With: Alone    Type of Home: House    Home Layout: Two level, Laundry in basement, Able to Live on Main level with bedroom/bathroom (pt sleeps on the couch in her living room d/t unable to climb stairs)    Home Access: Ramped entrance    Bathroom Shower/Tub: Walk-in shower    Bathroom Toilet: Handicap height (pt uses sink to pull herself up)    Bathroom Equipment: Grab bars in shower, Shower chair, Hand-held shower (pt reports ever since she got her pacemaker inserted a few years ago, she has been completing sponge baths)    Home Equipment: Cane, Rollator, Alert button (pt reports life alert is defective)    Has the patient had two or more falls in the past year or any fall with injury in the past year?: No (one fall d/t rollator defect- no injuries)    Receives Help From:  (was getting home health RN, OT, PT PTA; pt's friend was assisting w/ laundry d/t laundry in basement but friend has had multiple deaths in the family and is no longer able to help consistently. Neighbor occasionally helps w/ laundry)    ADL Assistance: Independent (pt reports difficulty washing back; uses long-handled brush to bathe herself)    Homemaking Assistance: Needs assistance    Meal Prep:  (pt reports depression limits ability to prepare meals.)    Laundry: Total    Yard Work: Total    Shopping: Total (neighbor completes)    Ambulation Assistance: Independent (pt

## 2025-06-29 NOTE — CARE COORDINATION
Case Management Assessment  Initial Evaluation    Date/Time of Evaluation: 6/29/2025 8:48 AM  Assessment Completed by: Kayleen Davies RN    If patient is discharged prior to next notation, then this note serves as note for discharge by case management.    Patient Name: Arlyn Bhagat                   YOB: 1945  Diagnosis: Lymphedema [I89.0]  UTI (urinary tract infection) [N39.0]  Generalized weakness [R53.1]  Acute UTI [N39.0]  Arthralgia of knee, left [M25.562]  Congestive heart failure, unspecified HF chronicity, unspecified heart failure type (HCC) [I50.9]                   Date / Time: 6/27/2025  5:12 PM    Patient Admission Status: Inpatient   Readmission Risk (Low < 19, Mod (19-27), High > 27): Readmission Risk Score: 14.7    Current PCP: Francis Mills MD  PCP verified by CM? Yes    Chart Reviewed: Yes      History Provided by: Patient  Patient Orientation: Alert and Oriented, Person, Place, Situation, Self    Patient Cognition: Alert    Hospitalization in the last 30 days (Readmission):  No    If yes, Readmission Assessment in  Navigator will be completed.    Advance Directives:      Code Status: Full Code   Patient's Primary Decision Maker is: Legal Next of Kin    Primary Decision Maker: Mariya Smith - Other - [phone]    Secondary Decision Maker: ANNABEL WEN - Other - [phone]    Discharge Planning:    Patient lives with: Alone Type of Home: House  Primary Care Giver: Self  Patient Support Systems include: Family Members, Friends/Neighbors, Meals on Wheels, Home Care Staff, Worship/[employer] Community   Current services prior to admission: Durable Medical Equipment, Meals On Wheels, Home Care ([facility]X/[facility](12HRS WAIVER))            Current DME: Cane, Other (Comment) (ROLATOR, CANE)            Type of Home Care services:  None    ADLS  Prior functional level: Independent in ADLs/IADLs, Assistance with the following:, Mobility, Shopping  Current functional level:

## 2025-06-29 NOTE — PLAN OF CARE
Problem: Safety - Adult  Goal: Free from fall injury  6/29/2025 0349 by Robert Castro RN  Outcome: Progressing  6/28/2025 1417 by Denise Szymanski RN  Outcome: Progressing     Problem: Discharge Planning  Goal: Discharge to home or other facility with appropriate resources  6/29/2025 0349 by Robert Castro RN  Outcome: Progressing  Flowsheets (Taken 6/28/2025 1912)  Discharge to home or other facility with appropriate resources: Identify barriers to discharge with patient and caregiver  6/28/2025 1417 by Denise Szymanski RN  Outcome: Progressing  Flowsheets (Taken 6/28/2025 1003)  Discharge to home or other facility with appropriate resources:   Identify barriers to discharge with patient and caregiver   Arrange for needed discharge resources and transportation as appropriate   Identify discharge learning needs (meds, wound care, etc)     Problem: Pain  Goal: Verbalizes/displays adequate comfort level or baseline comfort level  6/29/2025 0349 by Robert Castro RN  Outcome: Progressing  6/28/2025 1417 by Denise Szymanski RN  Outcome: Progressing     Problem: Skin/Tissue Integrity  Goal: Skin integrity remains intact  Description: 1.  Monitor for areas of redness and/or skin breakdown  2.  Assess vascular access sites hourly  3.  Every 4-6 hours minimum:  Change oxygen saturation probe site  4.  Every 4-6 hours:  If on nasal continuous positive airway pressure, respiratory therapy assess nares and determine need for appliance change or resting period  Outcome: Progressing

## 2025-06-29 NOTE — PROGRESS NOTES
Patient requested intra-articular left knee injection.  Pharmacy able to send up appropriate medications.  With patient's permission left knee intra-articular steroid injection performed at bedside    **PROCEDURE NOTE: Left knee steroid injection  Left knee prepped with alcohol  Skin anesthetized with ethylene chloride  4 cc 1% lidocaine plain and 1 cc (40 mg) Kenalog injected into the Left knee joint using a superolateral approach  Injection site covered with Band-Aid  Patient tolerated procedure well

## 2025-06-29 NOTE — PROGRESS NOTES
Physician Progress Note    6/29/2025   3:47 PM    Name:  Arlyn Bhagat  MRN:    [id number]     IP Day: 2     Admit Date: 6/27/2025  5:12 PM  PCP: Francis Mills MD    Code Status:  Full Code    Assessment and Plan:        1.  Weakness related to possible UTI, left knee osteoarthritis, class III obesity, chronic lymphedema:  - Empiric treatment of simple cystitis to complete 6/29  - Appreciate orthopedic surgery who performed intra-articular to left knee steroid injection 6/29  - Continue home oral diuretics  - PT/OT consulted 6/27.  Patient wants to try ambulation.    Hypertension  Class III obesity  Anxiety on chronic benzodiazepine  History of complete heart block with dual-chamber pacemaker    Diet: ADULT DIET; Regular; No Added Salt (3-4 gm)  Ppx: lovenox  Full Code      Electronically signed by Ramírez Wakefield DO on 6/29/2025 at 3:47 PM    Subjective:     Feels better    Current Facility-Administered Medications   Medication Dose Route Frequency Provider Last Rate Last Admin    [START ON 6/30/2025] furosemide (LASIX) tablet 40 mg  40 mg Oral Daily Ramírez Wakefield DO        pantoprazole (PROTONIX) tablet 40 mg  40 mg Oral QAM AC Ramírez Wakefield DO   40 mg at 06/29/25 1024    diclofenac sodium (VOLTAREN) 1 % gel 2 g  2 g Topical BID Ramírez Wakefield DO   2 g at 06/29/25 0843    sodium chloride flush 0.9 % injection 5-40 mL  5-40 mL IntraVENous 2 times per day Anatoly Carreon MD   10 mL at 06/29/25 0841    sodium chloride flush 0.9 % injection 5-40 mL  5-40 mL IntraVENous PRN Anatoly Carreon MD        0.9 % sodium chloride infusion   IntraVENous PRN Anatoly Carreon MD        potassium chloride (KLOR-CON M) extended release tablet 40 mEq  40 mEq Oral PRN Anatoly Carreon MD   40 mEq at 06/28/25 1016    Or    potassium bicarb-citric acid (EFFER-K) effervescent tablet 40 mEq  40 mEq Oral PRN Anatoly Carreon MD        Or    potassium chloride 10 mEq/100 mL IVPB (Peripheral Line)  10 mEq

## 2025-06-30 LAB
ALBUMIN SERPL-MCNC: 3.5 G/DL (ref 3.5–4.6)
ALP SERPL-CCNC: 80 U/L (ref 40–130)
ALT SERPL-CCNC: 9 U/L (ref 0–33)
ANION GAP SERPL CALCULATED.3IONS-SCNC: 9 MEQ/L (ref 9–15)
AST SERPL-CCNC: 13 U/L (ref 0–35)
BASOPHILS # BLD: 0 K/UL (ref 0–0.2)
BASOPHILS NFR BLD: 0.2 %
BILIRUB SERPL-MCNC: 0.3 MG/DL (ref 0.2–0.7)
BUN SERPL-MCNC: 23 MG/DL (ref 8–23)
CALCIUM SERPL-MCNC: 9 MG/DL (ref 8.5–9.9)
CHLORIDE SERPL-SCNC: 95 MEQ/L (ref 95–107)
CO2 SERPL-SCNC: 37 MEQ/L (ref 20–31)
CREAT SERPL-MCNC: 0.67 MG/DL (ref 0.5–0.9)
EOSINOPHIL # BLD: 0 K/UL (ref 0–0.7)
EOSINOPHIL NFR BLD: 0 %
ERYTHROCYTE [DISTWIDTH] IN BLOOD BY AUTOMATED COUNT: 14 % (ref 11.5–14.5)
GLOBULIN SER CALC-MCNC: 3.1 G/DL (ref 2.3–3.5)
GLUCOSE SERPL-MCNC: 116 MG/DL (ref 70–99)
HCT VFR BLD AUTO: 33.5 % (ref 37–47)
HGB BLD-MCNC: 11.2 G/DL (ref 12–16)
LYMPHOCYTES # BLD: 1.8 K/UL (ref 1–4.8)
LYMPHOCYTES NFR BLD: 17.5 %
MCH RBC QN AUTO: 29.8 PG (ref 27–31.3)
MCHC RBC AUTO-ENTMCNC: 33.4 % (ref 33–37)
MCV RBC AUTO: 89.1 FL (ref 79.4–94.8)
MONOCYTES # BLD: 0.7 K/UL (ref 0.2–0.8)
MONOCYTES NFR BLD: 6.4 %
NEUTROPHILS # BLD: 7.7 K/UL (ref 1.4–6.5)
NEUTS SEG NFR BLD: 75.6 %
PLATELET # BLD AUTO: 414 K/UL (ref 130–400)
POTASSIUM SERPL-SCNC: 3.9 MEQ/L (ref 3.4–4.9)
POTASSIUM SERPL-SCNC: 3.9 MEQ/L (ref 3.4–4.9)
PROT SERPL-MCNC: 6.6 G/DL (ref 6.3–8)
RBC # BLD AUTO: 3.76 M/UL (ref 4.2–5.4)
SODIUM SERPL-SCNC: 141 MEQ/L (ref 135–144)
WBC # BLD AUTO: 10.2 K/UL (ref 4.8–10.8)

## 2025-06-30 PROCEDURE — 97162 PT EVAL MOD COMPLEX 30 MIN: CPT

## 2025-06-30 PROCEDURE — G0378 HOSPITAL OBSERVATION PER HR: HCPCS

## 2025-06-30 PROCEDURE — 6360000002 HC RX W HCPCS: Performed by: INTERNAL MEDICINE

## 2025-06-30 PROCEDURE — 97166 OT EVAL MOD COMPLEX 45 MIN: CPT

## 2025-06-30 PROCEDURE — 84132 ASSAY OF SERUM POTASSIUM: CPT

## 2025-06-30 PROCEDURE — 2580000003 HC RX 258: Performed by: INTERNAL MEDICINE

## 2025-06-30 PROCEDURE — 2500000003 HC RX 250 WO HCPCS: Performed by: INTERNAL MEDICINE

## 2025-06-30 PROCEDURE — 6370000000 HC RX 637 (ALT 250 FOR IP)

## 2025-06-30 PROCEDURE — 80053 COMPREHEN METABOLIC PANEL: CPT

## 2025-06-30 PROCEDURE — 6370000000 HC RX 637 (ALT 250 FOR IP): Performed by: INTERNAL MEDICINE

## 2025-06-30 PROCEDURE — 96372 THER/PROPH/DIAG INJ SC/IM: CPT

## 2025-06-30 PROCEDURE — 1210000000 HC MED SURG R&B

## 2025-06-30 PROCEDURE — 85025 COMPLETE CBC W/AUTO DIFF WBC: CPT

## 2025-06-30 PROCEDURE — 36415 COLL VENOUS BLD VENIPUNCTURE: CPT

## 2025-06-30 RX ORDER — ENOXAPARIN SODIUM 100 MG/ML
30 INJECTION SUBCUTANEOUS 2 TIMES DAILY
Status: DISCONTINUED | OUTPATIENT
Start: 2025-06-30 | End: 2025-07-04 | Stop reason: HOSPADM

## 2025-06-30 RX ORDER — AMMONIUM LACTATE 12 G/100G
LOTION TOPICAL DAILY
Status: DISCONTINUED | OUTPATIENT
Start: 2025-06-30 | End: 2025-07-04 | Stop reason: HOSPADM

## 2025-06-30 RX ADMIN — PAROXETINE HYDROCHLORIDE 20 MG: 20 TABLET, FILM COATED ORAL at 08:38

## 2025-06-30 RX ADMIN — DICLOFENAC SODIUM 2 G: 10 GEL TOPICAL at 20:45

## 2025-06-30 RX ADMIN — TIZANIDINE 4 MG: 4 TABLET ORAL at 20:44

## 2025-06-30 RX ADMIN — METOLAZONE 2.5 MG: 2.5 TABLET ORAL at 08:37

## 2025-06-30 RX ADMIN — Medication 400 MG: at 08:38

## 2025-06-30 RX ADMIN — Medication: at 11:14

## 2025-06-30 RX ADMIN — ENOXAPARIN SODIUM 30 MG: 100 INJECTION SUBCUTANEOUS at 20:44

## 2025-06-30 RX ADMIN — LORAZEPAM 0.25 MG: 0.5 TABLET ORAL at 08:38

## 2025-06-30 RX ADMIN — CARVEDILOL 12.5 MG: 12.5 TABLET, FILM COATED ORAL at 08:38

## 2025-06-30 RX ADMIN — TIZANIDINE 4 MG: 4 TABLET ORAL at 08:37

## 2025-06-30 RX ADMIN — LORAZEPAM 0.5 MG: 0.5 TABLET ORAL at 20:44

## 2025-06-30 RX ADMIN — SODIUM CHLORIDE, PRESERVATIVE FREE 10 ML: 5 INJECTION INTRAVENOUS at 08:41

## 2025-06-30 RX ADMIN — SODIUM CHLORIDE, PRESERVATIVE FREE 10 ML: 5 INJECTION INTRAVENOUS at 20:44

## 2025-06-30 RX ADMIN — Medication 1 TABLET: at 08:38

## 2025-06-30 RX ADMIN — DICLOFENAC SODIUM 2 G: 10 GEL TOPICAL at 08:43

## 2025-06-30 RX ADMIN — ENOXAPARIN SODIUM 30 MG: 100 INJECTION SUBCUTANEOUS at 08:37

## 2025-06-30 RX ADMIN — CARVEDILOL 12.5 MG: 12.5 TABLET, FILM COATED ORAL at 20:44

## 2025-06-30 RX ADMIN — FUROSEMIDE 40 MG: 40 TABLET ORAL at 08:38

## 2025-06-30 RX ADMIN — Medication 3 MG: at 20:44

## 2025-06-30 RX ADMIN — PANTOPRAZOLE SODIUM 40 MG: 40 TABLET, DELAYED RELEASE ORAL at 05:25

## 2025-06-30 RX ADMIN — POTASSIUM CHLORIDE 100 MEQ: 1500 TABLET, EXTENDED RELEASE ORAL at 08:37

## 2025-06-30 RX ADMIN — AMITRIPTYLINE HYDROCHLORIDE 25 MG: 25 TABLET, FILM COATED ORAL at 20:44

## 2025-06-30 RX ADMIN — ASPIRIN 81 MG: 81 TABLET, DELAYED RELEASE ORAL at 08:38

## 2025-06-30 ASSESSMENT — PAIN SCALES - GENERAL: PAINLEVEL_OUTOF10: 2

## 2025-06-30 NOTE — PROGRESS NOTES
Wound Ostomy Continence Nurse  Consult Note       NAME:  Arlyn Bhagat  MEDICAL RECORD NUMBER:  [id number]  AGE: 79 y.o.   GENDER: female  : 1945  TODAY'S DATE:  2025    Subjective   Reason for WOC Nurse Evaluation and Assessment: Bilateral LE Stasis dermatitis      Arlyn Bhagat is a 79 y.o. female referred by:   [] Physician  [x] Nursing  [] Other:     Wound Identification:  Wound Type: stasis dermatitis  Contributing Factors: edema, venous stasis, decreased mobility, and obesity    Wound History: Patient admitted to [facility] with venous stasis dermatitis to bilateral LEs and significant edema in the LEs. Patient repots this has been going on for over 6 months, often developed open weeping wounds which she uses neosporin. No open wounds at the time of this evaluation.   Current Wound Care Treatment:  Recommending 1) topical lac-hydrin lotion to bilateral LEs and feet daily 2) ace wraps for light compression 3) Follow-up in [facility] after discharge, will needed follow for fitting for compression wraps to prevent reoccurring venous ulcers    Patient Goal of Care:  [x] Wound Healing  [] Odor Control  [] Palliative Care  [] Pain Control   [] Other:         PAST MEDICAL HISTORY        Diagnosis Date    Asthma     Degenerative disc disease, lumbar     Hypertension     Lumbosacral disc disease     Lymphedema     MRSA infection     UTI (lower urinary tract infection)        PAST SURGICAL HISTORY    Past Surgical History:   Procedure Laterality Date    APPENDECTOMY      CARDIAC PACEMAKER PLACEMENT  2020    PACEMAKER PLACEMENT Right 2020    SALPINGO-OOPHORECTOMY      left side     SHOULDER SURGERY Right     TONSILLECTOMY      WRIST SURGERY         FAMILY HISTORY    History reviewed. No pertinent family history.    SOCIAL HISTORY    Social History     Tobacco Use    Smoking status: Never    Smokeless tobacco: Never   Vaping Use    Vaping status: Never Used   Substance  Use Topics    Alcohol use: Yes     Comment: Rarely    Drug use: No       ALLERGIES    Allergies   Allergen Reactions    Ciprofloxacin      Per patient request due to bad side effects     Metronidazole     Tetracycline        MEDICATIONS    No current facility-administered medications on file prior to encounter.     Current Outpatient Medications on File Prior to Encounter   Medication Sig Dispense Refill    potassium chloride (KLOR-CON M) 20 MEQ extended release tablet Take 5 tablets by mouth daily 150 tablet 1    carvedilol (COREG) 12.5 MG tablet Take 1 tablet by mouth 2 times daily 180 tablet 2    LORazepam (ATIVAN) 0.5 MG tablet Take 1 tablet by mouth at bedtime.      LORazepam (ATIVAN) 0.5 MG tablet Take 0.5 tablets by mouth every morning.      magnesium oxide (MAG-OX) 400 (240 Mg) MG tablet Take 1 tablet by mouth daily      metOLazone (ZAROXOLYN) 2.5 MG tablet 1 tablet daily      Glucosamine-Chondroitin (OSTEO BI-FLEX REGULAR STRENGTH PO) Take 1 tablet by mouth      furosemide (LASIX) 40 MG tablet Take 1 tablet by mouth daily      amitriptyline (ELAVIL) 25 MG tablet Take 1 tablet by mouth nightly      Menthol-Methyl Salicylate (ANALGESIC OINTMENT) OINT ointment Apply topically as needed Knee pain      PARoxetine (PAXIL-CR) 25 MG CR tablet Take 1 tablet by mouth every morning      aspirin 81 MG tablet Take 1 tablet by mouth daily      Multiple Vitamins-Minerals (THERAPEUTIC MULTIVITAMIN-MINERALS) tablet Take 1 tablet by mouth daily      carvedilol (COREG) 12.5 MG tablet Take 1 tablet by mouth 2 times daily 60 tablet 0    tiZANidine (ZANAFLEX) 4 MG tablet Take 1 tablet by mouth 2 times daily As needed per patient         Objective    BP (!) 145/71   Pulse 66   Temp 97.7 °F (36.5 °C) (Oral)   Resp 18   Ht 1.651 m (5' 5\")   Wt 109.6 kg (241 lb 11.2 oz)   SpO2 96%   BMI 40.22 kg/m²     LABS:  WBC:    Lab Results   Component Value Date/Time    WBC 10.2 06/30/2025 05:58 AM     H/H:    Lab Results   Component

## 2025-06-30 NOTE — PROGRESS NOTES
Pt states she has been able to participate more in activities and was able to get up to the chair                                                                                                    She does follow up with Dr Edmonds outpatient for get injection   We spoke with her that she can follow up with him as needed    Ortho sign off

## 2025-06-30 NOTE — PROGRESS NOTES
Hospitalist Progress Note      Date of Admission: 6/27/2025  Chief Complaint:    Chief Complaint   Patient presents with    Knee Pain    Urinary Frequency     Subjective:  No new complaints.  No nausea, vomiting, chest pain, or headache      Medications:    Infusion Medications    sodium chloride       Scheduled Medications    enoxaparin  30 mg SubCUTAneous BID    ammonium lactate   Topical Daily    furosemide  40 mg Oral Daily    pantoprazole  40 mg Oral QAM AC    diclofenac sodium  2 g Topical BID    sodium chloride flush  5-40 mL IntraVENous 2 times per day    amitriptyline  25 mg Oral Nightly    aspirin  81 mg Oral Daily    carvedilol  12.5 mg Oral BID    LORazepam  0.5 mg Oral Nightly    LORazepam  0.25 mg Oral QAM    magnesium oxide  400 mg Oral Daily    metOLazone  2.5 mg Oral Daily    therapeutic multivitamin-minerals  1 tablet Oral Daily    PARoxetine  20 mg Oral Daily    potassium chloride  100 mEq Oral Daily    tiZANidine  4 mg Oral BID     PRN Meds: sodium chloride flush, sodium chloride, potassium chloride **OR** potassium alternative oral replacement **OR** potassium chloride, magnesium sulfate, ondansetron **OR** ondansetron, melatonin, acetaminophen **OR** acetaminophen, sodium phosphate, analgesic ointment    Intake/Output Summary (Last 24 hours) at 6/30/2025 1817  Last data filed at 6/30/2025 0852  Gross per 24 hour   Intake 320 ml   Output 100 ml   Net 220 ml     Exam:  /65   Pulse 61   Temp 97.9 °F (36.6 °C) (Oral)   Resp 18   Ht 1.651 m (5' 5\")   Wt 109.6 kg (241 lb 11.2 oz)   SpO2 97%   BMI 40.22 kg/m²   Head: Normocephalic, atraumatic  Sclera clear  Neck JVD flat  Lungs: normal effort of breathing    Labs:   Recent Labs     06/28/25  0902 06/29/25  0533 06/30/25  0558   WBC 10.2 9.8 10.2   HGB 11.2* 10.5* 11.2*   HCT 34.6* 32.1* 33.5*    368 414*     Recent Labs     06/28/25  0812 06/29/25  0533 06/30/25  0558    140 141   K 4.1 3.1* 3.9  3.9   CL 95 94* 95   CO2 33*

## 2025-06-30 NOTE — PLAN OF CARE
Problem: Safety - Adult  Goal: Free from fall injury  Outcome: Progressing     Problem: Discharge Planning  Goal: Discharge to home or other facility with appropriate resources  Outcome: Progressing  Flowsheets  Taken 6/29/2025 1932 by Robert Castro RN  Discharge to home or other facility with appropriate resources: Identify barriers to discharge with patient and caregiver  Taken 6/29/2025 0837 by Denise Szymanski RN  Discharge to home or other facility with appropriate resources:   Identify barriers to discharge with patient and caregiver   Arrange for needed discharge resources and transportation as appropriate   Identify discharge learning needs (meds, wound care, etc)     Problem: Pain  Goal: Verbalizes/displays adequate comfort level or baseline comfort level  Outcome: Progressing     Problem: Skin/Tissue Integrity  Goal: Skin integrity remains intact  Description: 1.  Monitor for areas of redness and/or skin breakdown  2.  Assess vascular access sites hourly  3.  Every 4-6 hours minimum:  Change oxygen saturation probe site  4.  Every 4-6 hours:  If on nasal continuous positive airway pressure, respiratory therapy assess nares and determine need for appliance change or resting period  Outcome: Progressing  Flowsheets  Taken 6/29/2025 1932 by Robert Castro RN  Skin Integrity Remains Intact: Monitor for areas of redness and/or skin breakdown  Taken 6/29/2025 0837 by Denise Szymanski RN  Skin Integrity Remains Intact: Monitor for areas of redness and/or skin breakdown

## 2025-06-30 NOTE — PROGRESS NOTES
Physical Therapy Med Surg Initial Assessment  Facility/Department: [address] MED SURG UNIT  Room: [place] [address]       NAME: Arlyn Bhagat  : 1945 (79 y.o.)  MRN: [id number]  CODE STATUS: Full Code    Date of Service: 2025    Patient Diagnosis(es): Lymphedema [I89.0]  UTI (urinary tract infection) [N39.0]  Generalized weakness [R53.1]  Acute UTI [N39.0]  Arthralgia of knee, left [M25.562]  Congestive heart failure, unspecified HF chronicity, unspecified heart failure type (HCC) [I50.9]   Chief Complaint   Patient presents with    Knee Pain    Urinary Frequency     Patient Active Problem List    Diagnosis Date Noted    Heart block 2020    Enlarged tonsils 2023    UTI (urinary tract infection) 2025    Temporomandibular joint disorder 2024    Morbid obesity due to excess calories (HCC) 2024    Impaired mobility and activities of daily living 2024    Hypokalemia 2024    Anemia 2024    Depression, unspecified 2024    Thyroid nodule 2021    Pre-diabetes 2021    Gastroesophageal reflux disease without esophagitis 2021    SALAS (dyspnea on exertion) 2020    Lymphedema 2020    Symptomatic bradycardia 2020    Glenoid fracture of shoulder, left, closed, initial encounter 2019    Anterior dislocation of left shoulder 2019    Orthostasis 10/28/2019    Acquired spondylolisthesis 2018    Numbness and tingling of both legs 10/20/2017    Primary osteoarthritis of right knee 10/20/2017    Spinal stenosis of lumbar region with neurogenic claudication 10/20/2017    Lumbar radiculopathy 2015    Abscess or cellulitis of wrist 2012    MRSA (methicillin resistant Staphylococcus aureus) infection 2012    Dysphagia 2006    Chronic otitis externa 2005        Past Medical History:   Diagnosis Date    Asthma     Degenerative disc disease, lumbar     Hypertension     Lumbosacral disc disease

## 2025-06-30 NOTE — PROGRESS NOTES
Shift assessment completed and charted in flow sheets, Pt resting in bed, denies any needs, call light with in reach, will continue to monitor.    1127 BLE cleaned and dressed per orders, Pt tolerated well, will continue to monitor.

## 2025-06-30 NOTE — PLAN OF CARE
See OT evaluation for all goals and OT POC. Electronically signed by Nisha Vizcarra OT on 6/30/2025 at 11:57 AM

## 2025-06-30 NOTE — PROGRESS NOTES
[facility] OCCUPATIONAL THERAPY EVALUATION - ACUTE     NAME: Arlyn Bhagat  : 1945 (79 y.o.)  MRN: [id number]  CODE STATUS: Full Code  Room: W473/W473-01    Date of Service: 2025    Patient Diagnosis(es): Lymphedema [I89.0]  UTI (urinary tract infection) [N39.0]  Generalized weakness [R53.1]  Acute UTI [N39.0]  Arthralgia of knee, left [M25.562]  Congestive heart failure, unspecified HF chronicity, unspecified heart failure type (HCC) [I50.9]   Patient Active Problem List    Diagnosis Date Noted    Heart block 2020    Enlarged tonsils 2023    UTI (urinary tract infection) 2025    Temporomandibular joint disorder 2024    Morbid obesity due to excess calories (HCC) 2024    Impaired mobility and activities of daily living 2024    Hypokalemia 2024    Anemia 2024    Depression, unspecified 2024    Thyroid nodule 2021    Pre-diabetes 2021    Gastroesophageal reflux disease without esophagitis 2021    SALAS (dyspnea on exertion) 2020    Lymphedema 2020    Symptomatic bradycardia 2020    Glenoid fracture of shoulder, left, closed, initial encounter 2019    Anterior dislocation of left shoulder 2019    Orthostasis 10/28/2019    Acquired spondylolisthesis 2018    Numbness and tingling of both legs 10/20/2017    Primary osteoarthritis of right knee 10/20/2017    Spinal stenosis of lumbar region with neurogenic claudication 10/20/2017    Lumbar radiculopathy 2015    Abscess or cellulitis of wrist 2012    MRSA (methicillin resistant Staphylococcus aureus) infection 2012    Dysphagia 2006    Chronic otitis externa 2005      Arthralgia of knee, left  Acute UTI  Lymphedema  Generalized weakness  Congestive heart failure, unspecified HF chronicity, unspecified heart failure type (HCC)  Venous stasis dermatitis of both lower extremities    Past Medical History:   Diagnosis Date

## 2025-06-30 NOTE — CARE COORDINATION
Quality round completed with care management team. Pt from home alone. Have [facility] through [facility].   Agency: [facility].   12 Hrs of HHA. M,W,F.     Discussed DC plan with pt. Pt would like to return home with [facility] and [facility].   Referral sent to  [facility].   Pending acceptance.   Need [facility] order    Electronically signed by JOVANA Charles on 6/30/2025 at 11:03 AM    [facility] accepted pt. [facility] order sent to [facility].   SOC 7/2/25.     Electronically signed by JOVANA Charles on 6/30/2025 at 11:26 AM

## 2025-06-30 NOTE — FLOWSHEET NOTE
Patient does not feel comfortable going home because she does not feel strong enough to discharge home alone. Patient's start of care for home care will be 7/2 and her home health aid does not come until 7/2 either. Dr. Ovalle notified and states that patient is medically clear for discharge. Patient decides that she will appeal discharge and call tonight to do so. Dr. Ovalle notified of appeal.    Patient unable to appeal discharge over the phone after 1700.

## 2025-07-01 LAB
ALBUMIN SERPL-MCNC: 3.5 G/DL (ref 3.5–4.6)
ALP SERPL-CCNC: 81 U/L (ref 40–130)
ALT SERPL-CCNC: 9 U/L (ref 0–33)
ANION GAP SERPL CALCULATED.3IONS-SCNC: 7 MEQ/L (ref 9–15)
AST SERPL-CCNC: 18 U/L (ref 0–35)
BASOPHILS # BLD: 0 K/UL (ref 0–0.2)
BASOPHILS NFR BLD: 0.2 %
BILIRUB SERPL-MCNC: <0.2 MG/DL (ref 0.2–0.7)
BUN SERPL-MCNC: 31 MG/DL (ref 8–23)
CALCIUM SERPL-MCNC: 9 MG/DL (ref 8.5–9.9)
CHLORIDE SERPL-SCNC: 100 MEQ/L (ref 95–107)
CO2 SERPL-SCNC: 36 MEQ/L (ref 20–31)
CREAT SERPL-MCNC: 1 MG/DL (ref 0.5–0.9)
EOSINOPHIL # BLD: 0.1 K/UL (ref 0–0.7)
EOSINOPHIL NFR BLD: 0.9 %
ERYTHROCYTE [DISTWIDTH] IN BLOOD BY AUTOMATED COUNT: 14.2 % (ref 11.5–14.5)
GLOBULIN SER CALC-MCNC: 3.1 G/DL (ref 2.3–3.5)
GLUCOSE SERPL-MCNC: 101 MG/DL (ref 70–99)
HCT VFR BLD AUTO: 32.8 % (ref 37–47)
HGB BLD-MCNC: 10.9 G/DL (ref 12–16)
LYMPHOCYTES # BLD: 2.9 K/UL (ref 1–4.8)
LYMPHOCYTES NFR BLD: 28 %
MCH RBC QN AUTO: 29.7 PG (ref 27–31.3)
MCHC RBC AUTO-ENTMCNC: 33.2 % (ref 33–37)
MCV RBC AUTO: 89.4 FL (ref 79.4–94.8)
MONOCYTES # BLD: 0.8 K/UL (ref 0.2–0.8)
MONOCYTES NFR BLD: 8.1 %
NEUTROPHILS # BLD: 6.5 K/UL (ref 1.4–6.5)
NEUTS SEG NFR BLD: 62.5 %
PLATELET # BLD AUTO: 407 K/UL (ref 130–400)
POTASSIUM SERPL-SCNC: 3.9 MEQ/L (ref 3.4–4.9)
PROT SERPL-MCNC: 6.6 G/DL (ref 6.3–8)
RBC # BLD AUTO: 3.67 M/UL (ref 4.2–5.4)
SODIUM SERPL-SCNC: 143 MEQ/L (ref 135–144)
WBC # BLD AUTO: 10.4 K/UL (ref 4.8–10.8)

## 2025-07-01 PROCEDURE — 96372 THER/PROPH/DIAG INJ SC/IM: CPT

## 2025-07-01 PROCEDURE — 97535 SELF CARE MNGMENT TRAINING: CPT

## 2025-07-01 PROCEDURE — 36415 COLL VENOUS BLD VENIPUNCTURE: CPT

## 2025-07-01 PROCEDURE — G0378 HOSPITAL OBSERVATION PER HR: HCPCS

## 2025-07-01 PROCEDURE — 2500000003 HC RX 250 WO HCPCS: Performed by: INTERNAL MEDICINE

## 2025-07-01 PROCEDURE — 1210000000 HC MED SURG R&B

## 2025-07-01 PROCEDURE — 97116 GAIT TRAINING THERAPY: CPT

## 2025-07-01 PROCEDURE — 6370000000 HC RX 637 (ALT 250 FOR IP): Performed by: INTERNAL MEDICINE

## 2025-07-01 PROCEDURE — 85025 COMPLETE CBC W/AUTO DIFF WBC: CPT

## 2025-07-01 PROCEDURE — 80053 COMPREHEN METABOLIC PANEL: CPT

## 2025-07-01 PROCEDURE — 6360000002 HC RX W HCPCS: Performed by: INTERNAL MEDICINE

## 2025-07-01 RX ADMIN — DICLOFENAC SODIUM 2 G: 10 GEL TOPICAL at 11:12

## 2025-07-01 RX ADMIN — LORAZEPAM 0.5 MG: 0.5 TABLET ORAL at 22:38

## 2025-07-01 RX ADMIN — ACETAMINOPHEN 650 MG: 325 TABLET ORAL at 22:53

## 2025-07-01 RX ADMIN — PANTOPRAZOLE SODIUM 40 MG: 40 TABLET, DELAYED RELEASE ORAL at 05:47

## 2025-07-01 RX ADMIN — AMITRIPTYLINE HYDROCHLORIDE 25 MG: 25 TABLET, FILM COATED ORAL at 22:38

## 2025-07-01 RX ADMIN — TIZANIDINE 4 MG: 4 TABLET ORAL at 22:38

## 2025-07-01 RX ADMIN — Medication 400 MG: at 11:08

## 2025-07-01 RX ADMIN — FUROSEMIDE 40 MG: 40 TABLET ORAL at 11:08

## 2025-07-01 RX ADMIN — METOLAZONE 2.5 MG: 2.5 TABLET ORAL at 11:08

## 2025-07-01 RX ADMIN — CARVEDILOL 12.5 MG: 12.5 TABLET, FILM COATED ORAL at 22:38

## 2025-07-01 RX ADMIN — PAROXETINE HYDROCHLORIDE 20 MG: 20 TABLET, FILM COATED ORAL at 11:08

## 2025-07-01 RX ADMIN — ASPIRIN 81 MG: 81 TABLET, DELAYED RELEASE ORAL at 11:08

## 2025-07-01 RX ADMIN — ENOXAPARIN SODIUM 30 MG: 100 INJECTION SUBCUTANEOUS at 22:37

## 2025-07-01 RX ADMIN — Medication: at 11:12

## 2025-07-01 RX ADMIN — CARVEDILOL 12.5 MG: 12.5 TABLET, FILM COATED ORAL at 11:08

## 2025-07-01 RX ADMIN — TIZANIDINE 4 MG: 4 TABLET ORAL at 11:08

## 2025-07-01 RX ADMIN — DICLOFENAC SODIUM 2 G: 10 GEL TOPICAL at 22:39

## 2025-07-01 RX ADMIN — LORAZEPAM 0.25 MG: 0.5 TABLET ORAL at 11:08

## 2025-07-01 RX ADMIN — SODIUM CHLORIDE, PRESERVATIVE FREE 10 ML: 5 INJECTION INTRAVENOUS at 11:08

## 2025-07-01 RX ADMIN — SODIUM CHLORIDE, PRESERVATIVE FREE 10 ML: 5 INJECTION INTRAVENOUS at 22:39

## 2025-07-01 RX ADMIN — ENOXAPARIN SODIUM 30 MG: 100 INJECTION SUBCUTANEOUS at 11:08

## 2025-07-01 RX ADMIN — Medication 1 TABLET: at 11:08

## 2025-07-01 RX ADMIN — Medication 3 MG: at 22:38

## 2025-07-01 RX ADMIN — POTASSIUM CHLORIDE 100 MEQ: 1500 TABLET, EXTENDED RELEASE ORAL at 11:07

## 2025-07-01 ASSESSMENT — PAIN SCALES - GENERAL
PAINLEVEL_OUTOF10: 2
PAINLEVEL_OUTOF10: 3
PAINLEVEL_OUTOF10: 1

## 2025-07-01 NOTE — CARE COORDINATION
Met with patient at bedside to discuss discharge plan. Patient states she called Ag to appeal discharge. Ag provided to patient ID number [id number].     1026  Reviewed DND form with patient and obtained patient's signautre and copy left with patient. Discharge plan remain home with [facility] waiver 12 hours a week and [facility]-accepted.    1120  Met with patient at bedside and updated if patient choose to discharge she may do so.  Patient concern for out of pocket cost for hospitalization.  Updated patient there would be no out of pocket cost during appeal. Patient states understanding.

## 2025-07-01 NOTE — PLAN OF CARE
Problem: Safety - Adult  Goal: Free from fall injury  Outcome: Progressing     Problem: Discharge Planning  Goal: Discharge to home or other facility with appropriate resources  Outcome: Progressing     Problem: Pain  Goal: Verbalizes/displays adequate comfort level or baseline comfort level  Outcome: Progressing     Problem: Skin/Tissue Integrity  Goal: Skin integrity remains intact  Description: 1.  Monitor for areas of redness and/or skin breakdown  2.  Assess vascular access sites hourly  3.  Every 4-6 hours minimum:  Change oxygen saturation probe site  4.  Every 4-6 hours:  If on nasal continuous positive airway pressure, respiratory therapy assess nares and determine need for appliance change or resting period  Outcome: Progressing     Problem: Occupational Therapy - Adult  Goal: By Discharge: Performs self-care activities at highest level of function for planned discharge setting.  See evaluation for individualized goals.  6/30/2025 1157 by Nisha Vizcarra, OT  Outcome: Progressing

## 2025-07-01 NOTE — PLAN OF CARE
Problem: Safety - Adult  Goal: Free from fall injury  7/1/2025 1233 by Clara Jimenez RN  Outcome: Progressing  7/1/2025 0156 by Keesha Wang RN  Outcome: Progressing     Problem: Discharge Planning  Goal: Discharge to home or other facility with appropriate resources  7/1/2025 1233 by Clara Jimenez RN  Outcome: Progressing  7/1/2025 0156 by Keesha Wang RN  Outcome: Progressing     Problem: Pain  Goal: Verbalizes/displays adequate comfort level or baseline comfort level  7/1/2025 1233 by Clara Jimenez RN  Outcome: Progressing  7/1/2025 0156 by Keesha Wang RN  Outcome: Progressing     Problem: Skin/Tissue Integrity  Goal: Skin integrity remains intact  Description: 1.  Monitor for areas of redness and/or skin breakdown  2.  Assess vascular access sites hourly  3.  Every 4-6 hours minimum:  Change oxygen saturation probe site  4.  Every 4-6 hours:  If on nasal continuous positive airway pressure, respiratory therapy assess nares and determine need for appliance change or resting period  7/1/2025 1233 by Clara Jimenez RN  Outcome: Progressing  7/1/2025 0156 by Keesha Wang RN  Outcome: Progressing

## 2025-07-01 NOTE — PROGRESS NOTES
Shift assessment complete. Pt is AxOx4. Meds given per mar. Pt sitting up in chair. Lac hydrin and ace wrap applied to BLE. Pt denies any further needs at this time. Call light within reach.     Electronically signed by Clara Jimenez RN on 7/1/2025 at 2:26 PM

## 2025-07-01 NOTE — PROGRESS NOTES
[facility] OCCUPATIONAL THERAPY MED SURG TREATMENT NOTE     Date: 2025  Patient Name: Arlyn Bhagat        MRN: [id number]  Account: [id number]   : 1945  (79 y.o.)  Room: [place] [address]    Chart Review:    Restrictions  Restrictions/Precautions  Restrictions/Precautions: Fall Risk     Safety:  Safety Devices  Type of Devices: All fall risk precautions in place;Call light within reach;Left in chair    Patient's birthday verified: Yes    Subjective:    Pain at start of treatment: Yes: 210    Pain at end of treatment: Yes: 10    Location: L knee  Nursing notified: Declined  Intervention: Other: Declined    Objective:    ADL Status:  ADL  Grooming: Minimal assistance  Grooming Skilled Clinical Factors: Washed face and performed oral care seated in chair with arms. Assist with haircare and deodorant for thoroughness.  UE Bathing: Supervision  UE Bathing Skilled Clinical Factors: VC for thoroughness. Seated in chair with arms.  LE Bathing: Moderate assistance  LE Bathing Skilled Clinical Factors: Assist for posterior zhanna care and to wash bottom half of B legs and feet.  UE Dressing: Unable to assess (Comment)  UE Dressing Skilled Clinical Factors: Hospital gown only.  LE Dressing: Unable to assess (Comment)  LE Dressing Skilled Clinical Factors: Hospital gown only.  Putting On/Taking Off Footwear: Dependent/Total  Putting On/Taking Off Footwear Skilled Clinical Factors: Doff/don hospital socks.  Toileting: Maximum assistance  Toileting Skilled Clinical Factors: Assist for posterior zhanna care.  Product Used : Soap and water  Toilet Transfers  Toilet - Technique: Ambulating  Equipment Used: Extra wide bedside commode  Toilet Transfer: Stand by assistance  Toilet Transfers Comments: Firm BM    Therapy key for assistance levels -   Independent/Mod I = Pt. is able to perform task with no assistance but may require a device   Stand by assistance = Pt. does not perform task at an independent level but does  of personal grooming?: A Little  How much help for eating meals?: A Little  AM-PAC Inpatient Daily Activity Raw Score: 15  AM-PAC Inpatient ADL T-Scale Score : 34.69  ADL Inpatient CMS 0-100% Score: 56.46  ADL Inpatient CMS G-Code Modifier : CK    Plan:    Continue OT per POC    Patient Education:  Patient Education  Education Given To: Patient  Education Provided: Role of Therapy;Plan of Care;Transfer Training  Education Provided Comments: Push off from chair when standing.  Education Method: Verbal  Barriers to Learning: None  Education Outcome: Verbalized understanding;Continued education needed    Goals/Plan of care addressed during this session:        Patient Goal: Patient goals : \"I'd like to get a little stronger.\"    Improve [place] with ADLs    Therapy Time:   Individual Group Co-Treat   Time In 1004       Time Out 1106         Minutes 62           ADL/IADL trainin minutes    Electronically signed by:    KARLO Nieto    2025, 12:49 PM

## 2025-07-01 NOTE — PROGRESS NOTES
Physical Therapy Med Surg Daily Treatment Note  Facility/Department: [address] MED SURG UNIT  Room: [place] [address]       NAME: Arlyn Bhagat  : 1945 (79 y.o.)  MRN: [id number]  CODE STATUS: Full Code    Date of Service: 2025    Patient Diagnosis(es): Lymphedema [I89.0]  UTI (urinary tract infection) [N39.0]  Generalized weakness [R53.1]  Acute UTI [N39.0]  Arthralgia of knee, left [M25.562]  Congestive heart failure, unspecified HF chronicity, unspecified heart failure type (HCC) [I50.9]   Chief Complaint   Patient presents with    Knee Pain    Urinary Frequency     Patient Active Problem List    Diagnosis Date Noted    Heart block 2020    Enlarged tonsils 2023    UTI (urinary tract infection) 2025    Temporomandibular joint disorder 2024    Morbid obesity due to excess calories (HCC) 2024    Impaired mobility and activities of daily living 2024    Hypokalemia 2024    Anemia 2024    Depression, unspecified 2024    Thyroid nodule 2021    Pre-diabetes 2021    Gastroesophageal reflux disease without esophagitis 2021    SALAS (dyspnea on exertion) 2020    Lymphedema 2020    Symptomatic bradycardia 2020    Glenoid fracture of shoulder, left, closed, initial encounter 2019    Anterior dislocation of left shoulder 2019    Orthostasis 10/28/2019    Acquired spondylolisthesis 2018    Numbness and tingling of both legs 10/20/2017    Primary osteoarthritis of right knee 10/20/2017    Spinal stenosis of lumbar region with neurogenic claudication 10/20/2017    Lumbar radiculopathy 2015    Abscess or cellulitis of wrist 2012    MRSA (methicillin resistant Staphylococcus aureus) infection 2012    Dysphagia 2006    Chronic otitis externa 2005        Past Medical History:   Diagnosis Date    Asthma     Degenerative disc disease, lumbar     Hypertension     Lumbosacral disc disease

## 2025-07-01 NOTE — PROGRESS NOTES
Hospitalist Progress Note      Date of Admission: 6/27/2025  Chief Complaint:    Chief Complaint   Patient presents with    Knee Pain    Urinary Frequency     Subjective:  No new complaints.  No nausea, vomiting, chest pain, or headache      Medications:    Infusion Medications    sodium chloride       Scheduled Medications    enoxaparin  30 mg SubCUTAneous BID    ammonium lactate   Topical Daily    furosemide  40 mg Oral Daily    pantoprazole  40 mg Oral QAM AC    diclofenac sodium  2 g Topical BID    sodium chloride flush  5-40 mL IntraVENous 2 times per day    amitriptyline  25 mg Oral Nightly    aspirin  81 mg Oral Daily    carvedilol  12.5 mg Oral BID    LORazepam  0.5 mg Oral Nightly    LORazepam  0.25 mg Oral QAM    magnesium oxide  400 mg Oral Daily    metOLazone  2.5 mg Oral Daily    therapeutic multivitamin-minerals  1 tablet Oral Daily    PARoxetine  20 mg Oral Daily    potassium chloride  100 mEq Oral Daily    tiZANidine  4 mg Oral BID     PRN Meds: sodium chloride flush, sodium chloride, potassium chloride **OR** potassium alternative oral replacement **OR** potassium chloride, magnesium sulfate, ondansetron **OR** ondansetron, melatonin, acetaminophen **OR** acetaminophen, sodium phosphate, analgesic ointment    Intake/Output Summary (Last 24 hours) at 7/1/2025 1821  Last data filed at 7/1/2025 1224  Gross per 24 hour   Intake 800 ml   Output 400 ml   Net 400 ml     Exam:  /73   Pulse 58   Temp 97.9 °F (36.6 °C) (Oral)   Resp 18   Ht 1.651 m (5' 5\")   Wt 109.6 kg (241 lb 11.2 oz)   SpO2 97%   BMI 40.22 kg/m²   Head: Normocephalic, atraumatic  Sclera clear  Neck JVD flat  Lungs: normal effort of breathing    Labs:   Recent Labs     06/29/25  0533 06/30/25  0558 07/01/25  0446   WBC 9.8 10.2 10.4   HGB 10.5* 11.2* 10.9*   HCT 32.1* 33.5* 32.8*    414* 407*     Recent Labs     06/29/25  0533 06/30/25  0558 07/01/25  0446    141 143   K 3.1* 3.9  3.9 3.9   CL 94* 95 100   CO2 39*

## 2025-07-02 LAB
ALBUMIN SERPL-MCNC: 3.8 G/DL (ref 3.5–4.6)
ALP SERPL-CCNC: 84 U/L (ref 40–130)
ALT SERPL-CCNC: 11 U/L (ref 0–33)
ANION GAP SERPL CALCULATED.3IONS-SCNC: 8 MEQ/L (ref 9–15)
AST SERPL-CCNC: 18 U/L (ref 0–35)
BASOPHILS # BLD: 0 K/UL (ref 0–0.2)
BASOPHILS NFR BLD: 0.4 %
BILIRUB SERPL-MCNC: <0.2 MG/DL (ref 0.2–0.7)
BUN SERPL-MCNC: 28 MG/DL (ref 8–23)
CALCIUM SERPL-MCNC: 9.5 MG/DL (ref 8.5–9.9)
CHLORIDE SERPL-SCNC: 101 MEQ/L (ref 95–107)
CO2 SERPL-SCNC: 36 MEQ/L (ref 20–31)
CREAT SERPL-MCNC: 0.81 MG/DL (ref 0.5–0.9)
EOSINOPHIL # BLD: 0.2 K/UL (ref 0–0.7)
EOSINOPHIL NFR BLD: 2.3 %
ERYTHROCYTE [DISTWIDTH] IN BLOOD BY AUTOMATED COUNT: 14.4 % (ref 11.5–14.5)
GLOBULIN SER CALC-MCNC: 3.1 G/DL (ref 2.3–3.5)
GLUCOSE SERPL-MCNC: 84 MG/DL (ref 70–99)
HCT VFR BLD AUTO: 34 % (ref 37–47)
HGB BLD-MCNC: 10.8 G/DL (ref 12–16)
LYMPHOCYTES # BLD: 3.4 K/UL (ref 1–4.8)
LYMPHOCYTES NFR BLD: 36.4 %
MCH RBC QN AUTO: 29 PG (ref 27–31.3)
MCHC RBC AUTO-ENTMCNC: 31.8 % (ref 33–37)
MCV RBC AUTO: 91.4 FL (ref 79.4–94.8)
MONOCYTES # BLD: 0.7 K/UL (ref 0.2–0.8)
MONOCYTES NFR BLD: 7.9 %
NEUTROPHILS # BLD: 4.9 K/UL (ref 1.4–6.5)
NEUTS SEG NFR BLD: 52.8 %
PLATELET # BLD AUTO: 426 K/UL (ref 130–400)
POTASSIUM SERPL-SCNC: 4.1 MEQ/L (ref 3.4–4.9)
PROT SERPL-MCNC: 6.9 G/DL (ref 6.3–8)
RBC # BLD AUTO: 3.72 M/UL (ref 4.2–5.4)
SODIUM SERPL-SCNC: 145 MEQ/L (ref 135–144)
WBC # BLD AUTO: 9.2 K/UL (ref 4.8–10.8)

## 2025-07-02 PROCEDURE — G0378 HOSPITAL OBSERVATION PER HR: HCPCS

## 2025-07-02 PROCEDURE — 97535 SELF CARE MNGMENT TRAINING: CPT

## 2025-07-02 PROCEDURE — 6370000000 HC RX 637 (ALT 250 FOR IP): Performed by: INTERNAL MEDICINE

## 2025-07-02 PROCEDURE — 80053 COMPREHEN METABOLIC PANEL: CPT

## 2025-07-02 PROCEDURE — 6360000002 HC RX W HCPCS: Performed by: INTERNAL MEDICINE

## 2025-07-02 PROCEDURE — 2500000003 HC RX 250 WO HCPCS: Performed by: INTERNAL MEDICINE

## 2025-07-02 PROCEDURE — 1210000000 HC MED SURG R&B

## 2025-07-02 PROCEDURE — 36415 COLL VENOUS BLD VENIPUNCTURE: CPT

## 2025-07-02 PROCEDURE — 96372 THER/PROPH/DIAG INJ SC/IM: CPT

## 2025-07-02 PROCEDURE — 85025 COMPLETE CBC W/AUTO DIFF WBC: CPT

## 2025-07-02 RX ORDER — OXYBUTYNIN CHLORIDE 5 MG/1
5 TABLET ORAL 3 TIMES DAILY
Status: DISCONTINUED | OUTPATIENT
Start: 2025-07-02 | End: 2025-07-04 | Stop reason: HOSPADM

## 2025-07-02 RX ADMIN — Medication 1 TABLET: at 10:46

## 2025-07-02 RX ADMIN — DICLOFENAC SODIUM 2 G: 10 GEL TOPICAL at 10:51

## 2025-07-02 RX ADMIN — ENOXAPARIN SODIUM 30 MG: 100 INJECTION SUBCUTANEOUS at 20:24

## 2025-07-02 RX ADMIN — CARVEDILOL 12.5 MG: 12.5 TABLET, FILM COATED ORAL at 10:46

## 2025-07-02 RX ADMIN — CARVEDILOL 12.5 MG: 12.5 TABLET, FILM COATED ORAL at 20:23

## 2025-07-02 RX ADMIN — AMITRIPTYLINE HYDROCHLORIDE 25 MG: 25 TABLET, FILM COATED ORAL at 20:23

## 2025-07-02 RX ADMIN — Medication: at 10:48

## 2025-07-02 RX ADMIN — OXYBUTYNIN CHLORIDE 5 MG: 5 TABLET ORAL at 16:08

## 2025-07-02 RX ADMIN — Medication 400 MG: at 10:46

## 2025-07-02 RX ADMIN — LORAZEPAM 0.25 MG: 0.5 TABLET ORAL at 10:47

## 2025-07-02 RX ADMIN — FUROSEMIDE 40 MG: 40 TABLET ORAL at 10:46

## 2025-07-02 RX ADMIN — PAROXETINE HYDROCHLORIDE 20 MG: 20 TABLET, FILM COATED ORAL at 10:46

## 2025-07-02 RX ADMIN — SODIUM CHLORIDE, PRESERVATIVE FREE 10 ML: 5 INJECTION INTRAVENOUS at 20:25

## 2025-07-02 RX ADMIN — ENOXAPARIN SODIUM 30 MG: 100 INJECTION SUBCUTANEOUS at 10:47

## 2025-07-02 RX ADMIN — PANTOPRAZOLE SODIUM 40 MG: 40 TABLET, DELAYED RELEASE ORAL at 05:51

## 2025-07-02 RX ADMIN — ASPIRIN 81 MG: 81 TABLET, DELAYED RELEASE ORAL at 10:46

## 2025-07-02 RX ADMIN — TIZANIDINE 4 MG: 4 TABLET ORAL at 20:26

## 2025-07-02 RX ADMIN — LORAZEPAM 0.5 MG: 0.5 TABLET ORAL at 20:23

## 2025-07-02 RX ADMIN — SODIUM CHLORIDE, PRESERVATIVE FREE 10 ML: 5 INJECTION INTRAVENOUS at 10:47

## 2025-07-02 RX ADMIN — OXYBUTYNIN CHLORIDE 5 MG: 5 TABLET ORAL at 20:23

## 2025-07-02 RX ADMIN — DICLOFENAC SODIUM 2 G: 10 GEL TOPICAL at 20:23

## 2025-07-02 RX ADMIN — METOLAZONE 2.5 MG: 2.5 TABLET ORAL at 10:47

## 2025-07-02 RX ADMIN — POTASSIUM CHLORIDE 100 MEQ: 1500 TABLET, EXTENDED RELEASE ORAL at 10:46

## 2025-07-02 RX ADMIN — TIZANIDINE 4 MG: 4 TABLET ORAL at 10:46

## 2025-07-02 ASSESSMENT — PAIN DESCRIPTION - ORIENTATION: ORIENTATION: LEFT;RIGHT

## 2025-07-02 ASSESSMENT — PAIN SCALES - GENERAL
PAINLEVEL_OUTOF10: 4
PAINLEVEL_OUTOF10: 3

## 2025-07-02 ASSESSMENT — PAIN DESCRIPTION - LOCATION: LOCATION: KNEE

## 2025-07-02 NOTE — PROGRESS NOTES
[facility]ERAP[facility] MED SURG TREATMENT NOTE     Date: 2025  Patient Name: Arlyn Bhagat        MRN: [id number]  Account: [id number]   : 1945  (79 y.o.)  [address]: [address]    Chart Review:    Restrictions  Restrictions/Precautions  Restrictions/Precautions: Fall Risk     Safety:  Safety Devices  Type of Devices: All fall risk precautions in place;Call light within reach;Bed alarm in place;Left in bed    Patient's birthday verified: Yes    Subjective:    Pain at start of treatment: Yes: 2/10    Pain at end of treatment: Yes: 2/10  Pt stated that her pain fluctuates between 0 and 2.     Location: L knee  Nursing notified: Declined  Intervention: Other: Declined    Objective:    ADL Status:  ADL  Grooming: Minimal assistance  Grooming Skilled Clinical Factors: Washed face and performed oral care at bed level. Assist with hair care and deodorant for thoroughness.  UE Bathing: Minimal assistance  UE Bathing Skilled Clinical Factors: VC for thoroughness when washing chest, stomach and arms. Min assist to wash under arms.  LE Bathing: Moderate assistance  LE Bathing Skilled Clinical Factors: Pt able to wash front zhanna area and top half of B legs. Assist for bottom half of legs, feet and posterior zhanna area.  UE Dressing: Unable to assess (Comment)  UE Dressing Skilled Clinical Factors: Hospital gown only.  LE Dressing: Unable to assess (Comment)  LE Dressing Skilled Clinical Factors: Hospital gown only.  Putting On/Taking Off Footwear Skilled Clinical Factors: Not attempted.  Toileting Skilled Clinical Factors: Not attempted  Product Used : Soap and water    Therapy key for assistance levels -   Independent/Mod I = Pt. is able to perform task with no assistance but may require a device   Stand by assistance = Pt. does not perform task at an independent level but does not need physical assistance, requires verbal cues  Minimal, Moderate, Maximal Assistance = Pt. requires physical assistance  (25%, 50%, 75% assist from helper) for task but is able to actively participate in task   Dependent = Pt. requires total assistance with task and is not able to actively participate with task completion    Orientation Status:  Orientation  Overall Orientation Status: Within Functional Limits  Orientation Level: Oriented X4    Cognition Status:  Cognition  Overall Cognitive Status: WFL    Bed Mobility  Bed mobility  Rolling to Left: Supervision  Rolling to Right: Supervision    Functional Endurance:  Activity Tolerance  Activity Tolerance: Patient Tolerated treatment well    Treatment consisted of:    ADL training    Assessment/Discharge Disposition:  Assessment  Discharge Recommendations: Continue to assess pending progress  Assessment: Pt demo good effort to complete ADL tasks at bed level. Assist for thoroghness provided. Conversational and pleasant.    [employer]  How much help is needed for putting on and taking off regular lower body clothing?: A Lot  How much help is needed for bathing (which includes washing, rinsing, drying)?: A Lot  How much help is needed for toileting (which includes using toilet, bedpan, or urinal)?: A Lot  How much help is needed for putting on and taking off regular upper body clothing?: A Little  How much help is needed for taking care of personal grooming?: A Little  How much help for eating meals?: A Little  AM-PAC Inpatient Daily Activity Raw Score: 15  AM-PAC Inpatient ADL T-Scale Score : 34.69  ADL Inpatient CMS 0-100% Score: 56.46  ADL Inpatient CMS G-Code Modifier : CK    Plan:    Continue OT per POC    Patient Education:  Patient Education  Education Given To: Patient  Education Provided: Role of Therapy;Plan of Care  Education Method: Verbal  Barriers to Learning: None  Education Outcome: Verbalized understanding;Continued education needed    Goals/Plan of care addressed during this session:        Patient Goal: Patient goals : \"I'd like to get a little stronger.\"    Improve

## 2025-07-02 NOTE — PLAN OF CARE
Problem: Safety - Adult  Goal: Free from fall injury  7/2/2025 1216 by Clara Jimenez RN  Outcome: Progressing  7/2/2025 0013 by Keesha Wang RN  Outcome: Progressing     Problem: Discharge Planning  Goal: Discharge to home or other facility with appropriate resources  7/2/2025 1216 by Clara Jimenez RN  Outcome: Progressing  7/2/2025 0013 by Keesha Wang RN  Outcome: Progressing     Problem: Pain  Goal: Verbalizes/displays adequate comfort level or baseline comfort level  7/2/2025 1216 by Clara Jimenez RN  Outcome: Progressing  7/2/2025 0013 by Keesha Wang RN  Outcome: Progressing     Problem: Skin/Tissue Integrity  Goal: Skin integrity remains intact  Description: 1.  Monitor for areas of redness and/or skin breakdown  2.  Assess vascular access sites hourly  3.  Every 4-6 hours minimum:  Change oxygen saturation probe site  4.  Every 4-6 hours:  If on nasal continuous positive airway pressure, respiratory therapy assess nares and determine need for appliance change or resting period  7/2/2025 1216 by Clara Jimenez RN  Outcome: Progressing  7/2/2025 0013 by Keesha Wang RN  Outcome: Progressing

## 2025-07-02 NOTE — PROGRESS NOTES
Shift assessment complete, see flowsheets. Pt is AxOx4. Meds given per mar, with a sip of water. Pt denies any needs at this time. Call light within reach.    Electronically signed by Clara Jimenez RN on 7/2/2025 at 12:18 PM

## 2025-07-02 NOTE — PROGRESS NOTES
Spiritual Health History and Assessment/Progress Note  [facility] [place]    Loneliness/Social Isolation,  , Grief and loss, Life Adjustments,      Name: Arlyn Bhagat MRN: [id number]    Age: 79 y.o.     Sex: female   Language: English   Evangelical: [employer]ology   UTI (urinary tract infection)     Date: 7/2/2025            Total Time Calculated: 15 min              Spiritual Assessment began in [facility] MED SURG UNIT            Encounter Overview/Reason: Loneliness/Social Isolation  Service Provided For: Patient     visited with the pt, the pt was open to having the  visit, the pt reports she is doing better, the pt desires to go home. The pt requested for prayer. The  provided active listening as the pt shared her story, theological reflection as the pt shared her huma, and prayer. The  remain available     Huma, Belief, Meaning:   Patient identifies as spiritual, is connected with a huma tradition or spiritual practice, and has beliefs or practices that help with coping during difficult times  Family/Friends No family/friends present      Importance and Influence:  Patient has spiritual/personal beliefs that influence decisions regarding their health  Family/Friends No family/friends present    Community:  Patient is connected with a spiritual community  Family/Friends No family/friends present    Assessment and Plan of Care:     Patient Interventions include: Facilitated expression of thoughts and feelings, Explored spiritual coping/struggle/distress, Engaged in theological reflection, and Affirmed coping skills/support systems  Family/Friends Interventions include: No family/friends present    Patient Plan of Care: Spiritual Care available upon further referral  Family/Friends Plan of Care: No family/friends present    Electronically signed by Chaplain Shadi on 7/2/2025 at 1:28 PM

## 2025-07-02 NOTE — PLAN OF CARE
Problem: Safety - Adult  Goal: Free from fall injury  7/2/2025 0013 by Keesha Wang RN  Outcome: Progressing  7/1/2025 1233 by Clara Jimenez RN  Outcome: Progressing     Problem: Discharge Planning  Goal: Discharge to home or other facility with appropriate resources  7/2/2025 0013 by Keesha Wang RN  Outcome: Progressing  7/1/2025 1233 by Clara Jimenez RN  Outcome: Progressing     Problem: Pain  Goal: Verbalizes/displays adequate comfort level or baseline comfort level  7/2/2025 0013 by Keesha Wang RN  Outcome: Progressing  7/1/2025 1233 by Clara Jimenez RN  Outcome: Progressing     Problem: Skin/Tissue Integrity  Goal: Skin integrity remains intact  Description: 1.  Monitor for areas of redness and/or skin breakdown  2.  Assess vascular access sites hourly  3.  Every 4-6 hours minimum:  Change oxygen saturation probe site  4.  Every 4-6 hours:  If on nasal continuous positive airway pressure, respiratory therapy assess nares and determine need for appliance change or resting period  7/2/2025 0013 by Keesha Wang RN  Outcome: Progressing  7/1/2025 1233 by Clara Jimenez RN  Outcome: Progressing

## 2025-07-03 LAB
ALBUMIN SERPL-MCNC: 3.6 G/DL (ref 3.5–4.6)
ALP SERPL-CCNC: 84 U/L (ref 40–130)
ALT SERPL-CCNC: 12 U/L (ref 0–33)
ANION GAP SERPL CALCULATED.3IONS-SCNC: 9 MEQ/L (ref 9–15)
AST SERPL-CCNC: 19 U/L (ref 0–35)
BASOPHILS # BLD: 0.1 K/UL (ref 0–0.2)
BASOPHILS NFR BLD: 0.5 %
BILIRUB SERPL-MCNC: 0.3 MG/DL (ref 0.2–0.7)
BUN SERPL-MCNC: 22 MG/DL (ref 8–23)
CALCIUM SERPL-MCNC: 9.3 MG/DL (ref 8.5–9.9)
CHLORIDE SERPL-SCNC: 98 MEQ/L (ref 95–107)
CO2 SERPL-SCNC: 35 MEQ/L (ref 20–31)
CREAT SERPL-MCNC: 0.81 MG/DL (ref 0.5–0.9)
EOSINOPHIL # BLD: 0.2 K/UL (ref 0–0.7)
EOSINOPHIL NFR BLD: 1.9 %
ERYTHROCYTE [DISTWIDTH] IN BLOOD BY AUTOMATED COUNT: 14.4 % (ref 11.5–14.5)
GLOBULIN SER CALC-MCNC: 3 G/DL (ref 2.3–3.5)
GLUCOSE SERPL-MCNC: 93 MG/DL (ref 70–99)
HCT VFR BLD AUTO: 34.2 % (ref 37–47)
HGB BLD-MCNC: 11 G/DL (ref 12–16)
LYMPHOCYTES # BLD: 3.2 K/UL (ref 1–4.8)
LYMPHOCYTES NFR BLD: 29.7 %
MCH RBC QN AUTO: 29.3 PG (ref 27–31.3)
MCHC RBC AUTO-ENTMCNC: 32.2 % (ref 33–37)
MCV RBC AUTO: 91.2 FL (ref 79.4–94.8)
MONOCYTES # BLD: 1.3 K/UL (ref 0.2–0.8)
MONOCYTES NFR BLD: 11.6 %
NEUTROPHILS # BLD: 6 K/UL (ref 1.4–6.5)
NEUTS SEG NFR BLD: 56 %
PLATELET # BLD AUTO: 419 K/UL (ref 130–400)
POTASSIUM SERPL-SCNC: 4.3 MEQ/L (ref 3.4–4.9)
PROT SERPL-MCNC: 6.6 G/DL (ref 6.3–8)
RBC # BLD AUTO: 3.75 M/UL (ref 4.2–5.4)
SODIUM SERPL-SCNC: 142 MEQ/L (ref 135–144)
WBC # BLD AUTO: 10.8 K/UL (ref 4.8–10.8)

## 2025-07-03 PROCEDURE — 6370000000 HC RX 637 (ALT 250 FOR IP): Performed by: INTERNAL MEDICINE

## 2025-07-03 PROCEDURE — 2500000003 HC RX 250 WO HCPCS: Performed by: INTERNAL MEDICINE

## 2025-07-03 PROCEDURE — 80053 COMPREHEN METABOLIC PANEL: CPT

## 2025-07-03 PROCEDURE — 85025 COMPLETE CBC W/AUTO DIFF WBC: CPT

## 2025-07-03 PROCEDURE — G0378 HOSPITAL OBSERVATION PER HR: HCPCS

## 2025-07-03 PROCEDURE — 36415 COLL VENOUS BLD VENIPUNCTURE: CPT

## 2025-07-03 PROCEDURE — 96372 THER/PROPH/DIAG INJ SC/IM: CPT

## 2025-07-03 PROCEDURE — 6360000002 HC RX W HCPCS: Performed by: INTERNAL MEDICINE

## 2025-07-03 PROCEDURE — 97535 SELF CARE MNGMENT TRAINING: CPT

## 2025-07-03 PROCEDURE — 1210000000 HC MED SURG R&B

## 2025-07-03 PROCEDURE — 97116 GAIT TRAINING THERAPY: CPT

## 2025-07-03 RX ORDER — OXYBUTYNIN CHLORIDE 5 MG/1
5 TABLET ORAL 3 TIMES DAILY PRN
Qty: 15 TABLET | Refills: 0 | Status: SHIPPED | OUTPATIENT
Start: 2025-07-03

## 2025-07-03 RX ADMIN — Medication 400 MG: at 08:37

## 2025-07-03 RX ADMIN — TIZANIDINE 4 MG: 4 TABLET ORAL at 23:13

## 2025-07-03 RX ADMIN — TIZANIDINE 4 MG: 4 TABLET ORAL at 08:37

## 2025-07-03 RX ADMIN — PAROXETINE HYDROCHLORIDE 20 MG: 20 TABLET, FILM COATED ORAL at 08:37

## 2025-07-03 RX ADMIN — OXYBUTYNIN CHLORIDE 5 MG: 5 TABLET ORAL at 23:13

## 2025-07-03 RX ADMIN — OXYBUTYNIN CHLORIDE 5 MG: 5 TABLET ORAL at 08:37

## 2025-07-03 RX ADMIN — AMITRIPTYLINE HYDROCHLORIDE 25 MG: 25 TABLET, FILM COATED ORAL at 23:13

## 2025-07-03 RX ADMIN — OXYBUTYNIN CHLORIDE 5 MG: 5 TABLET ORAL at 14:31

## 2025-07-03 RX ADMIN — DICLOFENAC SODIUM 2 G: 10 GEL TOPICAL at 08:38

## 2025-07-03 RX ADMIN — CARVEDILOL 12.5 MG: 12.5 TABLET, FILM COATED ORAL at 08:37

## 2025-07-03 RX ADMIN — METOLAZONE 2.5 MG: 2.5 TABLET ORAL at 08:37

## 2025-07-03 RX ADMIN — FUROSEMIDE 40 MG: 40 TABLET ORAL at 08:37

## 2025-07-03 RX ADMIN — DICLOFENAC SODIUM 2 G: 10 GEL TOPICAL at 23:17

## 2025-07-03 RX ADMIN — POTASSIUM CHLORIDE 100 MEQ: 1500 TABLET, EXTENDED RELEASE ORAL at 08:37

## 2025-07-03 RX ADMIN — LORAZEPAM 0.5 MG: 0.5 TABLET ORAL at 23:13

## 2025-07-03 RX ADMIN — CARVEDILOL 12.5 MG: 12.5 TABLET, FILM COATED ORAL at 23:13

## 2025-07-03 RX ADMIN — ASPIRIN 81 MG: 81 TABLET, DELAYED RELEASE ORAL at 08:37

## 2025-07-03 RX ADMIN — SODIUM CHLORIDE, PRESERVATIVE FREE 10 ML: 5 INJECTION INTRAVENOUS at 23:16

## 2025-07-03 RX ADMIN — ACETAMINOPHEN 650 MG: 325 TABLET ORAL at 14:31

## 2025-07-03 RX ADMIN — ENOXAPARIN SODIUM 30 MG: 100 INJECTION SUBCUTANEOUS at 08:39

## 2025-07-03 RX ADMIN — PANTOPRAZOLE SODIUM 40 MG: 40 TABLET, DELAYED RELEASE ORAL at 07:32

## 2025-07-03 RX ADMIN — Medication: at 08:38

## 2025-07-03 RX ADMIN — LORAZEPAM 0.25 MG: 0.5 TABLET ORAL at 08:38

## 2025-07-03 RX ADMIN — Medication 1 TABLET: at 08:38

## 2025-07-03 ASSESSMENT — PAIN DESCRIPTION - ORIENTATION
ORIENTATION: LEFT
ORIENTATION: LEFT

## 2025-07-03 ASSESSMENT — PAIN SCALES - GENERAL
PAINLEVEL_OUTOF10: 2
PAINLEVEL_OUTOF10: 3

## 2025-07-03 ASSESSMENT — PAIN DESCRIPTION - LOCATION
LOCATION: KNEE
LOCATION: LEG;KNEE

## 2025-07-03 ASSESSMENT — PAIN DESCRIPTION - DESCRIPTORS
DESCRIPTORS: ACHING
DESCRIPTORS: ACHING

## 2025-07-03 NOTE — PROGRESS NOTES
Physical Therapy Med Surg Daily Treatment Note  Facility/Department: [address] MED SURG UNIT  Room: [place] [address]       NAME: Arlyn Bhagat  : 1945 (79 y.o.)  MRN: [id number]  CODE STATUS: Full Code    Date of Service: 7/3/2025    Patient Diagnosis(es): Lymphedema [I89.0]  UTI (urinary tract infection) [N39.0]  Generalized weakness [R53.1]  Acute UTI [N39.0]  Arthralgia of knee, left [M25.562]  Congestive heart failure, unspecified HF chronicity, unspecified heart failure type (HCC) [I50.9]   Chief Complaint   Patient presents with    Knee Pain    Urinary Frequency     Patient Active Problem List    Diagnosis Date Noted    Heart block 2020    Enlarged tonsils 2023    UTI (urinary tract infection) 2025    Temporomandibular joint disorder 2024    Morbid obesity due to excess calories (HCC) 2024    Impaired mobility and activities of daily living 2024    Hypokalemia 2024    Anemia 2024    Depression, unspecified 2024    Thyroid nodule 2021    Pre-diabetes 2021    Gastroesophageal reflux disease without esophagitis 2021    SALAS (dyspnea on exertion) 2020    Lymphedema 2020    Symptomatic bradycardia 2020    Glenoid fracture of shoulder, left, closed, initial encounter 2019    Anterior dislocation of left shoulder 2019    Orthostasis 10/28/2019    Acquired spondylolisthesis 2018    Numbness and tingling of both legs 10/20/2017    Primary osteoarthritis of right knee 10/20/2017    Spinal stenosis of lumbar region with neurogenic claudication 10/20/2017    Lumbar radiculopathy 2015    Abscess or cellulitis of wrist 2012    MRSA (methicillin resistant Staphylococcus aureus) infection 2012    Dysphagia 2006    Chronic otitis externa 2005        Past Medical History:   Diagnosis Date    Asthma     Degenerative disc disease, lumbar     Hypertension     Lumbosacral disc disease      Devices  Type of Devices: All fall risk precautions in place, Call light within reach, Nurse notified (left on bedside commode with call light within reach.)     [facility] (6 CLICK) BASIC MOBILITY  AM-PAC Inpatient Mobility Raw Score : 17      Therapy Time   Individual   Time In 0830   Time Out 0903   Minutes 33      Gait: 17  BM/trsf: 16       Daniele Sainz PTA, 07/03/25 at 9:32 AM         Definitions for assistance levels  Independent = pt does not require any physical supervision or assistance from another person for activity completion. Device may be needed.  Stand by assistance = pt requires verbal cues or instructions from another person, close to but not touching, to perform the activity  Minimal assistance= pt performs 75% or more of the activity; assistance is required to complete the activity  Moderate assistance= pt performs 50% of the activity; assistance is required to complete the activity  Maximal assistance = pt performs 25% of the activity; assistance is required to complete the activity  Dependent = pt requires total physical assistance to accomplish the task

## 2025-07-03 NOTE — CARE COORDINATION
Met with patient at bedside to discuss discharge plan. Patient sitting up in chair. Patient updated on appeal denilal through [employer]. Patient states she is attempting to get a ride for transportation home.  Patient states she has used [facility] in the past.  Discharge plan home with [facility] and [facility].    1357  Met with patient at bedside to discuss transportation home. Patient states she cannot secure a ride.  Patient agreeable to [facility] through [facility].

## 2025-07-03 NOTE — CARE COORDINATION
Transportation set up through [facility] per pt request. W/C . Per [facility], pt will have to call provider ride to set up transportation.   Updated pt. [facility] with [facility].   Transportation set up through [facility].  time 5:30 pm.     UPDATED RN AND PT.     Electronically signed by JOVANA Charles on 7/3/2025 at 2:29 PM

## 2025-07-03 NOTE — PROGRESS NOTES
Hospitalist Progress Note      Date of Admission: 6/27/2025  Chief Complaint:    Chief Complaint   Patient presents with    Knee Pain    Urinary Frequency     Subjective:  No new complaints.  No nausea, vomiting, chest pain, or headache      Medications:    Infusion Medications    sodium chloride       Scheduled Medications    oxyBUTYnin  5 mg Oral TID    enoxaparin  30 mg SubCUTAneous BID    ammonium lactate   Topical Daily    furosemide  40 mg Oral Daily    pantoprazole  40 mg Oral QAM AC    diclofenac sodium  2 g Topical BID    sodium chloride flush  5-40 mL IntraVENous 2 times per day    amitriptyline  25 mg Oral Nightly    aspirin  81 mg Oral Daily    carvedilol  12.5 mg Oral BID    LORazepam  0.5 mg Oral Nightly    LORazepam  0.25 mg Oral QAM    magnesium oxide  400 mg Oral Daily    metOLazone  2.5 mg Oral Daily    therapeutic multivitamin-minerals  1 tablet Oral Daily    PARoxetine  20 mg Oral Daily    potassium chloride  100 mEq Oral Daily    tiZANidine  4 mg Oral BID     PRN Meds: sodium chloride flush, sodium chloride, potassium chloride **OR** potassium alternative oral replacement **OR** potassium chloride, magnesium sulfate, ondansetron **OR** ondansetron, melatonin, acetaminophen **OR** acetaminophen, sodium phosphate, analgesic ointment    Intake/Output Summary (Last 24 hours) at 7/3/2025 1933  Last data filed at 7/3/2025 1226  Gross per 24 hour   Intake 600 ml   Output 2050 ml   Net -1450 ml     Exam:  BP (!) 123/57   Pulse 73   Temp 98.4 °F (36.9 °C) (Oral)   Resp 18   Ht 1.651 m (5' 5\")   Wt 109.6 kg (241 lb 11.2 oz)   SpO2 98%   BMI 40.22 kg/m²   Head: Normocephalic, atraumatic  Sclera clear  Neck JVD flat  Lungs: normal effort of breathing    Labs:   Recent Labs     07/01/25  0446 07/02/25  0604 07/03/25  0625   WBC 10.4 9.2 10.8   HGB 10.9* 10.8* 11.0*   HCT 32.8* 34.0* 34.2*   * 426* 419*     Recent Labs     07/01/25  0446 07/02/25  0604 07/03/25  0625    145* 142   K 3.9

## 2025-07-03 NOTE — CARE COORDINATION
Checked status of appeal, currently at \"Physician Review completed\" stage. Awaiting financial liability and outcome notifications.  Omar Loja MSN, RN Mgr Case Mgmt.

## 2025-07-04 VITALS
TEMPERATURE: 97.5 F | BODY MASS INDEX: 40.27 KG/M2 | RESPIRATION RATE: 16 BRPM | OXYGEN SATURATION: 99 % | DIASTOLIC BLOOD PRESSURE: 79 MMHG | SYSTOLIC BLOOD PRESSURE: 129 MMHG | WEIGHT: 241.7 LBS | HEIGHT: 65 IN | HEART RATE: 64 BPM

## 2025-07-04 LAB
ALBUMIN SERPL-MCNC: 3.5 G/DL (ref 3.5–4.6)
ALP SERPL-CCNC: 87 U/L (ref 40–130)
ALT SERPL-CCNC: 13 U/L (ref 0–33)
ANION GAP SERPL CALCULATED.3IONS-SCNC: 11 MEQ/L (ref 9–15)
AST SERPL-CCNC: 16 U/L (ref 0–35)
BASOPHILS # BLD: 0 K/UL (ref 0–0.2)
BASOPHILS NFR BLD: 0.2 %
BILIRUB SERPL-MCNC: 0.3 MG/DL (ref 0.2–0.7)
BUN SERPL-MCNC: 24 MG/DL (ref 8–23)
CALCIUM SERPL-MCNC: 9.1 MG/DL (ref 8.5–9.9)
CHLORIDE SERPL-SCNC: 98 MEQ/L (ref 95–107)
CO2 SERPL-SCNC: 32 MEQ/L (ref 20–31)
CREAT SERPL-MCNC: 0.76 MG/DL (ref 0.5–0.9)
EOSINOPHIL # BLD: 0.2 K/UL (ref 0–0.7)
EOSINOPHIL NFR BLD: 1.4 %
ERYTHROCYTE [DISTWIDTH] IN BLOOD BY AUTOMATED COUNT: 14.4 % (ref 11.5–14.5)
GLOBULIN SER CALC-MCNC: 3 G/DL (ref 2.3–3.5)
GLUCOSE SERPL-MCNC: 95 MG/DL (ref 70–99)
HCT VFR BLD AUTO: 33.4 % (ref 37–47)
HGB BLD-MCNC: 10.8 G/DL (ref 12–16)
LYMPHOCYTES # BLD: 3 K/UL (ref 1–4.8)
LYMPHOCYTES NFR BLD: 27.7 %
MCH RBC QN AUTO: 29.1 PG (ref 27–31.3)
MCHC RBC AUTO-ENTMCNC: 32.3 % (ref 33–37)
MCV RBC AUTO: 90 FL (ref 79.4–94.8)
MONOCYTES # BLD: 1.2 K/UL (ref 0.2–0.8)
MONOCYTES NFR BLD: 11.6 %
NEUTROPHILS # BLD: 6.3 K/UL (ref 1.4–6.5)
NEUTS SEG NFR BLD: 58.8 %
PLATELET # BLD AUTO: 430 K/UL (ref 130–400)
POTASSIUM SERPL-SCNC: 3.9 MEQ/L (ref 3.4–4.9)
PROT SERPL-MCNC: 6.5 G/DL (ref 6.3–8)
RBC # BLD AUTO: 3.71 M/UL (ref 4.2–5.4)
SODIUM SERPL-SCNC: 141 MEQ/L (ref 135–144)
WBC # BLD AUTO: 10.7 K/UL (ref 4.8–10.8)

## 2025-07-04 PROCEDURE — 36415 COLL VENOUS BLD VENIPUNCTURE: CPT

## 2025-07-04 PROCEDURE — 85025 COMPLETE CBC W/AUTO DIFF WBC: CPT

## 2025-07-04 PROCEDURE — 80053 COMPREHEN METABOLIC PANEL: CPT

## 2025-07-04 PROCEDURE — 6370000000 HC RX 637 (ALT 250 FOR IP): Performed by: INTERNAL MEDICINE

## 2025-07-04 PROCEDURE — G0378 HOSPITAL OBSERVATION PER HR: HCPCS

## 2025-07-04 RX ADMIN — OXYBUTYNIN CHLORIDE 5 MG: 5 TABLET ORAL at 08:41

## 2025-07-04 RX ADMIN — ASPIRIN 81 MG: 81 TABLET, DELAYED RELEASE ORAL at 08:41

## 2025-07-04 RX ADMIN — FUROSEMIDE 40 MG: 40 TABLET ORAL at 08:41

## 2025-07-04 RX ADMIN — POTASSIUM CHLORIDE 100 MEQ: 1500 TABLET, EXTENDED RELEASE ORAL at 08:41

## 2025-07-04 RX ADMIN — TIZANIDINE 4 MG: 4 TABLET ORAL at 08:41

## 2025-07-04 RX ADMIN — PAROXETINE HYDROCHLORIDE 20 MG: 20 TABLET, FILM COATED ORAL at 08:41

## 2025-07-04 RX ADMIN — METOLAZONE 2.5 MG: 2.5 TABLET ORAL at 08:41

## 2025-07-04 RX ADMIN — Medication 1 TABLET: at 08:41

## 2025-07-04 RX ADMIN — Medication 400 MG: at 08:41

## 2025-07-04 RX ADMIN — CARVEDILOL 12.5 MG: 12.5 TABLET, FILM COATED ORAL at 08:41

## 2025-07-04 RX ADMIN — PANTOPRAZOLE SODIUM 40 MG: 40 TABLET, DELAYED RELEASE ORAL at 05:35

## 2025-07-04 RX ADMIN — LORAZEPAM 0.25 MG: 0.5 TABLET ORAL at 08:41

## 2025-07-04 RX ADMIN — Medication 3 MG: at 00:23

## 2025-07-04 ASSESSMENT — PAIN SCALES - GENERAL: PAINLEVEL_OUTOF10: 1

## 2025-07-05 NOTE — PROGRESS NOTES
Physician Progress Note      PATIENT:               GAETANO SIMONS  CSN #:                  [id number]  :                       1945  ADMIT DATE:       2025 5:12 PM  DISCH DATE:        2025 10:57 AM  RESPONDING  PROVIDER #:        Edin Ovalle MD          QUERY TEXT:    CHF documented by ED physician, Dr. Jeffers. Based on your medical judgment,   please clarify these findings and document if any of the following are being   evaluated and/or treated:    The clinical indicators include:  -Echo showed EF 50-55%. Mildly increased wall thickness. Findings consistent   with mild asymmetric hypertrophy. Normal wall motion. Indeterminate diastolic   function. Mildly elevated RVSP, consistent with mild pulmonary hypertension.   RVSP is 35 mmHg. ()  -HTN, Class III obesity, History of complete heart block with PMM (Dr. Ovalle   )  -NT Pro- (25 lab)  -Lasix 40 mg po daily, Coreg, ASA, Zaroxolyn (MAR)  Options provided:  -- Chronic Diastolic CHF/HFpEF  -- No current evidence of CHF  -- Other - I will add my own diagnosis  -- Disagree - Not applicable / Not valid  -- Disagree - Clinically unable to determine / Unknown  -- Refer to Clinical Documentation Reviewer    PROVIDER RESPONSE TEXT:    Provider is clinically unable to determine a response to this query.  echo report not conclusive regarding query of HFpEF    Query created by: Jeancarlos Ugarte on 2025 3:04 PM      Electronically signed by:  Edin Ovalle MD 2025 7:02 AM

## 2025-07-06 NOTE — PROGRESS NOTES
Physical Therapy  Facility/Department: [facility] MED SURG W473/W473-01  Physical Therapy Discharge      NAME: Arlyn Bhagat    : 1945 (79 y.o.)  MRN: [id number]    Account: [id number]  Gender: female      Patient has been discharged from acute care hospital. DC patient from current PT program.      Electronically signed by Gisella Noel PT on 25 at 11:46 AM EDT

## 2025-07-21 ENCOUNTER — OFFICE VISIT (OUTPATIENT)
Age: 80
End: 2025-07-21
Payer: MEDICARE

## 2025-07-21 VITALS
RESPIRATION RATE: 16 BRPM | SYSTOLIC BLOOD PRESSURE: 128 MMHG | HEART RATE: 74 BPM | OXYGEN SATURATION: 94 % | DIASTOLIC BLOOD PRESSURE: 68 MMHG

## 2025-07-21 DIAGNOSIS — E87.6 HYPOKALEMIA: ICD-10-CM

## 2025-07-21 PROCEDURE — 1159F MED LIST DOCD IN RCRD: CPT | Performed by: INTERNAL MEDICINE

## 2025-07-21 PROCEDURE — 1090F PRES/ABSN URINE INCON ASSESS: CPT | Performed by: INTERNAL MEDICINE

## 2025-07-21 PROCEDURE — 1111F DSCHRG MED/CURRENT MED MERGE: CPT | Performed by: INTERNAL MEDICINE

## 2025-07-21 PROCEDURE — G8400 PT W/DXA NO RESULTS DOC: HCPCS | Performed by: INTERNAL MEDICINE

## 2025-07-21 PROCEDURE — 1123F ACP DISCUSS/DSCN MKR DOCD: CPT | Performed by: INTERNAL MEDICINE

## 2025-07-21 PROCEDURE — 99214 OFFICE O/P EST MOD 30 MIN: CPT | Performed by: INTERNAL MEDICINE

## 2025-07-21 PROCEDURE — G8427 DOCREV CUR MEDS BY ELIG CLIN: HCPCS | Performed by: INTERNAL MEDICINE

## 2025-07-21 PROCEDURE — G8417 CALC BMI ABV UP PARAM F/U: HCPCS | Performed by: INTERNAL MEDICINE

## 2025-07-21 PROCEDURE — 1036F TOBACCO NON-USER: CPT | Performed by: INTERNAL MEDICINE

## 2025-07-21 RX ORDER — POTASSIUM CHLORIDE 1500 MG/1
100 TABLET, EXTENDED RELEASE ORAL DAILY
Qty: 150 TABLET | Refills: 1 | Status: SHIPPED | OUTPATIENT
Start: 2025-07-21

## 2025-07-21 ASSESSMENT — ENCOUNTER SYMPTOMS
BLOOD IN STOOL: 0
SHORTNESS OF BREATH: 0
STRIDOR: 0
GASTROINTESTINAL NEGATIVE: 1
COUGH: 0
EYES NEGATIVE: 1
CHEST TIGHTNESS: 0
WHEEZING: 0
RESPIRATORY NEGATIVE: 1

## 2025-07-21 NOTE — PROGRESS NOTES
Subsequent Progress Note  Patient: Arlyn Bhagat  YOB: 1945  MRN: [id number]    Chief Complaint:HB Tired. Edema  Chief Complaint   Patient presents with    Medication Refill    Coronary Artery Disease    1 Year Follow Up    Results     echo       CV Data:  2//6/2020 PPM 2nd dgree Type II AVB   2/2020 Echo EF 60   2/2020 spect negative.   6/25 Echo EF 50-55    Subjective/HPI: taking slaty food. Edema worse. No cp + salas. Waking some. No falls no bleed.     3/5/2020 no cp no sob no falls no bleed. Tired all the time. Leg swelling little worse.     12/7/2020 Patient and/or health care decision maker is aware that that he/she may receive a bill for this telephone service, depending on his insurance coverage, and has provided verbal consent to proceed.  This visit was completed via telephone.  Total time 14 minutes.    Fell on her knees wwent to ER. CT shows Left knee effuison   Still with SALAS and leg edema no worse. No cp walking little   No bleed    9/22/21 Patient and/or health care decision maker is aware that that he/she may receive a bill for this telephone service, depending on his insurance coverage, and has provided verbal consent to proceed.  This visit was completed via telephone.  Total time 14 minutes.    Pt does not feel well. Sob weak. Having little hematuria. Recent K remains low despite advancing to 20 bid.       10/21/21 no cp no sob no falls no bleed tired all the time. Labs reviewed.     1/17/22 tired no cp no sob occ flutter brief no falls no bleed    3/4/24 doing well no cp no sob has GERD. No falls no bleed     7/21/25 doing fine minimally active LE Edema no cp no falls no bleed    EKG: SR 76    Lives alone  Nonsmoker  Occ [employer]  Retired MA    Past Medical History:   Diagnosis Date    Asthma     Degenerative disc disease, lumbar     Hypertension     Lumbosacral disc disease     Lymphedema     MRSA infection     UTI (lower urinary tract infection)        Past Surgical History:

## 2025-07-24 ENCOUNTER — HOSPITAL ENCOUNTER (OUTPATIENT)
Dept: CARDIOLOGY | Age: 80
Discharge: HOME | End: 2025-07-24

## 2025-07-27 PROBLEM — N39.0 UTI (URINARY TRACT INFECTION): Status: RESOLVED | Noted: 2025-06-27 | Resolved: 2025-07-27

## 2025-08-01 LAB
BACTERIA UR CULT: ABNORMAL
BACTERIA UR CULT: ABNORMAL
ORGANISM: ABNORMAL

## 2025-08-06 DIAGNOSIS — E87.6 HYPOKALEMIA: ICD-10-CM

## 2025-08-06 RX ORDER — POTASSIUM CHLORIDE 1500 MG/1
100 TABLET, EXTENDED RELEASE ORAL DAILY
Qty: 450 TABLET | Refills: 3 | Status: SHIPPED | OUTPATIENT
Start: 2025-08-06

## 2025-08-12 ENCOUNTER — HOSPITAL ENCOUNTER (OUTPATIENT)
Dept: CARDIOLOGY | Age: 80
Discharge: HOME | End: 2025-08-12
Payer: MEDICARE

## 2025-08-12 PROCEDURE — 93296 REM INTERROG EVL PM/IDS: CPT

## 2025-08-20 ENCOUNTER — HOSPITAL ENCOUNTER (OUTPATIENT)
Dept: WOUND CARE | Age: 80
Discharge: HOME | End: 2025-08-20
Attending: FAMILY MEDICINE
Payer: MEDICARE

## 2025-08-20 VITALS
RESPIRATION RATE: 18 BRPM | SYSTOLIC BLOOD PRESSURE: 125 MMHG | TEMPERATURE: 99 F | HEART RATE: 72 BPM | DIASTOLIC BLOOD PRESSURE: 72 MMHG

## 2025-08-20 DIAGNOSIS — I83.899 VENOUS STASIS ULCER WITH EDEMA OF LOWER LEG (HCC): Primary | ICD-10-CM

## 2025-08-20 DIAGNOSIS — R60.9 VENOUS STASIS ULCER WITH EDEMA OF LOWER LEG (HCC): Primary | ICD-10-CM

## 2025-08-20 DIAGNOSIS — L97.909 VENOUS STASIS ULCER WITH EDEMA OF LOWER LEG (HCC): Primary | ICD-10-CM

## 2025-08-20 DIAGNOSIS — I89.0 LYMPHEDEMA: ICD-10-CM

## 2025-08-20 DIAGNOSIS — I83.009 VENOUS STASIS ULCER WITH EDEMA OF LOWER LEG (HCC): Primary | ICD-10-CM

## 2025-08-20 PROCEDURE — 99213 OFFICE O/P EST LOW 20 MIN: CPT

## 2025-08-20 PROCEDURE — 99203 OFFICE O/P NEW LOW 30 MIN: CPT | Performed by: FAMILY MEDICINE

## 2025-08-20 RX ORDER — LIDOCAINE 40 MG/G
CREAM TOPICAL PRN
OUTPATIENT
Start: 2025-08-20

## 2025-08-20 RX ORDER — MUPIROCIN 2 %
OINTMENT (GRAM) TOPICAL PRN
OUTPATIENT
Start: 2025-08-20

## 2025-08-20 RX ORDER — LIDOCAINE HYDROCHLORIDE 20 MG/ML
JELLY TOPICAL PRN
OUTPATIENT
Start: 2025-08-20

## 2025-08-20 RX ORDER — GINSENG 100 MG
CAPSULE ORAL PRN
OUTPATIENT
Start: 2025-08-20

## 2025-08-20 RX ORDER — NEOMYCIN/BACITRACIN/POLYMYXINB 3.5-400-5K
OINTMENT (GRAM) TOPICAL PRN
OUTPATIENT
Start: 2025-08-20

## 2025-08-20 RX ORDER — BETAMETHASONE DIPROPIONATE 0.5 MG/G
CREAM TOPICAL PRN
OUTPATIENT
Start: 2025-08-20

## 2025-08-20 RX ORDER — LIDOCAINE 50 MG/G
OINTMENT TOPICAL PRN
OUTPATIENT
Start: 2025-08-20

## 2025-08-20 RX ORDER — LIDOCAINE HYDROCHLORIDE 40 MG/ML
SOLUTION TOPICAL PRN
OUTPATIENT
Start: 2025-08-20

## 2025-08-20 RX ORDER — TRIAMCINOLONE ACETONIDE 1 MG/G
OINTMENT TOPICAL PRN
OUTPATIENT
Start: 2025-08-20

## 2025-08-20 RX ORDER — CLOBETASOL PROPIONATE 0.5 MG/G
OINTMENT TOPICAL PRN
OUTPATIENT
Start: 2025-08-20

## 2025-08-20 RX ORDER — BACITRACIN ZINC AND POLYMYXIN B SULFATE 500; 1000 [USP'U]/G; [USP'U]/G
OINTMENT TOPICAL PRN
OUTPATIENT
Start: 2025-08-20

## 2025-08-20 RX ORDER — SODIUM CHLOR/HYPOCHLOROUS ACID 0.033 %
SOLUTION, IRRIGATION IRRIGATION PRN
OUTPATIENT
Start: 2025-08-20

## 2025-08-20 RX ORDER — GENTAMICIN SULFATE 1 MG/G
OINTMENT TOPICAL PRN
OUTPATIENT
Start: 2025-08-20

## 2025-08-20 RX ORDER — SILVER SULFADIAZINE 10 MG/G
CREAM TOPICAL PRN
OUTPATIENT
Start: 2025-08-20

## 2025-08-20 ASSESSMENT — PAIN SCALES - GENERAL: PAINLEVEL_OUTOF10: 0

## 2025-08-21 RX ORDER — CARVEDILOL 12.5 MG/1
12.5 TABLET ORAL 2 TIMES DAILY
Qty: 180 TABLET | Refills: 2 | Status: SHIPPED | OUTPATIENT
Start: 2025-08-21

## 2025-09-03 ENCOUNTER — HOSPITAL ENCOUNTER (OUTPATIENT)
Dept: WOUND CARE | Age: 80
Discharge: HOME | End: 2025-09-03
Attending: FAMILY MEDICINE
Payer: MEDICARE

## 2025-09-03 VITALS
TEMPERATURE: 96.8 F | SYSTOLIC BLOOD PRESSURE: 150 MMHG | RESPIRATION RATE: 20 BRPM | DIASTOLIC BLOOD PRESSURE: 69 MMHG | HEART RATE: 82 BPM

## 2025-09-03 DIAGNOSIS — I83.899 VENOUS STASIS ULCER WITH EDEMA OF LOWER LEG (HCC): Primary | ICD-10-CM

## 2025-09-03 DIAGNOSIS — I83.009 VENOUS STASIS ULCER WITH EDEMA OF LOWER LEG (HCC): Primary | ICD-10-CM

## 2025-09-03 DIAGNOSIS — R60.9 VENOUS STASIS ULCER WITH EDEMA OF LOWER LEG (HCC): Primary | ICD-10-CM

## 2025-09-03 DIAGNOSIS — L97.909 VENOUS STASIS ULCER WITH EDEMA OF LOWER LEG (HCC): Primary | ICD-10-CM

## 2025-09-03 PROCEDURE — 99213 OFFICE O/P EST LOW 20 MIN: CPT | Performed by: FAMILY MEDICINE

## 2025-09-03 PROCEDURE — 99213 OFFICE O/P EST LOW 20 MIN: CPT

## 2025-09-03 RX ORDER — LIDOCAINE HYDROCHLORIDE 40 MG/ML
SOLUTION TOPICAL PRN
OUTPATIENT
Start: 2025-09-03

## 2025-09-03 RX ORDER — BACITRACIN ZINC AND POLYMYXIN B SULFATE 500; 1000 [USP'U]/G; [USP'U]/G
OINTMENT TOPICAL PRN
OUTPATIENT
Start: 2025-09-03

## 2025-09-03 RX ORDER — NEOMYCIN/BACITRACIN/POLYMYXINB 3.5-400-5K
OINTMENT (GRAM) TOPICAL PRN
OUTPATIENT
Start: 2025-09-03

## 2025-09-03 RX ORDER — SILVER SULFADIAZINE 10 MG/G
CREAM TOPICAL PRN
OUTPATIENT
Start: 2025-09-03

## 2025-09-03 RX ORDER — LIDOCAINE HYDROCHLORIDE 20 MG/ML
JELLY TOPICAL PRN
OUTPATIENT
Start: 2025-09-03

## 2025-09-03 RX ORDER — GENTAMICIN SULFATE 1 MG/G
OINTMENT TOPICAL PRN
OUTPATIENT
Start: 2025-09-03

## 2025-09-03 RX ORDER — LIDOCAINE 40 MG/G
CREAM TOPICAL PRN
OUTPATIENT
Start: 2025-09-03

## 2025-09-03 RX ORDER — LIDOCAINE 50 MG/G
OINTMENT TOPICAL PRN
OUTPATIENT
Start: 2025-09-03

## 2025-09-03 RX ORDER — BETAMETHASONE DIPROPIONATE 0.5 MG/G
CREAM TOPICAL PRN
OUTPATIENT
Start: 2025-09-03

## 2025-09-03 RX ORDER — CLOBETASOL PROPIONATE 0.5 MG/G
OINTMENT TOPICAL PRN
OUTPATIENT
Start: 2025-09-03

## 2025-09-03 RX ORDER — MUPIROCIN 2 %
OINTMENT (GRAM) TOPICAL PRN
OUTPATIENT
Start: 2025-09-03

## 2025-09-03 RX ORDER — TRIAMCINOLONE ACETONIDE 1 MG/G
OINTMENT TOPICAL PRN
OUTPATIENT
Start: 2025-09-03

## 2025-09-03 RX ORDER — GINSENG 100 MG
CAPSULE ORAL PRN
OUTPATIENT
Start: 2025-09-03

## 2025-09-03 RX ORDER — SODIUM CHLOR/HYPOCHLOROUS ACID 0.033 %
SOLUTION, IRRIGATION IRRIGATION PRN
OUTPATIENT
Start: 2025-09-03

## 2025-09-03 ASSESSMENT — PAIN SCALES - GENERAL: PAINLEVEL_OUTOF10: 0
